# Patient Record
Sex: FEMALE | Race: WHITE | NOT HISPANIC OR LATINO | Employment: OTHER | URBAN - NONMETROPOLITAN AREA
[De-identification: names, ages, dates, MRNs, and addresses within clinical notes are randomized per-mention and may not be internally consistent; named-entity substitution may affect disease eponyms.]

---

## 2020-01-03 ENCOUNTER — TRANSCRIBE ORDERS (OUTPATIENT)
Dept: ADMINISTRATIVE | Facility: HOSPITAL | Age: 75
End: 2020-01-03

## 2020-01-03 DIAGNOSIS — C37 MALIGNANT THYMOMA (HCC): Primary | ICD-10-CM

## 2020-01-07 ENCOUNTER — HOSPITAL ENCOUNTER (OUTPATIENT)
Dept: CT IMAGING | Facility: HOSPITAL | Age: 75
Discharge: HOME | End: 2020-01-07
Payer: MEDICARE

## 2020-01-07 ENCOUNTER — TRANSCRIBE ORDERS (OUTPATIENT)
Dept: ADMINISTRATIVE | Facility: HOSPITAL | Age: 75
End: 2020-01-07

## 2020-01-07 DIAGNOSIS — C37 MALIGNANT THYMOMA (HCC): Primary | ICD-10-CM

## 2020-01-07 DIAGNOSIS — C37 MALIGNANT THYMOMA (HCC): ICD-10-CM

## 2020-01-07 DIAGNOSIS — C37 THYMOMA, MALIGNANT (HCC): ICD-10-CM

## 2020-01-07 PROCEDURE — 71260 CT THORAX DX C+: CPT

## 2020-01-07 RX ADMIN — IOHEXOL 85 ML: 350 INJECTION, SOLUTION INTRAVENOUS at 09:24

## 2020-10-21 DIAGNOSIS — C37 MALIGNANT NEOPLASM OF THYMUS (HCC): ICD-10-CM

## 2021-02-10 ENCOUNTER — IMMUNIZATIONS (OUTPATIENT)
Dept: FAMILY MEDICINE CLINIC | Facility: HOSPITAL | Age: 76
End: 2021-02-10

## 2021-02-10 DIAGNOSIS — Z23 ENCOUNTER FOR IMMUNIZATION: Primary | ICD-10-CM

## 2021-02-10 PROCEDURE — 0011A SARS-COV-2 / COVID-19 MRNA VACCINE (MODERNA) 100 MCG: CPT

## 2021-02-10 PROCEDURE — 91301 SARS-COV-2 / COVID-19 MRNA VACCINE (MODERNA) 100 MCG: CPT

## 2021-04-14 ENCOUNTER — APPOINTMENT (EMERGENCY)
Dept: CT IMAGING | Facility: HOSPITAL | Age: 76
End: 2021-04-14
Payer: MEDICARE

## 2021-04-14 ENCOUNTER — HOSPITAL ENCOUNTER (EMERGENCY)
Facility: HOSPITAL | Age: 76
Discharge: HOME | End: 2021-04-14
Attending: EMERGENCY MEDICINE | Admitting: EMERGENCY MEDICINE
Payer: MEDICARE

## 2021-04-14 ENCOUNTER — APPOINTMENT (EMERGENCY)
Dept: RADIOLOGY | Facility: HOSPITAL | Age: 76
End: 2021-04-14
Payer: MEDICARE

## 2021-04-14 VITALS
TEMPERATURE: 97.6 F | RESPIRATION RATE: 16 BRPM | OXYGEN SATURATION: 97 % | DIASTOLIC BLOOD PRESSURE: 72 MMHG | SYSTOLIC BLOOD PRESSURE: 140 MMHG | HEART RATE: 74 BPM

## 2021-04-14 DIAGNOSIS — R07.9 CHEST PAIN: ICD-10-CM

## 2021-04-14 DIAGNOSIS — K80.20 CHOLELITHIASIS: Primary | ICD-10-CM

## 2021-04-14 LAB
ALBUMIN SERPL BCP-MCNC: 2.5 G/DL (ref 3.5–5)
ALP SERPL-CCNC: 314 U/L (ref 46–116)
ALT SERPL W P-5'-P-CCNC: 44 U/L (ref 12–78)
ANION GAP SERPL CALCULATED.3IONS-SCNC: 8 MMOL/L (ref 4–13)
AST SERPL W P-5'-P-CCNC: 91 U/L (ref 5–45)
BASOPHILS # BLD AUTO: 0.02 THOUSANDS/ΜL (ref 0–0.1)
BASOPHILS NFR BLD AUTO: 0 % (ref 0–1)
BILIRUB SERPL-MCNC: 0.72 MG/DL (ref 0.2–1)
BUN SERPL-MCNC: 12 MG/DL (ref 5–25)
CALCIUM ALBUM COR SERPL-MCNC: 10.1 MG/DL (ref 8.3–10.1)
CALCIUM SERPL-MCNC: 8.9 MG/DL (ref 8.3–10.1)
CHLORIDE SERPL-SCNC: 94 MMOL/L (ref 100–108)
CO2 SERPL-SCNC: 28 MMOL/L (ref 21–32)
CREAT SERPL-MCNC: 0.82 MG/DL (ref 0.6–1.3)
EOSINOPHIL # BLD AUTO: 0.07 THOUSAND/ΜL (ref 0–0.61)
EOSINOPHIL NFR BLD AUTO: 1 % (ref 0–6)
ERYTHROCYTE [DISTWIDTH] IN BLOOD BY AUTOMATED COUNT: 15.1 % (ref 11.6–15.1)
FLUAV RNA RESP QL NAA+PROBE: NEGATIVE
FLUBV RNA RESP QL NAA+PROBE: NEGATIVE
GFR SERPL CREATININE-BSD FRML MDRD: 69 ML/MIN/1.73SQ M
GLUCOSE SERPL-MCNC: 131 MG/DL (ref 65–140)
HCT VFR BLD AUTO: 30.7 % (ref 34.8–46.1)
HGB BLD-MCNC: 9.5 G/DL (ref 11.5–15.4)
IMM GRANULOCYTES # BLD AUTO: 0.06 THOUSAND/UL (ref 0–0.2)
IMM GRANULOCYTES NFR BLD AUTO: 1 % (ref 0–2)
LACTATE SERPL-SCNC: 2 MMOL/L (ref 0.5–2)
LIPASE SERPL-CCNC: 111 U/L (ref 73–393)
LYMPHOCYTES # BLD AUTO: 1.27 THOUSANDS/ΜL (ref 0.6–4.47)
LYMPHOCYTES NFR BLD AUTO: 14 % (ref 14–44)
MCH RBC QN AUTO: 27.6 PG (ref 26.8–34.3)
MCHC RBC AUTO-ENTMCNC: 30.9 G/DL (ref 31.4–37.4)
MCV RBC AUTO: 89 FL (ref 82–98)
MONOCYTES # BLD AUTO: 0.99 THOUSAND/ΜL (ref 0.17–1.22)
MONOCYTES NFR BLD AUTO: 11 % (ref 4–12)
NEUTROPHILS # BLD AUTO: 6.54 THOUSANDS/ΜL (ref 1.85–7.62)
NEUTS SEG NFR BLD AUTO: 73 % (ref 43–75)
NRBC BLD AUTO-RTO: 0 /100 WBCS
PLATELET # BLD AUTO: 367 THOUSANDS/UL (ref 149–390)
PMV BLD AUTO: 9.9 FL (ref 8.9–12.7)
POTASSIUM SERPL-SCNC: 3.8 MMOL/L (ref 3.5–5.3)
PROT SERPL-MCNC: 6.9 G/DL (ref 6.4–8.2)
RBC # BLD AUTO: 3.44 MILLION/UL (ref 3.81–5.12)
RSV RNA RESP QL NAA+PROBE: NEGATIVE
SARS-COV-2 RNA RESP QL NAA+PROBE: NEGATIVE
SODIUM SERPL-SCNC: 130 MMOL/L (ref 136–145)
TROPONIN I SERPL-MCNC: <0.02 NG/ML
TROPONIN I SERPL-MCNC: <0.02 NG/ML
WBC # BLD AUTO: 8.95 THOUSAND/UL (ref 4.31–10.16)

## 2021-04-14 PROCEDURE — 74177 CT ABD & PELVIS W/CONTRAST: CPT

## 2021-04-14 PROCEDURE — 93005 ELECTROCARDIOGRAM TRACING: CPT

## 2021-04-14 PROCEDURE — 85025 COMPLETE CBC W/AUTO DIFF WBC: CPT | Performed by: PHYSICIAN ASSISTANT

## 2021-04-14 PROCEDURE — 71045 X-RAY EXAM CHEST 1 VIEW: CPT

## 2021-04-14 PROCEDURE — 83690 ASSAY OF LIPASE: CPT | Performed by: PHYSICIAN ASSISTANT

## 2021-04-14 PROCEDURE — 71260 CT THORAX DX C+: CPT

## 2021-04-14 PROCEDURE — 0241U HB NFCT DS VIR RESP RNA 4 TRGT: CPT | Performed by: PHYSICIAN ASSISTANT

## 2021-04-14 PROCEDURE — 83605 ASSAY OF LACTIC ACID: CPT | Performed by: PHYSICIAN ASSISTANT

## 2021-04-14 PROCEDURE — 99285 EMERGENCY DEPT VISIT HI MDM: CPT

## 2021-04-14 PROCEDURE — 99284 EMERGENCY DEPT VISIT MOD MDM: CPT | Performed by: EMERGENCY MEDICINE

## 2021-04-14 PROCEDURE — 36415 COLL VENOUS BLD VENIPUNCTURE: CPT | Performed by: PHYSICIAN ASSISTANT

## 2021-04-14 PROCEDURE — 96374 THER/PROPH/DIAG INJ IV PUSH: CPT

## 2021-04-14 PROCEDURE — 80053 COMPREHEN METABOLIC PANEL: CPT | Performed by: PHYSICIAN ASSISTANT

## 2021-04-14 PROCEDURE — 84484 ASSAY OF TROPONIN QUANT: CPT | Performed by: PHYSICIAN ASSISTANT

## 2021-04-14 PROCEDURE — 96361 HYDRATE IV INFUSION ADD-ON: CPT

## 2021-04-14 RX ORDER — KETOROLAC TROMETHAMINE 30 MG/ML
15 INJECTION, SOLUTION INTRAMUSCULAR; INTRAVENOUS ONCE
Status: COMPLETED | OUTPATIENT
Start: 2021-04-14 | End: 2021-04-14

## 2021-04-14 RX ORDER — CEPHALEXIN 250 MG/1
CAPSULE ORAL
COMMUNITY
Start: 2021-01-14

## 2021-04-14 RX ORDER — ATORVASTATIN CALCIUM 20 MG/1
20 TABLET, FILM COATED ORAL
COMMUNITY
Start: 2021-03-30

## 2021-04-14 RX ADMIN — IOHEXOL 100 ML: 350 INJECTION, SOLUTION INTRAVENOUS at 18:02

## 2021-04-14 RX ADMIN — KETOROLAC TROMETHAMINE 15 MG: 30 INJECTION, SOLUTION INTRAMUSCULAR at 18:51

## 2021-04-14 RX ADMIN — SODIUM CHLORIDE 1000 ML: 0.9 INJECTION, SOLUTION INTRAVENOUS at 17:40

## 2021-04-14 NOTE — ED ATTENDING ATTESTATION
4/14/2021  ILebron MD, saw and evaluated the patient  I have discussed the patient with the resident/non-physician practitioner and agree with the resident's/non-physician practitioner's findings, Plan of Care, and MDM as documented in the resident's/non-physician practitioner's note, except where noted  All available labs and Radiology studies were reviewed  I was present for key portions of any procedure(s) performed by the resident/non-physician practitioner and I was immediately available to provide assistance  At this point I agree with the current assessment done in the Emergency Department  I have conducted an independent evaluation of this patient a history and physical is as follows:    ED Course     Patient complains of sharp pain under her breast   No radiation  Felt short of breath  Felt better after nitroglycerin  On exam patient awake alert  Lungs are clear to auscultation  Heart is a regular rate and rhythm  EKG shows no acute  ST changes  Abdomen soft nontender good bowel sounds      Critical Care Time  Procedures

## 2021-04-14 NOTE — ED PROVIDER NOTES
History  Chief Complaint   Patient presents with    Chest Pain     pt left chest pain started at 1430 today that comes and go  denies N/V/D, dizziness, diaphoresis  states SOB due to the pain  pt states fevers for the past 2 months everyday      57-year-old female presents the emergency department for evaluation of pain underneath bilateral breasts  Pain does not radiate  Sharp in nature  Patient states pain started suddenly around 14 30 today  Reports this has been relatively consistent since however waxing and waning  She reports she had had similar previously when she had passed a gallstone however denies cholecystectomy  Patient states she had COVID pneumonia in February  Reports since she has had intermittent fevers  She denies any cough, congestion, chest pain, shortness of breath  Patient denies any nausea, vomiting, diarrhea  She denies any dizziness, diaphoresis  Patient denies any leg swelling  She denies any cardiac history  Patient denies history of hypertension  Patient denies any trauma or injury  History provided by:  Patient  Chest Pain  Chest pain location: Beneath bilateral breasts  Pain quality: sharp    Pain radiates to:  Does not radiate  Pain radiates to the back: no    Pain severity:  Moderate  Onset quality:  Sudden  Timing:  Constant  Progression:  Waxing and waning  Chronicity:  New  Worsened by:  Nothing tried  Associated symptoms: abdominal pain and fever    Associated symptoms: no AICD problem, no altered mental status, no anorexia, no anxiety, no back pain, no claudication, no cough, no diaphoresis, no dizziness, no dysphagia, no fatigue, no headache, no heartburn, no lower extremity edema, no nausea, no near-syncope, no numbness, no orthopnea, no palpitations, no PND, no shortness of breath, no syncope, not vomiting and no weakness        Prior to Admission Medications   Prescriptions Last Dose Informant Patient Reported?  Taking?   atorvastatin (LIPITOR) 20 mg tablet   Yes Yes   Si mg   cephalexin (KEFLEX) 250 mg capsule   Yes Yes   Sig:   Start: 21 7:54:00 EST, See Instructions, Disp# 30 cap, Refills: 5, TAKE 1 CAPSULE BY MOUTH EVERY DAY, Pharmacy: [facility]/pharmacy #[id number]      Facility-Administered Medications: None       History reviewed  No pertinent past medical history  Past Surgical History:   Procedure Laterality Date    HYSTERECTOMY         History reviewed  No pertinent family history  I have reviewed and agree with the history as documented  E-Cigarette/Vaping    E-Cigarette Use Never User      E-Cigarette/Vaping Substances     Social History     Tobacco Use    Smoking status: Never Smoker    Smokeless tobacco: Never Used   Substance Use Topics    Alcohol use: Yes     Frequency: Monthly or less    Drug use: Never       Review of Systems   Constitutional: Positive for fever  Negative for appetite change, chills, diaphoresis and fatigue  HENT: Negative  Negative for trouble swallowing  Eyes: Negative for visual disturbance  Respiratory: Negative for cough, choking, chest tightness, shortness of breath, wheezing and stridor  Cardiovascular: Positive for chest pain  Negative for palpitations, orthopnea, claudication, leg swelling, syncope, PND and near-syncope  Gastrointestinal: Positive for abdominal pain  Negative for anorexia, blood in stool, constipation, diarrhea, heartburn, nausea, rectal pain and vomiting  Genitourinary: Negative  Musculoskeletal: Negative  Negative for back pain  Skin: Negative  Neurological: Negative  Negative for dizziness, weakness, numbness and headaches  All other systems reviewed and are negative  Physical Exam  Physical Exam  Vitals signs and nursing note reviewed  Constitutional:       General: She is not in acute distress  Appearance: She is well-developed and normal weight  She is not ill-appearing, toxic-appearing or diaphoretic     HENT:      Head: Normocephalic and atraumatic  Nose: Nose normal  No congestion or rhinorrhea  Mouth/Throat:      Mouth: Mucous membranes are moist       Pharynx: Oropharynx is clear  No oropharyngeal exudate or posterior oropharyngeal erythema  Eyes:      Extraocular Movements: Extraocular movements intact  Conjunctiva/sclera: Conjunctivae normal       Pupils: Pupils are equal, round, and reactive to light  Neck:      Musculoskeletal: Normal range of motion and neck supple  Cardiovascular:      Rate and Rhythm: Normal rate and regular rhythm  Pulmonary:      Effort: Pulmonary effort is normal  No respiratory distress  Breath sounds: Normal breath sounds  No stridor  No wheezing, rhonchi or rales  Chest:      Chest wall: Tenderness present  No mass  Abdominal:      General: Abdomen is flat  Bowel sounds are normal       Palpations: Abdomen is soft  Musculoskeletal: Normal range of motion  Right lower leg: She exhibits no tenderness  No edema  Left lower leg: She exhibits no tenderness  No edema  Skin:     General: Skin is warm and dry  Capillary Refill: Capillary refill takes less than 2 seconds  Coloration: Skin is not pale  Findings: No bruising, ecchymosis, erythema or rash  Nails: There is no clubbing  Neurological:      General: No focal deficit present  Mental Status: She is alert and oriented to person, place, and time  Sensory: No sensory deficit  Motor: No weakness  Coordination: Coordination normal       Deep Tendon Reflexes: Reflexes normal    Psychiatric:         Mood and Affect: Mood normal          Behavior: Behavior normal          Thought Content:  Thought content normal          Judgment: Judgment normal          Vital Signs  ED Triage Vitals [04/14/21 1624]   Temperature Pulse Respirations Blood Pressure SpO2   (!) 97 1 °F (36 2 °C) 83 20 119/63 92 %      Temp Source Heart Rate Source Patient Position - Orthostatic VS BP Location FiO2 (%) Temporal Monitor Lying Right arm --      Pain Score       4           Vitals:    04/14/21 1624 04/14/21 1700 04/14/21 1820   BP: 119/63 109/58 149/69   Pulse: 83 73 79   Patient Position - Orthostatic VS: Lying Lying Lying         Visual Acuity      ED Medications  Medications   sodium chloride 0 9 % bolus 1,000 mL (0 mL Intravenous Stopped 4/14/21 1927)   ketorolac (TORADOL) injection 15 mg (15 mg Intravenous Given 4/14/21 1851)   iohexol (OMNIPAQUE) 350 MG/ML injection (SINGLE-DOSE) 100 mL (100 mL Intravenous Given 4/14/21 1802)       Diagnostic Studies  Results Reviewed     Procedure Component Value Units Date/Time    Troponin I [[id number]]  (Normal) Collected: 04/14/21 1925    Lab Status: Final result Specimen: Blood from Arm, Right Updated: 04/14/21 1947     Troponin I <0 02 ng/mL     COVID19, Influenza A/B, RSV PCR, [facility] [[id number]]  (Normal) Collected: 04/14/21 1634    Lab Status: Final result Specimen: Nares from Nasopharyngeal Swab Updated: 04/14/21 1724     SARS-CoV-2 Negative     INFLUENZA A PCR Negative     INFLUENZA B PCR Negative     RSV PCR Negative    Narrative: This test has been authorized by FDA under an EUA (Emergency Use Assay) for use by authorized laboratories  Clinical caution and judgement should be used with the interpretation of these results with consideration of the clinical impression and other laboratory testing  Testing reported as "Positive" or "Negative" has been proven to be accurate according to standard laboratory validation requirements  All testing is performed with control materials showing appropriate reactivity at standard intervals      Comprehensive metabolic panel [[id number]]  (Abnormal) Collected: 04/14/21 1634    Lab Status: Final result Specimen: Blood from Arm, Right Updated: 04/14/21 1714     Sodium 130 mmol/L      Potassium 3 8 mmol/L      Chloride 94 mmol/L      CO2 28 mmol/L      ANION GAP 8 mmol/L      BUN 12 mg/dL      Creatinine 0 82 mg/dL Glucose 131 mg/dL      Calcium 8 9 mg/dL      Corrected Calcium 10 1 mg/dL      AST 91 U/L      ALT 44 U/L      Alkaline Phosphatase 314 U/L      Total Protein 6 9 g/dL      Albumin 2 5 g/dL      Total Bilirubin 0 72 mg/dL      eGFR 69 ml/min/1 73sq m     Narrative:      [place] guidelines for Chronic Kidney Disease (CKD):     Stage 1 with normal or high GFR (GFR > 90 mL/min/1 73 square meters)    Stage 2 Mild CKD (GFR = 60-89 mL/min/1 73 square meters)    Stage 3A Moderate CKD (GFR = 45-59 mL/min/1 73 square meters)    Stage 3B Moderate CKD (GFR = 30-44 mL/min/1 73 square meters)    Stage 4 Severe CKD (GFR = 15-29 mL/min/1 73 square meters)    Stage 5 End Stage CKD (GFR <15 mL/min/1 73 square meters)  Note: GFR calculation is accurate only with a steady state creatinine    Lipase [[id number]]  (Normal) Collected: 04/14/21 1634    Lab Status: Final result Specimen: Blood from Arm, Right Updated: 04/14/21 1714     Lipase 111 u/L     Lactic acid [[id number]]  (Normal) Collected: 04/14/21 1634    Lab Status: Final result Specimen: Blood from Arm, Right Updated: 04/14/21 1705     LACTIC ACID 2 0 mmol/L     Narrative:      Result may be elevated if tourniquet was used during collection      Troponin I [[id number]]  (Normal) Collected: 04/14/21 1634    Lab Status: Final result Specimen: Blood from Arm, Right Updated: 04/14/21 1705     Troponin I <0 02 ng/mL     CBC and differential [[id number]]  (Abnormal) Collected: 04/14/21 1634    Lab Status: Final result Specimen: Blood from Arm, Right Updated: 04/14/21 1642     WBC 8 95 Thousand/uL      RBC 3 44 Million/uL      Hemoglobin 9 5 g/dL      Hematocrit 30 7 %      MCV 89 fL      MCH 27 6 pg      MCHC 30 9 g/dL      RDW 15 1 %      MPV 9 9 fL      Platelets 890 Thousands/uL      nRBC 0 /100 WBCs      Neutrophils Relative 73 %      Immat GRANS % 1 %      Lymphocytes Relative 14 %      Monocytes Relative 11 %      Eosinophils Relative 1 % Basophils Relative 0 %      Neutrophils Absolute 6 54 Thousands/µL      Immature Grans Absolute 0 06 Thousand/uL      Lymphocytes Absolute 1 27 Thousands/µL      Monocytes Absolute 0 99 Thousand/µL      Eosinophils Absolute 0 07 Thousand/µL      Basophils Absolute 0 02 Thousands/µL                  CT chest abdomen pelvis w contrast   Final Result by Hoang Soriano MD (04/14 1856)      Pulmonary findings highly suspicious for Covid 19 pneumonia  Serologic correlation is recommended  Cholelithiasis without findings to suggest cholecystitis  Mild nonspecific intrahepatic ductal dilatation  Recommend correlation with LFTs  The study was marked in [facility] for immediate notification  Workstation performed: [id number]         XR chest 1 view portable    (Results Pending)              Procedures  ECG 12 Lead Documentation Only    Date/Time: 4/14/2021 4:46 PM  Performed by: Holly Soto PA-C  Authorized by: Holly Soto PA-C     Indications / Diagnosis:  Chest pain  Patient location:  ED  Interpretation:     Interpretation: non-specific    Rate:     ECG rate:  83    ECG rate assessment: normal    Rhythm:     Rhythm: sinus rhythm    Ectopy:     Ectopy: none    QRS:     QRS axis:  Normal    QRS intervals:  Normal  Conduction:     Conduction: normal    ST segments:     ST segments:  Normal  T waves:     T waves: normal               ED Course  ED Course as of Apr 14 2008 Wed Apr 14, 2021   1707 Troponin I: <0 02   1718 Sodium(!): 130   1858 CT:    Pulmonary findings highly suspicious for Covid 19 pneumonia  Serologic correlation is recommended  Cholelithiasis without findings to suggest cholecystitis  Mild nonspecific intrahepatic ductal dilatation  Recommend correlation with LFTs  1919 Discussed all results and findings with the patient  No significant elevation of LFTs  Patient with history of COVID pneumonia correlating with pulmonary findings    Patient feeling significantly improved  Will recheck troponin  1954 Second troponin normal      2005 I discussed all results and findings with the patient  We discussed symptomatic treatment at home and symptoms that require prompt return to the ED for further evaluation patient verbalized understanding  She will follow-up with PCP she will distally follow-up with General surgery and referral was placed  HEART Risk Score      Most Recent Value   Heart Score Risk Calculator   History  0 Filed at: 04/14/2021 1718   ECG  0 Filed at: 04/14/2021 1718   Age  2 Filed at: 04/14/2021 1718   Risk Factors  0 Filed at: 04/14/2021 1718   Troponin  0 Filed at: 04/14/2021 1718   HEART Score  2 Filed at: 04/14/2021 1718                      SBIRT 20yo+      Most Recent Value   SBIRT (23 yo +)   In order to provide better care to our patients, we are screening all of our patients for alcohol and drug use  Would it be okay to ask you these screening questions? Yes Filed at: 04/14/2021 1744   Initial Alcohol Screen: US AUDIT-C    1  How often do you have a drink containing alcohol?  0 Filed at: 04/14/2021 1744   2  How many drinks containing alcohol do you have on a typical day you are drinking? 0 Filed at: 04/14/2021 1744   3a  Male UNDER 65: How often do you have five or more drinks on one occasion? 0 Filed at: 04/14/2021 1744   3b  FEMALE Any Age, or MALE 65+: How often do you have 4 or more drinks on one occassion? 0 Filed at: 04/14/2021 1744   Audit-C Score  0 Filed at: 04/14/2021 1744   BRIELLE: How many times in the past year have you    Used an illegal drug or used a prescription medication for non-medical reasons?   Never Filed at: 04/14/2021 1744                    MDM  Number of Diagnoses or Management Options  Chest pain: new and requires workup  Cholelithiasis: new and requires workup  Diagnosis management comments: 66year old female presents emergency department for evaluation of pain underneath bilateral breasts onset at 2:30 p m  Today  Vitals and medical record reviewed  Pain resolved with Toradol in the emergency department  Physical exam was relatively unremarkable  She patient did have reproducible tenderness  Heart regular rate rhythm  Lungs clear  Laboratory findings significant for sodium of 130  No leukocytosis  Normal lipase  LFTs relatively unremarkable  Troponin negative  EKG was nonischemic  CT scan significant for pulmonary findings highly suspicious for Covid 19 pneumonia which patient has history of and is currently COVID negative  Serologic correlation is recommended  Cholelithiasis without findings to suggest cholecystitis  Nonspecific intrahepatic ductal dilatation  LFTs relatively unremarkable  Patient had improvement of symptoms  Troponin negative x2  We discussed results and findings, symptomatic treatment and appropriate follow-up  Patient was educated on symptoms that require prompt return to the emergency department for further evaluation and verbalized understanding  Workstation performed: [id number]         XR chest 1 view portable    (Results Pending)         Amount and/or Complexity of Data Reviewed  Clinical lab tests: ordered and reviewed  Tests in the radiology section of CPT®: reviewed and ordered  Review and summarize past medical records: yes  Independent visualization of images, tracings, or specimens: yes        Disposition  Final diagnoses:   Cholelithiasis   Chest pain     Time reflects when diagnosis was documented in both MDM as applicable and the Disposition within this note     Time User Action Codes Description Comment    4/14/2021  7:54 PM Loura Decree [K80 20] Cholelithiasis     4/14/2021  7:56 PM Louie Ojeda [R07 9] Chest pain       ED Disposition     ED Disposition Condition Date/Time Comment    Discharge Stable Wed Apr 14, 2021  7:54 PM [facility] discharge to home/self care              Follow-up Information     Follow up With Specialties Details Why Staceyaryan U  94  In 1 week As needed, If symptoms worsen [address] [facility] 5  [facility] [address] [place] [address]      Shellie Conrad, DO General Surgery In 1 week  [address] [address] [phone]            Patient's Medications   Discharge Prescriptions    No medications on file         PDMP Review     None          ED Provider  Electronically Signed by           Naman Bee PA-C  04/14/21 2008

## 2021-04-14 NOTE — DISCHARGE INSTRUCTIONS
If you have any new or worsening symptoms please return immediately to the emergency department  Please follow-up with your family doctor  I would additionally like you to follow-up with surgery for evaluation of your gallbladder    I placed a referral to a surgeon and Dr Stefany Acuña office number was provided

## 2021-04-18 LAB
ATRIAL RATE: 83 BPM
P AXIS: 28 DEGREES
PR INTERVAL: 178 MS
QRS AXIS: 39 DEGREES
QRSD INTERVAL: 70 MS
QT INTERVAL: 384 MS
QTC INTERVAL: 451 MS
T WAVE AXIS: 55 DEGREES
VENTRICULAR RATE: 83 BPM

## 2021-04-18 PROCEDURE — 93010 ELECTROCARDIOGRAM REPORT: CPT | Performed by: INTERNAL MEDICINE

## 2024-02-04 ENCOUNTER — HOSPITAL ENCOUNTER (INPATIENT)
Facility: HOSPITAL | Age: 79
LOS: 4 days | Discharge: HOME HEALTH | DRG: 871 | End: 2024-02-08
Attending: EMERGENCY MEDICINE | Admitting: HOSPITALIST
Payer: MEDICARE

## 2024-02-04 ENCOUNTER — APPOINTMENT (EMERGENCY)
Dept: RADIOLOGY | Facility: HOSPITAL | Age: 79
DRG: 871 | End: 2024-02-04
Payer: MEDICARE

## 2024-02-04 ENCOUNTER — APPOINTMENT (EMERGENCY)
Dept: CT IMAGING | Facility: HOSPITAL | Age: 79
DRG: 871 | End: 2024-02-04
Payer: MEDICARE

## 2024-02-04 ENCOUNTER — APPOINTMENT (INPATIENT)
Dept: CT IMAGING | Facility: HOSPITAL | Age: 79
DRG: 871 | End: 2024-02-04
Payer: MEDICARE

## 2024-02-04 DIAGNOSIS — J18.9 PNEUMONIA: ICD-10-CM

## 2024-02-04 DIAGNOSIS — N30.00 ACUTE CYSTITIS WITHOUT HEMATURIA: ICD-10-CM

## 2024-02-04 DIAGNOSIS — R53.1 GENERALIZED WEAKNESS: ICD-10-CM

## 2024-02-04 DIAGNOSIS — N39.0 UTI (URINARY TRACT INFECTION): ICD-10-CM

## 2024-02-04 DIAGNOSIS — U07.1 COVID-19: ICD-10-CM

## 2024-02-04 DIAGNOSIS — R78.81 BACTEREMIA: ICD-10-CM

## 2024-02-04 DIAGNOSIS — E87.20 LACTIC ACIDOSIS: ICD-10-CM

## 2024-02-04 DIAGNOSIS — R06.00 DYSPNEA: ICD-10-CM

## 2024-02-04 DIAGNOSIS — A41.51 SEPSIS DUE TO ESCHERICHIA COLI, UNSPECIFIED WHETHER ACUTE ORGAN DYSFUNCTION PRESENT (HCC): ICD-10-CM

## 2024-02-04 DIAGNOSIS — R26.2 AMBULATORY DYSFUNCTION: ICD-10-CM
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
## 2024-02-04 PROBLEM — Z85.238: Status: ACTIVE | Noted: 2024-02-04

## 2024-02-04 PROBLEM — Z85.850 HISTORY OF THYROID CANCER: Status: ACTIVE | Noted: 2024-02-04

## 2024-02-04 LAB
2HR DELTA HS TROPONIN: -2 NG/L
ALBUMIN SERPL BCP-MCNC: 3.5 G/DL (ref 3.5–5)
ALP SERPL-CCNC: 211 U/L (ref 34–104)
ALT SERPL W P-5'-P-CCNC: 41 U/L (ref 7–52)
AMORPH URATE CRY URNS QL MICRO: ABNORMAL /HPF
ANION GAP SERPL CALCULATED.3IONS-SCNC: 12 MMOL/L
ANISOCYTOSIS BLD QL SMEAR: PRESENT
APTT PPP: 25 SECONDS (ref 23–37)
AST SERPL W P-5'-P-CCNC: 27 U/L (ref 13–39)
BACTERIA UR QL AUTO: ABNORMAL /HPF
BASOPHILS # BLD MANUAL: 0 THOUSAND/UL (ref 0–0.1)
BASOPHILS NFR MAR MANUAL: 0 % (ref 0–1)
BILIRUB SERPL-MCNC: 1.06 MG/DL (ref 0.2–1)
BILIRUB UR QL STRIP: NEGATIVE
BNP SERPL-MCNC: 108 PG/ML (ref 0–100)
BUN SERPL-MCNC: 16 MG/DL (ref 5–25)
BURR CELLS BLD QL SMEAR: PRESENT
CALCIUM SERPL-MCNC: 8.2 MG/DL (ref 8.4–10.2)
CARDIAC TROPONIN I PNL SERPL HS: 30 NG/L
CARDIAC TROPONIN I PNL SERPL HS: 32 NG/L
CHLORIDE SERPL-SCNC: 97 MMOL/L (ref 96–108)
CK SERPL-CCNC: 12 U/L (ref 26–192)
CLARITY UR: ABNORMAL
CO2 SERPL-SCNC: 26 MMOL/L (ref 21–32)
COLOR UR: YELLOW
CREAT SERPL-MCNC: 0.94 MG/DL (ref 0.6–1.3)
CRP SERPL QL: 138.5 MG/L
D DIMER PPP FEU-MCNC: 1.61 UG/ML FEU
EOSINOPHIL # BLD MANUAL: 0 THOUSAND/UL (ref 0–0.4)
EOSINOPHIL NFR BLD MANUAL: 0 % (ref 0–6)
ERYTHROCYTE [DISTWIDTH] IN BLOOD BY AUTOMATED COUNT: 15.5 % (ref 11.6–15.1)
FLUAV RNA RESP QL NAA+PROBE: NEGATIVE
FLUBV RNA RESP QL NAA+PROBE: NEGATIVE
GFR SERPL CREATININE-BSD FRML MDRD: 57 ML/MIN/1.73SQ M
GLUCOSE SERPL-MCNC: 111 MG/DL (ref 65–140)
GLUCOSE UR STRIP-MCNC: NEGATIVE MG/DL
HCT VFR BLD AUTO: 40.6 % (ref 34.8–46.1)
HGB BLD-MCNC: 12.6 G/DL (ref 11.5–15.4)
HGB UR QL STRIP.AUTO: ABNORMAL
INR PPP: 0.93 (ref 0.84–1.19)
KETONES UR STRIP-MCNC: NEGATIVE MG/DL
L PNEUMO1 AG UR QL IA.RAPID: NEGATIVE
LACTATE SERPL-SCNC: 1.3 MMOL/L (ref 0.5–2)
LACTATE SERPL-SCNC: 2.6 MMOL/L (ref 0.5–2)
LEUKOCYTE ESTERASE UR QL STRIP: ABNORMAL
LIPASE SERPL-CCNC: 7 U/L (ref 11–82)
LYMPHOCYTES # BLD AUTO: 0.83 THOUSAND/UL (ref 0.6–4.47)
LYMPHOCYTES # BLD AUTO: 8 % (ref 14–44)
MCH RBC QN AUTO: 27 PG (ref 26.8–34.3)
MCHC RBC AUTO-ENTMCNC: 31 G/DL (ref 31.4–37.4)
MCV RBC AUTO: 87 FL (ref 82–98)
MONOCYTES # BLD AUTO: 0.21 THOUSAND/UL (ref 0–1.22)
MONOCYTES NFR BLD: 2 % (ref 4–12)
NEUTROPHILS # BLD MANUAL: 9.29 THOUSAND/UL (ref 1.85–7.62)
NEUTS BAND NFR BLD MANUAL: 8 % (ref 0–8)
NEUTS SEG NFR BLD AUTO: 82 % (ref 43–75)
NITRITE UR QL STRIP: POSITIVE
NON-SQ EPI CELLS URNS QL MICRO: ABNORMAL /HPF
OVALOCYTES BLD QL SMEAR: PRESENT
PH UR STRIP.AUTO: 6.5 [PH]
PLATELET # BLD AUTO: 187 THOUSANDS/UL (ref 149–390)
PLATELET BLD QL SMEAR: ADEQUATE
PMV BLD AUTO: 10.3 FL (ref 8.9–12.7)
POTASSIUM SERPL-SCNC: 3.2 MMOL/L (ref 3.5–5.3)
PROCALCITONIN SERPL-MCNC: 0.23 NG/ML
PROT SERPL-MCNC: 5.7 G/DL (ref 6.4–8.4)
PROT UR STRIP-MCNC: ABNORMAL MG/DL
PROTHROMBIN TIME: 12.8 SECONDS (ref 11.6–14.5)
RBC # BLD AUTO: 4.66 MILLION/UL (ref 3.81–5.12)
RBC #/AREA URNS AUTO: ABNORMAL /HPF
RBC MORPH BLD: PRESENT
RSV RNA RESP QL NAA+PROBE: NEGATIVE
S PNEUM AG UR QL: NEGATIVE
SARS-COV-2 RNA RESP QL NAA+PROBE: POSITIVE
SODIUM SERPL-SCNC: 135 MMOL/L (ref 135–147)
SP GR UR STRIP.AUTO: 1.01 (ref 1–1.03)
TSH SERPL DL<=0.05 MIU/L-ACNC: 3.34 UIU/ML (ref 0.45–4.5)
UROBILINOGEN UR QL STRIP.AUTO: 0.2 E.U./DL
WBC # BLD AUTO: 10.32 THOUSAND/UL (ref 4.31–10.16)
WBC #/AREA URNS AUTO: ABNORMAL /HPF
WBC CLUMPS # UR AUTO: YES /UL

## 2024-02-04 PROCEDURE — 85730 THROMBOPLASTIN TIME PARTIAL: CPT | Performed by: EMERGENCY MEDICINE

## 2024-02-04 PROCEDURE — 85027 COMPLETE CBC AUTOMATED: CPT | Performed by: EMERGENCY MEDICINE

## 2024-02-04 PROCEDURE — 71250 CT THORAX DX C-: CPT

## 2024-02-04 PROCEDURE — 84443 ASSAY THYROID STIM HORMONE: CPT | Performed by: EMERGENCY MEDICINE

## 2024-02-04 PROCEDURE — 84484 ASSAY OF TROPONIN QUANT: CPT

## 2024-02-04 PROCEDURE — 82550 ASSAY OF CK (CPK): CPT

## 2024-02-04 PROCEDURE — 96361 HYDRATE IV INFUSION ADD-ON: CPT

## 2024-02-04 PROCEDURE — 87040 BLOOD CULTURE FOR BACTERIA: CPT | Performed by: EMERGENCY MEDICINE

## 2024-02-04 PROCEDURE — 83605 ASSAY OF LACTIC ACID: CPT | Performed by: EMERGENCY MEDICINE

## 2024-02-04 PROCEDURE — 71275 CT ANGIOGRAPHY CHEST: CPT

## 2024-02-04 PROCEDURE — 87186 SC STD MICRODIL/AGAR DIL: CPT | Performed by: EMERGENCY MEDICINE

## 2024-02-04 PROCEDURE — 87449 NOS EACH ORGANISM AG IA: CPT

## 2024-02-04 PROCEDURE — 70450 CT HEAD/BRAIN W/O DYE: CPT

## 2024-02-04 PROCEDURE — 83690 ASSAY OF LIPASE: CPT | Performed by: EMERGENCY MEDICINE

## 2024-02-04 PROCEDURE — 99285 EMERGENCY DEPT VISIT HI MDM: CPT

## 2024-02-04 PROCEDURE — 85610 PROTHROMBIN TIME: CPT | Performed by: EMERGENCY MEDICINE

## 2024-02-04 PROCEDURE — 94664 DEMO&/EVAL PT USE INHALER: CPT

## 2024-02-04 PROCEDURE — 0241U HB NFCT DS VIR RESP RNA 4 TRGT: CPT | Performed by: EMERGENCY MEDICINE

## 2024-02-04 PROCEDURE — G1004 CDSM NDSC: HCPCS

## 2024-02-04 PROCEDURE — 85007 BL SMEAR W/DIFF WBC COUNT: CPT | Performed by: EMERGENCY MEDICINE

## 2024-02-04 PROCEDURE — 84145 PROCALCITONIN (PCT): CPT

## 2024-02-04 PROCEDURE — 86140 C-REACTIVE PROTEIN: CPT

## 2024-02-04 PROCEDURE — 36415 COLL VENOUS BLD VENIPUNCTURE: CPT | Performed by: EMERGENCY MEDICINE

## 2024-02-04 PROCEDURE — 74176 CT ABD & PELVIS W/O CONTRAST: CPT

## 2024-02-04 PROCEDURE — XW033E5 INTRODUCTION OF REMDESIVIR ANTI-INFECTIVE INTO PERIPHERAL VEIN, PERCUTANEOUS APPROACH, NEW TECHNOLOGY GROUP 5: ICD-10-PCS | Performed by: FAMILY MEDICINE

## 2024-02-04 PROCEDURE — 73060 X-RAY EXAM OF HUMERUS: CPT

## 2024-02-04 PROCEDURE — 81001 URINALYSIS AUTO W/SCOPE: CPT | Performed by: EMERGENCY MEDICINE

## 2024-02-04 PROCEDURE — 99223 1ST HOSP IP/OBS HIGH 75: CPT | Performed by: HOSPITALIST

## 2024-02-04 PROCEDURE — 96374 THER/PROPH/DIAG INJ IV PUSH: CPT

## 2024-02-04 PROCEDURE — 93005 ELECTROCARDIOGRAM TRACING: CPT

## 2024-02-04 PROCEDURE — 83880 ASSAY OF NATRIURETIC PEPTIDE: CPT | Performed by: EMERGENCY MEDICINE

## 2024-02-04 PROCEDURE — 99285 EMERGENCY DEPT VISIT HI MDM: CPT | Performed by: EMERGENCY MEDICINE

## 2024-02-04 PROCEDURE — 72125 CT NECK SPINE W/O DYE: CPT

## 2024-02-04 PROCEDURE — 87154 CUL TYP ID BLD PTHGN 6+ TRGT: CPT | Performed by: EMERGENCY MEDICINE

## 2024-02-04 PROCEDURE — 85379 FIBRIN DEGRADATION QUANT: CPT

## 2024-02-04 PROCEDURE — 87086 URINE CULTURE/COLONY COUNT: CPT | Performed by: EMERGENCY MEDICINE

## 2024-02-04 PROCEDURE — 80053 COMPREHEN METABOLIC PANEL: CPT | Performed by: EMERGENCY MEDICINE

## 2024-02-04 RX ORDER — ACETAMINOPHEN 325 MG/1
650 TABLET ORAL EVERY 6 HOURS PRN
Status: DISCONTINUED | OUTPATIENT
Start: 2024-02-04 | End: 2024-02-08 | Stop reason: HOSPADM

## 2024-02-04 RX ORDER — OMEPRAZOLE 20 MG/1
20 CAPSULE, DELAYED RELEASE ORAL DAILY
Status: ON HOLD | COMMUNITY

## 2024-02-04 RX ORDER — PANTOPRAZOLE SODIUM 40 MG/1
40 TABLET, DELAYED RELEASE ORAL
Status: DISCONTINUED | OUTPATIENT
Start: 2024-02-04 | End: 2024-02-08 | Stop reason: HOSPADM

## 2024-02-04 RX ORDER — CEFTRIAXONE 1 G/50ML
1000 INJECTION, SOLUTION INTRAVENOUS EVERY 24 HOURS
Status: DISCONTINUED | OUTPATIENT
Start: 2024-02-05 | End: 2024-02-05

## 2024-02-04 RX ORDER — CEFTRIAXONE 1 G/50ML
1000 INJECTION, SOLUTION INTRAVENOUS EVERY 24 HOURS
Status: DISCONTINUED | OUTPATIENT
Start: 2024-02-04 | End: 2024-02-04

## 2024-02-04 RX ORDER — MYCOPHENOLATE MOFETIL 250 MG/1
1000 CAPSULE ORAL EVERY 12 HOURS SCHEDULED
Status: DISCONTINUED | OUTPATIENT
Start: 2024-02-04 | End: 2024-02-08 | Stop reason: HOSPADM

## 2024-02-04 RX ORDER — METHENAMINE HIPPURATE 1000 MG/1
1 TABLET ORAL 2 TIMES DAILY WITH MEALS
Status: ON HOLD | COMMUNITY

## 2024-02-04 RX ORDER — ONDANSETRON 2 MG/ML
4 INJECTION INTRAMUSCULAR; INTRAVENOUS EVERY 6 HOURS PRN
Status: DISCONTINUED | OUTPATIENT
Start: 2024-02-04 | End: 2024-02-08 | Stop reason: HOSPADM

## 2024-02-04 RX ORDER — MULTIVIT WITH MINERALS/LUTEIN
500 TABLET ORAL DAILY
Status: ON HOLD | COMMUNITY

## 2024-02-04 RX ORDER — ENOXAPARIN SODIUM 100 MG/ML
40 INJECTION SUBCUTANEOUS DAILY
Status: DISCONTINUED | OUTPATIENT
Start: 2024-02-04 | End: 2024-02-08 | Stop reason: HOSPADM

## 2024-02-04 RX ORDER — ASCORBIC ACID 500 MG
500 TABLET ORAL DAILY
Status: DISCONTINUED | OUTPATIENT
Start: 2024-02-04 | End: 2024-02-08 | Stop reason: HOSPADM

## 2024-02-04 RX ORDER — GUAIFENESIN 600 MG/1
600 TABLET, EXTENDED RELEASE ORAL 2 TIMES DAILY
Status: DISCONTINUED | OUTPATIENT
Start: 2024-02-04 | End: 2024-02-06

## 2024-02-04 RX ORDER — SODIUM CHLORIDE, SODIUM GLUCONATE, SODIUM ACETATE, POTASSIUM CHLORIDE, MAGNESIUM CHLORIDE, SODIUM PHOSPHATE, DIBASIC, AND POTASSIUM PHOSPHATE .53; .5; .37; .037; .03; .012; .00082 G/100ML; G/100ML; G/100ML; G/100ML; G/100ML; G/100ML; G/100ML
75 INJECTION, SOLUTION INTRAVENOUS CONTINUOUS
Status: DISCONTINUED | OUTPATIENT
Start: 2024-02-04 | End: 2024-02-06

## 2024-02-04 RX ORDER — ATOVAQUONE 750 MG/5ML
1500 SUSPENSION ORAL DAILY
Status: ON HOLD | COMMUNITY

## 2024-02-04 RX ORDER — ACETAMINOPHEN 325 MG/1
650 TABLET ORAL ONCE
Status: COMPLETED | OUTPATIENT
Start: 2024-02-04 | End: 2024-02-04

## 2024-02-04 RX ORDER — POTASSIUM CHLORIDE 20 MEQ/1
40 TABLET, EXTENDED RELEASE ORAL ONCE
Status: COMPLETED | OUTPATIENT
Start: 2024-02-04 | End: 2024-02-04

## 2024-02-04 RX ORDER — CEFTRIAXONE 2 G/50ML
2000 INJECTION, SOLUTION INTRAVENOUS ONCE
Status: COMPLETED | OUTPATIENT
Start: 2024-02-04 | End: 2024-02-04

## 2024-02-04 RX ORDER — DEXAMETHASONE SODIUM PHOSPHATE 10 MG/ML
6 INJECTION, SOLUTION INTRAMUSCULAR; INTRAVENOUS EVERY 24 HOURS
Qty: 10 ML | Refills: 0 | Status: DISCONTINUED | OUTPATIENT
Start: 2024-02-04 | End: 2024-02-08 | Stop reason: HOSPADM

## 2024-02-04 RX ORDER — PREDNISONE 20 MG/1
20 TABLET ORAL DAILY
Status: ON HOLD | COMMUNITY

## 2024-02-04 RX ORDER — SODIUM CHLORIDE, SODIUM GLUCONATE, SODIUM ACETATE, POTASSIUM CHLORIDE, MAGNESIUM CHLORIDE, SODIUM PHOSPHATE, DIBASIC, AND POTASSIUM PHOSPHATE .53; .5; .37; .037; .03; .012; .00082 G/100ML; G/100ML; G/100ML; G/100ML; G/100ML; G/100ML; G/100ML
500 INJECTION, SOLUTION INTRAVENOUS ONCE
Status: COMPLETED | OUTPATIENT
Start: 2024-02-04 | End: 2024-02-04

## 2024-02-04 RX ORDER — ATOVAQUONE 750 MG/5ML
1500 SUSPENSION ORAL DAILY
Status: DISCONTINUED | OUTPATIENT
Start: 2024-02-04 | End: 2024-02-08 | Stop reason: HOSPADM

## 2024-02-04 RX ORDER — ATORVASTATIN CALCIUM 20 MG/1
20 TABLET, FILM COATED ORAL
Status: DISCONTINUED | OUTPATIENT
Start: 2024-02-04 | End: 2024-02-08 | Stop reason: HOSPADM

## 2024-02-04 RX ORDER — MYCOPHENOLATE MOFETIL 250 MG/1
1000 CAPSULE ORAL EVERY 12 HOURS SCHEDULED
Status: ON HOLD | COMMUNITY

## 2024-02-04 RX ADMIN — ENOXAPARIN SODIUM 40 MG: 40 INJECTION SUBCUTANEOUS at 10:22

## 2024-02-04 RX ADMIN — OXYCODONE HYDROCHLORIDE AND ACETAMINOPHEN 500 MG: 500 TABLET ORAL at 10:18

## 2024-02-04 RX ADMIN — PANTOPRAZOLE SODIUM 40 MG: 40 TABLET, DELAYED RELEASE ORAL at 10:19

## 2024-02-04 RX ADMIN — SODIUM CHLORIDE, SODIUM GLUCONATE, SODIUM ACETATE, POTASSIUM CHLORIDE, MAGNESIUM CHLORIDE, SODIUM PHOSPHATE, DIBASIC, AND POTASSIUM PHOSPHATE 100 ML/HR: .53; .5; .37; .037; .03; .012; .00082 INJECTION, SOLUTION INTRAVENOUS at 13:35

## 2024-02-04 RX ADMIN — MYCOPHENOLATE MOFETIL 1000 MG: 250 CAPSULE ORAL at 20:59

## 2024-02-04 RX ADMIN — IOHEXOL 65 ML: 350 INJECTION, SOLUTION INTRAVENOUS at 09:50

## 2024-02-04 RX ADMIN — SODIUM CHLORIDE 1000 ML: 0.9 INJECTION, SOLUTION INTRAVENOUS at 05:39

## 2024-02-04 RX ADMIN — GUAIFENESIN 600 MG: 600 TABLET ORAL at 10:19

## 2024-02-04 RX ADMIN — ACETAMINOPHEN 650 MG: 325 TABLET, FILM COATED ORAL at 05:34

## 2024-02-04 RX ADMIN — DEXAMETHASONE SODIUM PHOSPHATE 6 MG: 10 INJECTION, SOLUTION INTRAMUSCULAR; INTRAVENOUS at 10:26

## 2024-02-04 RX ADMIN — SODIUM CHLORIDE, SODIUM GLUCONATE, SODIUM ACETATE, POTASSIUM CHLORIDE, MAGNESIUM CHLORIDE, SODIUM PHOSPHATE, DIBASIC, AND POTASSIUM PHOSPHATE 500 ML: .53; .5; .37; .037; .03; .012; .00082 INJECTION, SOLUTION INTRAVENOUS at 07:12

## 2024-02-04 RX ADMIN — REMDESIVIR 200 MG: 100 INJECTION, POWDER, LYOPHILIZED, FOR SOLUTION INTRAVENOUS at 10:28

## 2024-02-04 RX ADMIN — ATOVAQUONE 1500 MG: 750 SUSPENSION ORAL at 10:20

## 2024-02-04 RX ADMIN — ATORVASTATIN CALCIUM 20 MG: 20 TABLET, FILM COATED ORAL at 16:47

## 2024-02-04 RX ADMIN — MYCOPHENOLATE MOFETIL 1000 MG: 250 CAPSULE ORAL at 10:37

## 2024-02-04 RX ADMIN — POTASSIUM CHLORIDE 40 MEQ: 1500 TABLET, EXTENDED RELEASE ORAL at 06:40

## 2024-02-04 RX ADMIN — SODIUM CHLORIDE, SODIUM GLUCONATE, SODIUM ACETATE, POTASSIUM CHLORIDE, MAGNESIUM CHLORIDE, SODIUM PHOSPHATE, DIBASIC, AND POTASSIUM PHOSPHATE 130 ML/HR: .53; .5; .37; .037; .03; .012; .00082 INJECTION, SOLUTION INTRAVENOUS at 08:18

## 2024-02-04 RX ADMIN — CEFTRIAXONE 2000 MG: 2 INJECTION, SOLUTION INTRAVENOUS at 07:16

## 2024-02-04 RX ADMIN — GUAIFENESIN 600 MG: 600 TABLET ORAL at 18:33

## 2024-02-04 NOTE — ED NOTES
Pt was placed on a bed pan to void. Call bell within reach.     Pt rang when she was done voiding. Specimen collected and sent to the lab.      Kaci Arauz  02/04/24 [id number]

## 2024-02-04 NOTE — ASSESSMENT & PLAN NOTE
Presented to ED with generalized weakness and a fall that occurred prior to admission. No head strike or LOC. Had right sided chest pain s/p fall. Was walking to the restroom when her legs gave out under her causing her to fall forward.   Trauma workup negative  At baseline patient will ambulate well on her own.   Currently lives at home  Suspect secondary to generalized weakness, UTI, and COVID infection.   PT/OT evaluations appreciated   Case management consult to assist with discharge disposition planning

## 2024-02-04 NOTE — ASSESSMENT & PLAN NOTE
Presents to ED with complaints of generalized weakness and UTI symptoms including frequency, burning and urgency  Start ceftriaxone   Follow up cultures

## 2024-02-04 NOTE — ED PROVIDER NOTES
History  Chief Complaint   Patient presents with    Fall     Fall 2 hours ago due to generalized weakness. Denies head strike - LOC. Pt complaining of right sided chest pain post fall      Patient is a 78-year-old female with a history of GERD hyperlipidemia and thymoma and history of cholecystectomy and hysterectomy presents the emergency department due to generalized weakness and fall she was walking to the restroom and her legs gave out on her causing her to fall forward reports striking the right upper abdomen and anterior right chest wall region and has pain there denies strike on head or loss of consciousness no anticoagulants complains of pain also in the bilateral upper arms no other injury.  Patient also reports generalized weakness and symptoms of a UTI and cough worsening over the past few days has not been treated or seen for this either yet.        History provided by:  Patient and EMS personnel  Fall  Mechanism of injury: fall    Fall:     Fall occurred:  Walking  Associated symptoms: abdominal pain and chest pain    Associated symptoms: no headaches, no nausea and no vomiting        Prior to Admission Medications   Prescriptions Last Dose Informant Patient Reported? Taking?   atorvastatin (LIPITOR) 20 mg tablet   Yes No   Si mg   cephalexin (KEFLEX) 250 mg capsule   Yes No   Sig:   Start: 21 7:54:00 EST, See Instructions, Disp# 30 cap, Refills: 5, TAKE 1 CAPSULE BY MOUTH EVERY DAY, Pharmacy: [facility]/pharmacy #[id number]      Facility-Administered Medications: None       Past Medical History:   Diagnosis Date    High cholesterol        Past Surgical History:   Procedure Laterality Date    HYSTERECTOMY         History reviewed. No pertinent family history.  I have reviewed and agree with the history as documented.    E-Cigarette/Vaping    E-Cigarette Use Never User      E-Cigarette/Vaping Substances     Social History     Tobacco Use    Smoking status: Never    Smokeless tobacco: Never   Vaping Use     Vaping status: Never Used   Substance Use Topics    Alcohol use: Yes    Drug use: Never       Review of Systems   Constitutional:  Positive for activity change and fatigue. Negative for appetite change, chills and fever.   HENT:  Negative for congestion, ear pain, rhinorrhea and sore throat.    Eyes:  Negative for discharge, redness and visual disturbance.   Respiratory:  Positive for cough. Negative for chest tightness, shortness of breath and wheezing.    Cardiovascular:  Positive for chest pain. Negative for palpitations.   Gastrointestinal:  Positive for abdominal pain. Negative for constipation, diarrhea, nausea and vomiting.   Endocrine: Negative for polydipsia and polyuria.   Genitourinary:  Positive for dysuria, frequency and urgency. Negative for difficulty urinating and hematuria.   Musculoskeletal:  Negative for arthralgias and myalgias.        Bilateral upper arm pain   Skin:  Negative for color change, pallor and rash.   Neurological:  Positive for weakness. Negative for dizziness, light-headedness, numbness and headaches.   Hematological:  Negative for adenopathy. Does not bruise/bleed easily.   All other systems reviewed and are negative.      Physical Exam  Physical Exam  Vitals and nursing note reviewed.   Constitutional:       Appearance: She is well-developed.   HENT:      Head: Normocephalic and atraumatic.      Right Ear: External ear normal.      Left Ear: External ear normal.      Nose: Congestion present.   Eyes:      Conjunctiva/sclera: Conjunctivae normal.      Pupils: Pupils are equal, round, and reactive to light.   Cardiovascular:      Rate and Rhythm: Regular rhythm. Tachycardia present.      Heart sounds: Normal heart sounds.   Pulmonary:      Effort: Pulmonary effort is normal. No respiratory distress.      Breath sounds: Normal breath sounds. No wheezing or rales.   Chest:      Chest wall: Tenderness present. No deformity or crepitus.      Comments: Tenderness right anterior lower  rib cage  Abdominal:      General: Bowel sounds are normal. There is no distension.      Palpations: Abdomen is soft.      Tenderness: There is abdominal tenderness in the right upper quadrant and epigastric area. There is no guarding.   Musculoskeletal:         General: Normal range of motion.      Right upper arm: Tenderness present. No deformity.      Left upper arm: Tenderness present. No deformity.      Cervical back: Normal range of motion and neck supple.      Comments: Neurovascular intact with full range of motion bilateral upper extremities.     Skin:     General: Skin is warm and dry.   Neurological:      Mental Status: She is alert and oriented to person, place, and time.      Cranial Nerves: No cranial nerve deficit.      Sensory: No sensory deficit.         Vital Signs  ED Triage Vitals [02/04/24 0522]   Temperature Pulse Respirations Blood Pressure SpO2   98.4 °F (36.9 °C) (!) 126 16 139/73 93 %      Temp Source Heart Rate Source Patient Position - Orthostatic VS BP Location FiO2 (%)   Temporal Monitor Sitting Left arm --      Pain Score       4           Vitals:    02/04/24 0522 02/04/24 0545 02/04/24 0605   BP: 139/73  132/63   Pulse: (!) 126 (!) 116 (!) 107   Patient Position - Orthostatic VS: Sitting           Visual Acuity      ED Medications  Medications   sodium chloride 0.9 % bolus 1,000 mL (1,000 mL Intravenous New Bag 2/4/24 0539)   potassium chloride (Klor-Con M20) CR tablet 40 mEq (has no administration in time range)   acetaminophen (TYLENOL) tablet 650 mg (650 mg Oral Given 2/4/24 0534)       Diagnostic Studies  Results Reviewed       Procedure Component Value Units Date/Time    B-Type Natriuretic Peptide(BNP) [[id number]]  (Abnormal) Collected: 02/04/24 0539    Lab Status: Final result Specimen: Blood from Arm, Left Updated: 02/04/24 0629      pg/mL     Protime-INR [[id number]]  (Normal) Collected: 02/04/24 0539    Lab Status: Final result Specimen: Blood from Arm, Left Updated:  02/04/24 0620     Protime 12.8 seconds      INR 0.93    APTT [[id number]]  (Normal) Collected: 02/04/24 0539    Lab Status: Final result Specimen: Blood from Arm, Left Updated: 02/04/24 0620     PTT 25 seconds     Comprehensive metabolic panel [[id number]]  (Abnormal) Collected: 02/04/24 0539    Lab Status: Final result Specimen: Blood from Arm, Left Updated: 02/04/24 0620     Sodium 135 mmol/L      Potassium 3.2 mmol/L      Chloride 97 mmol/L      CO2 26 mmol/L      ANION GAP 12 mmol/L      BUN 16 mg/dL      Creatinine 0.94 mg/dL      Glucose 111 mg/dL      Calcium 8.2 mg/dL      AST 27 U/L      ALT 41 U/L      Alkaline Phosphatase 211 U/L      Total Protein 5.7 g/dL      Albumin 3.5 g/dL      Total Bilirubin 1.06 mg/dL      eGFR 57 ml/min/1.73sq m     Narrative:      National Kid[employer] Disease Foundation guidelines for Chronic Kidney Disease (CKD):     Stage 1 with normal or high GFR (GFR > 90 mL/min/1.73 square meters)    Stage 2 Mild CKD (GFR = 60-89 mL/min/1.73 square meters)    Stage 3A Moderate CKD (GFR = 45-59 mL/min/1.73 square meters)    Stage 3B Moderate CKD (GFR = 30-44 mL/min/1.73 square meters)    Stage 4 Severe CKD (GFR = 15-29 mL/min/1.73 square meters)    Stage 5 End Stage CKD (GFR <15 mL/min/1.73 square meters)  Note: GFR calculation is accurate only with a steady state creatinine    Lipase [[id number]]  (Abnormal) Collected: 02/04/24 0539    Lab Status: Final result Specimen: Blood from Arm, Left Updated: 02/04/24 0620     Lipase 7 u/L     Blood culture #2 [[id number]] Collected: 02/04/24 0539    Lab Status: In process Specimen: Blood from Arm, Right Updated: 02/04/24 0555    Blood culture #1 [[id number]] Collected: 02/04/24 0539    Lab Status: In process Specimen: Blood from Arm, Left Updated: 02/04/24 0555    CBC and differential [[id number]]  (Abnormal) Collected: 02/04/24 0539    Lab Status: Final result Specimen: Blood from Arm, Left Updated: 02/04/24 0553     WBC 10.32 Thousand/uL      RBC 4.66  Million/uL      Hemoglobin 12.6 g/dL      Hematocrit 40.6 %      MCV 87 fL      MCH 27.0 pg      MCHC 31.0 g/dL      RDW 15.5 %      MPV 10.3 fL      Platelets 187 Thousands/uL     Manual Differential(PHLEBS Do Not Order) [[id number]] Collected: 02/04/24 0539    Lab Status: In process Specimen: Blood from Arm, Left Updated: 02/04/24 0553    FLU/RSV/COVID - if FLU/RSV clinically relevant [[id number]] Collected: 02/04/24 0539    Lab Status: In process Specimen: Nares from Nose Updated: 02/04/24 0550    TSH, 3rd generation with Free T4 reflex [[id number]] Collected: 02/04/24 0539    Lab Status: In process Specimen: Blood from Arm, Left Updated: 02/04/24 0548    Lactic acid, plasma (w/reflex if result > 2.0) [[id number]] Collected: 02/04/24 0539    Lab Status: In process Specimen: Blood from Arm, Left Updated: 02/04/24 0548    UA w Reflex to Microscopic w Reflex to Culture [[id number]]     Lab Status: No result Specimen: Urine                    XR humerus RIGHT   ED Interpretation by Farooq Ochoa DO (02/04 0603)   No acute fracture or dislocation      XR humerus LEFT   ED Interpretation by Farooq Ochoa DO (02/04 0603)   No acute fracture or dislocation      CT head without contrast   Final Result by Eladio Campbell MD (02/04 0607)      No acute intracranial abnormality.                  Workstation performed: [id number]         CT cervical spine without contrast   Final Result by Eladio Campbell MD (02/04 0620)      No cervical spine fracture or traumatic malalignment.                  Workstation performed: [id number]         CT chest abdomen pelvis wo contrast    (Results Pending)              Procedures  ECG 12 Lead Documentation Only    Date/Time: 2/4/2024 5:52 AM    Performed by: Farooq Ochoa DO  Authorized by: Farooq Ochoa DO    ECG reviewed by me, the ED Provider: yes    Patient location:  ED  Previous ECG:     Comparison to cardiac monitor: Yes    Quality:     Tracing quality:  Limited by  artifact  Rate:     ECG rate:  116    ECG rate assessment: tachycardic    Rhythm:     Rhythm: sinus tachycardia    QRS:     QRS axis:  Normal    QRS intervals:  Normal  Conduction:     Conduction: normal    ST segments:     ST segments:  Non-specific  T waves:     T waves: non-specific             ED Course  ED Course as of 02/04/24 0632   Sun Feb 04, 2024 0619 Discussed and care transferred to Dr. Torres pending labs and imaging results and further evaluation and treatment and disposition.     0621 Cervical Collar Clearance:    The patient had a CT scan of the cervical spine demonstrating no acute injury. On exam, the patient had no midline point tenderness or paresthesias/numbness/weakness in the extremities. The patient had full range of motion (was then able to flex, extend, and rotate head laterally) without pain. There were no distracting injuries and the patient was not intoxicated.      The patient's cervical spine was cleared radiologically and clinically. Cervical collar removed at this time.     Farooq GARBER AltonmikeyDO  2/4/2024 6:21 AM                                    SBIRT 20yo+      Flowsheet Row Most Recent Value   Initial Alcohol Screen: US AUDIT-C     1. How often do you have a drink containing alcohol? 0 Filed at: 02/04/2024 0521   2. How many drinks containing alcohol do you have on a typical day you are drinking?  0 Filed at: 02/04/2024 0521   3a. Male UNDER 65: How often do you have five or more drinks on one occasion? 0 Filed at: 02/04/2024 0521   3b. FEMALE Any Age, or MALE 65+: How often do you have 4 or more drinks on one occassion? 0 Filed at: 02/04/2024 0521   Audit-C Score 0 Filed at: 02/04/2024 0521   BRIELLE: How many times in the past year have you...    Used an illegal drug or used a prescription medication for non-medical reasons? Never Filed at: 02/04/2024 0521                      Medical Decision Making  Amount and/or Complexity of Data Reviewed  Labs: ordered.  Radiology: ordered  and independent interpretation performed.    Risk  OTC drugs.  Prescription drug management.             Disposition  Final diagnoses:   Fall, initial encounter   Generalized weakness     Time reflects when diagnosis was documented in both MDM as applicable and the Disposition within this note       Time User Action Codes Description Comment    2/4/2024  6:32 AM Farooq Ochoa [W19.XXXA] Fall, initial encounter     2/4/2024  6:32 AM Farooq Ochoa [R53.1] Generalized weakness           ED Disposition       None          Follow-up Information    None         Patient's Medications   Discharge Prescriptions    No medications on file       No discharge procedures on file.    PDMP Review       None            ED Provider  Electronically Signed by             Farooq Ochoa DO  02/04/24 0632

## 2024-02-04 NOTE — ED NOTES
Pt cleaned of incontinence of stool, full linen changed at this time.      Maru Hurd, RN  02/04/24 5777

## 2024-02-04 NOTE — H&P
"[facility]  H&P  Name: Nina Tran 78 y.o. female I MRN: [id number]  Unit/Bed#: ED 11 I Date of Admission: 2/4/2024   Date of Service: 2/4/2024 I Hospital Day: 0      Assessment/Plan   * COVID-19  Assessment & Plan  Background: Presented to ED with generalized weakness and fall when she walked to the bathroom.   Mild COVID pathway as below   Vaccinated previously, states current physicians encouraged patient to not get vaccinated again  COVID19- positive on 2/4   Supplemental oxygen with 2L NC  Wean as tolerated for oxygen saturation greater than 92%  D-dimer elevated 1.61  Will order CTA chest PE study as patient is requiring oxygen  Chest CT scan revealed \" Diffuse nonspecific interlobular septal thickening with areas of groundglass haziness with differential considerations including an infectious or inflammatory process, pulmonary edema, among other etiologies.  \"   CBC and CMP daily  Troponin 0hr, CK, BNP, and CRP ordered and pending  AC per protocol   Treat with remdesivir and steroids  OOB TID; ambulation and mobilization strongly encouraged  PT/OT consult and evaluation   Self proning strongly encouraged  Supportive care     Acute cystitis without hematuria  Assessment & Plan  Presents to ED with complaints of generalized weakness and UTI symptoms including frequency, burning and urgency  Start ceftriaxone   Follow up cultures     Ambulatory dysfunction  Assessment & Plan  Presented to ED with generalized weakness and a fall that occurred prior to admission. No head strike or LOC. Had right sided chest pain s/p fall. Was walking to the restroom when her legs gave out under her causing her to fall forward.   Trauma workup negative  At baseline patient will ambulate well on her own.   Currently lives at home  Suspect secondary to generalized weakness, UTI, and COVID infection.   PT/OT evaluations appreciated   Case management consult to assist with discharge disposition " planning    Generalized weakness  Assessment & Plan  POA with complaints of generalized weakness suspect secondary to COVID infection and UTI  Given 1 dose of rocephin in the ED, will continue and add remdesivir for COVID  PT/OT consult    History of thymus cancer  Assessment & Plan  Patient with history of thymus cancer s/p removal    Continue outpatient follow up        VTE Pharmacologic Prophylaxis: VTE Score: 8 High Risk (Score >/= 5) - Pharmacological DVT Prophylaxis Ordered: enoxaparin (Lovenox). Sequential Compression Devices Ordered.  Code Status: Level 1 - Full Code   Discussion with family: Updated  ( and daughter) at bedside.    Anticipated Length of Stay: Patient will be admitted on an inpatient basis with an anticipated length of stay of greater than 2 midnights secondary to COVID, generalized weakness, ambulatory dysfunction, UTI, IV abx.    Total Time Spent on Date of Encounter in care of patient: 70 mins. This time was spent on one or more of the following: performing physical exam; counseling and coordination of care; obtaining or reviewing history; documenting in the medical record; reviewing/ordering tests, medications or procedures; communicating with other healthcare professionals and discussing with patient's family/caregivers.    Chief Complaint: fall    History of Present Illness:  Nina Tran is a 78 y.o. female with a PMH of f thymus cancer, anemia, recurrent UTI's, GERD, hyperlipidemia who presents secondary to a fall that occurred earlier today. Patient states she was walking to the bathroom when she felt her legs give out from underneath her. No head strike or LOC. Aside from the fall, patient states she has been having generalized weakness that has been worsening over the last few days. Had a cough and increased work of breathing at home. Also with UTI symptoms of urgency, frequency, dysuria. No nausea, vomiting, diarrhea, constipation, abdominal pain, CP, fever,  "chills. Patient does not take any AC. Does not wear oxygen at baseline, but has some oxygen at home that she can use \"as needed\".      Review of Systems:  Review of Systems   Constitutional:  Positive for appetite change and fatigue. Negative for activity change, chills and fever.   HENT: Negative.     Eyes: Negative.    Respiratory:  Positive for cough and shortness of breath. Negative for chest tightness and wheezing.    Cardiovascular:  Negative for chest pain, palpitations and leg swelling.   Gastrointestinal:  Negative for abdominal distention, abdominal pain, constipation, diarrhea, nausea and vomiting.   Endocrine: Negative.    Genitourinary:  Positive for dysuria, frequency and urgency. Negative for difficulty urinating and hematuria.   Musculoskeletal:  Positive for gait problem (secondary to generalized weakness).   Skin: Negative.    Allergic/Immunologic: Negative.    Neurological:  Positive for weakness. Negative for dizziness, light-headedness and headaches.   Hematological: Negative.    Psychiatric/Behavioral: Negative.         Past Medical and Surgical History:   Past Medical History:   Diagnosis Date    High cholesterol        Past Surgical History:   Procedure Laterality Date    HYSTERECTOMY         Meds/Allergies:  Prior to Admission medications    Medication Sig Start Date End Date Taking? Authorizing Provider   atorvastatin (LIPITOR) 20 mg tablet 20 mg 3/30/21   Historical Provider, MD   cephalexin (KEFLEX) 250 mg capsule   Start: 01/14/21 7:54:00 EST, See Instructions, Disp# 30 cap, Refills: 5, TAKE 1 CAPSULE BY MOUTH EVERY DAY, Pharmacy: [facility]/pharmacy #[id number] 1/14/21   Historical Provider, MD     I have reviewed home medications with patient personally.    Allergies:   Allergies   Allergen Reactions    Tetanus Toxoid Hives     hives          Social History:  Marital Status: /Civil Union   Occupation:   Patient Pre-hospital Living Situation: Home  Patient Pre-hospital Level of Mobility: " "walks  Patient Pre-hospital Diet Restrictions:   Substance Use History:   Social History     Substance and Sexual Activity   Alcohol Use Yes     Social History     Tobacco Use   Smoking Status Never   Smokeless Tobacco Never     Social History     Substance and Sexual Activity   Drug Use Never       Family History:  History reviewed. No pertinent family history.    Physical Exam:     Vitals:   Blood Pressure: 120/59 (02/04/24 0800)  Pulse: 96 (02/04/24 0800)  Temperature: 98.4 °F (36.9 °C) (02/04/24 0522)  Temp Source: Temporal (02/04/24 0522)  Respirations: (!) 25 (02/04/24 0800)  Height: 5' 3\" (160 cm) (02/04/24 0522)  Weight - Scale: 59.1 kg (130 lb 4.7 oz) (02/04/24 0522)  SpO2: 97 % (02/04/24 0800)    Physical Exam  Vitals reviewed.   Constitutional:       General: She is not in acute distress.     Appearance: She is ill-appearing. She is not toxic-appearing.   HENT:      Head: Normocephalic and atraumatic.      Nose: Congestion present.      Mouth/Throat:      Mouth: Mucous membranes are dry.   Cardiovascular:      Rate and Rhythm: Normal rate and regular rhythm.      Heart sounds: No murmur heard.  Pulmonary:      Effort: No respiratory distress.      Breath sounds: No stridor. Rhonchi present. No wheezing or rales.   Abdominal:      General: Bowel sounds are normal. There is no distension.      Palpations: Abdomen is soft. There is no mass.      Tenderness: There is no abdominal tenderness.   Musculoskeletal:         General: Tenderness (Tenderness right anterior lower rib cage where patient fell) present.      Right lower leg: No edema.      Left lower leg: No edema.   Skin:     General: Skin is warm and dry.   Neurological:      General: No focal deficit present.      Mental Status: She is alert and oriented to person, place, and time.   Psychiatric:         Mood and Affect: Mood normal.         Behavior: Behavior normal.          Additional Data:     Lab Results:  Results from last 7 days   Lab Units " 02/04/24  0539   WBC Thousand/uL 10.32*   HEMOGLOBIN g/dL 12.6   HEMATOCRIT % 40.6   PLATELETS Thousands/uL 187   BANDS PCT % 8   LYMPHO PCT % 8*   MONO PCT % 2*   EOS PCT % 0     Results from last 7 days   Lab Units 02/04/24  0539   SODIUM mmol/L 135   POTASSIUM mmol/L 3.2*   CHLORIDE mmol/L 97   CO2 mmol/L 26   BUN mg/dL 16   CREATININE mg/dL 0.94   ANION GAP mmol/L 12   CALCIUM mg/dL 8.2*   ALBUMIN g/dL 3.5   TOTAL BILIRUBIN mg/dL 1.06*   ALK PHOS U/L 211*   ALT U/L 41   AST U/L 27   GLUCOSE RANDOM mg/dL 111     Results from last 7 days   Lab Units 02/04/24  0539   INR  0.93             Results from last 7 days   Lab Units 02/04/24  0833 02/04/24  0539   LACTIC ACID mmol/L 1.3 2.6*       Lines/Drains:  Invasive Devices       Peripheral Intravenous Line  Duration             Peripheral IV 02/04/24 Left Antecubital <1 day              Drain  Duration             External Urinary Catheter <1 day                        Imaging: Reviewed radiology reports from this admission including: chest CT scan, abdominal/pelvic CT, and CT head  XR humerus RIGHT   ED Interpretation by Farooq Ochoa DO (02/04 0603)   No acute fracture or dislocation      XR humerus LEFT   ED Interpretation by Farooq Ochoa DO (02/04 0603)   No acute fracture or dislocation      CT head without contrast   Final Result by Eladio Campbell MD (02/04 0607)      No acute intracranial abnormality.                  Workstation performed: [id number]         CT cervical spine without contrast   Final Result by Eladio Campbell MD (02/04 0620)      No cervical spine fracture or traumatic malalignment.                  Workstation performed: [id number]         CT chest abdomen pelvis wo contrast   Final Result by Eladio Campbell MD (02/04 0650)      Although the study was limited by the lack of intravenous contrast, there is no gross evidence of solid organ injury.      No acute intra-abdominal abnormality. No free air or free fluid.       Diffuse nonspecific interlobular septal thickening with areas of groundglass haziness with differential considerations including an infectious or inflammatory process, pulmonary edema, among other etiologies. Correlation with the patient's symptoms and    laboratory studies is recommended. A repeat high-resolution CT chest in 6 to 8 weeks is recommended to assess for improvement/resolution and exclude an underlying lesion.                        Workstation performed: [id number]         CTA chest pe study    (Results Pending)       EKG and Other Studies Reviewed on Admission:   EKG: Sinus Tachycardia. .    ** Please Note: This note has been constructed using a voice recognition system. **

## 2024-02-04 NOTE — ASSESSMENT & PLAN NOTE
POA with complaints of generalized weakness suspect secondary to COVID infection and UTI  Given 1 dose of rocephin in the ED, will continue and add remdesivir for COVID  PT/OT consult

## 2024-02-04 NOTE — ED CARE HANDOFF
"Emergency Department Sign Out Note        Sign out and transfer of care from Dr. Ochoa. See Separate Emergency Department note.     The patient, Nina Tran, was evaluated by the previous provider for generalized weakness and fall.    Workup Completed:  78-year-old female with history of GERD hyperlipidemia presenting to the emergency room after she had a fall secondary to generalized weakness.  She reported that her legs \"gave out.\"  Did not strike her head.  No loss conscious.  Denies any anticoagulation.  Patient is not a smoker.  Patient had increased work of breathing and UTI symptoms with a cough worsening for the last few days.  She was subsequently placed on oxygen.    ED Course / Workup Pending (followup):  History and physical  Review of records and studies as needed  Laboratory studies and imaging as appropriate  Resuscitative measures as required or needed, pain medications and comfort needs as appropriate  Re-evaluation as needed  Signed out to me      Will continue evaluation of the patient emergency room.  Patient is positive for COVID    Patient presented to the emergency department and a MSE was performed. The patient was evaluated for complaint related to acute fatigue.  Patient is potentially at risk for, but not limited to, dehydration, electrolyte abnormality, anxiety, infectious process such as pneumonia or urinary tract infection, cardiac arrhythmia, myocardial infarction, or medication effect/compliance.  Several of these diagnoses have been evaluated and ruled out by history and physical.  As needed, patient will be further evaluated with laboratory and imaging studies.  Higher level diagnostics, such as CT imaging or ultrasound, may also be required.  Please see work-up portion of the note for further evaluation of patient's risk.  Socioeconomic factors were also considered as part of the decision-making process.  Unless otherwise stated in the chart or patient is admitted as elsewhere " "documented, any previously prescribed medications will be maintained.      Please see ED course section of chart for further information regarding this patient's disposition.    Patient presented to the emergency department and a MSE was performed. The patient was evaluated and diagnosed with acute weakness, COVID-19, lactic acidosis, pneumonia, and UTI. This is a new issue that will require additional planned work-up and treatment in a hospitalized setting. As may have been required as part of this evaluation, clinical laboratory test, radiology imaging and medical testing (I.e. EKG) were ordered as necessitated by the patient's presentation. I independently reviewed these studies, imaging and testing. This patient's case is considered to be a considerable risk secondary to the above listed disease process and poses a threat to the patient's well-being and baseline function. Further in-patient diagnostic testing and management, which may include the administration of parenteral medications, is required.      ED Course as of 02/04/24 0713   Sun Feb 04, 2024   0646 SARS-COV-2(!): Positive   0649 Patient reports to me that she has oxygen at home which she can use as needed.  She has the oxygen secondary to \"scar tissue\" from previous episode of COVID.    Patient is not currently vaccinated for COVID-19.  She reports that her physicians advised her against same.     Patient is not eligible for Paxlovid as she is on medications that are contraindicated with that drug.   0654 CT of the cervical spine is negative.   0700 Spoke with the radiologist regarding patient's CAT scan.  He reports that these findings are new and worse than prior studies.    Given the possibility of pneumonia on CAT scan, weakness, increased need for oxygen, COVID-19, leukocytosis, lactic acidosis, will obtain blood cultures and start antibiotics and refer for admission.             Disposition  Final diagnoses:   Fall, initial encounter "   Generalized weakness   COVID-19   Pneumonia   Lactic acidosis   UTI (urinary tract infection)   Dyspnea     Time reflects when diagnosis was documented in both MDM as applicable and the Disposition within this note       Time User Action Codes Description Comment    2/4/2024  6:32 AM Farooq Ochoa Add [W19.XXXA] Fall, initial encounter     2/4/2024  6:32 AM Farooq Ochoa Add [R53.1] Generalized weakness     2/4/2024  6:48 AM Marcus Torres Add [U07.1] COVID-19     2/4/2024  7:12 AM BonrickyteMarcus Add [J18.9] Pneumonia     2/4/2024  7:12 AM BonMarcus burrell Add [E87.20] Lactic acidosis     2/4/2024  7:12 AM Marcus Torres Add [N39.0] UTI (urinary tract infection)     2/4/2024  7:12 AM Marcus Torres Add [R06.00] Dyspnea           ED Disposition       None          Follow-up Information    None       Patient's Medications   Discharge Prescriptions    No medications on file     No discharge procedures on file.       ED Provider  Electronically Signed by     Marcus Torres,   02/04/24 0649       Marcus Torres,   02/04/24 0713

## 2024-02-04 NOTE — ASSESSMENT & PLAN NOTE
"Background: Presented to ED with generalized weakness and fall when she walked to the bathroom.   Mild COVID pathway as below   Vaccinated previously, states current physicians encouraged patient to not get vaccinated again  COVID19- positive on 2/4   Supplemental oxygen with 2L NC  Wean as tolerated for oxygen saturation greater than 92%  D-dimer elevated 1.61  Will order CTA chest PE study as patient is requiring oxygen  Chest CT scan revealed \" Diffuse nonspecific interlobular septal thickening with areas of groundglass haziness with differential considerations including an infectious or inflammatory process, pulmonary edema, among other etiologies.  \"   CBC and CMP daily  Troponin 0hr, CK, BNP, and CRP ordered and pending  AC per protocol   Treat with remdesivir and steroids  OOB TID; ambulation and mobilization strongly encouraged  PT/OT consult and evaluation   Self proning strongly encouraged  Supportive care   "

## 2024-02-04 NOTE — RESPIRATORY THERAPY NOTE
"RT Protocol Note  Nina Tran 78 y.o. female MRN: [id number]  Unit/Bed#: ED 11 Encounter: [id number]    Assessment    Principal Problem:    COVID-19  Active Problems:    Acute cystitis without hematuria    Generalized weakness    Ambulatory dysfunction    History of thymus cancer      Home Pulmonary Medications:    Home Devices/Therapy: Home O2    Past Medical History:   Diagnosis Date    High cholesterol      Social History     Socioeconomic History    Marital status: /Civil Union     Spouse name: None    Number of children: None    Years of education: None    Highest education level: None   Occupational History    None   Tobacco Use    Smoking status: Never    Smokeless tobacco: Never   Vaping Use    Vaping status: Never Used   Substance and Sexual Activity    Alcohol use: Yes    Drug use: Never    Sexual activity: None   Other Topics Concern    None   Social History Narrative    None     Social Determinants of Health     Financial Resource Strain: Not on file   Food Insecurity: Not on file   Transportation Needs: Not on file   Physical Activity: Not on file   Stress: Not on file   Social Connections: Not on file   Intimate Partner Violence: Not on file   Housing Stability: Not on file       Subjective         Objective    Physical Exam:   Assessment Type: Assess only  General Appearance: Alert, Awake  Respiratory Pattern: Normal  Chest Assessment: Chest expansion symmetrical  Bilateral Breath Sounds: Diminished, Crackles  Cough: (P) None  O2 Device: 2 L    Vitals:  Blood pressure 143/61, pulse 83, temperature 98.4 °F (36.9 °C), temperature source Temporal, resp. rate (!) 24, height 5' 3\" (1.6 m), weight 59.1 kg (130 lb 4.7 oz), SpO2 99%.          Imaging and other studies: I have personally reviewed pertinent reports.      O2 Device: 2 L     Plan    Respiratory Plan: (P) No distress/Pulmonary history        Resp Comments: (P) covid + patient, s/p fall, weakness. pt on 2 L which she uses as home prn. " states had 02 ever since lung biopsy in 2023 which showed scarring. pt hx thymus ca. pt currently denies SOB. no home respiratory medications.

## 2024-02-05 ENCOUNTER — APPOINTMENT (INPATIENT)
Dept: NON INVASIVE DIAGNOSTICS | Facility: HOSPITAL | Age: 79
DRG: 871 | End: 2024-02-05
Payer: MEDICARE

## 2024-02-05 PROBLEM — R78.81 BACTEREMIA: Status: ACTIVE | Noted: 2024-02-05

## 2024-02-05 PROBLEM — J96.00 ACUTE RESPIRATORY FAILURE (HCC): Status: ACTIVE | Noted: 2024-02-05

## 2024-02-05 PROBLEM — R06.89 ACUTE RESPIRATORY INSUFFICIENCY: Status: ACTIVE | Noted: 2024-02-05

## 2024-02-05 PROBLEM — A41.9 SEPSIS (HCC): Status: ACTIVE | Noted: 2024-02-05

## 2024-02-05 LAB
ALBUMIN SERPL BCP-MCNC: 3 G/DL (ref 3.5–5)
ALP SERPL-CCNC: 157 U/L (ref 34–104)
ALT SERPL W P-5'-P-CCNC: 31 U/L (ref 7–52)
ANION GAP SERPL CALCULATED.3IONS-SCNC: 9 MMOL/L
AST SERPL W P-5'-P-CCNC: 23 U/L (ref 13–39)
ATRIAL RATE: 116 BPM
BACTERIA UR CULT: NORMAL
BASOPHILS # BLD AUTO: 0.01 THOUSANDS/ÂΜL (ref 0–0.1)
BASOPHILS NFR BLD AUTO: 0 % (ref 0–1)
BILIRUB SERPL-MCNC: 0.65 MG/DL (ref 0.2–1)
BSA FOR ECHO PROCEDURE: 1.61 M2
BUN SERPL-MCNC: 15 MG/DL (ref 5–25)
CALCIUM ALBUM COR SERPL-MCNC: 8.1 MG/DL (ref 8.3–10.1)
CALCIUM SERPL-MCNC: 7.3 MG/DL (ref 8.4–10.2)
CHLORIDE SERPL-SCNC: 102 MMOL/L (ref 96–108)
CO2 SERPL-SCNC: 26 MMOL/L (ref 21–32)
CREAT SERPL-MCNC: 0.72 MG/DL (ref 0.6–1.3)
EOSINOPHIL # BLD AUTO: 0 THOUSAND/ÂΜL (ref 0–0.61)
EOSINOPHIL NFR BLD AUTO: 0 % (ref 0–6)
ERYTHROCYTE [DISTWIDTH] IN BLOOD BY AUTOMATED COUNT: 15.4 % (ref 11.6–15.1)
GFR SERPL CREATININE-BSD FRML MDRD: 79 ML/MIN/1.73SQ M
GLUCOSE SERPL-MCNC: 119 MG/DL (ref 65–140)
GLUCOSE SERPL-MCNC: 158 MG/DL (ref 65–140)
HCT VFR BLD AUTO: 34.5 % (ref 34.8–46.1)
HGB BLD-MCNC: 10.8 G/DL (ref 11.5–15.4)
IMM GRANULOCYTES # BLD AUTO: 0.04 THOUSAND/UL (ref 0–0.2)
IMM GRANULOCYTES NFR BLD AUTO: 1 % (ref 0–2)
IVC: 13 MM
LYMPHOCYTES # BLD AUTO: 0.44 THOUSANDS/ÂΜL (ref 0.6–4.47)
LYMPHOCYTES NFR BLD AUTO: 7 % (ref 14–44)
MAGNESIUM SERPL-MCNC: 1.7 MG/DL (ref 1.9–2.7)
MCH RBC QN AUTO: 27.2 PG (ref 26.8–34.3)
MCHC RBC AUTO-ENTMCNC: 31.3 G/DL (ref 31.4–37.4)
MCV RBC AUTO: 87 FL (ref 82–98)
MONOCYTES # BLD AUTO: 0.23 THOUSAND/ÂΜL (ref 0.17–1.22)
MONOCYTES NFR BLD AUTO: 4 % (ref 4–12)
NEUTROPHILS # BLD AUTO: 5.82 THOUSANDS/ÂΜL (ref 1.85–7.62)
NEUTS SEG NFR BLD AUTO: 88 % (ref 43–75)
NRBC BLD AUTO-RTO: 0 /100 WBCS
P AXIS: 40 DEGREES
PLATELET # BLD AUTO: 156 THOUSANDS/UL (ref 149–390)
PMV BLD AUTO: 10.5 FL (ref 8.9–12.7)
POTASSIUM SERPL-SCNC: 4.1 MMOL/L (ref 3.5–5.3)
PR INTERVAL: 172 MS
PROCALCITONIN SERPL-MCNC: 0.2 NG/ML
PROT SERPL-MCNC: 5 G/DL (ref 6.4–8.4)
QRS AXIS: 39 DEGREES
QRSD INTERVAL: 70 MS
QT INTERVAL: 328 MS
QTC INTERVAL: 455 MS
RA PRESSURE ESTIMATED: 3 MMHG
RBC # BLD AUTO: 3.97 MILLION/UL (ref 3.81–5.12)
SODIUM SERPL-SCNC: 137 MMOL/L (ref 135–147)
T WAVE AXIS: 58 DEGREES
VENTRICULAR RATE: 116 BPM
WBC # BLD AUTO: 6.54 THOUSAND/UL (ref 4.31–10.16)

## 2024-02-05 PROCEDURE — 97163 PT EVAL HIGH COMPLEX 45 MIN: CPT

## 2024-02-05 PROCEDURE — 97167 OT EVAL HIGH COMPLEX 60 MIN: CPT

## 2024-02-05 PROCEDURE — 85025 COMPLETE CBC W/AUTO DIFF WBC: CPT

## 2024-02-05 PROCEDURE — G0427 INPT/ED TELECONSULT70: HCPCS | Performed by: INTERNAL MEDICINE

## 2024-02-05 PROCEDURE — 84145 PROCALCITONIN (PCT): CPT

## 2024-02-05 PROCEDURE — 97116 GAIT TRAINING THERAPY: CPT

## 2024-02-05 PROCEDURE — 83735 ASSAY OF MAGNESIUM: CPT

## 2024-02-05 PROCEDURE — 93306 TTE W/DOPPLER COMPLETE: CPT

## 2024-02-05 PROCEDURE — 80053 COMPREHEN METABOLIC PANEL: CPT

## 2024-02-05 PROCEDURE — 82948 REAGENT STRIP/BLOOD GLUCOSE: CPT

## 2024-02-05 PROCEDURE — 99232 SBSQ HOSP IP/OBS MODERATE 35: CPT

## 2024-02-05 PROCEDURE — 36415 COLL VENOUS BLD VENIPUNCTURE: CPT

## 2024-02-05 RX ORDER — CEFTRIAXONE 2 G/50ML
2000 INJECTION, SOLUTION INTRAVENOUS EVERY 24 HOURS
Status: DISCONTINUED | OUTPATIENT
Start: 2024-02-06 | End: 2024-02-07

## 2024-02-05 RX ORDER — MAGNESIUM SULFATE HEPTAHYDRATE 40 MG/ML
2 INJECTION, SOLUTION INTRAVENOUS ONCE
Status: COMPLETED | OUTPATIENT
Start: 2024-02-05 | End: 2024-02-05

## 2024-02-05 RX ORDER — CEFTRIAXONE 1 G/50ML
1000 INJECTION, SOLUTION INTRAVENOUS ONCE
Status: COMPLETED | OUTPATIENT
Start: 2024-02-05 | End: 2024-02-05

## 2024-02-05 RX ADMIN — MYCOPHENOLATE MOFETIL 1000 MG: 250 CAPSULE ORAL at 08:44

## 2024-02-05 RX ADMIN — ENOXAPARIN SODIUM 40 MG: 40 INJECTION SUBCUTANEOUS at 08:44

## 2024-02-05 RX ADMIN — CEFTRIAXONE 1000 MG: 1 INJECTION, SOLUTION INTRAVENOUS at 06:30

## 2024-02-05 RX ADMIN — GUAIFENESIN 600 MG: 600 TABLET ORAL at 08:44

## 2024-02-05 RX ADMIN — ATORVASTATIN CALCIUM 20 MG: 20 TABLET, FILM COATED ORAL at 17:41

## 2024-02-05 RX ADMIN — OXYCODONE HYDROCHLORIDE AND ACETAMINOPHEN 500 MG: 500 TABLET ORAL at 08:44

## 2024-02-05 RX ADMIN — REMDESIVIR 100 MG: 100 INJECTION, POWDER, LYOPHILIZED, FOR SOLUTION INTRAVENOUS at 11:01

## 2024-02-05 RX ADMIN — GUAIFENESIN 600 MG: 600 TABLET ORAL at 17:41

## 2024-02-05 RX ADMIN — PANTOPRAZOLE SODIUM 40 MG: 40 TABLET, DELAYED RELEASE ORAL at 06:30

## 2024-02-05 RX ADMIN — ATOVAQUONE 1500 MG: 750 SUSPENSION ORAL at 08:44

## 2024-02-05 RX ADMIN — SODIUM CHLORIDE, SODIUM GLUCONATE, SODIUM ACETATE, POTASSIUM CHLORIDE, MAGNESIUM CHLORIDE, SODIUM PHOSPHATE, DIBASIC, AND POTASSIUM PHOSPHATE 75 ML/HR: .53; .5; .37; .037; .03; .012; .00082 INJECTION, SOLUTION INTRAVENOUS at 14:29

## 2024-02-05 RX ADMIN — MYCOPHENOLATE MOFETIL 1000 MG: 250 CAPSULE ORAL at 21:30

## 2024-02-05 RX ADMIN — CEFTRIAXONE 1000 MG: 1 INJECTION, SOLUTION INTRAVENOUS at 13:07

## 2024-02-05 RX ADMIN — DEXAMETHASONE SODIUM PHOSPHATE 6 MG: 10 INJECTION, SOLUTION INTRAMUSCULAR; INTRAVENOUS at 10:46

## 2024-02-05 RX ADMIN — MAGNESIUM SULFATE HEPTAHYDRATE 2 G: 40 INJECTION, SOLUTION INTRAVENOUS at 08:36

## 2024-02-05 NOTE — ASSESSMENT & PLAN NOTE
Patient found to be requiring 2L NC in the ED. Does not wear oxygen at baseline, has home o2 prn. With tachypnea in the ED and desaturations requiring O2. Sats 85% and RR 23-25. Suspect secondary to COVID infection. Start on remdesivir and steroids.   D-dimer elevated  CTA chest PE study without PE; Redemonstrated diffuse interlobular septal thickening and bilateral groundglass opacities most pronounced in the lung bases and mild bronchiectasis in the right upper lobe and bilateral lower lobes. Findings may be due to infectious/inflammatory etiology such as COVID-pneumonia, pulmonary edema, or underlying pulmonary fibrosis. Recommend follow-up high-resolution chest CT in 3 months for reassessment.  Incentive spirometry, supportive measures, wean off oxygen as able.

## 2024-02-05 NOTE — PLAN OF CARE
Problem: Potential for Falls  Goal: Patient will remain free of falls  Description: INTERVENTIONS:  - Educate patient/family on patient safety including physical limitations  - Instruct patient to call for assistance with activity   - Consult OT/PT to assist with strengthening/mobility   - Keep Call bell within reach  - Keep bed low and locked with side rails adjusted as appropriate  - Keep care items and personal belongings within reach  - Initiate and maintain comfort rounds  - Make Fall Risk Sign visible to staff  - Offer Toileting every 2 Hours, in advance of need  - Initiate/Maintain bed alarm  - Obtain necessary fall risk management equipment: non skid socks  - Apply yellow socks and bracelet for high fall risk patients  - Consider moving patient to room near nurses station  Outcome: Progressing

## 2024-02-05 NOTE — ASSESSMENT & PLAN NOTE
POA with complaints of generalized weakness suspect secondary to COVID infection and UTI  Given 1 dose of rocephin in the ED, will continue and add remdesivir for COVID  PT/OT consult: III (Minimum Resource Intensity)

## 2024-02-05 NOTE — ASSESSMENT & PLAN NOTE
Presented to ED with generalized weakness and a fall that occurred prior to admission. No head strike or LOC. Had right sided chest pain s/p fall. Was walking to the restroom when her legs gave out under her causing her to fall forward.   Trauma workup negative  At baseline patient will ambulate well on her own.   Currently lives at home  Suspect secondary to generalized weakness, UTI, and COVID infection.   PT/OT: III (Minimum Resource Intensity)   Case management consult to assist with discharge disposition planning

## 2024-02-05 NOTE — ED NOTES
Patient assisted back into bed from bedside commode. Patient assisted into comfortable position and reconnected to monitor. No other needs at this time.      Paz Franco RN  02/05/24 6752

## 2024-02-05 NOTE — ASSESSMENT & PLAN NOTE
1/2 BC positive for e coli, pending 2nd BC  Patient already on rocephin for UTI  Continue rocephin  Echo:  Left ventricular cavity size is normal. Wall thickness is moderately increased. The left ventricular ejection fraction is 65-69 %. Systolic function is normal. Wall motion is normal. Diastolic function is mildly abnormal, consistent with grade I (abnormal) relaxation.   ID consult: Continue ceftriaxone, adjust dose to 2 every 24, anticipate completing 10 days of therapy with oral cephalosporin, recommend outpatient follow-up with urogynecology, continue Premarin cream and recommend resuming Hip-Richard 500 twice daily with vitamin C  Will need follow-up chest CT in 3 months and outpatient follow-up with pulmonology  Daily CBC, CMP to monitor for any evolving antibiotic toxicity

## 2024-02-05 NOTE — ED NOTES
Pt rang bell to be assisted onto bed side commode. Helped pt onto bedside commode. While pt was using the toilet, I threw away pt's old pure wick and got her a new one. Emptied container into toilet. Pt voided 800ml. Pt wiped herself and inadvertently pulled her own IV out. Stopped fluids. I aided her back into bed and replaced the pure wick. Put on a new diaper. Covered with blankets. Alerted RN to pt's IV being pulled.      Kaci Arauz  02/05/24 0507

## 2024-02-05 NOTE — PHYSICAL THERAPY NOTE
PHYSICAL THERAPY EVALUATION  NAME:  Nina Tran  DATE: 02/05/24    AGE:   78 y.o.  Mrn:   [id number]  ADMIT DX:  Bacteremia [R78.81]  Lactic acidosis [E87.20]  UTI (urinary tract infection) [N39.0]  Pneumonia [J18.9]  Weakness [R53.1]  Dyspnea [R06.00]  Acute cystitis without hematuria [N30.00]  Generalized weakness [R53.1]  Fall, initial encounter [W19.XXXA]  COVID-19 [U07.1]    Past Medical History:   Diagnosis Date    High cholesterol      Length Of Stay: 1  Performed at least 2 patient identifiers during session: Name and Birthday  PHYSICAL THERAPY EVALUATION :    02/05/24 0956   PT Last Visit   PT Visit Date 02/05/24   Note Type   Note type Evaluation   Pain Assessment   Pain Assessment Tool 0-10   Pain Score No Pain   Restrictions/Precautions   Other Precautions Airborne/isolation;Contact/isolation;Fall Risk;O2;Multiple lines  (2 lpm NC)   Home Living   Type of Home House  (2 RYAN no HR)   Home Layout One level;Performs ADLs on one level;Able to live on main level with bedroom/bathroom   Bathroom Shower/Tub Walk-in shower   Bathroom Toilet Raised   Bathroom Equipment Shower chair   Home Equipment Walker  (wasn't using)   Additional Comments Reports living in a 1  with 2 RYAN no HRs and not using an AD PTA.   Prior Function   Level of [place] Independent with ADLs;Independent with functional mobility;Independent with IADLS   Lives With Spouse   Receives Help From Family   IADLs Independent with meal prep;Independent with medication management;Family/Friend/Other provides transportation   Falls in the last 6 months 1 to 4   Comments Reports being independent with mobility, ADls and has assistance for transportation   General   Additional Pertinent History CTA chest PE study: Redemonstrated diffuse interlobular septal thickening and bilateral groundglass opacities most pronounced in the lung bases and mild bronchiectasis in the right upper lobe and bilateral lower lobes. Findings may be due to  "infectious/inflammatory etiology   such as COVID-pneumonia, pulmonary edema, or underlying pulmonary fibrosis.   Cognition   Orientation Level Oriented X4   Following Commands Follows one step commands without difficulty   Subjective   Subjective \"I use my oxygen prn.\"   RLE Assessment   RLE Assessment WFL  (3+/5)   LLE Assessment   LLE Assessment WFL  (3+/5)   Light Touch   RLE Light Touch Impaired   RLE Light Touch Comments plantar aspect of feet-pt reports associated with chemo   LLE Light Touch Impaired   LLE Light Touch Comments plantar aspect of feet-pt reports associated with chemo   Bed Mobility   Supine to Sit 5  Supervision   Additional items Increased time required;Verbal cues   Additional Comments HOB flat without bedrail supervision to complete.   Transfers   Sit to Stand   (SBA)   Additional items Increased time required;Verbal cues   Stand to Sit   (SBA)   Additional items Increased time required;Verbal cues   Stand pivot   (steadying assistance)   Additional items Assist x 1;Increased time required;Verbal cues   Additional Comments no AD. sit<>stand without AD with SBA with inc time with wide ANDRA. spt without AD with steadying asisstance. cues for turning completely.   Ambulation/Elevation   Gait pattern Wide ANDRA;Improper Weight shift;Decreased foot clearance;Short stride;Excessively slow;Step through pattern   Gait Assistance 4  Minimal assist   Additional items Assist x 1;Verbal cues   Assistive Device None   Distance ambulated 20'x1 without AD with wide ANDRA, decreased step length and foot clearance. reaching for external support at times and inc path deviation.   Balance   Static Sitting Good   Dynamic Sitting Fair +   Static Standing Fair   Dynamic Standing Fair -   Ambulatory Poor +   Endurance Deficit   Endurance Deficit Yes   Endurance Deficit Description SpO2 at rest on 2 lpm 96%. after ambulation 85% then increased to 93% with mod verbal and visual cues for pursed lip breathing.   Activity " "Tolerance   Activity Tolerance Patient limited by fatigue   Medical Staff Made Aware Jackelyn KOEHLER   Nurse Made Aware Meredith SILVA   Assessment   Prognosis Good   Problem List Decreased strength;Decreased endurance;Impaired balance;Decreased mobility;Decreased safety awareness   Barriers to Discharge None   Barriers to Discharge Comments pt has support from spouse prn   Goals   Patient Goals \"Go home\"   STG Expiration Date 02/19/24   PT Treatment Day 1   Plan   Treatment/Interventions ADL retraining;Functional transfer training;LE strengthening/ROM;Elevations;Therapeutic exercise;Endurance training;Patient/family training;Equipment eval/education;Bed mobility;Gait training;Compensatory technique education;Spoke to nursing;Spoke to case management;OT   PT Frequency 3-5x/wk   Discharge Recommendation   Rehab Resource Intensity Level, PT III (Minimum Resource Intensity)   Equipment Recommended   (walker-pt has)   Additional Comments recommend assistance with mobility prn. spouse reports beig able to provide assistance.   AM-PAC Basic Mobility Inpatient   Turning in Flat Bed Without Bedrails 3   Lying on Back to Sitting on Edge of Flat Bed Without Bedrails 3   Moving Bed to Chair 3   Standing Up From Chair Using Arms 3   Walk in Room 3   Climb 3-5 Stairs With Railing 3   Basic Mobility Inpatient Raw Score 18   Basic Mobility Standardized Score 41.05   Highest Level Of Mobility   JH-HLM Goal 6: Walk 10 steps or more   JH-HLM Achieved 7: Walk 25 feet or more   Additional Treatment Session   Start Time 1015   End Time 1036   Treatment Assessment Pt tolerated session fairly. She was able to ambulate inc distance with decreased asisstance wiht use of RW compared to no AD. She requires min cue for safety with use of RW. She is limited by decreased strength, balance endurance. She requires verbal and visual cues for improved breathign technique.   Equipment Use initiated use of RW. min cues for hand placement for safety with RW. " sit<>stand with RW wiht supervision. spt wiht RW wiht SBA wiht min cues for turning completely prior ot sitting. ambulated 60'x1 with RW with SBA with slow santiago, decreased step length and foot clearance wiht min cues for in step length and staying wihtin ANDRA of RW. SpO2 at rets 93% on 2 lpm, desaturated to 85% after ambulation and improved to 93% wihtin 4' sitting rest. Discussed use of RW upon return to home.    End of Consult   Patient Position at End of Consult All needs within reach;Supine       Pt requires PT/OT co-eval due to medical complexity, safety concerns, fall risk, significant assistance with mobility and/or cognitive-behavioral impairments.    (Please find full objective findings from PT assessment regarding body systems outlined above).     Assessment: Pt is a 78 y.o. female seen for PT evaluation s/p admission to [facility] on 2/4/2024 with COVID-19.  Order placed for PT services.  Upon evaluation: Pt is presenting with impaired functional mobility due to decreased strength, decreased endurance, impaired balance, gait deviations, decreased safety awareness, impaired judgment, and fall risk requiring  supervision assistance for bed mobility, stand by to steadying assistance for transfers, and minimal assistance for ambulation with out AD . Pt's clinical presentation is currently unpredictable given the functional mobility deficits above, especially weakness, decreased endurance, impaired balance, gait deviations, decreased activity tolerance, decreased functional mobility tolerance, decreased safety awareness, impaired judgement, and SOB upon exertion, coupled with fall risks as indicated by AM-PAC 6-Clicks: 18/24 as well as hx of falls, impaired balance, polypharmacy, impaired judgement, and decreased safety awareness and combined with medical complications of abnormal H&H, abnormal WBCs, abnormal blood sugars, abnormal sodium values, abnormal potassium values, low SpO2 values,  increased O2 needs from baseline, need for input for mobility technique/safety, and Covid-19, opacities left lung, acute cystitis, bacteremia, sepsis, acute respiratory failure .  Pt's PMHx and comorbidities that may affect physical performance and progress include:  thymus cancer, anemia, recurrent UTIs . Personal factors affecting pt at time of IE include: step(s) to enter environment, inability to perform IADLs, inability to perform ADLs, inability to navigate level surfaces without external assistance, inability to ambulate household distances, and recent fall(s)/fall history. Pt will benefit from continued skilled PT services to address deficits as defined above and to maximize level of functional mobility to facilitate return toward PLOF and improved QOL. From PT/mobility standpoint, recommendation at time of d/c would be Level III (Minimum Resource Intensity), with family and/or caregiver support, and with walker in order to reduce fall risk and maximize pt's functional independence and consistency with mobility. Recommend trial with walker next 1-2 sessions and ther ex next 1-2 sessions.       The patient's AM-PAC Basic Mobility Inpatient Short Form Raw Score is 18. A Raw score of greater than 16 suggests the patient may benefit from discharge to home. Please also refer to the recommendation of the Physical Therapist for safe discharge planning.       Goals: Pt will: Perform bed mobility tasks with modified Independent to reposition in bed and prepare for transfers. Pt will perform transfers with modified Independent to decrease burden of care, decrease risk for falls, and improve activity tolerance and prepare for ambulation. Pt will ambulate with LRAD for >/= 150' with  modified Independent  to decrease burden of care, decrease risk for falls, improve activity tolerance, and improve gait quality and to access home environment. Pt will complete 1 step with LRAD and >/= 4 steps without handrail with stand by  assistance to decrease burden of care, return to home with RYAN, decrease risk for falls, and improve activity tolerance. Pt will participate in objective balance assessment to determine baseline fall risk. Pt will participate in SSWS assessment to determine level of mobility. Pt will increase B LE strength >/= 1/2 MMT grade to facilitate functional mobility.      Bonny Perez, PT,DPT

## 2024-02-05 NOTE — PLAN OF CARE
Problem: PHYSICAL THERAPY ADULT  Goal: Performs mobility at highest level of function for planned discharge setting.  See evaluation for individualized goals.  Description: Treatment/Interventions: ADL retraining, Functional transfer training, LE strengthening/ROM, Elevations, Therapeutic exercise, Endurance training, Patient/family training, Equipment eval/education, Bed mobility, Gait training, Compensatory technique education, Spoke to nursing, Spoke to case management, OT  Equipment Recommended:  (walker-pt has)       See flowsheet documentation for full assessment, interventions and recommendations.  2/5/2024 1531 by Bonny Perez PT  Note: Prognosis: Good  Problem List: Decreased strength, Decreased endurance, Impaired balance, Decreased mobility, Decreased safety awareness  Pt tolerated session fairly. She was able to ambulate inc distance with decreased asisstance wiht use of RW compared to no AD. She requires min cue for safety with use of RW. She is limited by decreased strength, balance endurance. She requires verbal and visual cues for improved breathign technique.  Barriers to Discharge: None  Barriers to Discharge Comments: pt has support from spouse prn  Rehab Resource Intensity Level, PT: III (Minimum Resource Intensity)    See flowsheet documentation for full assessment.

## 2024-02-05 NOTE — ASSESSMENT & PLAN NOTE
Presents to ED with complaints of generalized weakness and UTI symptoms including frequency, burning and urgency  Start ceftriaxone   Follow up cultures   1/2 BC positive for e coli, continue rocephin and wait for final BC and urine culture

## 2024-02-05 NOTE — CONSULTS
Consultation - Infectious Disease   Nina Tran 78 y.o. female MRN: [id number]  Unit/Bed#: ED 11 Encounter: [id number]      Inpatient consult to Infectious Diseases  Consult performed by: Masha Altman MD  Consult ordered by: Ann Cardona PA-C          REQUIRED DOCUMENTATION:     1. This service was provided via Telemedicine.  2. Provider located at [place].  3. TeleMed provider: Masha Altman MD.  4. Identify all parties in room with patient during tele consult:RN  5. After connecting through [employer], patient was identified by name and date of birth and assistant checked wristband.  Patient was then informed that this was a Telemedicine visit and that the exam was being conducted confidentially over secure lines. My office door was closed. No one else was in the room.  Patient acknowledged consent and understanding of privacy and security of the Telemedicine visit, and gave us permission to have the assistant stay in the room in order to assist with the history and to conduct the exam.  I informed the patient that I have reviewed their record in Epic and presented the opportunity for them to ask any questions regarding the visit today.  The patient agreed to participate.       Assessment/Recommendations     E.coli bacteremia  Recurrent acute cystitis  Acute hypoxic respiratory insufficiency  Covid infection  Cystocele, vaginal atrophy    -Patient presenting with hypoxia secondary to COVID pneumonia and has E. coli bacteremia from UTI.  She has underlying vaginal atrophy, cystocele, suspect incomplete bladder emptying causing recurrent UTI.  -She is afebrile without leukocytosis, on 2 L/min O2    Continue ceftriaxone, adjust dose to 2 every 24  Follow-up blood and urine cultures  Anticipate completing 10 days of therapy with oral cephalosporin  Recommend outpatient follow-up with urogynecology, continue Premarin cream and recommend resuming Hip-Richard 500 twice daily with vitamin C  Continue  remdesivir and steroids, respiratory support and wean oxygen as tolerated  Will need follow-up chest CT in 3 months and outpatient follow-up with pulmonology  Daily CBC, CMP to monitor for any evolving antibiotic toxicity    CT chest/abdomen images personally reviewed and show bilateral groundglass opacities, diffuse interlobular septal thickening, mild bronchiectasis.  No obstructive uropathy noted.    Discussed above plan in detail with the primary service who is in agreement.    History     Reason for Consult: Bacteremia  HPI: Nina Tran is a 78 y.o. year old female with long covid on intermittent home oxygen, malignant thymoma s/p thymectomy, Hx vaginal atrophy and cystocele and recurrent uti, recent cholecystectomy in November with chronic diarrhea.   She states that she has been generally ill since lita COVID in 2021 and has been on intermittent oxygen.  She had a elective cholecystectomy in November and has had diarrhea postop.  About 1 month ago she began to feel worsening fatigue, generalized weakness and a few weeks ago began to note urinary symptoms such as dysuria, frequency and urgency.  She reports seeing a urogynecologist in the past and was told she needed a cystocele repair but has been putting it off.  She also noted few days of worsening shortness of breath and dry cough.  She presented to the ER on 2/4 when she suddenly felt her legs give out .  No fever or chills.  She tested positive for COVID and blood cultures are growing E. coli.  Infectious disease is being consulted for diagnostic work up and antibiotic management.    Review of Systems  Pertinent positives and negatives as noted in HPI. Rest complete 12 point system-based review of systems is otherwise negative.    PAST MEDICAL HISTORY:  Past Medical History:   Diagnosis Date    High cholesterol      Past Surgical History:   Procedure Laterality Date    HYSTERECTOMY         FAMILY HISTORY:  Non-contributory    SOCIAL  HISTORY:  Social History   /Civil Union  Social History     Substance and Sexual Activity   Alcohol Use Yes     Social History     Substance and Sexual Activity   Drug Use Never     Social History     Tobacco Use   Smoking Status Never   Smokeless Tobacco Never       ALLERGIES:  Allergies   Allergen Reactions    Tetanus Toxoid Hives     hives          MEDICATIONS:  All current active medications have been reviewed.    Physical Exam     Temp:  [97.6 °F (36.4 °C)] 97.6 °F (36.4 °C)  HR:  [56-92] 90  Resp:  [18-25] 20  BP: ()/(56-67) 130/59  SpO2:  [97 %-100 %] 100 %  Temp (24hrs), Av.6 °F (36.4 °C), Min:97.6 °F (36.4 °C), Max:97.6 °F (36.4 °C)  Current: Temperature: 97.6 °F (36.4 °C)    Intake/Output Summary (Last 24 hours) at 2024 1007  Last data filed at 2024 0537  Gross per 24 hour   Intake 290 ml   Output 2800 ml   Net -2510 ml         Physical exam findings reported by bedside and primary medical team staff    General Appearance:  Appearing fatigued, nontoxic, and in no distress, appears stated age   Head:  Normocephalic, without obvious abnormality, atraumatic   Eyes:  PERRL, conjunctiva pink and sclera anicteric, both eyes   Nose: Nares normal, mucosa normal, no drainage   Throat: Oropharynx moist without lesions; lips, mucosa, and tongue normal; teeth and gums normal   Neck: Supple, symmetrical, trachea midline, no adenopathy, no tenderness/mass/nodules   Back:   Symmetric, no curvature, ROM normal, no CVA tenderness   Lungs:   Coarse and diminished and rhonchorosu to auscultation bilaterally, no audible wheezes, respirations unlabored   Chest Wall:  No tenderness or deformity   Heart:  Regular rate and rhythm, S1, S2 normal, no murmur, rub or gallop   Abdomen:   Soft, non-tender, non-distended, positive bowel sounds, no masses, no organomegaly    No CVA tenderness   Extremities: Extremities normal, atraumatic, no cyanosis, clubbing or edema   Skin: Skin color, texture, turgor  normal, no rashes or lesions. No draining wounds noted.   Lymph nodes: Cervical, supraclavicular, and axillary nodes normal   Neurologic: Alert and oriented times 3       Invasive Devices:   Peripheral IV 02/05/24 Distal;Dorsal (posterior);Right Forearm (Active)       External Urinary Catheter (Active)   Collection Container Canister and suction tubing (For Female) 02/04/24 0820       Labs, Imaging, & Other Studies     Lab Results:    I have personally reviewed pertinent labs.    Results from last 7 days   Lab Units 02/05/24  0630 02/04/24  0539   WBC Thousand/uL 6.54 10.32*   HEMOGLOBIN g/dL 10.8* 12.6   PLATELETS Thousands/uL 156 187     Results from last 7 days   Lab Units 02/05/24  0630 02/04/24  0539   POTASSIUM mmol/L 4.1 3.2*   CHLORIDE mmol/L 102 97   CO2 mmol/L 26 26   BUN mg/dL 15 16   CREATININE mg/dL 0.72 0.94   EGFR ml/min/1.73sq m 79 57   CALCIUM mg/dL 7.3* 8.2*   AST U/L 23 27   ALT U/L 31 41   ALK PHOS U/L 157* 211*     Results from last 7 days   Lab Units 02/04/24  0639 02/04/24  0539   BLOOD CULTURE   --  Received in Microbiology Lab. Culture in Progress.   GRAM STAIN RESULT   --  Gram negative rods*   LEGIONELLA URINARY ANTIGEN  Negative  --        Imaging Studies:   I have personally reviewed pertinent imaging study reports and images in PACS.      EKG, Pathology, and Other Studies:   I have personally reviewed pertinent reports and reviewed external records.    Counseling/Coordination of care:       Total 90 minutes communication with the patient via telehealth.  Labs, medical tests and imaging studies were independently and extensively reviewed by me as noted above in HPI and old records were obtained and summarized as noted above in HPI.   My recommendations were discussed with the patient in detail who verbalized understanding.

## 2024-02-05 NOTE — ASSESSMENT & PLAN NOTE
"Background: Presented to ED with generalized weakness and fall when she walked to the bathroom.   Mild COVID pathway as below   Vaccinated previously, states current physicians encouraged patient to not get vaccinated again  COVID19- positive on 2/4   Supplemental oxygen with 2L NC  Wean as tolerated for oxygen saturation greater than 92%  D-dimer elevated 1.61  Will order CTA chest PE study as patient is requiring oxygen  Chest CT scan revealed \" Diffuse nonspecific interlobular septal thickening with areas of groundglass haziness with differential considerations including an infectious or inflammatory process, pulmonary edema, among other etiologies.  \"   CBC and CMP daily  Troponin 0hr normal, CRP elevated, CK low, BNP mildly elevated 108  AC per protocol   Treat with remdesivir and steroids  OOB TID; ambulation and mobilization strongly encouraged  PT/OT consult and evaluation   Self proning strongly encouraged  Supportive care   "

## 2024-02-05 NOTE — PLAN OF CARE
Problem: PHYSICAL THERAPY ADULT  Goal: Performs mobility at highest level of function for planned discharge setting.  See evaluation for individualized goals.  Description: Treatment/Interventions: ADL retraining, Functional transfer training, LE strengthening/ROM, Elevations, Therapeutic exercise, Endurance training, Patient/family training, Equipment eval/education, Bed mobility, Gait training, Compensatory technique education, Spoke to nursing, Spoke to case management, OT  Equipment Recommended:  (walker-pt has)       See flowsheet documentation for full assessment, interventions and recommendations.  Note: Prognosis: Good  Problem List: Decreased strength, Decreased endurance, Impaired balance, Decreased mobility, Decreased safety awareness  Assessment: Pt is a 78 y.o. female seen for PT evaluation s/p admission to [facility] on 2/4/2024 with COVID-19.  Order placed for PT services.  Upon evaluation: Pt is presenting with impaired functional mobility due to decreased strength, decreased endurance, impaired balance, gait deviations, decreased safety awareness, impaired judgment, and fall risk requiring  supervision assistance for bed mobility, stand by to steadying assistance for transfers, and minimal assistance for ambulation with out AD . Pt's clinical presentation is currently unpredictable given the functional mobility deficits above, especially weakness, decreased endurance, impaired balance, gait deviations, decreased activity tolerance, decreased functional mobility tolerance, decreased safety awareness, impaired judgement, and SOB upon exertion, coupled with fall risks as indicated by AM-PAC 6-Clicks: 18/24 as well as hx of falls, impaired balance, polypharmacy, impaired judgement, and decreased safety awareness and combined with medical complications of abnormal H&H, abnormal WBCs, abnormal blood sugars, abnormal sodium values, abnormal potassium values, low SpO2 values, increased  O2 needs from baseline, need for input for mobility technique/safety, and Covid-19, opacities left lung, acute cystitis, bacteremia, sepsis, acute respiratory failure .  Pt's PMHx and comorbidities that may affect physical performance and progress include:  thymus cancer, anemia, recurrent UTIs . Personal factors affecting pt at time of IE include: step(s) to enter environment, inability to perform IADLs, inability to perform ADLs, inability to navigate level surfaces without external assistance, inability to ambulate household distances, and recent fall(s)/fall history. Pt will benefit from continued skilled PT services to address deficits as defined above and to maximize level of functional mobility to facilitate return toward PLOF and improved QOL. From PT/mobility standpoint, recommendation at time of d/c would be Level III (Minimum Resource Intensity), with family and/or caregiver support, and with walker in order to reduce fall risk and maximize pt's functional independence and consistency with mobility. Recommend trial with walker next 1-2 sessions and ther ex next 1-2 sessions.  Barriers to Discharge: None  Barriers to Discharge Comments: pt has support from spouse prn  Rehab Resource Intensity Level, PT: III (Minimum Resource Intensity)    See flowsheet documentation for full assessment.

## 2024-02-05 NOTE — ASSESSMENT & PLAN NOTE
Presents to ED with complaints of generalized weakness and UTI symptoms including frequency, burning and urgency  Start ceftriaxone   Follow up cultures   1/2 BC positive for e coli, continue rocephin  Urine culture mixed contaminants

## 2024-02-05 NOTE — ASSESSMENT & PLAN NOTE
Sepsis, 2/2 Covid, UTI a/e/b Tachypnea, Tachycardia, Lactic acidosis, GNR + BC requiring IVF boluses, IVF, IV Ceftriaxone, IV Remdesivir, Cultures.   ID consulted, continue with current treatment  Repeat BC ordered  Continue IV rocephin 2g q24h per ID recs.    sameer

## 2024-02-05 NOTE — ASSESSMENT & PLAN NOTE
Patient found to be requiring 2L NC in the ED. Does not wear oxygen at baseline. Suspect secondary to COVID infection. Start on remdesivir and steroids.   D-dimer elevated  CTA chest PE study without PE; Redemonstrated diffuse interlobular septal thickening and bilateral groundglass opacities most pronounced in the lung bases and mild bronchiectasis in the right upper lobe and bilateral lower lobes. Findings may be due to infectious/inflammatory etiology such as COVID-pneumonia, pulmonary edema, or underlying pulmonary fibrosis. Recommend follow-up high-resolution chest CT in 3 months for reassessment.  Incentive spirometry, supportive measures, wean off oxygen as able.

## 2024-02-05 NOTE — ASSESSMENT & PLAN NOTE
1/2 BC positive for e coli, pending 2nd BC  Patient already on rocephin for UTI  Continue rocephin  Echo ordered  ID consult

## 2024-02-05 NOTE — PROGRESS NOTES
"[facility]  Progress Note  Name: Nina Tran I  MRN: [id number]  Unit/Bed#: ED 11 I Date of Admission: 2/4/2024   Date of Service: 2/5/2024 I Hospital Day: 1    Assessment/Plan   * COVID-19  Assessment & Plan  Background: Presented to ED with generalized weakness and fall when she walked to the bathroom.   Mild COVID pathway as below   Vaccinated previously, states current physicians encouraged patient to not get vaccinated again  COVID19- positive on 2/4   Supplemental oxygen with 2L NC  Wean as tolerated for oxygen saturation greater than 92%  D-dimer elevated 1.61  Will order CTA chest PE study as patient is requiring oxygen  Chest CT scan revealed \" Diffuse nonspecific interlobular septal thickening with areas of groundglass haziness with differential considerations including an infectious or inflammatory process, pulmonary edema, among other etiologies.  \"   CBC and CMP daily  Troponin 0hr normal, CRP elevated, CK low, BNP mildly elevated 108  AC per protocol   Treat with remdesivir and steroids  OOB TID; ambulation and mobilization strongly encouraged  PT/OT consult and evaluation   Self proning strongly encouraged  Supportive care     Acute cystitis without hematuria  Assessment & Plan  Presents to ED with complaints of generalized weakness and UTI symptoms including frequency, burning and urgency  Start ceftriaxone   Follow up cultures   1/2 BC positive for e coli, continue rocephin and wait for final BC and urine culture    Bacteremia  Assessment & Plan  1/2 BC positive for e coli, pending 2nd BC  Patient already on rocephin for UTI  Continue rocephin  Echo ordered  ID consult    Sepsis (HCC)  Assessment & Plan  Sepsis, 2/2 Covid, UTI a/e/b Tachypnea, Tachycardia, Lactic acidosis, GNR + BC requiring IVF boluses, IVF, IV Ceftriaxone, IV Remdesivir, Cultures.   ID consulted, continue with current treatment  Repeat BC ordered  Continue IV rocephin 2g q24h per ID recs.     Acute " respiratory failure (HCC)  Assessment & Plan  Patient found to be requiring 2L NC in the ED. Does not wear oxygen at baseline, has home o2 prn. With tachypnea in the ED and desaturations requiring O2. Sats 85% and RR 23-25. Suspect secondary to COVID infection. Start on remdesivir and steroids.   D-dimer elevated  CTA chest PE study without PE; Redemonstrated diffuse interlobular septal thickening and bilateral groundglass opacities most pronounced in the lung bases and mild bronchiectasis in the right upper lobe and bilateral lower lobes. Findings may be due to infectious/inflammatory etiology such as COVID-pneumonia, pulmonary edema, or underlying pulmonary fibrosis. Recommend follow-up high-resolution chest CT in 3 months for reassessment.  Incentive spirometry, supportive measures, wean off oxygen as able.     Ambulatory dysfunction  Assessment & Plan  Presented to ED with generalized weakness and a fall that occurred prior to admission. No head strike or LOC. Had right sided chest pain s/p fall. Was walking to the restroom when her legs gave out under her causing her to fall forward.   Trauma workup negative  At baseline patient will ambulate well on her own.   Currently lives at home  Suspect secondary to generalized weakness, UTI, and COVID infection.   PT/OT evaluations appreciated   Case management consult to assist with discharge disposition planning    Generalized weakness  Assessment & Plan  POA with complaints of generalized weakness suspect secondary to COVID infection and UTI  Given 1 dose of rocephin in the ED, will continue and add remdesivir for COVID  PT/OT consult    History of thymus cancer  Assessment & Plan  Patient with history of thymus cancer s/p removal    Continue outpatient follow up          VTE Pharmacologic Prophylaxis: VTE Score: 8 High Risk (Score >/= 5) - Pharmacological DVT Prophylaxis Ordered: enoxaparin (Lovenox). Sequential Compression Devices Ordered.    Mobility:      [facility]  Goal NOT achieved. Continue with multidisciplinary rounding and encourage appropriate mobility to improve upon HLM goals.    Patient Centered Rounds: I performed bedside rounds with nursing staff today.   Discussions with Specialists or Other Care Team Provider: nursing, case management    Education and Discussions with Family / Patient: Attempted to update  () via phone. Left voicemail.     Total Time Spent on Date of Encounter in care of patient: 38 mins. This time was spent on one or more of the following: performing physical exam; counseling and coordination of care; obtaining or reviewing history; documenting in the medical record; reviewing/ordering tests, medications or procedures; communicating with other healthcare professionals and discussing with patient's family/caregivers.    Current Length of Stay: 1 day(s)  Current Patient Status: Inpatient   Certification Statement: The patient will continue to require additional inpatient hospital stay due to COVID, UTI, IV abx, bacteremia, acute respiratory insufficiency  Discharge Plan: Anticipate discharge in >72 hrs to discharge location to be determined pending rehab evaluations.    Code Status: Level 1 - Full Code    Subjective:   Patient seen and examined this morning. She is doing better today. No nausea, vomiting, constipation, abdominal pain.  Still with urinary frequency, but improved. Has chronic diarrhea from recent gallbladder surgery, also feeling improved.Feels her breathing is better as well. Eating and drinking well.     Objective:     Vitals:   Temp (24hrs), Av.6 °F (36.4 °C), Min:97.6 °F (36.4 °C), Max:97.6 °F (36.4 °C)    Temp:  [97.6 °F (36.4 °C)] 97.6 °F (36.4 °C)  HR:  [] 102  Resp:  [18-24] 20  BP: ()/(56-72) 129/60  SpO2:  [85 %-100 %] 94 %  Body mass index is 23.03 kg/m².     Input and Output Summary (last 24 hours):     Intake/Output Summary (Last 24 hours) at 2024 0511  Last data filed at 2024  1212  Gross per 24 hour   Intake 270 ml   Output 1800 ml   Net -1530 ml       Physical Exam:   Physical Exam  Vitals reviewed.   Constitutional:       General: She is not in acute distress.     Appearance: She is ill-appearing. She is not toxic-appearing.   HENT:      Head: Normocephalic and atraumatic.      Nose: Congestion present.      Mouth/Throat:      Mouth: Mucous membranes are moist.   Cardiovascular:      Heart sounds: No murmur heard.  Pulmonary:      Effort: No respiratory distress.      Breath sounds: No stridor. Rhonchi present. No wheezing or rales.      Comments: Saturating around 95% on 2L NC  Abdominal:      General: Bowel sounds are normal. There is no distension.      Palpations: Abdomen is soft. There is no mass.      Tenderness: There is no abdominal tenderness.   Musculoskeletal:         General: Tenderness (Tenderness right anterior lower rib cage where patient fell) present.      Right lower leg: No edema.      Left lower leg: No edema.   Skin:     General: Skin is warm and dry.   Neurological:      General: No focal deficit present.      Mental Status: She is alert and oriented to person, place, and time.   Psychiatric:         Mood and Affect: Mood normal.         Behavior: Behavior normal.          Additional Data:     Labs:  Results from last 7 days   Lab Units 02/05/24  0630 02/04/24  0539   WBC Thousand/uL 6.54 10.32*   HEMOGLOBIN g/dL 10.8* 12.6   HEMATOCRIT % 34.5* 40.6   PLATELETS Thousands/uL 156 187   BANDS PCT %  --  8   NEUTROS PCT % 88*  --    LYMPHS PCT % 7*  --    LYMPHO PCT %  --  8*   MONOS PCT % 4  --    MONO PCT %  --  2*   EOS PCT % 0 0     Results from last 7 days   Lab Units 02/05/24  0630   SODIUM mmol/L 137   POTASSIUM mmol/L 4.1   CHLORIDE mmol/L 102   CO2 mmol/L 26   BUN mg/dL 15   CREATININE mg/dL 0.72   ANION GAP mmol/L 9   CALCIUM mg/dL 7.3*   ALBUMIN g/dL 3.0*   TOTAL BILIRUBIN mg/dL 0.65   ALK PHOS U/L 157*   ALT U/L 31   AST U/L 23   GLUCOSE RANDOM mg/dL 119      Results from last 7 days   Lab Units 02/04/24  0539   INR  0.93     Results from last 7 days   Lab Units 02/05/24  1119   POC GLUCOSE mg/dl 158*         Results from last 7 days   Lab Units 02/05/24  0630 02/04/24  0833 02/04/24  0539   LACTIC ACID mmol/L  --  1.3 2.6*   PROCALCITONIN ng/ml 0.20  --  0.23       Lines/Drains:  Invasive Devices       Peripheral Intravenous Line  Duration             Peripheral IV 02/05/24 Distal;Dorsal (posterior);Right Forearm <1 day              Drain  Duration             External Urinary Catheter 1 day                          Imaging: Reviewed radiology reports from this admission including: CTA chest PE study, CT CAP, CT cervical spine, CT head    Recent Cultures (last 7 days):   Results from last 7 days   Lab Units 02/04/24  0639 02/04/24  0539   BLOOD CULTURE   --  Received in Microbiology Lab. Culture in Progress.   GRAM STAIN RESULT   --  Gram negative rods*   URINE CULTURE  >100,000 cfu/ml  --    LEGIONELLA URINARY ANTIGEN  Negative  --        Last 24 Hours Medication List:   Current Facility-Administered Medications   Medication Dose Route Frequency Provider Last Rate    acetaminophen  650 mg Oral Q6H PRN Ann Cardona PA-C      ascorbic acid  500 mg Oral Daily Ann Cardona PA-C      atorvastatin  20 mg Oral Daily With Dinner Ann Cardona PA-C      atovaquone  1,500 mg Oral Daily Ann Cardona PA-C      cefTRIAXone  1,000 mg Intravenous Once Ann Cardona PA-C      [START ON 2/6/2024] cefTRIAXone  2,000 mg Intravenous Q24H Ann Cardona PA-C      dexamethasone  6 mg Intravenous Q24H KD Garrett-ROMARIO      enoxaparin  40 mg Subcutaneous Daily Ann Cardona PA-C      guaiFENesin  600 mg Oral BID Ann Cardona PA-C      multi-electrolyte  75 mL/hr Intravenous Continuous Ann Cardona PA-C 75 mL/hr (02/05/24 1212)    mycophenolate  1,000 mg Oral Q12H [facility] Ann Cardona PA-C      ondansetron  4 mg  Intravenous Q6H PRN Ann Cardona PA-C      pantoprazole  40 mg Oral Early Morning Ann Cardona PA-C      remdesivir  100 mg Intravenous Q24H Ann Cardona PA-C Stopped (02/05/24 1212)        Today, Patient Was Seen By: Ann Cardona PA-C    **Please Note: This note may have been constructed using a voice recognition system.**

## 2024-02-06 LAB
ALBUMIN SERPL BCP-MCNC: 3.2 G/DL (ref 3.5–5)
ALP SERPL-CCNC: 159 U/L (ref 34–104)
ALT SERPL W P-5'-P-CCNC: 30 U/L (ref 7–52)
ANION GAP SERPL CALCULATED.3IONS-SCNC: 9 MMOL/L
AST SERPL W P-5'-P-CCNC: 24 U/L (ref 13–39)
BILIRUB SERPL-MCNC: 0.56 MG/DL (ref 0.2–1)
BUN SERPL-MCNC: 18 MG/DL (ref 5–25)
CALCIUM ALBUM COR SERPL-MCNC: 8.3 MG/DL (ref 8.3–10.1)
CALCIUM SERPL-MCNC: 7.7 MG/DL (ref 8.4–10.2)
CHLORIDE SERPL-SCNC: 101 MMOL/L (ref 96–108)
CO2 SERPL-SCNC: 25 MMOL/L (ref 21–32)
CREAT SERPL-MCNC: 0.7 MG/DL (ref 0.6–1.3)
ERYTHROCYTE [DISTWIDTH] IN BLOOD BY AUTOMATED COUNT: 15.5 % (ref 11.6–15.1)
GFR SERPL CREATININE-BSD FRML MDRD: 82 ML/MIN/1.73SQ M
GLUCOSE SERPL-MCNC: 136 MG/DL (ref 65–140)
HCT VFR BLD AUTO: 35.2 % (ref 34.8–46.1)
HGB BLD-MCNC: 11.1 G/DL (ref 11.5–15.4)
MAGNESIUM SERPL-MCNC: 2.1 MG/DL (ref 1.9–2.7)
MCH RBC QN AUTO: 27.1 PG (ref 26.8–34.3)
MCHC RBC AUTO-ENTMCNC: 31.5 G/DL (ref 31.4–37.4)
MCV RBC AUTO: 86 FL (ref 82–98)
PLATELET # BLD AUTO: 181 THOUSANDS/UL (ref 149–390)
PMV BLD AUTO: 10.8 FL (ref 8.9–12.7)
POTASSIUM SERPL-SCNC: 3.9 MMOL/L (ref 3.5–5.3)
PROCALCITONIN SERPL-MCNC: 0.16 NG/ML
PROT SERPL-MCNC: 5.3 G/DL (ref 6.4–8.4)
RBC # BLD AUTO: 4.09 MILLION/UL (ref 3.81–5.12)
SODIUM SERPL-SCNC: 135 MMOL/L (ref 135–147)
WBC # BLD AUTO: 7.88 THOUSAND/UL (ref 4.31–10.16)

## 2024-02-06 PROCEDURE — 85027 COMPLETE CBC AUTOMATED: CPT

## 2024-02-06 PROCEDURE — 83735 ASSAY OF MAGNESIUM: CPT

## 2024-02-06 PROCEDURE — 84145 PROCALCITONIN (PCT): CPT

## 2024-02-06 PROCEDURE — 87040 BLOOD CULTURE FOR BACTERIA: CPT

## 2024-02-06 PROCEDURE — 87205 SMEAR GRAM STAIN: CPT

## 2024-02-06 PROCEDURE — 80053 COMPREHEN METABOLIC PANEL: CPT

## 2024-02-06 PROCEDURE — 99232 SBSQ HOSP IP/OBS MODERATE 35: CPT

## 2024-02-06 PROCEDURE — 87070 CULTURE OTHR SPECIMN AEROBIC: CPT

## 2024-02-06 RX ORDER — METHENAMINE HIPPURATE 1000 MG/1
1 TABLET ORAL 2 TIMES DAILY
Status: DISCONTINUED | OUTPATIENT
Start: 2024-02-06 | End: 2024-02-08 | Stop reason: HOSPADM

## 2024-02-06 RX ORDER — GUAIFENESIN 600 MG/1
1200 TABLET, EXTENDED RELEASE ORAL 2 TIMES DAILY
Status: DISCONTINUED | OUTPATIENT
Start: 2024-02-06 | End: 2024-02-08 | Stop reason: HOSPADM

## 2024-02-06 RX ADMIN — CEFTRIAXONE 2000 MG: 2 INJECTION, SOLUTION INTRAVENOUS at 05:45

## 2024-02-06 RX ADMIN — PANTOPRAZOLE SODIUM 40 MG: 40 TABLET, DELAYED RELEASE ORAL at 05:33

## 2024-02-06 RX ADMIN — REMDESIVIR 100 MG: 100 INJECTION, POWDER, LYOPHILIZED, FOR SOLUTION INTRAVENOUS at 10:12

## 2024-02-06 RX ADMIN — ATORVASTATIN CALCIUM 20 MG: 20 TABLET, FILM COATED ORAL at 15:28

## 2024-02-06 RX ADMIN — DEXAMETHASONE SODIUM PHOSPHATE 6 MG: 10 INJECTION, SOLUTION INTRAMUSCULAR; INTRAVENOUS at 08:37

## 2024-02-06 RX ADMIN — SODIUM CHLORIDE, SODIUM GLUCONATE, SODIUM ACETATE, POTASSIUM CHLORIDE, MAGNESIUM CHLORIDE, SODIUM PHOSPHATE, DIBASIC, AND POTASSIUM PHOSPHATE 75 ML/HR: .53; .5; .37; .037; .03; .012; .00082 INJECTION, SOLUTION INTRAVENOUS at 03:32

## 2024-02-06 RX ADMIN — GUAIFENESIN 1200 MG: 600 TABLET ORAL at 17:17

## 2024-02-06 RX ADMIN — ATOVAQUONE 1500 MG: 750 SUSPENSION ORAL at 08:38

## 2024-02-06 RX ADMIN — GUAIFENESIN 600 MG: 600 TABLET ORAL at 08:37

## 2024-02-06 RX ADMIN — MYCOPHENOLATE MOFETIL 1000 MG: 250 CAPSULE ORAL at 20:11

## 2024-02-06 RX ADMIN — ENOXAPARIN SODIUM 40 MG: 40 INJECTION SUBCUTANEOUS at 08:37

## 2024-02-06 RX ADMIN — MYCOPHENOLATE MOFETIL 1000 MG: 250 CAPSULE ORAL at 08:37

## 2024-02-06 RX ADMIN — METHENAMINE HIPPURATE 1 G: 1 TABLET ORAL at 17:18

## 2024-02-06 RX ADMIN — OXYCODONE HYDROCHLORIDE AND ACETAMINOPHEN 500 MG: 500 TABLET ORAL at 08:37

## 2024-02-06 NOTE — CASE MANAGEMENT
Case Management Assessment & Discharge Planning Note    Patient name Nina Tran  Location /-01 MRN [id number]  : 1945 Date 2024       Current Admission Date: 2024  Current Admission Diagnosis:COVID-19   Patient Active Problem List    Diagnosis Date Noted    Bacteremia 2024    Acute respiratory failure (HCC) 2024    Sepsis (HCC) 2024    Acute cystitis without hematuria 2024    COVID-19 2024    Generalized weakness 2024    Ambulatory dysfunction 2024    History of thymus cancer 2024      LOS (days): 2  Geometric Mean LOS (GMLOS) (days): 5.1  Days to GMLOS:3     OBJECTIVE:    Risk of Unplanned Readmission Score: 12.66         Current admission status: Inpatient       Preferred Pharmacy:   [facility]/pharmacy #[id number] [place] [address]  Phone: [phone] Fax: [phone]    Primary Care Provider: No primary care provider on file.    Primary Insurance: MEDICARE  Secondary Insurance: AARP    ASSESSMENT:  Active Health Care Proxies    There are no active Health Care Proxies on file.       Advance Directives  Does patient have a Health Care POA?: No  Was patient offered paperwork?: Yes (pt declined)  Does patient currently have a Health Care decision maker?: Yes, please see Health Care Proxy section  Does patient have Advance Directives?: No  Was patient offered paperwork?: Yes (pt declined)  Primary Contact: Abelardo Tran, spouse         Readmission Root Cause  30 Day Readmission: No    Patient Information  Admitted from:: Home  Mental Status: Alert  During Assessment patient was accompanied by: Not accompanied during assessment  Assessment information provided by:: Patient, Spouse  Primary Caregiver: Self  Support Systems: Spouse/significant other, Daughter  County of Residence: [facility]  What city do you live in?: [place]  Home entry access options. Select all that  apply.: Stairs  Number of steps to enter home.: 2  Do the steps have railings?: No  Type of Current Residence: [facility]  Living Arrangements: Lives w/ Spouse/significant other  Is patient a ?: No    Activities of Daily Living Prior to Admission  Functional Status: Independent  Completes ADLs independently?: Yes  Ambulates independently?: Yes  Does patient use assisted devices?: Yes  Assisted Devices (DME) used: Home Oxygen concentrator, Portable Oxygen tanks, Walker  DME Company Name (respiratory supplies): [employer]  O2 Rate(s): 2L PRN  Does patient currently own DME?: Yes  What DME does the patient currently own?: Home Oxygen concentrator, Portable Oxygen tanks, Walker  Does patient have a history of Outpatient Therapy (PT/OT)?: No  Does the patient have a history of Short-Term Rehab?: No  Does patient have a history of HHC?: No  Does patient currently have HHC?: No         Patient Information Continued  Income Source: SSI/SSD  Does patient have prescription coverage?: Yes  Does patient receive dialysis treatments?: No  Does patient have a history of substance abuse?: No  Does patient have a history of Mental Health Diagnosis?: No         Means of Transportation  Means of Transport to Appts:: Family transport      Housing Stability: Low Risk  (2/6/2024)    Housing Stability Vital Sign     Unable to Pay for Housing in the Last Year: No     Number of Places Lived in the Last Year: 1     Unstable Housing in the Last Year: No   Food Insecurity: No Food Insecurity (2/6/2024)    Hunger Vital Sign     Worried About Running Out of Food in the Last Year: Never true     Ran Out of Food in the Last Year: Never true   Transportation Needs: No Transportation Needs (2/6/2024)    PRAPARE - Transportation     Lack of Transportation (Medical): No     Lack of Transportation (Non-Medical): No   Utilities: Not At Risk (2/6/2024)    [facility] Utilities     Threatened with loss of utilities: No       DISCHARGE DETAILS:    Discharge  planning discussed with:: patient and spouse, Abelardo  Freedom of Choice: Yes  Comments - Freedom of Choice: blanket [facility] referral for HHC  CM contacted family/caregiver?: Yes             Contacts  Patient Contacts: Abelardo Tran, spouse  Relationship to Patient:: Family  Contact Method: Phone  Phone Number: [phone] (H)  Reason/Outcome: Continuity of Care, Discharge Planning    Requested Home Health Care         Is the patient interested in HHC at discharge?: Yes  Home Health Discipline requested:: Nursing, Occupational Therapy, Physical Therapy  HHA External Referral Reason (only applicable if external HHA name selected): Services not provided in network or near patient location  Home Health Follow-Up Provider:: PCP  Home Health Services Needed:: Evaluate Functional Status and Safety, Gait/ADL Training, Strengthening/Theraputic Exercises to Improve Function, Other (comment) (Medication Management)  Homebound Criteria Met:: Uses an Assist Device (i.e. cane, walker, etc)  Supporting Clincal Findings:: Limited Endurance, Fatigues Easliy in Short Distances    DME Referral Provided  Referral made for DME?: No    Other Referral/Resources/Interventions Provided:  Interventions: HHC         Treatment Team Recommendation: Home with Home Health Care  Discharge Destination Plan:: Home with Home Health Care  Transport at Discharge : Family              CM contacted patient in her room and contacted spouse Abelardo for home O2 supplier information, baseline information was obtained. CM discussed the role of CM in helping the patient develop a discharge plan and assist the patient in carry out their plan.      Patient lives in ranch home, independent with ambulation. Patient has had O2 at home for approximately one year, 2L PRN through [facility].  CM obtained PCP and placed in patients chart, Emely Will.    CM discussed with patient therapy recommendation for HHC. A post acute care recommendation was  made by your care team for [facility].  Discussed Freedom of Choice with patient. Offered patient blanket referral for agencies via phone. patient aware the list is custom by insurance and patient's medical needs. CM submitted blanket [facility] referral in [facility].    CM to follow for [facility] acceptance and review with patient.  CM to follow for any additional CM discharge referral needs.

## 2024-02-06 NOTE — PLAN OF CARE
Problem: Potential for Falls  Goal: Patient will remain free of falls  Description: INTERVENTIONS:  - Educate patient/family on patient safety including physical limitations  - Instruct patient to call for assistance with activity   - Consult OT/PT to assist with strengthening/mobility   - Keep Call bell within reach  - Keep bed low and locked with side rails adjusted as appropriate  - Keep care items and personal belongings within reach  - Initiate and maintain comfort rounds  - Make Fall Risk Sign visible to staff  - Offer Toileting every 2 Hours, in advance of need  - Initiate/Maintain bed alarm  - Obtain necessary fall risk management equipment: bed check  - Apply yellow socks and bracelet for high fall risk patients  - Consider moving patient to room near nurses station  Outcome: Progressing     Problem: PAIN - ADULT  Goal: Verbalizes/displays adequate comfort level or baseline comfort level  Description: Interventions:  - Encourage patient to monitor pain and request assistance  - Assess pain using appropriate pain scale  - Administer analgesics based on type and severity of pain and evaluate response  - Implement non-pharmacological measures as appropriate and evaluate response  - Consider cultural and social influences on pain and pain management  - Notify physician/advanced practitioner if interventions unsuccessful or patient reports new pain  Outcome: Progressing     Problem: INFECTION - ADULT  Goal: Absence or prevention of progression during hospitalization  Description: INTERVENTIONS:  - Assess and monitor for signs and symptoms of infection  - Monitor lab/diagnostic results  - Monitor all insertion sites, i.e. indwelling lines, tubes, and drains  - Monitor endotracheal if appropriate and nasal secretions for changes in amount and color  - Rico appropriate cooling/warming therapies per order  - Administer medications as ordered  - Instruct and encourage patient and family to use good hand  hygiene technique  - Identify and instruct in appropriate isolation precautions for identified infection/condition  Outcome: Progressing     Problem: SAFETY ADULT  Goal: Patient will remain free of falls  Description: INTERVENTIONS:  - Educate patient/family on patient safety including physical limitations  - Instruct patient to call for assistance with activity   - Consult OT/PT to assist with strengthening/mobility   - Keep Call bell within reach  - Keep bed low and locked with side rails adjusted as appropriate  - Keep care items and personal belongings within reach  - Initiate and maintain comfort rounds  - Make Fall Risk Sign visible to staff  - Offer Toileting every 2 Hours, in advance of need  - Initiate/Maintain bed alarm  - Obtain necessary fall risk management equipment: bed check  - Apply yellow socks and bracelet for high fall risk patients  - Consider moving patient to room near nurses station  Outcome: Progressing  Goal: Maintain or return to baseline ADL function  Description: INTERVENTIONS:  -  Assess patient's ability to carry out ADLs; assess patient's baseline for ADL function and identify physical deficits which impact ability to perform ADLs (bathing, care of mouth/teeth, toileting, grooming, dressing, etc.)  - Assess/evaluate cause of self-care deficits   - Assess range of motion  - Assess patient's mobility; develop plan if impaired  - Assess patient's need for assistive devices and provide as appropriate  - Encourage maximum independence but intervene and supervise when necessary  - Involve family in performance of ADLs  - Assess for home care needs following discharge   - Consider OT consult to assist with ADL evaluation and planning for discharge  - Provide patient education as appropriate  Outcome: Progressing  Goal: Maintains/Returns to pre admission functional level  Description: INTERVENTIONS:  - Perform AM-PAC 6 Click Basic Mobility/ Daily Activity assessment daily.  - Set and  communicate daily mobility goal to care team and patient/family/caregiver.   - Collaborate with rehabilitation services on mobility goals if consulted  -  - Reposition patient every 2 hours.    - Out of bed for toileting  - Record patient progress and toleration of activity level   Outcome: Progressing     Problem: DISCHARGE PLANNING  Goal: Discharge to home or other facility with appropriate resources  Description: INTERVENTIONS:  - Identify barriers to discharge w/patient and caregiver  - Arrange for needed discharge resources and transportation as appropriate  - Identify discharge learning needs (meds, wound care, etc.)  - Arrange for interpretive services to assist at discharge as needed  - Refer to Case Management Department for coordinating discharge planning if the patient needs post-hospital services based on physician/advanced practitioner order or complex needs related to functional status, cognitive ability, or social support system  Outcome: Progressing     Problem: Knowledge Deficit  Goal: Patient/family/caregiver demonstrates understanding of disease process, treatment plan, medications, and discharge instructions  Description: Complete learning assessment and assess knowledge base.  Interventions:  - Provide teaching at level of understanding  - Provide teaching via preferred learning methods  Outcome: Progressing     Problem: RESPIRATORY - ADULT  Goal: Achieves optimal ventilation and oxygenation  Description: INTERVENTIONS:  - Assess for changes in respiratory status  - Assess for changes in mentation and behavior  - Position to facilitate oxygenation and minimize respiratory effort  - Oxygen administered by appropriate delivery if ordered  - Initiate smoking cessation education as indicated  - Encourage broncho-pulmonary hygiene including cough, deep breathe, Incentive Spirometry  - Assess the need for suctioning and aspirate as needed  - Assess and instruct to report SOB or any respiratory  difficulty  - Respiratory Therapy support as indicated  Outcome: Progressing

## 2024-02-06 NOTE — PROGRESS NOTES
"[facility]  Progress Note  Name: Nina Tran I  MRN: [id number]  Unit/Bed#: -Kar[facility] I Date of Admission: 2/4/2024   Date of Service: 2/6/2024 I Hospital Day: 2    Assessment/Plan   * COVID-19  Assessment & Plan  Background: Presented to ED with generalized weakness and fall when she walked to the bathroom.   Mild COVID pathway as below   Vaccinated previously, states current physicians encouraged patient to not get vaccinated again  COVID19- positive on 2/4   Supplemental oxygen with 2L NC  Wean as tolerated for oxygen saturation greater than 92%  D-dimer elevated 1.61  Will order CTA chest PE study as patient is requiring oxygen  Chest CT scan revealed \" Diffuse nonspecific interlobular septal thickening with areas of groundglass haziness with differential considerations including an infectious or inflammatory process, pulmonary edema, among other etiologies.  \"   CBC and CMP daily  Troponin 0hr normal, CRP elevated, CK low, BNP mildly elevated 108  AC per protocol   Treat with remdesivir and steroids  OOB TID; ambulation and mobilization strongly encouraged  PT/OT consult and evaluation   Self proning strongly encouraged  Supportive care     Acute cystitis without hematuria  Assessment & Plan  Presents to ED with complaints of generalized weakness and UTI symptoms including frequency, burning and urgency  Start ceftriaxone   Follow up cultures   1/2 BC positive for e coli, continue rocephin  Urine culture mixed contaminants    Bacteremia  Assessment & Plan  1/2 BC positive for e coli, pending 2nd BC  Patient already on rocephin for UTI  Continue rocephin  Echo:  Left ventricular cavity size is normal. Wall thickness is moderately increased. The left ventricular ejection fraction is 65-69 %. Systolic function is normal. Wall motion is normal. Diastolic function is mildly abnormal, consistent with grade I (abnormal) relaxation.   ID consult: Continue ceftriaxone, adjust dose to 2 " every 24, anticipate completing 10 days of therapy with oral cephalosporin, recommend outpatient follow-up with urogynecology, continue Premarin cream and recommend resuming Hip-Richard 500 twice daily with vitamin C  Will need follow-up chest CT in 3 months and outpatient follow-up with pulmonology  Daily CBC, CMP to monitor for any evolving antibiotic toxicity    Sepsis (HCC)  Assessment & Plan  Sepsis, 2/2 Covid, UTI a/e/b Tachypnea, Tachycardia, Lactic acidosis, GNR + BC requiring IVF boluses, IVF, IV Ceftriaxone, IV Remdesivir, Cultures.   ID consulted, continue with current treatment  Repeat BC ordered  Continue IV rocephin 2g q24h per ID recs.     Acute respiratory failure (HCC)  Assessment & Plan  Patient found to be requiring 2L NC in the ED. Does not wear oxygen at baseline, has home o2 prn. With tachypnea in the ED and desaturations requiring O2. Sats 85% and RR 23-25. Suspect secondary to COVID infection. Start on remdesivir and steroids.   D-dimer elevated  CTA chest PE study without PE; Redemonstrated diffuse interlobular septal thickening and bilateral groundglass opacities most pronounced in the lung bases and mild bronchiectasis in the right upper lobe and bilateral lower lobes. Findings may be due to infectious/inflammatory etiology such as COVID-pneumonia, pulmonary edema, or underlying pulmonary fibrosis. Recommend follow-up high-resolution chest CT in 3 months for reassessment.  Incentive spirometry, supportive measures, wean off oxygen as able.     Ambulatory dysfunction  Assessment & Plan  Presented to ED with generalized weakness and a fall that occurred prior to admission. No head strike or LOC. Had right sided chest pain s/p fall. Was walking to the restroom when her legs gave out under her causing her to fall forward.   Trauma workup negative  At baseline patient will ambulate well on her own.   Currently lives at home  Suspect secondary to generalized weakness, UTI, and COVID infection.    PT/OT: III (Minimum Resource Intensity)   Case management consult to assist with discharge disposition planning    Generalized weakness  Assessment & Plan  POA with complaints of generalized weakness suspect secondary to COVID infection and UTI  Given 1 dose of rocephin in the ED, will continue and add remdesivir for COVID  PT/OT consult: III (Minimum Resource Intensity)     History of thymus cancer  Assessment & Plan  Patient with history of thymus cancer s/p removal    Continue outpatient follow up          VTE Pharmacologic Prophylaxis: VTE Score: 8 High Risk (Score >/= 5) - Pharmacological DVT Prophylaxis Ordered: enoxaparin (Lovenox). Sequential Compression Devices Ordered.    Mobility:   Basic Mobility Inpatient Raw Score: 22  JH-HLM Goal: 7: Walk 25 feet or more  JH-HLM Achieved: 6: Walk 10 steps or more  HLM Goal NOT achieved. Continue with multidisciplinary rounding and encourage appropriate mobility to improve upon HLM goals.    Patient Centered Rounds: I performed bedside rounds with nursing staff today.   Discussions with Specialists or Other Care Team Provider: nursing, case management    Education and Discussions with Family / Patient: Attempted to update  () via phone. Left voicemail.     Total Time Spent on Date of Encounter in care of patient: 40 mins. This time was spent on one or more of the following: performing physical exam; counseling and coordination of care; obtaining or reviewing history; documenting in the medical record; reviewing/ordering tests, medications or procedures; communicating with other healthcare professionals and discussing with patient's family/caregivers.    Current Length of Stay: 2 day(s)  Current Patient Status: Inpatient   Certification Statement: The patient will continue to require additional inpatient hospital stay due to bacteremia, sepsis, UTI, IV abx  Discharge Plan: Anticipate discharge in >72 hrs to home with home services.    Code Status:  Level 1 - Full Code    Subjective:   Patient seen and examined today. She is feeling a little better today. States she is coughing frequently and not able to bring much up. No nausea, vomiting. Still has some loose bowel movements, but same as her baseline. No CP. SOB improving.     Objective:     Vitals:   Temp (24hrs), Av.5 °F (36.4 °C), Min:97.2 °F (36.2 °C), Max:97.7 °F (36.5 °C)    Temp:  [97.2 °F (36.2 °C)-97.7 °F (36.5 °C)] 97.5 °F (36.4 °C)  HR:  [] 118  Resp:  [17-19] 19  BP: (122-129)/(66-74) 127/74  SpO2:  [79 %-97 %] 94 %  Body mass index is 23.03 kg/m².     Input and Output Summary (last 24 hours):     Intake/Output Summary (Last 24 hours) at 2024 1252  Last data filed at 2024 1012  Gross per 24 hour   Intake 1160 ml   Output 350 ml   Net 810 ml       Physical Exam:   Physical Exam  Vitals reviewed.   Constitutional:       General: She is not in acute distress.     Appearance: She is ill-appearing. She is not toxic-appearing.   HENT:      Head: Normocephalic and atraumatic.      Mouth/Throat:      Mouth: Mucous membranes are moist.   Cardiovascular:      Rate and Rhythm: Normal rate and regular rhythm.      Heart sounds: No murmur heard.  Pulmonary:      Effort: No respiratory distress.      Breath sounds: No stridor. Rhonchi present. No wheezing or rales.      Comments: Saturating around 92% on 2L NC  Abdominal:      General: Bowel sounds are normal. There is no distension.      Palpations: Abdomen is soft. There is no mass.      Tenderness: There is no abdominal tenderness.   Musculoskeletal:      Right lower leg: No edema.      Left lower leg: No edema.   Skin:     General: Skin is warm and dry.   Neurological:      General: No focal deficit present.      Mental Status: She is alert and oriented to person, place, and time.   Psychiatric:         Mood and Affect: Mood normal.         Behavior: Behavior normal.          Additional Data:     Labs:  Results from last 7 days   Lab Units  02/06/24  0505 02/05/24  0630 02/04/24  0539   WBC Thousand/uL 7.88 6.54 10.32*   HEMOGLOBIN g/dL 11.1* 10.8* 12.6   HEMATOCRIT % 35.2 34.5* 40.6   PLATELETS Thousands/uL 181 156 187   BANDS PCT %  --   --  8   NEUTROS PCT %  --  88*  --    LYMPHS PCT %  --  7*  --    LYMPHO PCT %  --   --  8*   MONOS PCT %  --  4  --    MONO PCT %  --   --  2*   EOS PCT %  --  0 0     Results from last 7 days   Lab Units 02/06/24  0505   SODIUM mmol/L 135   POTASSIUM mmol/L 3.9   CHLORIDE mmol/L 101   CO2 mmol/L 25   BUN mg/dL 18   CREATININE mg/dL 0.70   ANION GAP mmol/L 9   CALCIUM mg/dL 7.7*   ALBUMIN g/dL 3.2*   TOTAL BILIRUBIN mg/dL 0.56   ALK PHOS U/L 159*   ALT U/L 30   AST U/L 24   GLUCOSE RANDOM mg/dL 136     Results from last 7 days   Lab Units 02/04/24  0539   INR  0.93     Results from last 7 days   Lab Units 02/05/24  1119   POC GLUCOSE mg/dl 158*         Results from last 7 days   Lab Units 02/06/24  0505 02/05/24  0630 02/04/24  0833 02/04/24  0539   LACTIC ACID mmol/L  --   --  1.3 2.6*   PROCALCITONIN ng/ml 0.16 0.20  --  0.23       Lines/Drains:  Invasive Devices       Peripheral Intravenous Line  Duration             Peripheral IV 02/05/24 Distal;Dorsal (posterior);Right Forearm 1 day                          Imaging: Reviewed radiology reports from this admission including: ECHO    Recent Cultures (last 7 days):   Results from last 7 days   Lab Units 02/04/24  0639 02/04/24  0539   BLOOD CULTURE   --  Escherichia coli*  No Growth at 24 hrs.   GRAM STAIN RESULT   --  Gram negative rods*   URINE CULTURE  >100,000 cfu/ml  --    LEGIONELLA URINARY ANTIGEN  Negative  --        Last 24 Hours Medication List:   Current Facility-Administered Medications   Medication Dose Route Frequency Provider Last Rate    acetaminophen  650 mg Oral Q6H PRN Ann Cardona PA-C      ascorbic acid  500 mg Oral Daily Ann Cardona PA-C      atorvastatin  20 mg Oral Daily With Dinner Ann Cardona PA-C      atovaquone   1,500 mg Oral Daily Ann Cardona PA-C      cefTRIAXone  2,000 mg Intravenous Q24H Ann Cardona PA-C 2,000 mg (02/06/24 0545)    dexamethasone  6 mg Intravenous Q24H Ann Cardona PA-C      enoxaparin  40 mg Subcutaneous Daily Ann Cardona PA-C      guaiFENesin  1,200 mg Oral BID Ann Cardona PA-C      mycophenolate  1,000 mg Oral Q12H FALLON Ann Cardona PA-C      ondansetron  4 mg Intravenous Q6H PRN Ann Cardona PA-C      pantoprazole  40 mg Oral Early Morning Ann Cardona PA-C      remdesivir  100 mg Intravenous Q24H Ann Cardona PA-C 0 mg (02/05/24 1212)        Today, Patient Was Seen By: Ann Cardona PA-C    **Please Note: This note may have been constructed using a voice recognition system.**

## 2024-02-06 NOTE — INCIDENTAL FINDINGS
The following findings require follow up:  Radiographic finding   Finding: Redemonstrated diffuse interlobular septal thickening and bilateral groundglass opacities most pronounced in the lung bases and mild bronchiectasis in the right upper lobe and bilateral lower lobes. Findings may be due to infectious/inflammatory etiology such as COVID-pneumonia, pulmonary edema, or underlying pulmonary fibrosis   Follow up required: high-resolution chest CT    Follow up should be done within 3 month(s)    Please notify the following clinician to assist with the follow up:   Your outpatient PCP

## 2024-02-07 LAB
ALBUMIN SERPL BCP-MCNC: 3.3 G/DL (ref 3.5–5)
ALP SERPL-CCNC: 146 U/L (ref 34–104)
ALT SERPL W P-5'-P-CCNC: 30 U/L (ref 7–52)
ANION GAP SERPL CALCULATED.3IONS-SCNC: 6 MMOL/L
AST SERPL W P-5'-P-CCNC: 24 U/L (ref 13–39)
BACTERIA BLD CULT: ABNORMAL
BILIRUB SERPL-MCNC: 0.49 MG/DL (ref 0.2–1)
BUN SERPL-MCNC: 21 MG/DL (ref 5–25)
CALCIUM ALBUM COR SERPL-MCNC: 8.8 MG/DL (ref 8.3–10.1)
CALCIUM SERPL-MCNC: 8.2 MG/DL (ref 8.4–10.2)
CHLORIDE SERPL-SCNC: 102 MMOL/L (ref 96–108)
CO2 SERPL-SCNC: 28 MMOL/L (ref 21–32)
CREAT SERPL-MCNC: 0.73 MG/DL (ref 0.6–1.3)
E COLI DNA BLD POS QL NAA+NON-PROBE: DETECTED
ERYTHROCYTE [DISTWIDTH] IN BLOOD BY AUTOMATED COUNT: 15.6 % (ref 11.6–15.1)
GFR SERPL CREATININE-BSD FRML MDRD: 78 ML/MIN/1.73SQ M
GLUCOSE SERPL-MCNC: 122 MG/DL (ref 65–140)
GRAM STN SPEC: ABNORMAL
HCT VFR BLD AUTO: 35 % (ref 34.8–46.1)
HGB BLD-MCNC: 10.9 G/DL (ref 11.5–15.4)
MAGNESIUM SERPL-MCNC: 1.9 MG/DL (ref 1.9–2.7)
MCH RBC QN AUTO: 27 PG (ref 26.8–34.3)
MCHC RBC AUTO-ENTMCNC: 31.1 G/DL (ref 31.4–37.4)
MCV RBC AUTO: 87 FL (ref 82–98)
PLATELET # BLD AUTO: 181 THOUSANDS/UL (ref 149–390)
PMV BLD AUTO: 10.6 FL (ref 8.9–12.7)
POTASSIUM SERPL-SCNC: 3.9 MMOL/L (ref 3.5–5.3)
PROT SERPL-MCNC: 5.4 G/DL (ref 6.4–8.4)
RBC # BLD AUTO: 4.03 MILLION/UL (ref 3.81–5.12)
SODIUM SERPL-SCNC: 136 MMOL/L (ref 135–147)
WBC # BLD AUTO: 6.32 THOUSAND/UL (ref 4.31–10.16)

## 2024-02-07 PROCEDURE — 80053 COMPREHEN METABOLIC PANEL: CPT

## 2024-02-07 PROCEDURE — 97116 GAIT TRAINING THERAPY: CPT

## 2024-02-07 PROCEDURE — 83735 ASSAY OF MAGNESIUM: CPT

## 2024-02-07 PROCEDURE — 99233 SBSQ HOSP IP/OBS HIGH 50: CPT | Performed by: INTERNAL MEDICINE

## 2024-02-07 PROCEDURE — 85027 COMPLETE CBC AUTOMATED: CPT

## 2024-02-07 PROCEDURE — 97110 THERAPEUTIC EXERCISES: CPT

## 2024-02-07 PROCEDURE — 99232 SBSQ HOSP IP/OBS MODERATE 35: CPT

## 2024-02-07 RX ORDER — CEFAZOLIN SODIUM 2 G/50ML
2000 SOLUTION INTRAVENOUS EVERY 8 HOURS
Status: DISCONTINUED | OUTPATIENT
Start: 2024-02-07 | End: 2024-02-08 | Stop reason: HOSPADM

## 2024-02-07 RX ADMIN — PANTOPRAZOLE SODIUM 40 MG: 40 TABLET, DELAYED RELEASE ORAL at 05:35

## 2024-02-07 RX ADMIN — METHENAMINE HIPPURATE 1 G: 1 TABLET ORAL at 08:52

## 2024-02-07 RX ADMIN — ENOXAPARIN SODIUM 40 MG: 40 INJECTION SUBCUTANEOUS at 08:52

## 2024-02-07 RX ADMIN — CEFAZOLIN SODIUM 2000 MG: 2 SOLUTION INTRAVENOUS at 20:19

## 2024-02-07 RX ADMIN — DEXAMETHASONE SODIUM PHOSPHATE 6 MG: 10 INJECTION, SOLUTION INTRAMUSCULAR; INTRAVENOUS at 08:53

## 2024-02-07 RX ADMIN — METHENAMINE HIPPURATE 1 G: 1 TABLET ORAL at 16:51

## 2024-02-07 RX ADMIN — GUAIFENESIN 1200 MG: 600 TABLET ORAL at 16:51

## 2024-02-07 RX ADMIN — ATORVASTATIN CALCIUM 20 MG: 20 TABLET, FILM COATED ORAL at 16:51

## 2024-02-07 RX ADMIN — REMDESIVIR 100 MG: 100 INJECTION, POWDER, LYOPHILIZED, FOR SOLUTION INTRAVENOUS at 10:22

## 2024-02-07 RX ADMIN — MYCOPHENOLATE MOFETIL 1000 MG: 250 CAPSULE ORAL at 20:18

## 2024-02-07 RX ADMIN — GUAIFENESIN 1200 MG: 600 TABLET ORAL at 08:52

## 2024-02-07 RX ADMIN — OXYCODONE HYDROCHLORIDE AND ACETAMINOPHEN 500 MG: 500 TABLET ORAL at 08:53

## 2024-02-07 RX ADMIN — CEFAZOLIN SODIUM 2000 MG: 2 SOLUTION INTRAVENOUS at 13:29

## 2024-02-07 RX ADMIN — ATOVAQUONE 1500 MG: 750 SUSPENSION ORAL at 08:52

## 2024-02-07 RX ADMIN — CEFTRIAXONE 2000 MG: 2 INJECTION, SOLUTION INTRAVENOUS at 05:36

## 2024-02-07 RX ADMIN — MYCOPHENOLATE MOFETIL 1000 MG: 250 CAPSULE ORAL at 08:52

## 2024-02-07 NOTE — PLAN OF CARE
Problem: SAFETY,RESTRAINT: NV/NON-SELF DESTRUCTIVE BEHAVIOR  Goal: Remains free of harm/injury (restraint for non violent/non self-detsructive behavior)  Description: INTERVENTIONS:  - Instruct patient/family regarding restraint use   - Assess and monitor physiologic and psychological status   - Provide interventions and comfort measures to meet assessed patient needs   - Identify and implement measures to help patient regain control  - Assess readiness for release of restraint   2/7/2024 0014 by Niru Aranda RN  Outcome: Completed  Note: Charted on wrong person  2/7/2024 0013 by Niru Aranda RN  Outcome: Not Progressing  Goal: Returns to optimal restraint-free functioning  Description: INTERVENTIONS:  - Assess the patient's behavior and symptoms that indicate continued need for restraint  - Identify and implement measures to help patient regain control  - Assess readiness for release of restraint   2/7/2024 0014 by Niru Aranda RN  Outcome: Completed  Note: Charted on wrong person  2/7/2024 0013 by Niru Aranda RN  Outcome: Not Progressing

## 2024-02-07 NOTE — PLAN OF CARE
Problem: Nutrition/Hydration-ADULT  Goal: Nutrient/Hydration intake appropriate for improving, restoring or maintaining nutritional needs  Description: Monitor and assess patient's nutrition/hydration status for malnutrition. Collaborate with interdisciplinary team and initiate plan and interventions as ordered.  Monitor patient's weight and dietary intake as ordered or per policy. Utilize nutrition screening tool and intervene as necessary. Determine patient's food preferences and provide high-protein, high-caloric foods as appropriate.     INTERVENTIONS:  - Monitor oral intake, urinary output, labs, and treatment plans  - Assess nutrition and hydration status and recommend course of action  - Evaluate amount of meals eaten  - Assist patient with eating if necessary   - Allow adequate time for meals  - Recommend/ encourage appropriate diets, oral nutritional supplements, and vitamin/mineral supplements  - Order, calculate, and assess calorie counts as needed  - Recommend, monitor, and adjust tube feedings and TPN/PPN based on assessed needs  - Assess need for intravenous fluids  - Provide specific nutrition/hydration education as appropriate  - Include patient/family/caregiver in decisions related to nutrition  Outcome: Progressing     Problem: SAFETY,RESTRAINT: NV/NON-SELF DESTRUCTIVE BEHAVIOR  Goal: Remains free of harm/injury (restraint for non violent/non self-detsructive behavior)  Description: INTERVENTIONS:  - Instruct patient/family regarding restraint use   - Assess and monitor physiologic and psychological status   - Provide interventions and comfort measures to meet assessed patient needs   - Identify and implement measures to help patient regain control  - Assess readiness for release of restraint   Outcome: Not Progressing  Goal: Returns to optimal restraint-free functioning  Description: INTERVENTIONS:  - Assess the patient's behavior and symptoms that indicate continued need for restraint  -  Identify and implement measures to help patient regain control  - Assess readiness for release of restraint   Outcome: Not Progressing

## 2024-02-07 NOTE — ASSESSMENT & PLAN NOTE
Sepsis, 2/2 Covid, UTI a/e/b Tachypnea, Tachycardia, Lactic acidosis, GNR + BC requiring IVF boluses, IVF, IV Ceftriaxone, IV Remdesivir, Cultures.   ID consulted, continue with current treatment  Repeat BC no growth 24 hours  ID recs: start cefazolin 2 q8h, can discharge on po keflex 500 mg qid on d/c through 2/13

## 2024-02-07 NOTE — PROGRESS NOTES
Patient:    MRN:  [id number]    Nerissa Request ID:  [id number]    Level of care reserved:  Home Health Agency    Partner Reserved:  [facility] [place] [address] [phone]    Clinical needs requested:    Geography searched:  [phone]    Start of Service:    Request sent:  10:15am EST on 2/6/2024 by Carri Lowry    Partner reserved:  4:00pm EST on 2/7/2024 by Carri Lowry    Choice list shared:  9:01am EST on 2/7/2024 by Carri Lowry

## 2024-02-07 NOTE — ASSESSMENT & PLAN NOTE
1/2 BC positive for e coli, pending 2nd BC  Patient already on rocephin for UTI  Continue rocephin  Echo:  Left ventricular cavity size is normal. Wall thickness is moderately increased. The left ventricular ejection fraction is 65-69 %. Systolic function is normal. Wall motion is normal. Diastolic function is mildly abnormal, consistent with grade I (abnormal) relaxation.   ID consult: Switch rocephin to cefazolin 2 q8h, anticipate completing 10 days of abx therapy with keflex 500 mg qid on d/c through 2/13, recommend outpatient follow-up with urogynecology, continue Premarin cream and recommend resuming Hip-Richard 500 twice daily with vitamin C  Will need follow-up chest CT in 3 months and outpatient follow-up with pulmonology  Daily CBC, CMP to monitor for any evolving antibiotic toxicity  Repeat BC no growth 24 hours.

## 2024-02-07 NOTE — PLAN OF CARE
Problem: PHYSICAL THERAPY ADULT  Goal: Performs mobility at highest level of function for planned discharge setting.  See evaluation for individualized goals.  Description: Treatment/Interventions: ADL retraining, Functional transfer training, LE strengthening/ROM, Elevations, Therapeutic exercise, Endurance training, Patient/family training, Equipment eval/education, Bed mobility, Gait training, Compensatory technique education, Spoke to nursing, Spoke to case management, OT  Equipment Recommended:  (walker-pt has)       See flowsheet documentation for full assessment, interventions and recommendations.  Outcome: Progressing  Note: Prognosis: Good  Problem List: Decreased strength, Decreased endurance, Impaired balance, Decreased mobility, Decreased safety awareness  Assessment: Pt seen for PT treatment session this date with interventions consisting of gait training w/ emphasis on improving pt's ability to ambulate level surfaces x 20' + 80' with SBA provided by therapist with no AD and Therapeutic exercise consisting of: AROM 15 reps B LE in standing with B/L UE support position. Pt agreeable to PT treatment session upon arrival, pt found seated OOB in recliner, in no apparent distress. In comparison to previous session, pt with improvements in pt able to ambulate increased distance this session and complete assigned therex, pt limited by fatigue . Post session: pt returned back to recliner, all needs in reach, and RN notified of session findings/recommendations Continue to recommend  Level III Resource Intensity  at time of d/c in order to maximize pt's functional independence and safety w/ mobility. Pt continues to be functioning below baseline level, and remains limited 2* factors listed above and including decreased strength, balance, mobility, and activity tolerance. PT will continue to see pt while here in order to address the deficits listed above and provide interventions consistent w/ POC in effort to  achieve STGs.  Barriers to Discharge: None  Barriers to Discharge Comments: pt has support from spouse prn  Rehab Resource Intensity Level, PT: III (Minimum Resource Intensity)    See flowsheet documentation for full assessment.

## 2024-02-07 NOTE — PROGRESS NOTES
Progress Note - Infectious Disease   Nina Tran 78 y.o. female MRN: [id number]  Unit/Bed#: -01 Encounter: [id number]        REQUIRED DOCUMENTATION:     1. This service was provided via Telemedicine.  2. Provider located at [place].  3. TeleMed provider: Masha Altman MD.  4. Identify all parties in room with patient during tele consult:RN  5. After connecting through televideo, patient was identified by name and date of birth and assistant checked wristband.  Patient was then informed that this was a Telemedicine visit and that the exam was being conducted confidentially over secure lines. My office door was closed. No one else was in the room.  Patient acknowledged consent and understanding of privacy and security of the Telemedicine visit, and gave us permission to have the assistant stay in the room in order to assist with the history and to conduct the exam.  I informed the patient that I have reviewed their record in Epic and presented the opportunity for them to ask any questions regarding the visit today.  The patient agreed to participate.       Assessment/Recommendations     E.coli bacteremia  Recurrent acute cystitis  Acute hypoxic respiratory insufficiency  Covid infection  Cystocele, vaginal atrophy     -Patient presenting with hypoxia secondary to COVID pneumonia and has E. coli bacteremia from UTI.  She has underlying vaginal atrophy, cystocele, suspect incomplete bladder emptying causing recurrent UTI.  -She is afebrile without leukocytosis, on 2 L/min O2     Discontinue ceftriaxone and start cefazolin 2 q8h  Follow-up cultures  Anticipate completing 10 days of therapy with po keflex 500 qid on dc through 2/13  Recommend outpatient follow-up with urogynecology, continue Premarin cream and recommend resuming Hip-Richard 500 twice daily with vitamin C  Continue remdesivir and steroids, respiratory support and wean oxygen as tolerated  Will need follow-up chest CT in 3 months and outpatient  follow-up with pulmonology  Daily CBC, CMP to monitor for any evolving antibiotic toxicity    Discussed above plan in detail with the primary service who is in agreement.    History       Subjective:  The patient has no complaints and reports improvement in breathing.  Denies fevers, chills, or sweats.  Denies nausea, vomiting, or diarrhea.    Antibiotics:  ceftriaxone      Physical Exam     Temp:  [97.2 °F (36.2 °C)-97.7 °F (36.5 °C)] 97.2 °F (36.2 °C)  HR:  [72-91] 91  Resp:  [18] 18  BP: (110-123)/(63-72) 123/72  SpO2:  [86 %-98 %] 86 %  Temp (24hrs), Av.5 °F (36.4 °C), Min:97.2 °F (36.2 °C), Max:97.7 °F (36.5 °C)  Current: Temperature: (!) 97.2 °F (36.2 °C)    Intake/Output Summary (Last 24 hours) at 2024 1221  Last data filed at 2024 0905  Gross per 24 hour   Intake 840 ml   Output --   Net 840 ml       Physical exam findings reported by bedside and primary medical team staff      General Appearance:  Appearing well, nontoxic, and in no distress, appears stated age   Lungs:   Coarse and diminished to auscultation bilaterally, no audible wheezes, rhonchi and rales, respirations unlabored   Heart:  Regular rate and rhythm, S1, S2 normal, no murmur, rub or gallop   Abdomen:   Soft, non-tender, non-distended, positive bowel sounds, no masses, no organomegaly    No CVA tenderness   Extremities: Extremities normal, atraumatic, no cyanosis, clubbing or edema   Skin: Skin color, texture, turgor normal, no rashes or lesions. No draining wounds noted.   Neurologic: Alert and oriented times 3,         Invasive Devices:   Peripheral IV 24 Distal;Dorsal (posterior);Right Forearm (Active)   Site Assessment WDL 24   Dressing Type Transparent 24   Line Status Flushed 24   Dressing Status Clean;Dry;Intact 24   Dressing Change Due 24 1425   Reason Not Rotated Not due 24 0900       Labs, Imaging, & Other Studies     Lab Results:    I have  personally reviewed pertinent labs.    Results from last 7 days   Lab Units 02/07/24  0451 02/06/24  0505 02/05/24  0630   WBC Thousand/uL 6.32 7.88 6.54   HEMOGLOBIN g/dL 10.9* 11.1* 10.8*   PLATELETS Thousands/uL 181 181 156     Results from last 7 days   Lab Units 02/07/24  0451 02/06/24  0505 02/05/24  0630   POTASSIUM mmol/L 3.9 3.9 4.1   CHLORIDE mmol/L 102 101 102   CO2 mmol/L 28 25 26   BUN mg/dL 21 18 15   CREATININE mg/dL 0.73 0.70 0.72   EGFR ml/min/1.73sq m 78 82 79   CALCIUM mg/dL 8.2* 7.7* 7.3*   AST U/L 24 24 23   ALT U/L 30 30 31   ALK PHOS U/L 146* 159* 157*     Results from last 7 days   Lab Units 02/06/24  1006 02/06/24  0643 02/06/24  0506 02/04/24  0639 02/04/24  0539   BLOOD CULTURE   --  No Growth at 24 hrs. No Growth at 24 hrs.  --  Escherichia coli*  No Growth at 48 hrs.   GRAM STAIN RESULT  2+ Epithelial cells per low power field  No polys seen  No organisms seen  --   --   --  Gram negative rods*   URINE CULTURE   --   --   --  >100,000 cfu/ml  --    LEGIONELLA URINARY ANTIGEN   --   --   --  Negative  --        Imaging Studies:   I have personally reviewed pertinent imaging study reports and images in PACS.      EKG, Pathology, and Other Studies:   I have personally reviewed pertinent reports.        Counseling/Coordination of care:       Total 50 minutes communication with the patient via telehealth.  Labs, medical tests and imaging studies were independently reviewed by me as noted above.

## 2024-02-07 NOTE — PHYSICAL THERAPY NOTE
PHYSICAL THERAPY TREATMENT NOTE  NAME:  Nina Tran  DATE: 02/07/24    Length Of Stay: 3  Performed at least 2 patient identifiers during session: Name and Birthday  Treatment time: 4777-0289  Treatment length: 30 min       02/07/24 1334   Note Type   Note Type Treatment   Pain Assessment   Pain Assessment Tool 0-10   Pain Score No Pain   Restrictions/Precautions   Other Precautions Airborne/isolation;Contact/isolation;Multiple lines;O2  (1 lpm)   General   Chart Reviewed Yes   Response to Previous Treatment Patient with no complaints from previous session.   Cognition   Overall Cognitive Status WFL   Orientation Level Oriented X4   Following Commands Follows one step commands without difficulty   Bed Mobility   Additional Comments Pt seated OOB in recliner at start and end of session; bed mobility not assessed this session   Transfers   Sit to Stand 5  Supervision   Additional items Increased time required;Verbal cues   Stand to Sit 5  Supervision   Additional items Increased time required;Verbal cues   Stand pivot   (SBA)   Additional items Increased time required;Verbal cues   Additional Comments STS with not AD and supervision with VC for safety, SPT with SBA   Ambulation/Elevation   Gait pattern Wide ANDRA;Decreased foot clearance;Short stride   Gait Assistance   (SBA)   Additional items Verbal cues   Assistive Device None   Distance 20' + 80' without AD, VC for increased step length, pt limited by fatigue, O2 87-95% on 1 lpm throughout session   Balance   Static Sitting Good   Dynamic Sitting Fair +   Static Standing Fair   Dynamic Standing Fair -   Ambulatory Fair -   Endurance Deficit   Endurance Deficit Yes   Endurance Deficit Description fatigue   Activity Tolerance   Activity Tolerance Patient limited by fatigue   Medical Staff Made Aware OTBaudilio   Nurse Made Aware Blanca SILVA   Exercises   Hamstring Sets Standing;15 reps;AROM;Bilateral  (HS Curls)   Hip Flexion Standing;15 reps;AROM;Bilateral   Hip  Abduction Standing;15 reps;AROM;Bilateral   Ankle Pumps Standing;15 reps;AROM;Bilateral  (HR/TR)   Assessment   Prognosis Good   Problem List Decreased strength;Decreased endurance;Impaired balance;Decreased mobility;Decreased safety awareness   Barriers to Discharge None   Goals   PT Treatment Day 2   Plan   Treatment/Interventions Functional transfer training;LE strengthening/ROM;Elevations;Therapeutic exercise;Endurance training;Patient/family training;Equipment eval/education;Bed mobility;Gait training;Compensatory technique education;Spoke to nursing   Progress Progressing toward goals   PT Frequency 3-5x/wk   Discharge Recommendation   Rehab Resource Intensity Level, PT III (Minimum Resource Intensity)   AM-PAC Basic Mobility Inpatient   Turning in Flat Bed Without Bedrails 4   Lying on Back to Sitting on Edge of Flat Bed Without Bedrails 4   Moving Bed to Chair 3   Standing Up From Chair Using Arms 3   Walk in Room 3   Climb 3-5 Stairs With Railing 3   Basic Mobility Inpatient Raw Score 20   Basic Mobility Standardized Score 43.99   Highest Level Of Mobility   JH-HLM Goal 6: Walk 10 steps or more   JH-HLM Achieved 7: Walk 25 feet or more   Education   Education Provided Mobility training   Patient Demonstrates acceptance/verbal understanding   End of Consult   Patient Position at End of Consult Bedside chair;All needs within reach     The patient's AM-PAC Basic Mobility Inpatient Short Form Raw Score is 20. A Raw score of greater than 16 suggests the patient may benefit from discharge to home. Please also refer to the recommendation of the Physical Therapist for safe discharge planning.      Assessment: Pt seen for PT treatment session this date with interventions consisting of gait training w/ emphasis on improving pt's ability to ambulate level surfaces x 20' + 80' with SBA provided by therapist with no AD and Therapeutic exercise consisting of: AROM 15 reps B LE in standing with B/L UE support position. Pt  agreeable to PT treatment session upon arrival, pt found seated OOB in recliner, in no apparent distress. In comparison to previous session, pt with improvements in pt able to ambulate increased distance this session and complete assigned therex, pt limited by fatigue . Post session: pt returned back to recliner, all needs in reach, and RN notified of session findings/recommendations Continue to recommend  Level III Resource Intensity  at time of d/c in order to maximize pt's functional independence and safety w/ mobility. Pt continues to be functioning below baseline level, and remains limited 2* factors listed above and including decreased strength, balance, mobility, and activity tolerance. PT will continue to see pt while here in order to address the deficits listed above and provide interventions consistent w/ POC in effort to achieve STGs.     Adriana Marte, PT,DPT

## 2024-02-07 NOTE — PLAN OF CARE
Problem: Potential for Falls  Goal: Patient will remain free of falls  Description: INTERVENTIONS:  - Educate patient/family on patient safety including physical limitations  - Instruct patient to call for assistance with activity   - Consult OT/PT to assist with strengthening/mobility   - Keep Call bell within reach  - Keep bed low and locked with side rails adjusted as appropriate  - Keep care items and personal belongings within reach  - Initiate and maintain comfort rounds  - Make Fall Risk Sign visible to staff  - Offer Toileting every 2 Hours, in advance of need  - Initiate/Maintain bedalarm  - Obtain necessary fall risk management equipment:   - Apply yellow socks and bracelet for high fall risk patients  - Consider moving patient to room near nurses station  Outcome: Progressing     Problem: PAIN - ADULT  Goal: Verbalizes/displays adequate comfort level or baseline comfort level  Description: Interventions:  - Encourage patient to monitor pain and request assistance  - Assess pain using appropriate pain scale  - Administer analgesics based on type and severity of pain and evaluate response  - Implement non-pharmacological measures as appropriate and evaluate response  - Consider cultural and social influences on pain and pain management  - Notify physician/advanced practitioner if interventions unsuccessful or patient reports new pain  Outcome: Progressing     Problem: INFECTION - ADULT  Goal: Absence or prevention of progression during hospitalization  Description: INTERVENTIONS:  - Assess and monitor for signs and symptoms of infection  - Monitor lab/diagnostic results  - Monitor all insertion sites, i.e. indwelling lines, tubes, and drains  - Monitor endotracheal if appropriate and nasal secretions for changes in amount and color  - [place] appropriate cooling/warming therapies per order  - Administer medications as ordered  - Instruct and encourage patient and family to use good hand hygiene  technique  - Identify and instruct in appropriate isolation precautions for identified infection/condition  Outcome: Progressing     Problem: SAFETY ADULT  Goal: Patient will remain free of falls  Description: INTERVENTIONS:  - Educate patient/family on patient safety including physical limitations  - Instruct patient to call for assistance with activity   - Consult OT/PT to assist with strengthening/mobility   - Keep Call bell within reach  - Keep bed low and locked with side rails adjusted as appropriate  - Keep care items and personal belongings within reach  - Initiate and maintain comfort rounds  - Make Fall Risk Sign visible to staff  - Offer Toileting every 2 Hours, in advance of need  - Initiate/Maintain bedalarm  - Obtain necessary fall risk management equipment:   - Apply yellow socks and bracelet for high fall risk patients  - Consider moving patient to room near nurses station  Outcome: Progressing  Goal: Maintain or return to baseline ADL function  Description: INTERVENTIONS:  -  Assess patient's ability to carry out ADLs; assess patient's baseline for ADL function and identify physical deficits which impact ability to perform ADLs (bathing, care of mouth/teeth, toileting, grooming, dressing, etc.)  - Assess/evaluate cause of self-care deficits   - Assess range of motion  - Assess patient's mobility; develop plan if impaired  - Assess patient's need for assistive devices and provide as appropriate  - Encourage maximum independence but intervene and supervise when necessary  - Involve family in performance of ADLs  - Assess for home care needs following discharge   - Consider OT consult to assist with ADL evaluation and planning for discharge  - Provide patient education as appropriate  Outcome: Progressing  Goal: Maintains/Returns to pre admission functional level  Description: INTERVENTIONS:  - Perform AM-PAC 6 Click Basic Mobility/ Daily Activity assessment daily.  - Set and communicate daily mobility  goal to care team and patient/family/caregiver.   - Collaborate with rehabilitation services on mobility goals if consulted  - Perform Range of Motion 2 times a day.  - Reposition patient every 2 hours.  - Dangle patient 2 times a day  - Stand patient 2 times a day  - Ambulate patient 2 times a day  - Out of bed to chair 2 times a day   - Out of bed for meals 2 times a day  - Out of bed for toileting  - Record patient progress and toleration of activity level   Outcome: Progressing

## 2024-02-07 NOTE — PROGRESS NOTES
"[facility]  Progress Note  Name: Nina Tran I  MRN: [id number]  Unit/Bed#: -[place] I Date of Admission: 2/4/2024   Date of Service: 2/7/2024 I Hospital Day: 3    Assessment/Plan   * COVID-19  Assessment & Plan  Background: Presented to ED with generalized weakness and fall when she walked to the bathroom.   Mild COVID pathway as below   Vaccinated previously, states current physicians encouraged patient to not get vaccinated again  COVID19- positive on 2/4   Supplemental oxygen with 2L NC  Wean as tolerated for oxygen saturation greater than 92%  D-dimer elevated 1.61  Will order CTA chest PE study as patient is requiring oxygen  Chest CT scan revealed \" Diffuse nonspecific interlobular septal thickening with areas of groundglass haziness with differential considerations including an infectious or inflammatory process, pulmonary edema, among other etiologies.  \"   CBC and CMP daily  Troponin 0hr normal, CRP elevated, CK low, BNP mildly elevated 108  AC per protocol   Treat with remdesivir and steroids  OOB TID; ambulation and mobilization strongly encouraged  PT/OT consult and evaluation   Self proning strongly encouraged  Supportive care     Acute cystitis without hematuria  Assessment & Plan  Presents to ED with complaints of generalized weakness and UTI symptoms including frequency, burning and urgency  Start ceftriaxone   Follow up cultures   1/2 BC positive for e coli, continue rocephin  Urine culture mixed contaminants    Bacteremia  Assessment & Plan  1/2 BC positive for e coli, pending 2nd BC  Patient already on rocephin for UTI  Continue rocephin  Echo:  Left ventricular cavity size is normal. Wall thickness is moderately increased. The left ventricular ejection fraction is 65-69 %. Systolic function is normal. Wall motion is normal. Diastolic function is mildly abnormal, consistent with grade I (abnormal) relaxation.   ID consult: Switch rocephin to cefazolin 2 q8h, " anticipate completing 10 days of abx therapy with keflex 500 mg qid on d/c through 2/13, recommend outpatient follow-up with urogynecology, continue Premarin cream and recommend resuming Hip-Richard 500 twice daily with vitamin C  Will need follow-up chest CT in 3 months and outpatient follow-up with pulmonology  Daily CBC, CMP to monitor for any evolving antibiotic toxicity  Repeat BC no growth 24 hours.     Sepsis (HCC)  Assessment & Plan  Sepsis, 2/2 Covid, UTI a/e/b Tachypnea, Tachycardia, Lactic acidosis, GNR + BC requiring IVF boluses, IVF, IV Ceftriaxone, IV Remdesivir, Cultures.   ID consulted, continue with current treatment  Repeat BC no growth 24 hours  ID recs: start cefazolin 2 q8h, can discharge on po keflex 500 mg qid on d/c through 2/13    Acute respiratory failure (HCC)  Assessment & Plan  Patient found to be requiring 2L NC in the ED. Does not wear oxygen at baseline, has home o2 prn. With tachypnea in the ED and desaturations requiring O2. Sats 85% and RR 23-25. Suspect secondary to COVID infection. Start on remdesivir and steroids.   D-dimer elevated  CTA chest PE study without PE; Redemonstrated diffuse interlobular septal thickening and bilateral groundglass opacities most pronounced in the lung bases and mild bronchiectasis in the right upper lobe and bilateral lower lobes. Findings may be due to infectious/inflammatory etiology such as COVID-pneumonia, pulmonary edema, or underlying pulmonary fibrosis. Recommend follow-up high-resolution chest CT in 3 months for reassessment.  Incentive spirometry, supportive measures, wean off oxygen as able.   Weaned to 1L NC    Ambulatory dysfunction  Assessment & Plan  Presented to ED with generalized weakness and a fall that occurred prior to admission. No head strike or LOC. Had right sided chest pain s/p fall. Was walking to the restroom when her legs gave out under her causing her to fall forward.   Trauma workup negative  At baseline patient will  ambulate well on her own.   Currently lives at home  Suspect secondary to generalized weakness, UTI, and COVID infection.   PT/OT: III (Minimum Resource Intensity)   Case management consult to assist with discharge disposition planning    Generalized weakness  Assessment & Plan  POA with complaints of generalized weakness suspect secondary to COVID infection and UTI  Given 1 dose of rocephin in the ED, will continue and add remdesivir for COVID  PT/OT consult: III (Minimum Resource Intensity)     History of thymus cancer  Assessment & Plan  Patient with history of thymus cancer s/p removal    Continue outpatient follow up          VTE Pharmacologic Prophylaxis: VTE Score: 8 High Risk (Score >/= 5) - Pharmacological DVT Prophylaxis Ordered: enoxaparin (Lovenox). Sequential Compression Devices Ordered.    Mobility:   Basic Mobility Inpatient Raw Score: 22  JH-HLM Goal: 7: Walk 25 feet or more  JH-HLM Achieved: 7: Walk 25 feet or more  HLM Goal achieved. Continue to encourage appropriate mobility.    Patient Centered Rounds: I performed bedside rounds with nursing staff today.   Discussions with Specialists or Other Care Team Provider: nursing, case management, ID    Education and Discussions with Family / Patient: Updated  () at bedside.    Total Time Spent on Date of Encounter in care of patient: 39 mins. This time was spent on one or more of the following: performing physical exam; counseling and coordination of care; obtaining or reviewing history; documenting in the medical record; reviewing/ordering tests, medications or procedures; communicating with other healthcare professionals and discussing with patient's family/caregivers.    Current Length of Stay: 3 day(s)  Current Patient Status: Inpatient   Certification Statement: The patient will continue to require additional inpatient hospital stay due to IV abx, monitoring blood cultures  Discharge Plan: Anticipate discharge in 24-48 hrs to  home with home services.    Code Status: Level 1 - Full Code    Subjective:   Patient seen and examined today. She is doing better today. Feeling that her energy is coming back more. No nausea, vomiting, diarrhea, constipation, abdominal pain, CP. Feels her breathing is improving today.     Objective:     Vitals:   Temp (24hrs), Av.5 °F (36.4 °C), Min:97.2 °F (36.2 °C), Max:97.7 °F (36.5 °C)    Temp:  [97.2 °F (36.2 °C)-97.7 °F (36.5 °C)] 97.2 °F (36.2 °C)  HR:  [72-91] 91  Resp:  [18] 18  BP: (110-123)/(63-72) 123/72  SpO2:  [86 %-98 %] 86 %  Body mass index is 23.03 kg/m².     Input and Output Summary (last 24 hours):     Intake/Output Summary (Last 24 hours) at 2024 1328  Last data filed at 2024 0905  Gross per 24 hour   Intake 600 ml   Output --   Net 600 ml       Physical Exam:   Physical Exam  Vitals reviewed.   Constitutional:       General: She is not in acute distress.     Appearance: She is ill-appearing. She is not toxic-appearing.   HENT:      Head: Normocephalic and atraumatic.      Mouth/Throat:      Mouth: Mucous membranes are moist.   Cardiovascular:      Rate and Rhythm: Normal rate and regular rhythm.      Heart sounds: No murmur heard.  Pulmonary:      Effort: No respiratory distress.      Breath sounds: No stridor. Rhonchi present. No wheezing or rales.      Comments: Saturating around 98% on 2L NC, decreased to 1L NC and saturating around 95%.   Abdominal:      General: Bowel sounds are normal. There is no distension.      Palpations: Abdomen is soft. There is no mass.      Tenderness: There is no abdominal tenderness.   Musculoskeletal:      Right lower leg: No edema.      Left lower leg: No edema.   Skin:     General: Skin is warm.   Neurological:      General: No focal deficit present.      Mental Status: She is alert and oriented to person, place, and time.   Psychiatric:         Mood and Affect: Mood normal.         Behavior: Behavior normal.          Additional Data:      Labs:  Results from last 7 days   Lab Units 02/07/24  0451 02/06/24  0505 02/05/24  0630 02/04/24  0539   WBC Thousand/uL 6.32   < > 6.54 10.32*   HEMOGLOBIN g/dL 10.9*   < > 10.8* 12.6   HEMATOCRIT % 35.0   < > 34.5* 40.6   PLATELETS Thousands/uL 181   < > 156 187   BANDS PCT %  --   --   --  8   NEUTROS PCT %  --   --  88*  --    LYMPHS PCT %  --   --  7*  --    LYMPHO PCT %  --   --   --  8*   MONOS PCT %  --   --  4  --    MONO PCT %  --   --   --  2*   EOS PCT %  --   --  0 0    < > = values in this interval not displayed.     Results from last 7 days   Lab Units 02/07/24  0451   SODIUM mmol/L 136   POTASSIUM mmol/L 3.9   CHLORIDE mmol/L 102   CO2 mmol/L 28   BUN mg/dL 21   CREATININE mg/dL 0.73   ANION GAP mmol/L 6   CALCIUM mg/dL 8.2*   ALBUMIN g/dL 3.3*   TOTAL BILIRUBIN mg/dL 0.49   ALK PHOS U/L 146*   ALT U/L 30   AST U/L 24   GLUCOSE RANDOM mg/dL 122     Results from last 7 days   Lab Units 02/04/24  0539   INR  0.93     Results from last 7 days   Lab Units 02/05/24  1119   POC GLUCOSE mg/dl 158*         Results from last 7 days   Lab Units 02/06/24  0505 02/05/24  0630 02/04/24  0833 02/04/24  0539   LACTIC ACID mmol/L  --   --  1.3 2.6*   PROCALCITONIN ng/ml 0.16 0.20  --  0.23       Lines/Drains:  Invasive Devices       Peripheral Intravenous Line  Duration             Peripheral IV 02/05/24 Distal;Dorsal (posterior);Right Forearm 2 days                          Imaging: Reviewed radiology reports from this admission including: CTA chest, XR humerus left and right, CT CAP, CT cervical spine, CT head    Recent Cultures (last 7 days):   Results from last 7 days   Lab Units 02/06/24  1006 02/06/24  0643 02/06/24  0506 02/04/24  0639 02/04/24  0539   BLOOD CULTURE   --  No Growth at 24 hrs. No Growth at 24 hrs.  --  Escherichia coli*  No Growth at 48 hrs.   SPUTUM CULTURE  Culture too young- will reincubate  --   --   --   --    GRAM STAIN RESULT  2+ Epithelial cells per low power field  No polys  seen  No organisms seen  --   --   --  Gram negative rods*   URINE CULTURE   --   --   --  >100,000 cfu/ml  --    LEGIONELLA URINARY ANTIGEN   --   --   --  Negative  --        Last 24 Hours Medication List:   Current Facility-Administered Medications   Medication Dose Route Frequency Provider Last Rate    acetaminophen  650 mg Oral Q6H PRN Ann Cardona PA-C      ascorbic acid  500 mg Oral Daily Ann Cardona PA-C      atorvastatin  20 mg Oral Daily With Dinner Ann Cardona PA-C      atovaquone  1,500 mg Oral Daily KD Garrett-ROMARIO      cefazolin  2,000 mg Intravenous Q8H KD Garrett-ROMARIO      dexamethasone  6 mg Intravenous Q24H Ann Cardona PA-C      enoxaparin  40 mg Subcutaneous Daily Ann Cardona PA-C      guaiFENesin  1,200 mg Oral BID Ann Cardona PA-C      methenamine hippurate  1 g Oral BID Ann Cardona PA-C      mycophenolate  1,000 mg Oral Q12H [place] Ann Cardona PA-C      ondansetron  4 mg Intravenous Q6H PRN Ann Cardona PA-C      pantoprazole  40 mg Oral Early Morning Ann Cardona PA-C      remdesivir  100 mg Intravenous Q24H VALERIA GarrettC 100 mg (02/07/24 1022)        Today, Patient Was Seen By: Ann Cardona PA-C    **Please Note: This note may have been constructed using a voice recognition system.**

## 2024-02-07 NOTE — CASE MANAGEMENT
Case Management Discharge Planning Note    Patient name Nina Tran  Location /-01 MRN [id number]  : 1945 Date 2024       Current Admission Date: 2024  Current Admission Diagnosis:COVID-19   Patient Active Problem List    Diagnosis Date Noted    Bacteremia 2024    Acute respiratory failure (HCC) 2024    Sepsis (HCC) 2024    Acute cystitis without hematuria 2024    COVID-19 2024    Generalized weakness 2024    Ambulatory dysfunction 2024    History of thymus cancer 2024      LOS (days): 3  Geometric Mean LOS (GMLOS) (days): 5.1  Days to GMLOS:1.7     OBJECTIVE:  Risk of Unplanned Readmission Score: 12.61         Current admission status: Inpatient   Preferred Pharmacy:   [facility]/pharmacy #[id number] [place] [address]  Phone: [phone] Fax: [phone]    Primary Care Provider: Emely Will DO    Primary Insurance: MEDICARE  Secondary Insurance: [facility]    DISCHARGE DETAILS:    Discharge planning discussed with:: patient           Were Treatment Team discharge recommendations reviewed with patient/caregiver?: Yes  Did patient/caregiver verbalize understanding of patient care needs?: Yes  Were patient/caregiver advised of the risks associated with not following Treatment Team discharge recommendations?: Yes         Requested Home Health Care         Home Health Agency Name:: [facility]         Other Referral/Resources/Interventions Provided:  Referral Comments: [facility]             CM contacted patient in her room to review [facility] Provider Choice List. Patient chose [facility]. CM made [facility] providers aware of patient choice.    CM to follow for medical stability.

## 2024-02-07 NOTE — ASSESSMENT & PLAN NOTE
Patient found to be requiring 2L NC in the ED. Does not wear oxygen at baseline, has home o2 prn. With tachypnea in the ED and desaturations requiring O2. Sats 85% and RR 23-25. Suspect secondary to COVID infection. Start on remdesivir and steroids.   D-dimer elevated  CTA chest PE study without PE; Redemonstrated diffuse interlobular septal thickening and bilateral groundglass opacities most pronounced in the lung bases and mild bronchiectasis in the right upper lobe and bilateral lower lobes. Findings may be due to infectious/inflammatory etiology such as COVID-pneumonia, pulmonary edema, or underlying pulmonary fibrosis. Recommend follow-up high-resolution chest CT in 3 months for reassessment.  Incentive spirometry, supportive measures, wean off oxygen as able.   Weaned to 1L NC

## 2024-02-08 VITALS
TEMPERATURE: 97.3 F | RESPIRATION RATE: 20 BRPM | HEART RATE: 89 BPM | HEIGHT: 63 IN | WEIGHT: 130 LBS | SYSTOLIC BLOOD PRESSURE: 128 MMHG | DIASTOLIC BLOOD PRESSURE: 70 MMHG | OXYGEN SATURATION: 88 % | BODY MASS INDEX: 23.04 KG/M2

## 2024-02-08 LAB
ANION GAP SERPL CALCULATED.3IONS-SCNC: 8 MMOL/L
BACTERIA SPT RESP CULT: NORMAL
BUN SERPL-MCNC: 22 MG/DL (ref 5–25)
CALCIUM SERPL-MCNC: 7.9 MG/DL (ref 8.4–10.2)
CHLORIDE SERPL-SCNC: 102 MMOL/L (ref 96–108)
CO2 SERPL-SCNC: 27 MMOL/L (ref 21–32)
CREAT SERPL-MCNC: 0.73 MG/DL (ref 0.6–1.3)
ERYTHROCYTE [DISTWIDTH] IN BLOOD BY AUTOMATED COUNT: 15.7 % (ref 11.6–15.1)
GFR SERPL CREATININE-BSD FRML MDRD: 78 ML/MIN/1.73SQ M
GLUCOSE SERPL-MCNC: 134 MG/DL (ref 65–140)
GRAM STN SPEC: NORMAL
HCT VFR BLD AUTO: 36 % (ref 34.8–46.1)
HGB BLD-MCNC: 11.2 G/DL (ref 11.5–15.4)
MAGNESIUM SERPL-MCNC: 1.7 MG/DL (ref 1.9–2.7)
MCH RBC QN AUTO: 27.1 PG (ref 26.8–34.3)
MCHC RBC AUTO-ENTMCNC: 31.1 G/DL (ref 31.4–37.4)
MCV RBC AUTO: 87 FL (ref 82–98)
PLATELET # BLD AUTO: 181 THOUSANDS/UL (ref 149–390)
PMV BLD AUTO: 10.9 FL (ref 8.9–12.7)
POTASSIUM SERPL-SCNC: 3.9 MMOL/L (ref 3.5–5.3)
RBC # BLD AUTO: 4.14 MILLION/UL (ref 3.81–5.12)
SODIUM SERPL-SCNC: 137 MMOL/L (ref 135–147)
WBC # BLD AUTO: 7.19 THOUSAND/UL (ref 4.31–10.16)

## 2024-02-08 PROCEDURE — 99233 SBSQ HOSP IP/OBS HIGH 50: CPT | Performed by: INTERNAL MEDICINE

## 2024-02-08 PROCEDURE — 83735 ASSAY OF MAGNESIUM: CPT

## 2024-02-08 PROCEDURE — 97535 SELF CARE MNGMENT TRAINING: CPT

## 2024-02-08 PROCEDURE — 99239 HOSP IP/OBS DSCHRG MGMT >30: CPT

## 2024-02-08 PROCEDURE — 97110 THERAPEUTIC EXERCISES: CPT

## 2024-02-08 PROCEDURE — 94761 N-INVAS EAR/PLS OXIMETRY MLT: CPT

## 2024-02-08 PROCEDURE — 80048 BASIC METABOLIC PNL TOTAL CA: CPT

## 2024-02-08 PROCEDURE — 85027 COMPLETE CBC AUTOMATED: CPT

## 2024-02-08 RX ORDER — DEXAMETHASONE 6 MG/1
6 TABLET ORAL
Qty: 5 TABLET | Refills: 0 | Status: SHIPPED | OUTPATIENT
Start: 2024-02-09 | End: 2024-02-14

## 2024-02-08 RX ORDER — MAGNESIUM SULFATE HEPTAHYDRATE 40 MG/ML
2 INJECTION, SOLUTION INTRAVENOUS ONCE
Qty: 50 ML | Refills: 0 | Status: COMPLETED | OUTPATIENT
Start: 2024-02-08 | End: 2024-02-08

## 2024-02-08 RX ORDER — CEPHALEXIN 500 MG/1
500 CAPSULE ORAL EVERY 6 HOURS SCHEDULED
Qty: 24 CAPSULE | Refills: 0 | Status: SHIPPED | OUTPATIENT
Start: 2024-02-08 | End: 2024-02-14

## 2024-02-08 RX ADMIN — GUAIFENESIN 1200 MG: 600 TABLET ORAL at 09:23

## 2024-02-08 RX ADMIN — CEFAZOLIN SODIUM 2000 MG: 2 SOLUTION INTRAVENOUS at 13:09

## 2024-02-08 RX ADMIN — ATOVAQUONE 1500 MG: 750 SUSPENSION ORAL at 09:24

## 2024-02-08 RX ADMIN — ENOXAPARIN SODIUM 40 MG: 40 INJECTION SUBCUTANEOUS at 09:23

## 2024-02-08 RX ADMIN — METHENAMINE HIPPURATE 1 G: 1 TABLET ORAL at 11:00

## 2024-02-08 RX ADMIN — CEFAZOLIN SODIUM 2000 MG: 2 SOLUTION INTRAVENOUS at 04:54

## 2024-02-08 RX ADMIN — REMDESIVIR 100 MG: 100 INJECTION, POWDER, LYOPHILIZED, FOR SOLUTION INTRAVENOUS at 11:00

## 2024-02-08 RX ADMIN — PANTOPRAZOLE SODIUM 40 MG: 40 TABLET, DELAYED RELEASE ORAL at 04:55

## 2024-02-08 RX ADMIN — MYCOPHENOLATE MOFETIL 1000 MG: 250 CAPSULE ORAL at 09:23

## 2024-02-08 RX ADMIN — MAGNESIUM SULFATE HEPTAHYDRATE 2 G: 40 INJECTION, SOLUTION INTRAVENOUS at 09:25

## 2024-02-08 RX ADMIN — OXYCODONE HYDROCHLORIDE AND ACETAMINOPHEN 500 MG: 500 TABLET ORAL at 09:23

## 2024-02-08 RX ADMIN — DEXAMETHASONE SODIUM PHOSPHATE 6 MG: 10 INJECTION, SOLUTION INTRAMUSCULAR; INTRAVENOUS at 09:25

## 2024-02-08 NOTE — ASSESSMENT & PLAN NOTE
1/2 BC positive for e coli, pending 2nd BC  Patient already on rocephin for UTI  Continue rocephin  Echo:  Left ventricular cavity size is normal. Wall thickness is moderately increased. The left ventricular ejection fraction is 65-69 %. Systolic function is normal. Wall motion is normal. Diastolic function is mildly abnormal, consistent with grade I (abnormal) relaxation.   ID consult: Switch rocephin to cefazolin 2 q8h, anticipate completing 10 days of abx therapy with keflex 500 mg qid on d/c through 2/13, recommend outpatient follow-up with urogynecology, continue Premarin cream and recommend resuming Hip-Richard 500 twice daily with vitamin C; favor completing 10 days of po dexamethasone 6 mg daily then resuming po prednisone 20 mg daily.   Will need follow-up chest CT in 3 months and outpatient follow-up with pulmonology  Daily CBC, CMP to monitor for any evolving antibiotic toxicity  Repeat BC no growth 48 hours.

## 2024-02-08 NOTE — DISCHARGE INSTR - AVS FIRST PAGE
Dear Nina Tran,     It was our pleasure to care for you here at [facility]. For follow up as well as any medication refills, we recommend that you follow up with your primary care physician. Here are the most important instructions/ recommendations at discharge:     Notable Medication Adjustments -   Start taking keflex 500 mg four times daily until 2/13  Continue taking dexamethasone 6 mg daily, when completed with this, can resume home prednisone  Continue taking hipprex and atovaquone as previously prescribed  Testing Required after Discharge -   Repeat CT chest in 2-3 months  Important follow up information -   Follow up with PCP in 1 week  Follow up with pulmonology outpatient  Follow up with ID outpatient  Follow up with urogynecology outpatient  Other Instructions -   Please continue taking medications as prescribed. You were found to need 2L of oxygen when you are up and moving around. You do not need any oxygen when you are resting and sitting down, but any time you are up walking around you should use your oxygen  If you have any new or worsening symptoms, please return to the ED for evaluation  Please review this entire after visit summary as additional general instructions including medication list, appointments, activity, diet, any pertinent wound care, and other additional recommendations from your care team that may be provided for you.      Sincerely,     Ann Cardona PA-C

## 2024-02-08 NOTE — NURSING NOTE
Reviewed discharge instructions, med rec, follow up appointments, worsening s/s when to call PCP or return to ER verbally understood

## 2024-02-08 NOTE — ASSESSMENT & PLAN NOTE
Sepsis, 2/2 Covid, UTI a/e/b Tachypnea, Tachycardia, Lactic acidosis, GNR + BC requiring IVF boluses, IVF, IV Ceftriaxone, IV Remdesivir, Cultures.   ID consulted, continue with current treatment  Repeat BC no growth 48 hours  ID recs: start cefazolin 2 q8h, can discharge on po keflex 500 mg qid on d/c through 2/13

## 2024-02-08 NOTE — PROGRESS NOTES
Progress Note - Infectious Disease   Nina Tran 78 y.o. female MRN: [id number]  Unit/Bed#: -01 Encounter: [id number]        REQUIRED DOCUMENTATION:     1. This service was provided via Telemedicine.  2. Provider located at [place].  3. TeleMed provider: Masha Altman MD.  4. Identify all parties in room with patient during tele consult:RN  5. After connecting through televideo, patient was identified by name and date of birth and assistant checked wristband.  Patient was then informed that this was a Telemedicine visit and that the exam was being conducted confidentially over secure lines. My office door was closed. No one else was in the room.  Patient acknowledged consent and understanding of privacy and security of the Telemedicine visit, and gave us permission to have the assistant stay in the room in order to assist with the history and to conduct the exam.  I informed the patient that I have reviewed their record in Epic and presented the opportunity for them to ask any questions regarding the visit today.  The patient agreed to participate.       Assessment/Recommendations     E.coli bacteremia  Recurrent acute cystitis  Acute hypoxic respiratory insufficiency  Covid infection  Cystocele, vaginal atrophy  Hx organizing pneumonia, chronic hypoxic respiratory failure on cellcept, prednisone     -Patient presenting with hypoxia secondary to COVID pneumonia and has E. coli bacteremia from UTI.  She has underlying vaginal atrophy, cystocele, suspect incomplete bladder emptying causing recurrent UTI.  -She has a past complicated hx of organizing pneumonia (thought to be related to covid vs nitrofurantoin) and is followed by [facility] and ID  -She is afebrile without leukocytosis, on 1-2 L/min O2     Continue cefazolin 2 q8h  On discharge, patient can be transitioned to po keflex 500 qid through 2/13  Recommend outpatient follow-up with urogynecology, continue Premarin cream and continue  Hip-Richard 1g twice daily with vitamin C  Complete 5 days of iv remdesivir through today   Favor completing 10 days of po dexamethasone 6 mg daily then resuming po prednisone 20 mg daily.   Continue cellcept and pcp prophylaxis with atovaquone as recommended by pulm/ID  Will need follow-up chest CT in 2-3 months and outpatient follow-up with pulmonology    Reviewed recommendations with primary team who is in agreement. Will sign off.  Please call with concerns or any changes in clinical status or new test results.      History       Subjective:  The patient has no complaints and reports ongoing improvement in breathing.  Denies fevers, chills, or sweats.  Denies nausea, vomiting, or diarrhea.    Antibiotics:  cefazolin      Physical Exam     Temp:  [97.3 °F (36.3 °C)] 97.3 °F (36.3 °C)  HR:  [75-89] 89  Resp:  [16-20] 20  BP: (111-128)/(67-70) 128/70  SpO2:  [88 %-98 %] 88 %  Temp (24hrs), Av.3 °F (36.3 °C), Min:97.3 °F (36.3 °C), Max:97.3 °F (36.3 °C)  Current: Temperature: (!) 97.3 °F (36.3 °C)    Intake/Output Summary (Last 24 hours) at 2024 0933  Last data filed at 2024 2019  Gross per 24 hour   Intake 360 ml   Output --   Net 360 ml       Physical exam findings reported by bedside and primary medical team staff      General Appearance:  Appearing well, nontoxic, and in no distress, appears stated age   Lungs:   Coarse and diminished to auscultation bilaterally, no audible wheezes, rhonchi and rales, respirations unlabored   Heart:  Regular rate and rhythm, S1, S2 normal, no murmur, rub or gallop   Abdomen:   Soft, non-tender, non-distended, positive bowel sounds, no masses, no organomegaly    No CVA tenderness   Extremities: Extremities normal, atraumatic, no cyanosis, clubbing or edema   Skin: Skin color, texture, turgor normal, no rashes or lesions. No draining wounds noted.   Neurologic: Alert and oriented times 3         Invasive Devices:   Peripheral IV 24 Distal;Dorsal (posterior);Right  Forearm (Active)   Site Assessment WDL 02/07/24 0900   Dressing Type Transparent 02/07/24 0900   Line Status Flushed 02/07/24 0900   Dressing Status Clean;Dry;Intact 02/07/24 0900   Dressing Change Due 02/09/24 02/05/24 1425   Reason Not Rotated Not due 02/07/24 0900       Labs, Imaging, & Other Studies     Lab Results:    I have personally reviewed pertinent labs.    Results from last 7 days   Lab Units 02/08/24  0423 02/07/24  0451 02/06/24  0505   WBC Thousand/uL 7.19 6.32 7.88   HEMOGLOBIN g/dL 11.2* 10.9* 11.1*   PLATELETS Thousands/uL 181 181 181     Results from last 7 days   Lab Units 02/08/24  0423 02/07/24  0451 02/06/24  0505 02/05/24  0630   POTASSIUM mmol/L 3.9 3.9 3.9 4.1   CHLORIDE mmol/L 102 102 101 102   CO2 mmol/L 27 28 25 26   BUN mg/dL 22 21 18 15   CREATININE mg/dL 0.73 0.73 0.70 0.72   EGFR ml/min/1.73sq m 78 78 82 79   CALCIUM mg/dL 7.9* 8.2* 7.7* 7.3*   AST U/L  --  24 24 23   ALT U/L  --  30 30 31   ALK PHOS U/L  --  146* 159* 157*     Results from last 7 days   Lab Units 02/06/24  1006 02/06/24  0643 02/06/24  0506 02/04/24  0639 02/04/24  0539   BLOOD CULTURE   --  No Growth at 24 hrs. No Growth at 24 hrs.  --  No Growth at 72 hrs.  Escherichia coli*   SPUTUM CULTURE  Culture too young- will reincubate  --   --   --   --    GRAM STAIN RESULT  2+ Epithelial cells per low power field  No polys seen  No organisms seen  --   --   --  Gram negative rods*   URINE CULTURE   --   --   --  >100,000 cfu/ml  --    LEGIONELLA URINARY ANTIGEN   --   --   --  Negative  --        Imaging Studies:   I have personally reviewed pertinent imaging study reports and images in PACS.      EKG, Pathology, and Other Studies:   I have personally reviewed pertinent reports.        Counseling/Coordination of care:       Total 50 minutes communication with the patient via telehealth.  Labs, medical tests and imaging studies were independently reviewed by me as noted above.

## 2024-02-08 NOTE — PLAN OF CARE
Problem: INFECTION - ADULT  Goal: Absence or prevention of progression during hospitalization  Description: INTERVENTIONS:  - Assess and monitor for signs and symptoms of infection fever cough  - Monitor lab/diagnostic results  - Monitor all insertion sites, i.e. indwelling lines, tubes, and drains  - Monitor endotracheal if appropriate and nasal secretions for changes in amount and color  - Bock appropriate cooling/warming therapies per order  - Administer medications as ordered  - Instruct and encourage patient and family to use good hand hygiene technique  - Identify and instruct in appropriate isolation precautions for identified infection/condition  Outcome: Progressing

## 2024-02-08 NOTE — DISCHARGE SUMMARY
"[facility]  Discharge- Nina Tran 1945, 78 y.o. female MRN: [id number]  Unit/Bed#: -[facility] Encounter: [id number]  Primary Care Provider: Emely Will DO   Date and time admitted to hospital: 2/4/2024  5:18 AM    * COVID-19  Assessment & Plan  Background: Presented to ED with generalized weakness and fall when she walked to the bathroom.   Mild COVID pathway as below   Vaccinated previously, states current physicians encouraged patient to not get vaccinated again  COVID19- positive on 2/4   Supplemental oxygen with 2L NC  Wean as tolerated for oxygen saturation greater than 92%  D-dimer elevated 1.61  Will order CTA chest PE study as patient is requiring oxygen  Chest CT scan revealed \" Diffuse nonspecific interlobular septal thickening with areas of groundglass haziness with differential considerations including an infectious or inflammatory process, pulmonary edema, among other etiologies.  \"   CBC and CMP daily  Troponin 0hr normal, CRP elevated, CK low, BNP mildly elevated 108  AC per protocol   Treat with remdesivir and steroids  OOB TID; ambulation and mobilization strongly encouraged  PT/OT consult and evaluation   Self proning strongly encouraged  Supportive care     Acute cystitis without hematuria  Assessment & Plan  Presents to ED with complaints of generalized weakness and UTI symptoms including frequency, burning and urgency  Start ceftriaxone   Follow up cultures   1/2 BC positive for e coli; repeat BC no growth 48 hours  Urine culture mixed contaminants    Bacteremia  Assessment & Plan  1/2 BC positive for e coli, pending 2nd BC  Patient already on rocephin for UTI  Continue rocephin  Echo:  Left ventricular cavity size is normal. Wall thickness is moderately increased. The left ventricular ejection fraction is 65-69 %. Systolic function is normal. Wall motion is normal. Diastolic function is mildly abnormal, consistent with grade I (abnormal) relaxation. "   ID consult: Switch rocephin to cefazolin 2 q8h, anticipate completing 10 days of abx therapy with keflex 500 mg qid on d/c through 2/13, recommend outpatient follow-up with urogynecology, continue Premarin cream and recommend resuming Hip-Richard 500 twice daily with vitamin C; favor completing 10 days of po dexamethasone 6 mg daily then resuming po prednisone 20 mg daily.   Will need follow-up chest CT in 3 months and outpatient follow-up with pulmonology  Daily CBC, CMP to monitor for any evolving antibiotic toxicity  Repeat BC no growth 48 hours.     Sepsis (HCC)  Assessment & Plan  Sepsis, 2/2 Covid, UTI a/e/b Tachypnea, Tachycardia, Lactic acidosis, GNR + BC requiring IVF boluses, IVF, IV Ceftriaxone, IV Remdesivir, Cultures.   ID consulted, continue with current treatment  Repeat BC no growth 48 hours  ID recs: start cefazolin 2 q8h, can discharge on po keflex 500 mg qid on d/c through 2/13    Acute respiratory failure (HCC)  Assessment & Plan  Patient found to be requiring 2L NC in the ED. Does not wear oxygen at baseline, has home o2 prn. With tachypnea in the ED and desaturations requiring O2. Sats 85% and RR 23-25. Suspect secondary to COVID infection. Start on remdesivir and steroids.   D-dimer elevated  CTA chest PE study without PE; Redemonstrated diffuse interlobular septal thickening and bilateral groundglass opacities most pronounced in the lung bases and mild bronchiectasis in the right upper lobe and bilateral lower lobes. Findings may be due to infectious/inflammatory etiology such as COVID-pneumonia, pulmonary edema, or underlying pulmonary fibrosis. Recommend follow-up high-resolution chest CT in 3 months for reassessment.  Incentive spirometry, supportive measures, wean off oxygen as able.   Home o2 eval showing no o2 needed at rest, 2L NC needed with ambulation    Ambulatory dysfunction  Assessment & Plan  Presented to ED with generalized weakness and a fall that occurred prior to admission. No  head strike or LOC. Had right sided chest pain s/p fall. Was walking to the restroom when her legs gave out under her causing her to fall forward.   Trauma workup negative  At baseline patient will ambulate well on her own.   Currently lives at home  Suspect secondary to generalized weakness, UTI, and COVID infection.   PT/OT: III (Minimum Resource Intensity)   Case management consult to assist with discharge disposition planning    Generalized weakness  Assessment & Plan  POA with complaints of generalized weakness suspect secondary to COVID infection and UTI  Given 1 dose of rocephin in the ED, will continue and add remdesivir for COVID  PT/OT consult: III (Minimum Resource Intensity)     History of thymus cancer  Assessment & Plan  Patient with history of thymus cancer s/p removal    Continue outpatient follow up       Medical Problems       Resolved Problems  Date Reviewed: 2/8/2024   None       Discharging Physician / Practitioner: Ann Cardona PA-C  PCP: Emely Will,   Admission Date:   Admission Orders (From admission, onward)       Ordered        02/04/24 0714  INPATIENT ADMISSION  Once                          Discharge Date: 02/08/24    Consultations During Hospital Stay:  ID    Procedures Performed:   Home o2 eval: no o2 needed at rest, 2L NC with ambulation    Significant Findings / Test Results:   CTA chest pe study   Final Result by Roxanna Powell MD (02/04 1016)      No pulmonary embolism.      Redemonstrated diffuse interlobular septal thickening and bilateral groundglass opacities most pronounced in the lung bases and mild bronchiectasis in the right upper lobe and bilateral lower lobes. Findings may be due to infectious/inflammatory etiology    such as COVID-pneumonia, pulmonary edema, or underlying pulmonary fibrosis. Recommend follow-up high-resolution chest CT in 3 months for reassessment.      The study was marked in EPIC for immediate notification.      Workstation  performed: [id number]         XR humerus RIGHT   ED Interpretation by Farooq Ochoa DO (02/04 0603)   No acute fracture or dislocation      Final Result by Dagoberto Bundy MD (02/05 0934)      No acute osseous abnormality.      Resident: ETHAN Flowers I, the attending radiologist, have reviewed the images and agree with the final report above.      Workstation performed: [id number]         XR humerus LEFT   ED Interpretation by Farooq Ochoa DO (02/04 0603)   No acute fracture or dislocation      Final Result by Dagoberto Bundy MD (02/05 0938)      No acute osseous abnormality.      Opacities in the left lung could represent pneumonia or sequelae of interstitial lung disease. Correlate for acute respiratory symptoms.            Resident: ETHAN Flowers I, the attending radiologist, have reviewed the images and agree with the final report above.      Workstation performed: [id number]         CT head without contrast   Final Result by Eladio Campbell MD (02/04 0607)      No acute intracranial abnormality.                  Workstation performed: [id number]         CT cervical spine without contrast   Final Result by Eladio Campbell MD (02/04 0620)      No cervical spine fracture or traumatic malalignment.                  Workstation performed: [id number]         CT chest abdomen pelvis wo contrast   Final Result by Eladio Campbell MD (02/04 0650)      Although the study was limited by the lack of intravenous contrast, there is no gross evidence of solid organ injury.      No acute intra-abdominal abnormality. No free air or free fluid.      Diffuse nonspecific interlobular septal thickening with areas of groundglass haziness with differential considerations including an infectious or inflammatory process, pulmonary edema, among other etiologies. Correlation with the patient's symptoms and    laboratory studies is recommended. A repeat high-resolution CT chest in 6 to 8 weeks is recommended to  assess for improvement/resolution and exclude an underlying lesion.                        Workstation performed: [id number]           Echo: Left Ventricle: Left ventricular cavity size is normal. Wall thickness is moderately increased. The left ventricular ejection fraction is 65-69 %. Systolic function is normal. Wall motion is normal. Diastolic function is mildly abnormal, consistent with grade I (abnormal) relaxation. Right Ventricle: Right ventricular cavity size is normal. Systolic function is normal. Mitral Valve: There is mild regurgitation.  Tricuspid Valve: There is mild regurgitation. IVC/SVC: The inferior vena cava size is 13.0 mm. The right atrial pressure is estimated at 3.0 mmHg. The inferior vena cava is normal in size. Respirophasic changes were normal.  Sputum culture mixed respiratory emelia  BC 1/2 positive for e coli  Repeat BC no growth after 48 hours  UA: moderate leukocytes, positive nitrite, protein: 30, small occult blood; microscopic: WBC: 20-30, bacteria innumerable, occasional epithelial cells  Urine culture: mixed contaminants  Procal negative  D-dimer: 1.61  COVID/Flu/RSV: COVID positive  TSH normal  BNP: 108    Incidental Findings:   none    Test Results Pending at Discharge (will require follow up):   Repeat BC     Outpatient Tests Requested:  Follow up with urogynecology outpatient  Follow up with pulmonology outpatient  Follow up with ID outpatient  Repeat CT chest in 3 months     Complications:  none    Reason for Admission: COVID, UTI, ambulatory dysfunction, generalized weakness    Hospital Course:   Nina Tran is a 78 y.o. female patient who originally presented to the hospital on 2/4/2024 due to a fall that occurred at home. Was walking to the bathroom and felt her legs give out from under her. In ED, trauma workup negative. CT with results above. D-dimer elevated and CTA ordered. No PE. COVID positive. Admitted for COVID infection and UTI. Started on IV rocephin and  "remdesivir. Decadron per protocol. Found to have acute respiratory failure as well on 2L NC. BC ordered and 1/2 positive for e coli. ID consulted, recommended increasing rocephin initially, then switched to cefazolin. Echo ordered with results above. Repeat BC ordered and no growth after 48 hours. ID recommended on discharge, patient can be transitioned to po keflex 500 qid through 2/13, favor completing 10 days of po dexamethasone 6 mg daily then resuming po prednisone 20 mg daily. Patient was weaned to room air and home o2 eval done prior to discharge. Showed patient did not need oxygen at rest, but needed 2L NC with exertion. Patient already has O2 at home. PT/OT consulted for generalized weakness, recommended III minimum resource intensity. CM assisted with C for discharge. In addition to antibiotics and steroids, patient should continue hipprex ant atovaquone as recommended by pulm/ID outpatient. Should follow up with urogynecology outpatient. Follow up with pulm and ID outpatient. Follow up with PCP in 1 week. Needs repeat CT chest in 2-3 months as well. Instructed patient to return if she has any new or worsening symptoms. Verbalized understanding and agreement to the plan.     Please see above list of diagnoses and related plan for additional information.     Condition at Discharge: fair    Discharge Day Visit / Exam:   Subjective:  Patient seen and examined. She is doing well today. Feels much better than when she came in. No nausea, vomiting, diarrhea, constipation, abdominal pain, CP, SOB, fever, chills. She is looking forward to going home. Feels she has much more energy than before.   Vitals: Blood Pressure: 128/70 (02/08/24 0727)  Pulse: 89 (02/08/24 0727)  Temperature: (!) 97.3 °F (36.3 °C) (02/08/24 0727)  Temp Source: Temporal (02/05/24 2320)  Respirations: 20 (02/08/24 0727)  Height: 5' 3\" (160 cm) (02/05/24 1130)  Weight - Scale: 59 kg (130 lb) (02/05/24 1130)  SpO2: (!) 88 % (02/08/24 " 0193)  Exam:   Physical Exam  Vitals reviewed.   Constitutional:       General: She is not in acute distress.     Appearance: She is ill-appearing. She is not toxic-appearing.   HENT:      Head: Normocephalic and atraumatic.      Mouth/Throat:      Mouth: Mucous membranes are moist.   Cardiovascular:      Rate and Rhythm: Normal rate and regular rhythm.      Heart sounds: No murmur heard.  Pulmonary:      Effort: No respiratory distress.      Breath sounds: No stridor. No wheezing.      Comments: Saturating well on room air  Abdominal:      General: Bowel sounds are normal. There is no distension.      Palpations: Abdomen is soft. There is no mass.      Tenderness: There is no abdominal tenderness.   Musculoskeletal:      Right lower leg: No edema.      Left lower leg: No edema.   Skin:     General: Skin is warm and dry.   Neurological:      General: No focal deficit present.      Mental Status: She is alert and oriented to person, place, and time.   Psychiatric:         Mood and Affect: Mood normal.         Behavior: Behavior normal.          Discussion with Family: Updated  () at bedside.    Discharge instructions/Information to patient and family:   See after visit summary for information provided to patient and family.      Provisions for Follow-Up Care:  See after visit summary for information related to follow-up care and any pertinent home health orders.      Mobility at time of Discharge:   Basic Mobility Inpatient Raw Score: 22  JH-HLM Goal: 7: Walk 25 feet or more  JH-HLM Achieved: 7: Walk 25 feet or more  HLM Goal achieved. Continue to encourage appropriate mobility.     Disposition:   Home with [facility] Services (Reminder: Complete face to face encounter)    Planned Readmission: no     Discharge Statement:  I spent 53 minutes discharging the patient. This time was spent on the day of discharge. I had direct contact with the patient on the day of discharge. Greater than 50% of the total  time was spent examining patient, answering all patient questions, arranging and discussing plan of care with patient as well as directly providing post-discharge instructions.  Additional time then spent on discharge activities.    Discharge Medications:  See after visit summary for reconciled discharge medications provided to patient and/or family.      **Please Note: This note may have been constructed using a voice recognition system**

## 2024-02-08 NOTE — DISCHARGE INSTR - OTHER ORDERS
Your goal pulse oximetry is 88 - 92%.  If your pulse ox is <88 - rest for 5 minutes and take deep breaths through your nose with the oxygen in place.  Make sure your finger is warm (cold fingers will give falsely low readings).  After 5 minutes, recheck your pulse ox.  If your pulse ox continues to be below < 88% and you feel short of breath, rest, breathe through your nose and call your primary care physician or pulmonologist 24/7 (if after office hours the answering service will contact the on call physician who will call you back).  If you continue to feel excessively short of breath (much more than your normal breathing pattern), consider further evaluation in the emergency department - remember that a mask must be worn to enter any [facility] Emergency Department.  If your pulse ox continues to be below < 88% and you do not feel short of breath increase your oxygen flow by 1 liter at a time to achieve a reading between 88 and 92%.  If you are needing more than 2 liters higher than your normal flow for more than a few hours call your primary care physician or pulmonologist, even if you are not feeling short of breath.  If your pulse ox is >92% it is safe to turn down oxygen by 1 liter at a time to achieve a reading of 88-92%.     YOU ARE RECOMMENDED 2L O2 UPON EXERTION AND 0L AT REST.

## 2024-02-08 NOTE — RESPIRATORY THERAPY NOTE
Home Oxygen Qualifying Test     Patient name: Nina Tran        : 1945   Date of Test:  2024  Diagnosis:    Home Oxygen Test:    **Medicare Guidelines require item(s) 1-5 on all ambulatory patients or 1 and 2 on non-ambulatory patients.    1. Baseline SPO2 on Room Air at rest 93 %   If <= 88% on Room Air add O2 via NC to obtain SpO2 >=88%. If LPM needed, document LPM NA needed to reach =>88%    SPO2 during exertion on Room Air 84  %  During exertion monitor SPO2. If SPO2 increases >=89%, do not add supplemental oxygen    SPO2 on Oxygen at Rest NA % at NA LPM    SPO2 during exertion on Oxygen 90 % at 2 LPM    Test performed during exertion activity.      [x]  Supplemental Home Oxygen is indicated.    []  Client does not qualify for home oxygen.    Respiratory Additional Notes- Pt will require 2 L NC on exertion and None at rest.    Kaci Zabala, RT

## 2024-02-08 NOTE — OCCUPATIONAL THERAPY NOTE
Occupational Therapy Progress Note     Patient Name: Nina Tran  Today's Date: 2/8/2024  Problem List  Principal Problem:    COVID-19  Active Problems:    Acute cystitis without hematuria    Generalized weakness    Ambulatory dysfunction    History of thymus cancer    Bacteremia    Acute respiratory failure (HCC)    Sepsis (HCC)       02/08/24 0825   OT Last Visit   OT Visit Date 02/08/24   Note Type   Note Type Treatment   Pain Assessment   Pain Assessment Tool 0-10   Pain Score No Pain   Restrictions/Precautions   Other Precautions Airborne/isolation;Contact/isolation;Multiple lines;O2  (1L O2)   ADL   Where Assessed Standing at sink   Grooming Assistance 5  Supervision/Setup   Grooming Deficit Setup;Supervision/safety;Wash/dry hands;Wash/dry face;Teeth care   Toileting Assistance  5  Supervision/Setup   Toileting Deficit Clothing management up;Clothing management down   Transfers   Sit to Stand 5  Supervision   Additional items Increased time required;Verbal cues   Stand to Sit 5  Supervision   Additional items Increased time required;Verbal cues   Additional Comments No AD; cued to increase BLE ANDRA for improved balance   Exercise Tools   Exercise Tools Yes   Theraband Red theraband exercises completed while seated unsupported: bicep curls, chest press, diagonals, 2 x 20.   Cognition   Overall Cognitive Status WFL   Arousal/Participation Alert;Cooperative   Attention Within functional limits   Activity Tolerance   Activity Tolerance Other (Comment)  (O2 desaturation on 1L)   Assessment   Assessment Ms. Tran was seen for a follow up treatment session focused on improving cardiovascular and muscular endurance required for safe ADL/IADL management. Pt received on 1L O2 and titrated to RA. Pt maintaining >90% at rest, however, required supplemental 1L O2 with exertion due to desaturations noted to 80% and moderate ECHEVERRIA reported. Pt educated about pursed lip breathing techniques to improve reoxygenation. Standing  tolerance at sink (no AD): 3-4 minutes without LOB. Pt reports her  will be able to assist with tasks as needed upon DC. Continue recommendation for Level III Resource Intensity and continued acute OT to maximize functional performance.   Plan   Treatment Interventions ADL retraining;Functional transfer training;UE strengthening/ROM;Endurance training;Cognitive reorientation;Patient/family training;Equipment evaluation/education;Compensatory technique education;Continued evaluation;Energy conservation;Activityengagement   Goal Expiration Date 02/19/24   OT Treatment Day 1   OT Frequency 2-3x/wk   Discharge Recommendation   Rehab Resource Intensity Level, OT III (Minimum Resource Intensity)   AM-PAC Daily Activity Inpatient   Lower Body Dressing 3   Bathing 4   Toileting 4   Upper Body Dressing 4   Grooming 4   Eating 4   Daily Activity Raw Score 23   Daily Activity Standardized Score (Calc for Raw Score >=11) 51.12   -[facility] Applied Cognition Inpatient   Following a Speech/Presentation 4   Understanding Ordinary Conversation 4   Taking Medications 4   Remembering Where Things Are Placed or Put Away 4   Remembering List of 4-5 Errands 4   Taking Care of Complicated Tasks 3   Applied Cognition Raw Score 23   Applied Cognition Standardized Score 53.08     The patient's raw score on the AM-PAC Daily Activity Inpatient Short Form is 19. A raw score of greater than or equal to 19 suggests the patient may benefit from discharge to home. Please refer to the recommendation of the Occupational Therapist for safe discharge planning. Level III: Minimum Resource Intensity     Nhung Cummins MS, OTR/L

## 2024-02-08 NOTE — ASSESSMENT & PLAN NOTE
Patient found to be requiring 2L NC in the ED. Does not wear oxygen at baseline, has home o2 prn. With tachypnea in the ED and desaturations requiring O2. Sats 85% and RR 23-25. Suspect secondary to COVID infection. Start on remdesivir and steroids.   D-dimer elevated  CTA chest PE study without PE; Redemonstrated diffuse interlobular septal thickening and bilateral groundglass opacities most pronounced in the lung bases and mild bronchiectasis in the right upper lobe and bilateral lower lobes. Findings may be due to infectious/inflammatory etiology such as COVID-pneumonia, pulmonary edema, or underlying pulmonary fibrosis. Recommend follow-up high-resolution chest CT in 3 months for reassessment.  Incentive spirometry, supportive measures, wean off oxygen as able.   Home o2 eval showing no o2 needed at rest, 2L NC needed with ambulation

## 2024-02-08 NOTE — ASSESSMENT & PLAN NOTE
Presents to ED with complaints of generalized weakness and UTI symptoms including frequency, burning and urgency  Start ceftriaxone   Follow up cultures   1/2 BC positive for e coli; repeat BC no growth 48 hours  Urine culture mixed contaminants

## 2024-02-08 NOTE — CASE MANAGEMENT
Case Management Discharge Planning Note    Patient name Nina Tran  Location /-01 MRN [id number]  : 1945 Date 2024       Current Admission Date: 2024  Current Admission Diagnosis:COVID-19   Patient Active Problem List    Diagnosis Date Noted    Bacteremia 2024    Acute respiratory failure (HCC) 2024    Sepsis (HCC) 2024    Acute cystitis without hematuria 2024    COVID-19 2024    Generalized weakness 2024    Ambulatory dysfunction 2024    History of thymus cancer 2024      LOS (days): 4  Geometric Mean LOS (GMLOS) (days): 5.1  Days to GMLOS:0.8     OBJECTIVE:  Risk of Unplanned Readmission Score: 12.95         Current admission status: Inpatient   Preferred Pharmacy:   [facility]/pharmacy #[id number] [place] [address]  Phone: [phone] Fax: [phone]    Primary Care Provider: Emely Will DO    Primary Insurance: MEDICARE  Secondary Insurance: [facility]    DISCHARGE DETAILS:        CM contacted patient in her room to discuss discharge home today with [facility] and new O2 recommendations.    CM spoke to patient, reviewed DC IMM with patient and informed that patient can stay an additional 4 hours for reconsidering appealing the discharge as the medicare rights were review on the day of discharge. Pt verbalized understanding and feels ready to go home and does not intend to stay 4 hours to reconsider. IMM placed in bin for filing.                                                                                   IMM Given (Date):: 24  IMM Given to:: Patient  Family notified:: reviewed verbally with patient

## 2024-02-08 NOTE — CASE MANAGEMENT
Case Management Discharge Planning Note    Patient name Nina Tran  Location /-01 MRN [id number]  : 1945 Date 2024       Current Admission Date: 2024  Current Admission Diagnosis:COVID-19   Patient Active Problem List    Diagnosis Date Noted    Bacteremia 2024    Acute respiratory failure (HCC) 2024    Sepsis (HCC) 2024    Acute cystitis without hematuria 2024    COVID-19 2024    Generalized weakness 2024    Ambulatory dysfunction 2024    History of thymus cancer 2024      LOS (days): 4  Geometric Mean LOS (GMLOS) (days): 5.1  Days to GMLOS:0.7     OBJECTIVE:  Risk of Unplanned Readmission Score: 13.27         Current admission status: Inpatient   Preferred Pharmacy:   [facility]/pharmacy #[id number] [place] [address]  Phone: [phone] Fax: [phone]    Primary Care Provider: Emely Will DO    Primary Insurance: MEDICARE  Secondary Insurance: [employer]    DISCHARGE DETAILS:        AVS to [facility] order uploaded in [facility]

## 2024-02-09 LAB — BACTERIA BLD CULT: NORMAL

## 2024-02-11 LAB
BACTERIA BLD CULT: NORMAL
BACTERIA BLD CULT: NORMAL

## 2024-02-17 ENCOUNTER — HOSPITAL ENCOUNTER (INPATIENT)
Facility: HOSPITAL | Age: 79
LOS: 17 days | Discharge: HOSPICE | DRG: 196 | End: 2024-03-05
Attending: EMERGENCY MEDICINE | Admitting: FAMILY MEDICINE
Payer: MEDICARE

## 2024-02-17 ENCOUNTER — APPOINTMENT (EMERGENCY)
Dept: CT IMAGING | Facility: HOSPITAL | Age: 79
DRG: 196 | End: 2024-02-17
Payer: MEDICARE

## 2024-02-17 DIAGNOSIS — K21.9 GASTROESOPHAGEAL REFLUX DISEASE WITHOUT ESOPHAGITIS: ICD-10-CM

## 2024-02-17 DIAGNOSIS — J96.01 ACUTE RESPIRATORY FAILURE WITH HYPOXIA (HCC): ICD-10-CM

## 2024-02-17 DIAGNOSIS — R09.02 HYPOXIA: Primary | ICD-10-CM

## 2024-02-17 DIAGNOSIS — R10.13 EPIGASTRIC PAIN: ICD-10-CM

## 2024-02-17 DIAGNOSIS — Z71.89 GOALS OF CARE, COUNSELING/DISCUSSION: ICD-10-CM

## 2024-02-17 DIAGNOSIS — K21.9 GASTROESOPHAGEAL REFLUX DISEASE, UNSPECIFIED WHETHER ESOPHAGITIS PRESENT: ICD-10-CM

## 2024-02-17 DIAGNOSIS — J18.9 PNEUMONIA DUE TO INFECTIOUS ORGANISM, UNSPECIFIED LATERALITY, UNSPECIFIED PART OF LUNG: ICD-10-CM

## 2024-02-17 PROBLEM — R07.9 CHEST PAIN: Status: ACTIVE | Noted: 2024-02-17

## 2024-02-17 LAB
2HR DELTA HS TROPONIN: -9 NG/L
ALBUMIN SERPL BCP-MCNC: 3.3 G/DL (ref 3.5–5)
ALP SERPL-CCNC: 131 U/L (ref 34–104)
ALT SERPL W P-5'-P-CCNC: 50 U/L (ref 7–52)
ANION GAP SERPL CALCULATED.3IONS-SCNC: 9 MMOL/L
ANISOCYTOSIS BLD QL SMEAR: PRESENT
APTT PPP: 25 SECONDS (ref 23–37)
AST SERPL W P-5'-P-CCNC: 38 U/L (ref 13–39)
BACTERIA UR QL AUTO: ABNORMAL /HPF
BASOPHILS # BLD MANUAL: 0 THOUSAND/UL (ref 0–0.1)
BASOPHILS NFR MAR MANUAL: 0 % (ref 0–1)
BILIRUB SERPL-MCNC: 1.3 MG/DL (ref 0.2–1)
BILIRUB UR QL STRIP: NEGATIVE
BNP SERPL-MCNC: 52 PG/ML (ref 0–100)
BUN SERPL-MCNC: 20 MG/DL (ref 5–25)
BURR CELLS BLD QL SMEAR: PRESENT
CALCIUM ALBUM COR SERPL-MCNC: 8.7 MG/DL (ref 8.3–10.1)
CALCIUM SERPL-MCNC: 8.1 MG/DL (ref 8.4–10.2)
CARDIAC TROPONIN I PNL SERPL HS: 30 NG/L
CARDIAC TROPONIN I PNL SERPL HS: 39 NG/L
CHLORIDE SERPL-SCNC: 95 MMOL/L (ref 96–108)
CHOLEST SERPL-MCNC: 150 MG/DL
CLARITY UR: CLEAR
CO2 SERPL-SCNC: 28 MMOL/L (ref 21–32)
COLOR UR: YELLOW
CREAT SERPL-MCNC: 0.81 MG/DL (ref 0.6–1.3)
EOSINOPHIL # BLD MANUAL: 0 THOUSAND/UL (ref 0–0.4)
EOSINOPHIL NFR BLD MANUAL: 0 % (ref 0–6)
ERYTHROCYTE [DISTWIDTH] IN BLOOD BY AUTOMATED COUNT: 15.6 % (ref 11.6–15.1)
FLUAV RNA RESP QL NAA+PROBE: NEGATIVE
FLUBV RNA RESP QL NAA+PROBE: NEGATIVE
GFR SERPL CREATININE-BSD FRML MDRD: 68 ML/MIN/1.73SQ M
GLUCOSE SERPL-MCNC: 113 MG/DL (ref 65–140)
GLUCOSE UR STRIP-MCNC: NEGATIVE MG/DL
HCT VFR BLD AUTO: 41.3 % (ref 34.8–46.1)
HDLC SERPL-MCNC: 49 MG/DL
HGB BLD-MCNC: 13.1 G/DL (ref 11.5–15.4)
HGB UR QL STRIP.AUTO: ABNORMAL
HYALINE CASTS #/AREA URNS LPF: ABNORMAL /LPF
INR PPP: 0.92 (ref 0.84–1.19)
KETONES UR STRIP-MCNC: NEGATIVE MG/DL
L PNEUMO1 AG UR QL IA.RAPID: NEGATIVE
LACTATE SERPL-SCNC: 1.1 MMOL/L (ref 0.5–2)
LACTATE SERPL-SCNC: 2.3 MMOL/L (ref 0.5–2)
LDLC SERPL CALC-MCNC: 54 MG/DL (ref 0–100)
LEUKOCYTE ESTERASE UR QL STRIP: NEGATIVE
LIPASE SERPL-CCNC: 29 U/L (ref 11–82)
LYMPHOCYTES # BLD AUTO: 0.09 THOUSAND/UL (ref 0.6–4.47)
LYMPHOCYTES # BLD AUTO: 1 % (ref 14–44)
MCH RBC QN AUTO: 26.8 PG (ref 26.8–34.3)
MCHC RBC AUTO-ENTMCNC: 31.7 G/DL (ref 31.4–37.4)
MCV RBC AUTO: 85 FL (ref 82–98)
MICROCYTES BLD QL AUTO: PRESENT
MONOCYTES # BLD AUTO: 0.19 THOUSAND/UL (ref 0–1.22)
MONOCYTES NFR BLD: 2 % (ref 4–12)
MYELOCYTES NFR BLD MANUAL: 1 % (ref 0–1)
NEUTROPHILS # BLD MANUAL: 9.03 THOUSAND/UL (ref 1.85–7.62)
NEUTS BAND NFR BLD MANUAL: 8 % (ref 0–8)
NEUTS SEG NFR BLD AUTO: 88 % (ref 43–75)
NITRITE UR QL STRIP: NEGATIVE
NON-SQ EPI CELLS URNS QL MICRO: ABNORMAL /HPF
NONHDLC SERPL-MCNC: 101 MG/DL
OVALOCYTES BLD QL SMEAR: PRESENT
PH UR STRIP.AUTO: 7 [PH]
PLATELET # BLD AUTO: 112 THOUSANDS/UL (ref 149–390)
PLATELET BLD QL SMEAR: ABNORMAL
PMV BLD AUTO: 10.5 FL (ref 8.9–12.7)
POTASSIUM SERPL-SCNC: 3.6 MMOL/L (ref 3.5–5.3)
PROCALCITONIN SERPL-MCNC: 0.32 NG/ML
PROT SERPL-MCNC: 5.3 G/DL (ref 6.4–8.4)
PROT UR STRIP-MCNC: ABNORMAL MG/DL
PROTHROMBIN TIME: 12.6 SECONDS (ref 11.6–14.5)
RBC # BLD AUTO: 4.89 MILLION/UL (ref 3.81–5.12)
RBC #/AREA URNS AUTO: ABNORMAL /HPF
RBC MORPH BLD: PRESENT
RSV RNA RESP QL NAA+PROBE: NEGATIVE
S PNEUM AG UR QL: NEGATIVE
SARS-COV-2 RNA RESP QL NAA+PROBE: POSITIVE
SODIUM SERPL-SCNC: 132 MMOL/L (ref 135–147)
SP GR UR STRIP.AUTO: <=1.005 (ref 1–1.03)
STOMATOCYTES BLD QL SMEAR: PRESENT
T4 FREE SERPL-MCNC: 1.06 NG/DL (ref 0.61–1.12)
TRIGL SERPL-MCNC: 235 MG/DL
TSH SERPL DL<=0.05 MIU/L-ACNC: 5.93 UIU/ML (ref 0.45–4.5)
UROBILINOGEN UR QL STRIP.AUTO: 0.2 E.U./DL
WBC # BLD AUTO: 9.41 THOUSAND/UL (ref 4.31–10.16)
WBC #/AREA URNS AUTO: ABNORMAL /HPF

## 2024-02-17 PROCEDURE — 71275 CT ANGIOGRAPHY CHEST: CPT

## 2024-02-17 PROCEDURE — 84484 ASSAY OF TROPONIN QUANT: CPT | Performed by: EMERGENCY MEDICINE

## 2024-02-17 PROCEDURE — 83690 ASSAY OF LIPASE: CPT | Performed by: EMERGENCY MEDICINE

## 2024-02-17 PROCEDURE — 85730 THROMBOPLASTIN TIME PARTIAL: CPT | Performed by: EMERGENCY MEDICINE

## 2024-02-17 PROCEDURE — C9113 INJ PANTOPRAZOLE SODIUM, VIA: HCPCS

## 2024-02-17 PROCEDURE — 84145 PROCALCITONIN (PCT): CPT

## 2024-02-17 PROCEDURE — 99223 1ST HOSP IP/OBS HIGH 75: CPT | Performed by: FAMILY MEDICINE

## 2024-02-17 PROCEDURE — 36415 COLL VENOUS BLD VENIPUNCTURE: CPT | Performed by: EMERGENCY MEDICINE

## 2024-02-17 PROCEDURE — 84443 ASSAY THYROID STIM HORMONE: CPT

## 2024-02-17 PROCEDURE — 84439 ASSAY OF FREE THYROXINE: CPT

## 2024-02-17 PROCEDURE — 87070 CULTURE OTHR SPECIMN AEROBIC: CPT

## 2024-02-17 PROCEDURE — 87449 NOS EACH ORGANISM AG IA: CPT

## 2024-02-17 PROCEDURE — 87040 BLOOD CULTURE FOR BACTERIA: CPT | Performed by: EMERGENCY MEDICINE

## 2024-02-17 PROCEDURE — G1004 CDSM NDSC: HCPCS

## 2024-02-17 PROCEDURE — 99285 EMERGENCY DEPT VISIT HI MDM: CPT | Performed by: EMERGENCY MEDICINE

## 2024-02-17 PROCEDURE — 85027 COMPLETE CBC AUTOMATED: CPT | Performed by: EMERGENCY MEDICINE

## 2024-02-17 PROCEDURE — 81001 URINALYSIS AUTO W/SCOPE: CPT | Performed by: EMERGENCY MEDICINE

## 2024-02-17 PROCEDURE — 97116 GAIT TRAINING THERAPY: CPT

## 2024-02-17 PROCEDURE — 83880 ASSAY OF NATRIURETIC PEPTIDE: CPT | Performed by: EMERGENCY MEDICINE

## 2024-02-17 PROCEDURE — 74177 CT ABD & PELVIS W/CONTRAST: CPT

## 2024-02-17 PROCEDURE — 99285 EMERGENCY DEPT VISIT HI MDM: CPT

## 2024-02-17 PROCEDURE — 83605 ASSAY OF LACTIC ACID: CPT | Performed by: EMERGENCY MEDICINE

## 2024-02-17 PROCEDURE — 93005 ELECTROCARDIOGRAM TRACING: CPT

## 2024-02-17 PROCEDURE — 85610 PROTHROMBIN TIME: CPT | Performed by: EMERGENCY MEDICINE

## 2024-02-17 PROCEDURE — 0241U HB NFCT DS VIR RESP RNA 4 TRGT: CPT | Performed by: EMERGENCY MEDICINE

## 2024-02-17 PROCEDURE — 85007 BL SMEAR W/DIFF WBC COUNT: CPT | Performed by: EMERGENCY MEDICINE

## 2024-02-17 PROCEDURE — 80061 LIPID PANEL: CPT

## 2024-02-17 PROCEDURE — 87205 SMEAR GRAM STAIN: CPT

## 2024-02-17 PROCEDURE — 97163 PT EVAL HIGH COMPLEX 45 MIN: CPT

## 2024-02-17 PROCEDURE — 80053 COMPREHEN METABOLIC PANEL: CPT | Performed by: EMERGENCY MEDICINE

## 2024-02-17 RX ORDER — METHENAMINE HIPPURATE 1000 MG/1
1 TABLET ORAL 2 TIMES DAILY WITH MEALS
Status: DISCONTINUED | OUTPATIENT
Start: 2024-02-17 | End: 2024-03-05 | Stop reason: HOSPADM

## 2024-02-17 RX ORDER — SODIUM CHLORIDE, SODIUM GLUCONATE, SODIUM ACETATE, POTASSIUM CHLORIDE, MAGNESIUM CHLORIDE, SODIUM PHOSPHATE, DIBASIC, AND POTASSIUM PHOSPHATE .53; .5; .37; .037; .03; .012; .00082 G/100ML; G/100ML; G/100ML; G/100ML; G/100ML; G/100ML; G/100ML
100 INJECTION, SOLUTION INTRAVENOUS CONTINUOUS
Status: DISCONTINUED | OUTPATIENT
Start: 2024-02-17 | End: 2024-02-18

## 2024-02-17 RX ORDER — ATORVASTATIN CALCIUM 20 MG/1
20 TABLET, FILM COATED ORAL
Status: DISCONTINUED | OUTPATIENT
Start: 2024-02-17 | End: 2024-03-05 | Stop reason: HOSPADM

## 2024-02-17 RX ORDER — ACETAMINOPHEN 325 MG/1
650 TABLET ORAL EVERY 6 HOURS PRN
Status: DISCONTINUED | OUTPATIENT
Start: 2024-02-17 | End: 2024-03-05 | Stop reason: HOSPADM

## 2024-02-17 RX ORDER — ASCORBIC ACID 500 MG
500 TABLET ORAL DAILY
Status: DISCONTINUED | OUTPATIENT
Start: 2024-02-17 | End: 2024-03-05 | Stop reason: HOSPADM

## 2024-02-17 RX ORDER — ONDANSETRON 2 MG/ML
4 INJECTION INTRAMUSCULAR; INTRAVENOUS EVERY 6 HOURS PRN
Status: DISCONTINUED | OUTPATIENT
Start: 2024-02-17 | End: 2024-03-05 | Stop reason: HOSPADM

## 2024-02-17 RX ORDER — CEFTRIAXONE 1 G/50ML
1000 INJECTION, SOLUTION INTRAVENOUS ONCE
Status: COMPLETED | OUTPATIENT
Start: 2024-02-17 | End: 2024-02-17

## 2024-02-17 RX ORDER — CEFTRIAXONE 1 G/50ML
1000 INJECTION, SOLUTION INTRAVENOUS EVERY 24 HOURS
Status: DISCONTINUED | OUTPATIENT
Start: 2024-02-18 | End: 2024-02-22

## 2024-02-17 RX ORDER — PANTOPRAZOLE SODIUM 40 MG/1
40 TABLET, DELAYED RELEASE ORAL
Status: DISCONTINUED | OUTPATIENT
Start: 2024-02-17 | End: 2024-02-17

## 2024-02-17 RX ORDER — MYCOPHENOLATE MOFETIL 250 MG/1
1000 CAPSULE ORAL EVERY 12 HOURS SCHEDULED
Status: DISCONTINUED | OUTPATIENT
Start: 2024-02-17 | End: 2024-03-05 | Stop reason: HOSPADM

## 2024-02-17 RX ORDER — GUAIFENESIN 600 MG/1
1200 TABLET, EXTENDED RELEASE ORAL EVERY 12 HOURS SCHEDULED
Status: DISCONTINUED | OUTPATIENT
Start: 2024-02-17 | End: 2024-03-05 | Stop reason: HOSPADM

## 2024-02-17 RX ORDER — PREDNISONE 20 MG/1
20 TABLET ORAL DAILY
Status: DISCONTINUED | OUTPATIENT
Start: 2024-02-17 | End: 2024-02-20

## 2024-02-17 RX ORDER — ENOXAPARIN SODIUM 100 MG/ML
40 INJECTION SUBCUTANEOUS DAILY
Status: DISCONTINUED | OUTPATIENT
Start: 2024-02-17 | End: 2024-03-05 | Stop reason: HOSPADM

## 2024-02-17 RX ORDER — PANTOPRAZOLE SODIUM 40 MG/10ML
40 INJECTION, POWDER, LYOPHILIZED, FOR SOLUTION INTRAVENOUS EVERY 12 HOURS SCHEDULED
Status: DISCONTINUED | OUTPATIENT
Start: 2024-02-17 | End: 2024-02-23

## 2024-02-17 RX ORDER — ATOVAQUONE 750 MG/5ML
1500 SUSPENSION ORAL DAILY
Status: DISCONTINUED | OUTPATIENT
Start: 2024-02-17 | End: 2024-03-05 | Stop reason: HOSPADM

## 2024-02-17 RX ADMIN — ATOVAQUONE 1500 MG: 750 SUSPENSION ORAL at 10:58

## 2024-02-17 RX ADMIN — SODIUM CHLORIDE, SODIUM GLUCONATE, SODIUM ACETATE, POTASSIUM CHLORIDE, MAGNESIUM CHLORIDE, SODIUM PHOSPHATE, DIBASIC, AND POTASSIUM PHOSPHATE 100 ML/HR: .53; .5; .37; .037; .03; .012; .00082 INJECTION, SOLUTION INTRAVENOUS at 16:39

## 2024-02-17 RX ADMIN — ATORVASTATIN CALCIUM 20 MG: 20 TABLET, FILM COATED ORAL at 16:38

## 2024-02-17 RX ADMIN — MYCOPHENOLATE MOFETIL 1000 MG: 250 CAPSULE ORAL at 20:38

## 2024-02-17 RX ADMIN — DOXYCYCLINE 100 MG: 100 INJECTION, POWDER, LYOPHILIZED, FOR SOLUTION INTRAVENOUS at 09:59

## 2024-02-17 RX ADMIN — SODIUM CHLORIDE, SODIUM GLUCONATE, SODIUM ACETATE, POTASSIUM CHLORIDE, MAGNESIUM CHLORIDE, SODIUM PHOSPHATE, DIBASIC, AND POTASSIUM PHOSPHATE 100 ML/HR: .53; .5; .37; .037; .03; .012; .00082 INJECTION, SOLUTION INTRAVENOUS at 12:02

## 2024-02-17 RX ADMIN — ACETAMINOPHEN 325MG 650 MG: 325 TABLET ORAL at 17:44

## 2024-02-17 RX ADMIN — METHENAMINE HIPPURATE 1 G: 1 TABLET ORAL at 17:44

## 2024-02-17 RX ADMIN — DOXYCYCLINE 100 MG: 100 INJECTION, POWDER, LYOPHILIZED, FOR SOLUTION INTRAVENOUS at 20:51

## 2024-02-17 RX ADMIN — PANTOPRAZOLE SODIUM 40 MG: 40 TABLET, DELAYED RELEASE ORAL at 10:57

## 2024-02-17 RX ADMIN — OXYCODONE HYDROCHLORIDE AND ACETAMINOPHEN 500 MG: 500 TABLET ORAL at 10:57

## 2024-02-17 RX ADMIN — PREDNISONE 20 MG: 20 TABLET ORAL at 10:57

## 2024-02-17 RX ADMIN — GUAIFENESIN 1200 MG: 600 TABLET ORAL at 10:57

## 2024-02-17 RX ADMIN — PANTOPRAZOLE SODIUM 40 MG: 40 INJECTION, POWDER, FOR SOLUTION INTRAVENOUS at 20:39

## 2024-02-17 RX ADMIN — ENOXAPARIN SODIUM 40 MG: 40 INJECTION SUBCUTANEOUS at 10:59

## 2024-02-17 RX ADMIN — GUAIFENESIN 1200 MG: 600 TABLET ORAL at 20:38

## 2024-02-17 RX ADMIN — CEFTRIAXONE 1000 MG: 1 INJECTION, SOLUTION INTRAVENOUS at 09:26

## 2024-02-17 RX ADMIN — SODIUM CHLORIDE 2000 ML: 0.9 INJECTION, SOLUTION INTRAVENOUS at 08:43

## 2024-02-17 RX ADMIN — IOHEXOL 100 ML: 350 INJECTION, SOLUTION INTRAVENOUS at 08:09

## 2024-02-17 NOTE — ASSESSMENT & PLAN NOTE
Presented to ED with left sided chest pain and worsening SOB for 3 days.   CTA chest: Unchanged groundglass opacities and interstitial marking thickening with bibasal predominance. Appearance favors fibrosis but acute process cannot be completely excluded. No PE.   COVID/Flu/RSV: COVID positive, but positive since 2/4 during previous admission.   Urine strep and legionella ordered, Sputum culture ordered  Blood Cultures pending  Trend fever curve  Initially given rocephin and doxy in the emergency department, will continue and monitor cultures and titrate therapy as indicated  Supplemental oxygen for saturations > 90%; wean as tolerated  Aspiration Precautions  Respiratory Protocol   Incentive Spirometry   Supportive Care

## 2024-02-17 NOTE — ED NOTES
Olu txt sent to 3M charge RN. Pt to be transported to unit, No s/s of distress, VS stable, A&Ox4, ID band in place.      Almita Dawson RN  02/17/24 1334

## 2024-02-17 NOTE — PLAN OF CARE
Problem: PHYSICAL THERAPY ADULT  Goal: Performs mobility at highest level of function for planned discharge setting.  See evaluation for individualized goals.  Description: Treatment/Interventions: ADL retraining, Functional transfer training, LE strengthening/ROM, Elevations, Therapeutic exercise, Endurance training, Patient/family training, Equipment eval/education, Bed mobility, Gait training, Compensatory technique education, Spoke to nursing, Spoke to case management, OT  Equipment Recommended:  (walker-pt has)       See flowsheet documentation for full assessment, interventions and recommendations.  2/17/2024 1422 by Bonny Perez, PT  Note: Prognosis: Good  Problem List: Decreased strength, Decreased endurance, Impaired balance, Decreased mobility, Decreased safety awareness, Pain  Pt tolerated session fairly. She prefers to return to home upon discharge with spouse assisting patient prn. verbalized understanding of use of RW. She was able to ambulate and transfer with decreased asisstance wiht use of RW comapred to no AD. She fatigues easily. She is limited by decreased strength, balance, endurance. She will continue to beenfit from PT services to maximize LOF.  Barriers to Discharge: Inaccessible home environment (limited activity tolerance, fatigues easily)  Barriers to Discharge Comments: recommend increased support from spouse uponr eturn to home and use of RW  Rehab Resource Intensity Level, PT: II (Moderate Resource Intensity)    See flowsheet documentation for full assessment.

## 2024-02-17 NOTE — H&P
[facility]  H&P  Name: Nina Tran 78 y.o. female I MRN: [id number]  Unit/Bed#: TR 13B I Date of Admission: 2/17/2024   Date of Service: 2/17/2024 I Hospital Day: 0      Assessment/Plan   * Acute respiratory failure (HCC)  Assessment & Plan  POA with complaints of SOB and left sided chest pain. Recently admitted for COVID infection and sepsis. During previous admission, was requiring 2L NC. Home o2 eval done prior to discharge showed patient requiring no o2 at rest and 2L NC with ambulation. The last 3 days, patient has been using o2 more. Increased pain with movement.   CTA chest PE study: Unchanged groundglass opacities and interstitial marking thickening with bibasal predominance. Appearance favors fibrosis but acute process cannot be completely excluded. No pulmonary embolism  Will treat as active infection, start on IV rocephin  Incentive spirometry, supportive measures  Keep o2 saturations >89% and wean off o2 as able.   Will consult pulmonology    Pneumonia  Assessment & Plan  Presented to ED with left sided chest pain and worsening SOB for 3 days.   CTA chest: Unchanged groundglass opacities and interstitial marking thickening with bibasal predominance. Appearance favors fibrosis but acute process cannot be completely excluded. No PE.   COVID/Flu/RSV: COVID positive, but positive since 2/4 during previous admission.   Urine strep and legionella ordered, Sputum culture ordered  Blood Cultures pending  Trend fever curve  Initially given rocephin and doxy in the emergency department, will continue and monitor cultures and titrate therapy as indicated  Supplemental oxygen for saturations > 90%; wean as tolerated  Aspiration Precautions  Respiratory Protocol   Incentive Spirometry   Supportive Care    Chest pain  Assessment & Plan  Presents to ED with left sided chest pain and increasing shortness of breath worsening over the last 3 days. Worse with movement.   EKG: sinus  tachycardia, no ischemic changes.   CTA chest PE study: Unchanged groundglass opacities and interstitial marking thickening with bibasal predominance. Appearance favors fibrosis but acute process cannot be completely excluded.   MITESH score = 2  Troponin 0Hr 39; continue to trend with serial EKGs  Lipid panel,TSH, & Hgb A1c ordered  Hold NSAIDS  Pain management  Telemetry  Cardiac diet; low sodium     Generalized weakness  Assessment & Plan  POA with complaints of generalized weakness suspect secondary to suspected bacterial pneumonia   Given 1 dose of rocephin and doxy in the ED, will continue  PT/OT consult    History of thymus cancer  Assessment & Plan  Patient with history of thymus cancer s/p removal    Continue outpatient follow up     COVID-19  Assessment & Plan  Recently admitted with COVID-19 infection tested positive on 2/4, still testing positive from previous infection. Completed 10 days of isolation per COVID isolation precautions  Will hold further treatment for COVID and treat for suspected bacterial pneumonia         VTE Pharmacologic Prophylaxis:   High Risk (Score >/= 5) - Pharmacological DVT Prophylaxis Ordered: enoxaparin (Lovenox). Sequential Compression Devices Ordered.  Code Status: Level 1 - Full Code   Discussion with family: Updated  () at bedside.    Anticipated Length of Stay: Patient will be admitted on an inpatient basis with an anticipated length of stay of greater than 2 midnights secondary to acute respiratory failure, IV abx, pneumonia.    Total Time Spent on Date of Encounter in care of patient: 68 mins. This time was spent on one or more of the following: performing physical exam; counseling and coordination of care; obtaining or reviewing history; documenting in the medical record; reviewing/ordering tests, medications or procedures; communicating with other healthcare professionals and discussing with patient's family/caregivers.    Chief Complaint: shortness  of breath and left sided chest pain worsening x 3 days    History of Present Illness:  Nina Tran is a 78 y.o. female with a PMH of  thymus cancer, anemia, recurrent UTI's, GERD, hyperlipidemia, recent admission with COVID infection who presents with shortness of breath and left-sided chest pain x 3 days.  Patient states that she has been having worsening shortness of breath and left-sided chest pain.  No nausea or vomiting.  No difficulty eating.  Does still have cough, no fevers or chills.  Feels that her pain is worse with movement but does not have any change with deep breaths.  Has been needing her oxygen more frequently than prior since discharge.  No dysuria, hematuria, frequency, urgency.    Review of Systems:  Review of Systems   Constitutional:  Positive for activity change. Negative for appetite change, fatigue and fever.   HENT: Negative.     Eyes: Negative.    Respiratory:  Positive for cough and shortness of breath. Negative for apnea, chest tightness and wheezing.    Cardiovascular:  Positive for chest pain. Negative for palpitations and leg swelling.   Gastrointestinal:  Positive for abdominal pain (epigastric) and diarrhea (chronic loose stools). Negative for constipation, nausea and vomiting.   Endocrine: Negative.    Genitourinary:  Negative for difficulty urinating, dysuria, frequency, hematuria and urgency.   Musculoskeletal: Negative.    Skin: Negative.    Allergic/Immunologic: Negative.    Neurological:  Positive for weakness (generalized).   Psychiatric/Behavioral: Negative.         Past Medical and Surgical History:   Past Medical History:   Diagnosis Date    High cholesterol        Past Surgical History:   Procedure Laterality Date    CHOLECYSTECTOMY  11/2023    HYSTERECTOMY      LUNG BIOPSY Right 01/2023       Meds/Allergies:  Prior to Admission medications    Medication Sig Start Date End Date Taking? Authorizing Provider   ascorbic acid (VITAMIN C) 250 mg tablet Take 500 mg by mouth  "daily    Historical Provider, MD   atorvastatin (LIPITOR) 20 mg tablet 20 mg 3/30/21   Historical Provider, MD   atovaquone (MEPRON) 750 mg/5 mL suspension Take 1,500 mg by mouth daily Take 10 mL by mouth daily    Historical Provider, MD   methenamine hippurate (HIPREX) 1 g tablet Take 1 g by mouth 2 (two) times a day with meals    Historical Provider, MD   mycophenolate (CELLCEPT) 250 mg capsule Take 1,000 mg by mouth every 12 (twelve) hours    Historical Provider, MD   omeprazole (PriLOSEC) 20 mg delayed release capsule Take 20 mg by mouth daily    Historical Provider, MD   predniSONE 20 mg tablet Take 20 mg by mouth daily    Historical Provider, MD     I have reviewed home medications with patient personally.    Allergies:   Allergies   Allergen Reactions    Tetanus Toxoid Hives     hives          Social History:  Marital Status: /Civil Union   Occupation:   Patient Pre-hospital Living Situation: Home  Patient Pre-hospital Level of Mobility: walks  Patient Pre-hospital Diet Restrictions:   Substance Use History:   Social History     Substance and Sexual Activity   Alcohol Use Yes     Social History     Tobacco Use   Smoking Status Never   Smokeless Tobacco Never     Social History     Substance and Sexual Activity   Drug Use Never       Family History:  History reviewed. No pertinent family history.    Physical Exam:     Vitals:   Blood Pressure: 104/57 (02/17/24 1115)  Pulse: 96 (02/17/24 1115)  Temperature: 97.8 °F (36.6 °C) (02/17/24 0740)  Respirations: 18 (02/17/24 1115)  Height: 5' 3\" (160 cm) (02/17/24 0740)  SpO2: 99 % (02/17/24 1115)    Physical Exam  Vitals reviewed.   Constitutional:       General: She is not in acute distress.     Appearance: She is ill-appearing. She is not toxic-appearing.   HENT:      Head: Normocephalic and atraumatic.      Mouth/Throat:      Mouth: Mucous membranes are dry.   Cardiovascular:      Rate and Rhythm: Regular rhythm. Tachycardia present.      Heart sounds: No " murmur heard.  Pulmonary:      Effort: No respiratory distress.      Breath sounds: No stridor. Rhonchi present. No wheezing or rales.      Comments: Saturating on 4L NC at 96%.   Abdominal:      General: Bowel sounds are normal. There is no distension.      Palpations: Abdomen is soft. There is no mass.      Tenderness: There is abdominal tenderness (epigastric tenderness).   Musculoskeletal:      Right lower leg: No edema.      Left lower leg: No edema.   Skin:     General: Skin is warm and dry.   Neurological:      Mental Status: She is alert.   Psychiatric:         Mood and Affect: Mood normal.         Behavior: Behavior normal.          Additional Data:     Lab Results:  Results from last 7 days   Lab Units 02/17/24  0753   WBC Thousand/uL 9.41   HEMOGLOBIN g/dL 13.1   HEMATOCRIT % 41.3   PLATELETS Thousands/uL 112*   BANDS PCT % 8   LYMPHO PCT % 1*   MONO PCT % 2*   EOS PCT % 0     Results from last 7 days   Lab Units 02/17/24  0753   SODIUM mmol/L 132*   POTASSIUM mmol/L 3.6   CHLORIDE mmol/L 95*   CO2 mmol/L 28   BUN mg/dL 20   CREATININE mg/dL 0.81   ANION GAP mmol/L 9   CALCIUM mg/dL 8.1*   ALBUMIN g/dL 3.3*   TOTAL BILIRUBIN mg/dL 1.30*   ALK PHOS U/L 131*   ALT U/L 50   AST U/L 38   GLUCOSE RANDOM mg/dL 113     Results from last 7 days   Lab Units 02/17/24  0753   INR  0.92             Results from last 7 days   Lab Units 02/17/24  1052 02/17/24  0753   LACTIC ACID mmol/L 1.1 2.3*   PROCALCITONIN ng/ml  --  0.32*       Lines/Drains:  Invasive Devices       Peripheral Intravenous Line  Duration             Peripheral IV 02/17/24 Left Antecubital <1 day                        Imaging: Reviewed radiology reports from this admission including: CTA chest PE study  PE Study with CT Abdomen and Pelvis with contrast   Final Result by Laura Poon MD (02/17 0854)   No pulmonary embolism identified.   Unchanged groundglass opacities and interstitial marking thickening with bibasal predominance. Appearance  favors fibrosis but acute process cannot be completely excluded.   Featureless appearance, wall thickening and under distention of the transverse and left colon including rectum. Correlate to exclude history or symptoms of colitis. Ulcerative colitis is considered but usually presents at earlier age, correlate with    history.   Chronic findings, as per the body of the report.                           Workstation performed: [id number]             EKG and Other Studies Reviewed on Admission:   EKG: Sinus Tachycardia. .    ** Please Note: This note has been constructed using a voice recognition system. **

## 2024-02-17 NOTE — ASSESSMENT & PLAN NOTE
POA with complaints of generalized weakness suspect secondary to suspected bacterial pneumonia   Given 1 dose of rocephin and doxy in the ED, will continue  PT/OT consult

## 2024-02-17 NOTE — ASSESSMENT & PLAN NOTE
POA with complaints of SOB and left sided chest pain. Recently admitted for COVID infection and sepsis. During previous admission, was requiring 2L NC. Home o2 eval done prior to discharge showed patient requiring no o2 at rest and 2L NC with ambulation. The last 3 days, patient has been using o2 more. Increased pain with movement.   CTA chest PE study: Unchanged groundglass opacities and interstitial marking thickening with bibasal predominance. Appearance favors fibrosis but acute process cannot be completely excluded. No pulmonary embolism  Will treat as active infection, start on IV rocephin  Incentive spirometry, supportive measures  Keep o2 saturations >89% and wean off o2 as able.   Will consult pulmonology

## 2024-02-17 NOTE — PLAN OF CARE
Problem: PHYSICAL THERAPY ADULT  Goal: Performs mobility at highest level of function for planned discharge setting.  See evaluation for individualized goals.  Description: Treatment/Interventions: ADL retraining, Functional transfer training, LE strengthening/ROM, Elevations, Therapeutic exercise, Endurance training, Patient/family training, Equipment eval/education, Bed mobility, Gait training, Compensatory technique education, Spoke to nursing, Spoke to case management, OT  Equipment Recommended:  (walker-pt has)       See flowsheet documentation for full assessment, interventions and recommendations.  Note: Prognosis: Good  Problem List: Decreased strength, Decreased endurance, Impaired balance, Decreased mobility, Decreased safety awareness, Pain  Assessment: Pt is a 78 y.o. female seen for PT evaluation s/p admission to [facility] on 2/17/2024 with Acute respiratory failure (HCC).  Order placed for PT services.  Upon evaluation: Pt is presenting with impaired functional mobility due to pain, decreased strength, decreased endurance, impaired balance, gait deviations, decreased safety awareness, impaired judgment, and fall risk requiring  minimal assistance for bed mobility, steadying to minimal assistance for transfers, and minimal assistance for ambulation with out AD . Pt's clinical presentation is currently unpredictable given the functional mobility deficits above, especially weakness, decreased endurance, impaired balance, gait deviations, pain, decreased activity tolerance, decreased functional mobility tolerance, decreased safety awareness, and SOB upon exertion, coupled with fall risks as indicated by AM-PAC 6-Clicks: 17/24 as well as hx of falls, impaired balance, polypharmacy, and decreased safety awareness and combined with medical complications of pain impacting overall mobility status, abnormal sodium values, increased O2 needs from baseline, readmission to hospital, need for  input for mobility technique/safety, and acute respiratory failure, PNA, c/o chest pain (troponin normal range), Covid-19 .  Pt's PMHx and comorbidities that may affect physical performance and progress include:  thymus cancer, anemia, recurrent UTIs, recent Covid-19 . Personal factors affecting pt at time of IE include: inaccessible home environment, step(s) to enter environment, advanced age, inability to perform IADLs, inability to perform ADLs, inability to navigate level surfaces without external assistance, inability to ambulate household distances, and recent fall(s)/fall history. Pt will benefit from continued skilled PT services to address deficits as defined above and to maximize level of functional mobility to facilitate return toward PLOF and improved QOL. From PT/mobility standpoint, recommendation at time of d/c would be Level II (Moderate Resource Intensity, with family and/or caregiver support, and with walker in order to reduce fall risk and maximize pt's functional independence and consistency with mobility. Recommend trial with walker next 1-2 sessions and ther ex next 1-2 sessions.  Barriers to Discharge: Inaccessible home environment (limited activity tolerance, fatigues easily)  Barriers to Discharge Comments: recommend increased support from spouse uponr eturn to home and use of RW  Rehab Resource Intensity Level, PT: II (Moderate Resource Intensity)    See flowsheet documentation for full assessment.

## 2024-02-17 NOTE — ED NOTES
Pt on with [facility] at this time for admission assessment     Almita Dawson RN  02/17/24 1200

## 2024-02-17 NOTE — ASSESSMENT & PLAN NOTE
Recently admitted with COVID-19 infection tested positive on 2/4, still testing positive from previous infection. Completed 10 days of isolation per COVID isolation precautions  Will hold further treatment for COVID and treat for suspected bacterial pneumonia

## 2024-02-17 NOTE — ASSESSMENT & PLAN NOTE
Presents to ED with left sided chest pain and increasing shortness of breath worsening over the last 3 days. Worse with movement.   EKG: sinus tachycardia, no ischemic changes.   CTA chest PE study: Unchanged groundglass opacities and interstitial marking thickening with bibasal predominance. Appearance favors fibrosis but acute process cannot be completely excluded.   MITESH score = 2  Troponin 0Hr 39; continue to trend with serial EKGs  Lipid panel,TSH, & Hgb A1c ordered  Hold NSAIDS  Pain management  Telemetry  Cardiac diet; low sodium

## 2024-02-17 NOTE — ED PROVIDER NOTES
History  Chief Complaint   Patient presents with    Chest Pain     Pt arrives via ems from home.  Pt wears home O2 PRN at 2 liters but reports over past 2 weeks since covid + she has needed it more.  Pt reports left sided chest pain increasing over past 3 days with SOB. Pt received 324mg of ASA via ems.      Patient recently diagnosed with COVID 2 weeks ago.  Is on 2 L nasal cannula.  Now complaining of increasing shortness of breath.  Complaining of left-sided chest pain/left upper quadrant pain over the last 3 days.  No nausea or vomiting.  No change with eating.  Has a cough.  No fevers or chills.  No dysuria or frequency.  Patient states pain seems to get worse with movement.  No change with deep breath.  Has had her gallbladder removed already.      History provided by:  Patient   used: No    Chest Pain  Pain location:  L chest  Pain quality: aching    Pain radiates to:  Epigastrium  Pain radiates to the back: no    Pain severity:  Mild  Onset quality:  Gradual  Duration:  3 days  Timing:  Constant  Progression:  Worsening  Chronicity:  New  Context: at rest    Context: no drug use and no movement    Relieved by:  Nothing  Worsened by:  Movement  Ineffective treatments:  None tried  Associated symptoms: abdominal pain, cough and shortness of breath    Associated symptoms: no altered mental status, no anxiety, no dizziness, no dysphagia, no fever, no headache, no nausea, no orthopnea, no palpitations and not vomiting        Prior to Admission Medications   Prescriptions Last Dose Informant Patient Reported? Taking?   ascorbic acid (VITAMIN C) 250 mg tablet   Yes No   Sig: Take 500 mg by mouth daily   atorvastatin (LIPITOR) 20 mg tablet   Yes No   Si mg   atovaquone (MEPRON) 750 mg/5 mL suspension   Yes No   Sig: Take 1,500 mg by mouth daily Take 10 mL by mouth daily   methenamine hippurate (HIPREX) 1 g tablet   Yes No   Sig: Take 1 g by mouth 2 (two) times a day with meals    mycophenolate (CELLCEPT) 250 mg capsule   Yes No   Sig: Take 1,000 mg by mouth every 12 (twelve) hours   omeprazole (PriLOSEC) 20 mg delayed release capsule   Yes No   Sig: Take 20 mg by mouth daily   predniSONE 20 mg tablet   Yes No   Sig: Take 20 mg by mouth daily      Facility-Administered Medications: None       Past Medical History:   Diagnosis Date    High cholesterol        Past Surgical History:   Procedure Laterality Date    CHOLECYSTECTOMY  11/2023    HYSTERECTOMY      LUNG BIOPSY Right 01/2023       History reviewed. No pertinent family history.  I have reviewed and agree with the history as documented.    E-Cigarette/Vaping    E-Cigarette Use Never User      E-Cigarette/Vaping Substances     Social History     Tobacco Use    Smoking status: Never    Smokeless tobacco: Never   Vaping Use    Vaping status: Never Used   Substance Use Topics    Alcohol use: Yes    Drug use: Never       Review of Systems   Constitutional:  Negative for chills and fever.   HENT:  Negative for ear pain, hearing loss, sore throat, trouble swallowing and voice change.    Eyes:  Negative for pain and discharge.   Respiratory:  Positive for cough and shortness of breath. Negative for wheezing.    Cardiovascular:  Positive for chest pain. Negative for palpitations and orthopnea.   Gastrointestinal:  Positive for abdominal pain. Negative for blood in stool, constipation, diarrhea, nausea and vomiting.   Genitourinary:  Negative for dysuria, flank pain, frequency and hematuria.   Musculoskeletal:  Negative for joint swelling, neck pain and neck stiffness.   Skin:  Negative for rash and wound.   Neurological:  Negative for dizziness, seizures, syncope, facial asymmetry and headaches.   Psychiatric/Behavioral:  Negative for hallucinations, self-injury and suicidal ideas.    All other systems reviewed and are negative.      Physical Exam  Physical Exam  Vitals and nursing note reviewed.   Constitutional:       General: She is not in  acute distress.     Appearance: She is well-developed.   HENT:      Head: Normocephalic and atraumatic.      Right Ear: External ear normal.      Left Ear: External ear normal.   Eyes:      General: No scleral icterus.        Right eye: No discharge.         Left eye: No discharge.      Extraocular Movements: Extraocular movements intact.      Conjunctiva/sclera: Conjunctivae normal.   Cardiovascular:      Rate and Rhythm: Normal rate and regular rhythm.      Heart sounds: Normal heart sounds. No murmur heard.  Pulmonary:      Effort: Pulmonary effort is normal.      Breath sounds: Normal breath sounds. No wheezing or rales.   Abdominal:      General: Bowel sounds are normal. There is no distension.      Palpations: Abdomen is soft.      Tenderness: There is no abdominal tenderness. There is no guarding or rebound.   Musculoskeletal:         General: No deformity. Normal range of motion.      Cervical back: Normal range of motion and neck supple.      Right lower leg: Edema present.      Left lower leg: Edema present.   Skin:     General: Skin is warm and dry.      Findings: No rash.   Neurological:      General: No focal deficit present.      Mental Status: She is alert and oriented to person, place, and time.      Cranial Nerves: No cranial nerve deficit.   Psychiatric:         Mood and Affect: Mood normal.         Behavior: Behavior normal.         Thought Content: Thought content normal.         Judgment: Judgment normal.         Vital Signs  ED Triage Vitals   Temperature Pulse Respirations Blood Pressure SpO2   02/17/24 0740 02/17/24 0740 02/17/24 0740 02/17/24 0742 02/17/24 0742   97.8 °F (36.6 °C) (!) 118 20 103/68 (!) 86 %      Temp src Heart Rate Source Patient Position - Orthostatic VS BP Location FiO2 (%)   -- 02/17/24 0740 -- -- --    Monitor         Pain Score       02/17/24 0740       8           Vitals:    02/17/24 0740 02/17/24 0742   BP:  103/68   Pulse: (!) 118          Visual Acuity      ED  Medications  Medications   sodium chloride 0.9 % bolus 2,000 mL (2,000 mL Intravenous New Bag 2/17/24 0843)   cefTRIAXone (ROCEPHIN) IVPB (premix in dextrose) 1,000 mg 50 mL (has no administration in time range)   doxycycline (VIBRAMYCIN) 100 mg in sodium chloride 0.9 % 100 mL IVPB (has no administration in time range)   iohexol (OMNIPAQUE) 350 MG/ML injection (MULTI-DOSE) 100 mL (100 mL Intravenous Given 2/17/24 0809)       Diagnostic Studies  Results Reviewed       Procedure Component Value Units Date/Time    Manual Differential(PHLEBS Do Not Order) [[id number]]  (Abnormal) Collected: 02/17/24 0753    Lab Status: Final result Specimen: Blood from Foot, Left Updated: 02/17/24 0904     Segmented % 88 %      Bands % 8 %      Lymphocytes % 1 %      Monocytes % 2 %      Eosinophils, % 0 %      Basophils % 0 %      Myelocytes % 1 %      Absolute Neutrophils 9.03 Thousand/uL      Lymphocytes Absolute 0.09 Thousand/uL      Monocytes Absolute 0.19 Thousand/uL      Eosinophils Absolute 0.00 Thousand/uL      Basophils Absolute 0.00 Thousand/uL      Total Counted --     RBC Morphology Present     Platelet Estimate Decreased     Anisocytosis Present     Morris Cells Present     Microcytes Present     Ovalocytes Present     Stomatocytes Present    FLU/RSV/COVID - if FLU/RSV clinically relevant [[id number]]  (Abnormal) Collected: 02/17/24 0753    Lab Status: Final result Specimen: Nares from Nasopharyngeal Swab Updated: 02/17/24 0841     SARS-CoV-2 Positive     INFLUENZA A PCR Negative     INFLUENZA B PCR Negative     RSV PCR Negative    Narrative:      FOR PEDIATRIC PATIENTS - copy/paste COVID Guidelines URL to browser: [email or web address]    SARS-CoV-2 assay is a Nucleic Acid Amplification assay intended for the  qualitative detection of nucleic acid from SARS-CoV-2 in nasopharyngeal  swabs. Results are for the presumptive identification of SARS-CoV-2 RNA.    Positive results  are indicative of infection with SARS-CoV-2, the virus  causing COVID-19, but do not rule out bacterial infection or co-infection  with other viruses. Laboratories within the [place] and its  territories are required to report all positive results to the appropriate  public health authorities. Negative results do not preclude SARS-CoV-2  infection and should not be used as the sole basis for treatment or other  patient management decisions. Negative results must be combined with  clinical observations, patient history, and epidemiological information.  This test has not been FDA cleared or approved.    This test has been authorized by FDA under an Emergency Use Authorization  (EUA). This test is only authorized for the duration of time the  declaration that circumstances exist justifying the authorization of the  emergency use of an in vitro diagnostic tests for detection of SARS-CoV-2  virus and/or diagnosis of COVID-19 infection under section 564(b)(1) of  the Act, 21 U.S.C. 360bbb-3(b)(1), unless the authorization is terminated  or revoked sooner. The test has been validated but independent review by FDA  and CLIA is pending.    Test performed using [employer] GeneXpert: This RT-PCR assay targets N2,  a region unique to SARS-CoV-2. A conserved region in the E-gene was chosen  for pan-Sarbecovirus detection which includes SARS-CoV-2.    According to CMS-2020-01-R, this platform meets the definition of high-throughput technology.    Lactic acid, plasma (w/reflex if result > 2.0) [[id number]]  (Abnormal) Collected: 02/17/24 0753    Lab Status: Final result Specimen: Blood from Arm, Left Updated: 02/17/24 0838     LACTIC ACID 2.3 mmol/L     Narrative:      Result may be elevated if tourniquet was used during collection.    Lactic acid 2 Hours [[id number]]     Lab Status: No result Specimen: Blood     B-Type Natriuretic Peptide(BNP) [[id number]]  (Normal) Collected: 02/17/24 0753    Lab Status: Final result Specimen:  Blood from Foot, Left Updated: 02/17/24 0833     BNP 52 pg/mL     HS Troponin 0hr (reflex protocol) [[id number]]  (Normal) Collected: 02/17/24 0753    Lab Status: Final result Specimen: Blood from Arm, Left Updated: 02/17/24 0829     hs TnI 0hr 39 ng/L     HS Troponin I 4hr [[id number]]     Lab Status: No result Specimen: Blood     HS Troponin I 2hr [[id number]]     Lab Status: No result Specimen: Blood     Comprehensive metabolic panel [[id number]]  (Abnormal) Collected: 02/17/24 0753    Lab Status: Final result Specimen: Blood from Arm, Left Updated: 02/17/24 0828     Sodium 132 mmol/L      Potassium 3.6 mmol/L      Chloride 95 mmol/L      CO2 28 mmol/L      ANION GAP 9 mmol/L      BUN 20 mg/dL      Creatinine 0.81 mg/dL      Glucose 113 mg/dL      Calcium 8.1 mg/dL      Corrected Calcium 8.7 mg/dL      AST 38 U/L      ALT 50 U/L      Alkaline Phosphatase 131 U/L      Total Protein 5.3 g/dL      Albumin 3.3 g/dL      Total Bilirubin 1.30 mg/dL      eGFR 68 ml/min/1.73sq m     Narrative:      National Kidney Disease Foundation guidelines for Chronic Kidney Disease (CKD):     Stage 1 with normal or high GFR (GFR > 90 mL/min/1.73 square meters)    Stage 2 Mild CKD (GFR = 60-89 mL/min/1.73 square meters)    Stage 3A Moderate CKD (GFR = 45-59 mL/min/1.73 square meters)    Stage 3B Moderate CKD (GFR = 30-44 mL/min/1.73 square meters)    Stage 4 Severe CKD (GFR = 15-29 mL/min/1.73 square meters)    Stage 5 End Stage CKD (GFR <15 mL/min/1.73 square meters)  Note: GFR calculation is accurate only with a steady state creatinine    Lipase [[id number]]  (Normal) Collected: 02/17/24 0753    Lab Status: Final result Specimen: Blood from Arm, Left Updated: 02/17/24 0828     Lipase 29 u/L     Protime-INR [[id number]]  (Normal) Collected: 02/17/24 0753    Lab Status: Final result Specimen: Blood from Arm, Left Updated: 02/17/24 0823     Protime 12.6 seconds      INR 0.92    APTT [[id number]]  (Normal) Collected: 02/17/24 0753    Lab  Status: Final result Specimen: Blood from Arm, Left Updated: 02/17/24 0823     PTT 25 seconds     CBC and differential [[id number]]  (Abnormal) Collected: 02/17/24 0753    Lab Status: Final result Specimen: Blood from Foot, Left Updated: 02/17/24 0812     WBC 9.41 Thousand/uL      RBC 4.89 Million/uL      Hemoglobin 13.1 g/dL      Hematocrit 41.3 %      MCV 85 fL      MCH 26.8 pg      MCHC 31.7 g/dL      RDW 15.6 %      MPV 10.5 fL      Platelets 112 Thousands/uL     Blood culture #1 [[id number]] Collected: 02/17/24 0753    Lab Status: In process Specimen: Blood from Hand, Right Updated: 02/17/24 0758    Blood culture #2 [[id number]] Collected: 02/17/24 0753    Lab Status: In process Specimen: Blood from Arm, Left Updated: 02/17/24 0758    UA w Reflex to Microscopic w Reflex to Culture [[id number]]     Lab Status: No result Specimen: Urine                    PE Study with CT Abdomen and Pelvis with contrast   Final Result by Laura Poon MD (02/17 0854)   No pulmonary embolism identified.   Unchanged groundglass opacities and interstitial marking thickening with bibasal predominance. Appearance favors fibrosis but acute process cannot be completely excluded.   Featureless appearance, wall thickening and under distention of the transverse and left colon including rectum. Correlate to exclude history or symptoms of colitis. Ulcerative colitis is considered but usually presents at earlier age, correlate with    history.   Chronic findings, as per the body of the report.                           Workstation performed: [id number]                    Procedures  ECG 12 Lead Documentation Only    Date/Time: 2/17/2024 7:45 AM    Performed by: Melvin Dominique MD  Authorized by: Melvin Dominique MD    Patient location:  ED  Previous ECG:     Previous ECG:  Compared to current    Similarity:  No change  Rate:     ECG rate:  115  Rhythm:     Rhythm: sinus rhythm    Ectopy:     Ectopy: PAC    QRS:     QRS axis:  Normal            ED Course  ED Course as of 02/17/24 0913   Sat Feb 17, 2024   0830 hs TnI 0hr: 39  Troponins were in the 30s 2 weeks ago.             HEART Risk Score      Flowsheet Row Most Recent Value   Heart Score Risk Calculator    History 0 Filed at: 02/17/2024 0815   ECG 0 Filed at: 02/17/2024 0815   Age 2 Filed at: 02/17/2024 0815   Risk Factors 1 Filed at: 02/17/2024 0815   Troponin 2 Filed at: 02/17/2024 0815   HEART Score 5 Filed at: 02/17/2024 0815                          SBIRT 20yo+      Flowsheet Row Most Recent Value   Initial Alcohol Screen:  AUDIT-C     1. How often do you have a drink containing alcohol? 0 Filed at: 02/17/2024 0832   2. How many drinks containing alcohol do you have on a typical day you are drinking?  0 Filed at: 02/17/2024 0832   3b. FEMALE Any Age, or MALE 65+: How often do you have 4 or more drinks on one occassion? 0 Filed at: 02/17/2024 0832   Audit-C Score 0 Filed at: 02/17/2024 0832   BRIELLE: How many times in the past year have you...    Used an illegal drug or used a prescription medication for non-medical reasons? Never Filed at: 02/17/2024 0832                      Medical Decision Making  Amount and/or Complexity of Data Reviewed  Labs: ordered. Decision-making details documented in ED Course.  Radiology: ordered. Decision-making details documented in ED Course.  ECG/medicine tests: ordered and independent interpretation performed. Decision-making details documented in ED Course.  Discussion of management or test interpretation with external provider(s): Differential diagnosis includes but not limited to STEMI, NSTEMI, PE, pneumonia, pneumothorax, musculoskeletal chest pain, costochondritis, gastritis, cholelithiasis, contusion, strain    Risk  Prescription drug management.  Decision regarding hospitalization.             Disposition  Final diagnoses:   Hypoxia     Time reflects when diagnosis was documented in both MDM as applicable and the Disposition within this note        Time User Action Codes Description Comment    2/17/2024  9:03 AM Melvin Dominique Add [R09.02] Hypoxia           ED Disposition       ED Disposition   Admit    Condition   Stable    Date/Time   Sat Feb 17, 2024 0913    Comment   Case was discussed with Dr. Archer and the patient's admission status was agreed to be Admission Status: inpatient status to the service of Dr. Archer.               Follow-up Information    None         Patient's Medications   Discharge Prescriptions    No medications on file       No discharge procedures on file.    PDMP Review       None            ED Provider  Electronically Signed by             Melvin Dominique MD  02/17/24 0937

## 2024-02-17 NOTE — PHYSICAL THERAPY NOTE
"   PHYSICAL THERAPY EVALUATION  NAME:  Nina Tran  DATE: 02/17/24    AGE:   78 y.o.  Mrn:   [id number]  ADMIT DX:  Chest pain [R07.9]    Past Medical History:   Diagnosis Date    High cholesterol      Length Of Stay: 0  Performed at least 2 patient identifiers during session: Name and Birthday  PHYSICAL THERAPY EVALUATION :    02/17/24 1317   PT Last Visit   PT Visit Date 02/17/24   Note Type   Note type Evaluation   Pain Assessment   Pain Assessment Tool 0-10   Pain Score 6   Pain Location/Orientation Orientation: Upper;Location: Abdomen   Restrictions/Precautions   Other Precautions Fall Risk;Multiple lines;Telemetry;O2  (4 lpm at rest upon arrival to room)   Home Living   Type of Home House  (1 small RYAN)   Home Layout One level;Performs ADLs on one level;Able to live on main level with bedroom/bathroom   Bathroom Shower/Tub Walk-in shower   Bathroom Toilet Raised   Bathroom Equipment Shower chair;Grab bars in shower;Grab bars around toilet   Home Equipment Walker;Cane  (wasn't using an AD PTA.)   Additional Comments Reports livign in a 1SH with 1 small RYAN and not using an AD PTA.   Prior Function   Level of [place] Independent with ADLs;Independent with functional mobility;Independent with IADLS   Lives With Spouse   Receives Help From Family   IADLs Independent with driving;Independent with meal prep;Independent with medication management  (hasn't drivin for a while)   Falls in the last 6 months 1 to 4   Comments Reports being independent with mobility, ADLs and IADLs.   General   Additional Pertinent History Recently admitted to this campus 2/4/24 to 2/8/24, discharged to home with [facility], room air at rest and 2 lpm with exertion.   Cognition   Orientation Level Oriented X4   Following Commands Follows one step commands without difficulty   Subjective   Subjective \"My  can help me.\"   RLE Assessment   RLE Assessment WFL  (3+/5)   LLE Assessment   LLE Assessment WFL  (3+/5)   Bed Mobility   Supine " to Sit 4  Minimal assistance   Additional items Assist x 1;Increased time required;Verbal cues   Additional Comments HOB elevated > 30 degrees. minAx1 to ahcieve sitting at EOB, verbal cues for technique. pt reporting fatigue   Transfers   Sit to Stand   (steadying assistance)   Additional items Assist x 1;Increased time required;Verbal cues   Stand to Sit   (steadying assistance)   Additional items Assist x 1;Increased time required;Verbal cues   Stand pivot 4  Minimal assistance   Additional items Assist x 1;Increased time required;Verbal cues   Additional Comments no AD. sit<>stand without AD wiht steadying assistance, pt guarded, reaching for external support. spt without AD with minAx1 with pt reaching for support, manual cues for wt shifting.   Ambulation/Elevation   Gait pattern Wide ANDRA;Improper Weight shift;Decreased foot clearance;Short stride;Excessively slow   Gait Assistance 4  Minimal assist   Additional items Assist x 1;Verbal cues   Assistive Device None   Distance ambulated 5'x1 forward and backward with miNAx1 wiht manual cues for wt shifting and verbal cues for inc steplength. pt gurded with short shuffled steps   Balance   Static Sitting Fair   Dynamic Sitting Fair -   Static Standing Fair -   Dynamic Standing Poor +   Ambulatory Poor +   Endurance Deficit   Endurance Deficit Yes   Endurance Deficit Description Spo2 at rest on 4 lpm 100%. HR low 100s. trialed on 2 lpm. SpO2 on 2 lpm 96% at rest. with activity SpO2 93%. HR to 110s.   Activity Tolerance   Activity Tolerance Patient limited by fatigue;Patient limited by pain   Nurse Made Aware Almita SILVA   Assessment   Prognosis Good   Problem List Decreased strength;Decreased endurance;Impaired balance;Decreased mobility;Decreased safety awareness;Pain   Barriers to Discharge Inaccessible home environment  (limited activity tolerance, fatigues easily)   Barriers to Discharge Comments recommend increased support from spouse uponr eturn to home and  "use of RW   Goals   Patient Goals \"Go home\"   [facility] Expiration Date 03/02/24   PT Treatment Day 1   Plan   Treatment/Interventions ADL retraining;Functional transfer training;LE strengthening/ROM;Elevations;Therapeutic exercise;Endurance training;Patient/family training;Equipment eval/education;Bed mobility;Gait training;Compensatory technique education;Spoke to nursing;Spoke to case management;OT   PT Frequency 3-5x/wk   Discharge Recommendation   Rehab Resource Intensity Level, PT II (Moderate Resource Intensity)   Equipment Recommended   (walker-pt has)   AM-PAC Basic Mobility Inpatient   Turning in Flat Bed Without Bedrails 3   Lying on Back to Sitting on Edge of Flat Bed Without Bedrails 3   Moving Bed to Chair 3   Standing Up From Chair Using Arms 3   Walk in Room 3   Climb 3-5 Stairs With Railing 2   Basic Mobility Inpatient Raw Score 17   Basic Mobility Standardized Score 39.67   Highest Level Of Mobility   -HLM Goal 5: Stand one or more mins   JH-HLM Achieved 7: Walk 25 feet or more   Additional Treatment Session   Start Time 1339   End Time 1349   Treatment Assessment Pt tolerated session fairly. She prefers to return to home upon discharge with spouse assisting patient prn. verbalized understanding of use of RW. She was able to ambulate and transfer with decreased asisstance wiht use of RW comapred to no AD. She fatigues easily. She is limited by decreased strength, balance, endurance. She will continue to beenfit from PT services to maximize LOF.   Equipment Use initiated use of RW. min cues for hand placement for safety. sit<>stand with steadying assistance wiht inc time. spt with RW with steadying assistance. min cues for use of RW throughout turning for safety and balance. ambulated 40'x1 with RW with steadying assistance with slow santiago, decreased step length and foot clearance with min cues for increased foot clearance and step length. min cues for use of RW throughout. discussed use of RW upon " discharge and assistance from spouse. pt verbalized understanding. SpO2 on 2 lpm after ambulation 87%, min ECHEVERRIA. min cues for pursed lip breathing. Spo2 increased to 88% after 1', O2 increased to 3 then 4 lpm to increase to 93%. SpO2 on 4 lpm then 98%. decreased to 2 lpm and Spo2 at 96-97% on 2 lpm. RN aware of O2.   End of Consult   Patient Position at End of Consult Supine;All needs within reach         (Please find full objective findings from PT assessment regarding body systems outlined above).     Assessment: Pt is a 78 y.o. female seen for PT evaluation s/p admission to [facility] on 2/17/2024 with Acute respiratory failure (HCC).  Order placed for PT services.  Upon evaluation: Pt is presenting with impaired functional mobility due to pain, decreased strength, decreased endurance, impaired balance, gait deviations, decreased safety awareness, impaired judgment, and fall risk requiring  minimal assistance for bed mobility, steadying to minimal assistance for transfers, and minimal assistance for ambulation with out AD . Pt's clinical presentation is currently unpredictable given the functional mobility deficits above, especially weakness, decreased endurance, impaired balance, gait deviations, pain, decreased activity tolerance, decreased functional mobility tolerance, decreased safety awareness, and SOB upon exertion, coupled with fall risks as indicated by AM-PAC 6-Clicks: 17/24 as well as hx of falls, impaired balance, polypharmacy, and decreased safety awareness and combined with medical complications of pain impacting overall mobility status, abnormal sodium values, increased O2 needs from baseline, readmission to hospital, need for input for mobility technique/safety, and acute respiratory failure, PNA, c/o chest pain (troponin normal range), Covid-19 .  Pt's PMHx and comorbidities that may affect physical performance and progress include:  thymus cancer, anemia, recurrent UTIs, recent  Covid-19 . Personal factors affecting pt at time of IE include: inaccessible home environment, step(s) to enter environment, advanced age, inability to perform IADLs, inability to perform ADLs, inability to navigate level surfaces without external assistance, inability to ambulate household distances, and recent fall(s)/fall history. Pt will benefit from continued skilled PT services to address deficits as defined above and to maximize level of functional mobility to facilitate return toward PLOF and improved QOL. From PT/mobility standpoint, recommendation at time of d/c would be Level II (Moderate Resource Intensity, with family and/or caregiver support, and with walker in order to reduce fall risk and maximize pt's functional independence and consistency with mobility. Recommend trial with walker next 1-2 sessions and ther ex next 1-2 sessions.       The patient's AM-PAC Basic Mobility Inpatient Short Form Raw Score is 17. A Raw score of greater than 16 suggests the patient may benefit from discharge to home. Please also refer to the recommendation of the Physical Therapist for safe discharge planning.       Goals: Pt will: Perform bed mobility tasks with modified Independent to reposition in bed and prepare for transfers. Pt will perform transfers with modified Independent to decrease burden of care, decrease risk for falls, and improve activity tolerance and prepare for ambulation. Pt will ambulate with LRAD for >/= 150' with  modified Independent  to decrease burden of care, decrease risk for falls, and improve activity tolerance and to access home environment. Pt will complete 1 step with LRAD and >/= 4 steps with unilateral handrail with stand by assistance to decrease burden of care, return to home with RYAN, decrease risk for falls, and improve activity tolerance. Pt will participate in objective balance assessment to determine baseline fall risk. Pt will participate in SSWS assessment to determine level of  mobility. Pt will increase B LE strength >/= 1/2 MMT grade to facilitate functional mobility.      Bonny Perez, PT,DPT

## 2024-02-18 PROBLEM — K21.9 GERD (GASTROESOPHAGEAL REFLUX DISEASE): Status: ACTIVE | Noted: 2024-02-18

## 2024-02-18 LAB
ALBUMIN SERPL BCP-MCNC: 2.6 G/DL (ref 3.5–5)
ALP SERPL-CCNC: 94 U/L (ref 34–104)
ALT SERPL W P-5'-P-CCNC: 40 U/L (ref 7–52)
ANION GAP SERPL CALCULATED.3IONS-SCNC: 5 MMOL/L
ANISOCYTOSIS BLD QL SMEAR: PRESENT
AST SERPL W P-5'-P-CCNC: 31 U/L (ref 13–39)
BASOPHILS # BLD MANUAL: 0 THOUSAND/UL (ref 0–0.1)
BASOPHILS NFR MAR MANUAL: 0 % (ref 0–1)
BILIRUB SERPL-MCNC: 0.83 MG/DL (ref 0.2–1)
BUN SERPL-MCNC: 16 MG/DL (ref 5–25)
BURR CELLS BLD QL SMEAR: PRESENT
CALCIUM ALBUM COR SERPL-MCNC: 8 MG/DL (ref 8.3–10.1)
CALCIUM SERPL-MCNC: 6.9 MG/DL (ref 8.4–10.2)
CHLORIDE SERPL-SCNC: 102 MMOL/L (ref 96–108)
CO2 SERPL-SCNC: 26 MMOL/L (ref 21–32)
CREAT SERPL-MCNC: 0.65 MG/DL (ref 0.6–1.3)
EOSINOPHIL # BLD MANUAL: 0 THOUSAND/UL (ref 0–0.4)
EOSINOPHIL NFR BLD MANUAL: 0 % (ref 0–6)
ERYTHROCYTE [DISTWIDTH] IN BLOOD BY AUTOMATED COUNT: 15.8 % (ref 11.6–15.1)
GFR SERPL CREATININE-BSD FRML MDRD: 84 ML/MIN/1.73SQ M
GLUCOSE SERPL-MCNC: 88 MG/DL (ref 65–140)
HCT VFR BLD AUTO: 33.4 % (ref 34.8–46.1)
HGB BLD-MCNC: 10.4 G/DL (ref 11.5–15.4)
LYMPHOCYTES # BLD AUTO: 0.13 THOUSAND/UL (ref 0.6–4.47)
LYMPHOCYTES # BLD AUTO: 2 % (ref 14–44)
MAGNESIUM SERPL-MCNC: 1.7 MG/DL (ref 1.9–2.7)
MCH RBC QN AUTO: 27.2 PG (ref 26.8–34.3)
MCHC RBC AUTO-ENTMCNC: 31.1 G/DL (ref 31.4–37.4)
MCV RBC AUTO: 87 FL (ref 82–98)
MONOCYTES # BLD AUTO: 0.13 THOUSAND/UL (ref 0–1.22)
MONOCYTES NFR BLD: 2 % (ref 4–12)
NEUTROPHILS # BLD MANUAL: 6.17 THOUSAND/UL (ref 1.85–7.62)
NEUTS BAND NFR BLD MANUAL: 18 % (ref 0–8)
NEUTS SEG NFR BLD AUTO: 78 % (ref 43–75)
OVALOCYTES BLD QL SMEAR: PRESENT
PLATELET # BLD AUTO: 97 THOUSANDS/UL (ref 149–390)
PLATELET BLD QL SMEAR: ABNORMAL
PMV BLD AUTO: 11.2 FL (ref 8.9–12.7)
POTASSIUM SERPL-SCNC: 3.9 MMOL/L (ref 3.5–5.3)
PROCALCITONIN SERPL-MCNC: 0.26 NG/ML
PROT SERPL-MCNC: 4.2 G/DL (ref 6.4–8.4)
RBC # BLD AUTO: 3.83 MILLION/UL (ref 3.81–5.12)
RBC MORPH BLD: PRESENT
SODIUM SERPL-SCNC: 133 MMOL/L (ref 135–147)
WBC # BLD AUTO: 6.43 THOUSAND/UL (ref 4.31–10.16)

## 2024-02-18 PROCEDURE — C9113 INJ PANTOPRAZOLE SODIUM, VIA: HCPCS

## 2024-02-18 PROCEDURE — 85027 COMPLETE CBC AUTOMATED: CPT

## 2024-02-18 PROCEDURE — 84145 PROCALCITONIN (PCT): CPT

## 2024-02-18 PROCEDURE — 80053 COMPREHEN METABOLIC PANEL: CPT

## 2024-02-18 PROCEDURE — 83036 HEMOGLOBIN GLYCOSYLATED A1C: CPT

## 2024-02-18 PROCEDURE — 99232 SBSQ HOSP IP/OBS MODERATE 35: CPT

## 2024-02-18 PROCEDURE — 97535 SELF CARE MNGMENT TRAINING: CPT

## 2024-02-18 PROCEDURE — 97167 OT EVAL HIGH COMPLEX 60 MIN: CPT

## 2024-02-18 PROCEDURE — 85007 BL SMEAR W/DIFF WBC COUNT: CPT

## 2024-02-18 PROCEDURE — 83735 ASSAY OF MAGNESIUM: CPT

## 2024-02-18 RX ORDER — SUCRALFATE 1 G/1
1 TABLET ORAL
Status: DISCONTINUED | OUTPATIENT
Start: 2024-02-18 | End: 2024-03-05 | Stop reason: HOSPADM

## 2024-02-18 RX ORDER — MAGNESIUM SULFATE HEPTAHYDRATE 40 MG/ML
2 INJECTION, SOLUTION INTRAVENOUS ONCE
Status: COMPLETED | OUTPATIENT
Start: 2024-02-18 | End: 2024-02-18

## 2024-02-18 RX ADMIN — ACETAMINOPHEN 325MG 650 MG: 325 TABLET ORAL at 08:53

## 2024-02-18 RX ADMIN — ATOVAQUONE 1500 MG: 750 SUSPENSION ORAL at 08:54

## 2024-02-18 RX ADMIN — METHENAMINE HIPPURATE 1 G: 1 TABLET ORAL at 17:49

## 2024-02-18 RX ADMIN — METHENAMINE HIPPURATE 1 G: 1 TABLET ORAL at 08:53

## 2024-02-18 RX ADMIN — PANTOPRAZOLE SODIUM 40 MG: 40 INJECTION, POWDER, FOR SOLUTION INTRAVENOUS at 21:52

## 2024-02-18 RX ADMIN — MAGNESIUM SULFATE HEPTAHYDRATE 2 G: 40 INJECTION, SOLUTION INTRAVENOUS at 08:53

## 2024-02-18 RX ADMIN — PREDNISONE 20 MG: 20 TABLET ORAL at 08:53

## 2024-02-18 RX ADMIN — SUCRALFATE 1 G: 1 TABLET ORAL at 15:13

## 2024-02-18 RX ADMIN — ACETAMINOPHEN 325MG 650 MG: 325 TABLET ORAL at 19:48

## 2024-02-18 RX ADMIN — SUCRALFATE 1 G: 1 TABLET ORAL at 21:52

## 2024-02-18 RX ADMIN — DOXYCYCLINE 100 MG: 100 INJECTION, POWDER, LYOPHILIZED, FOR SOLUTION INTRAVENOUS at 21:56

## 2024-02-18 RX ADMIN — MYCOPHENOLATE MOFETIL 1000 MG: 250 CAPSULE ORAL at 08:52

## 2024-02-18 RX ADMIN — GUAIFENESIN 1200 MG: 600 TABLET ORAL at 21:52

## 2024-02-18 RX ADMIN — CEFTRIAXONE 1000 MG: 1 INJECTION, SOLUTION INTRAVENOUS at 05:47

## 2024-02-18 RX ADMIN — ENOXAPARIN SODIUM 40 MG: 40 INJECTION SUBCUTANEOUS at 08:53

## 2024-02-18 RX ADMIN — MYCOPHENOLATE MOFETIL 1000 MG: 250 CAPSULE ORAL at 21:52

## 2024-02-18 RX ADMIN — OXYCODONE HYDROCHLORIDE AND ACETAMINOPHEN 500 MG: 500 TABLET ORAL at 08:53

## 2024-02-18 RX ADMIN — ATORVASTATIN CALCIUM 20 MG: 20 TABLET, FILM COATED ORAL at 17:49

## 2024-02-18 RX ADMIN — DOXYCYCLINE 100 MG: 100 INJECTION, POWDER, LYOPHILIZED, FOR SOLUTION INTRAVENOUS at 09:01

## 2024-02-18 RX ADMIN — PANTOPRAZOLE SODIUM 40 MG: 40 INJECTION, POWDER, FOR SOLUTION INTRAVENOUS at 08:53

## 2024-02-18 RX ADMIN — SODIUM CHLORIDE, SODIUM GLUCONATE, SODIUM ACETATE, POTASSIUM CHLORIDE, MAGNESIUM CHLORIDE, SODIUM PHOSPHATE, DIBASIC, AND POTASSIUM PHOSPHATE 100 ML/HR: .53; .5; .37; .037; .03; .012; .00082 INJECTION, SOLUTION INTRAVENOUS at 04:26

## 2024-02-18 RX ADMIN — GUAIFENESIN 1200 MG: 600 TABLET ORAL at 08:52

## 2024-02-18 NOTE — ASSESSMENT & PLAN NOTE
POA with complaints of SOB and left sided chest pain. Recently admitted for COVID infection and sepsis. During previous admission, was requiring 2L NC. Home o2 eval done prior to discharge showed patient requiring no o2 at rest and 2L NC with ambulation. The last 3 days, patient has been using o2 more. Increased pain with movement.   During previous admission, patient stated she had o2 at home she used prn, but did not chronically use o2.   In ED, patient found to be 86% when placed on 2L NC and needed o2 increased to 4L NC.   CTA chest PE study: Unchanged groundglass opacities and interstitial marking thickening with bibasal predominance. Appearance favors fibrosis but acute process cannot be completely excluded. No pulmonary embolism  Will treat as active infection, start on IV rocephin  Incentive spirometry, supportive measures  Keep o2 saturations >89% and wean off o2 as able.   Will consult pulmonology

## 2024-02-18 NOTE — ASSESSMENT & PLAN NOTE
Presented to ED with left sided chest pain and worsening SOB for 3 days.   CTA chest: Unchanged groundglass opacities and interstitial marking thickening with bibasal predominance. Appearance favors fibrosis but acute process cannot be completely excluded. No PE.   COVID/Flu/RSV: COVID positive, but positive since 2/4 during previous admission.   Urine strep and legionella negative, Sputum culture ordered  Blood Cultures pending  Trend fever curve  Initially given rocephin and doxy in the emergency department, will continue and monitor cultures and titrate therapy as indicated  Supplemental oxygen for saturations > 90%; wean as tolerated  Aspiration Precautions  Respiratory Protocol   Incentive Spirometry   Supportive Care

## 2024-02-18 NOTE — PLAN OF CARE
Problem: OCCUPATIONAL THERAPY ADULT  Goal: Performs self-care activities at highest level of function for planned discharge setting.  See evaluation for individualized goals.  Description: Treatment Interventions: ADL retraining, Functional transfer training, UE strengthening/ROM, Endurance training, Patient/family training, Equipment evaluation/education, Compensatory technique education, Continued evaluation, Cardiac education, Energy conservation, Activityengagement          See flowsheet documentation for full assessment, interventions and recommendations.   Note: Limitation: Decreased ADL status, Decreased Safe judgement during ADL, Decreased endurance, Decreased self-care trans, Decreased high-level ADLs  Prognosis: Good  Assessment: Pt is a 78 y.o. female, admitted to [facility] 2/17/2024 d/t experiencing chest pain. Dx: Acute respiratory failure. Pt with PMHx impacting their performance during ADL tasks, including: pneumonia, acute respiratory failure, chest pain, hx of thymus cancer s/p removal, COVID-19, anemia, recurrent UTIs, GERD, HLD. Prior to admission to the hospital Pt was performing ADLs without physical assistance. Most IADLs without physical assistance. Functional transfers/ambulation without physical assistance. Cognitive status PTA was WFL. OT order placed to assess Pt's ADLs, cognitive status, and performance during functional tasks in order to maximize safety and independence while making most appropriate d/c recommendations. Pt's clinical presentation is currently unstable/unpredictable given new onset deficits that affect Pt's occupational performance and ability to safely return to PLOF including decrease activity tolerance, decrease standing balance, decrease performance during ADL tasks, decrease activity engagement, decrease performance during functional transfers, steps to enter home, limited family support, high fall risk, and limited insight to deficits combined with medical complications  of abnormal H&H, abnormal CBC, abnormal sodium values, low SpO2 values, new onset O2 use, fear/retreat, and need for input for mobility technique/safety. Personal factors affecting Pt at time of initial evaluation include: anxiety, step(s) to enter environment, limited home support, advanced age, inability to perform IADLs, new need for AD, inability to navigate community distances, limited insight into impairments, decreased initiation and engagement, and recent fall(s)/fall history. Pt will benefit from continued skilled OT services to address deficits as defined above and to maximize level independence/participation during ADLs and functional tasks to facilitate return toward PLOF and improved quality of life. From an occupational therapy standpoint, Level II (Moderate Resource Intensity is recommended upon d/c.     Rehab Resource Intensity Level, OT: II (Moderate Resource Intensity)

## 2024-02-18 NOTE — PROGRESS NOTES
[facility]  Progress Note  Name: Nina Tran I  MRN: [id number]  Unit/Bed#: -01 I Date of Admission: 2/17/2024   Date of Service: 2/18/2024 I Hospital Day: 1    Assessment/Plan   * Acute respiratory failure (HCC)  Assessment & Plan  POA with complaints of SOB and left sided chest pain. Recently admitted for COVID infection and sepsis. During previous admission, was requiring 2L NC. Home o2 eval done prior to discharge showed patient requiring no o2 at rest and 2L NC with ambulation. The last 3 days, patient has been using o2 more. Increased pain with movement.   During previous admission, patient stated she had o2 at home she used prn, but did not chronically use o2.   In ED, patient found to be 86% when placed on 2L NC and needed o2 increased to 4L NC.   CTA chest PE study: Unchanged groundglass opacities and interstitial marking thickening with bibasal predominance. Appearance favors fibrosis but acute process cannot be completely excluded. No pulmonary embolism  Will treat as active infection, start on IV rocephin  Incentive spirometry, supportive measures  Keep o2 saturations >89% and wean off o2 as able.   Will consult pulmonology    Pneumonia  Assessment & Plan  Presented to ED with left sided chest pain and worsening SOB for 3 days.   CTA chest: Unchanged groundglass opacities and interstitial marking thickening with bibasal predominance. Appearance favors fibrosis but acute process cannot be completely excluded. No PE.   COVID/Flu/RSV: COVID positive, but positive since 2/4 during previous admission.   Urine strep and legionella negative, Sputum culture ordered  Blood Cultures pending  Trend fever curve  Initially given rocephin and doxy in the emergency department, will continue and monitor cultures and titrate therapy as indicated  Supplemental oxygen for saturations > 90%; wean as tolerated  Aspiration Precautions  Respiratory Protocol   Incentive Spirometry    Supportive Care    Chest pain  Assessment & Plan  Presents to ED with left sided chest pain and increasing shortness of breath worsening over the last 3 days. Worse with movement.   EKG: sinus tachycardia, no ischemic changes.   CTA chest PE study: Unchanged groundglass opacities and interstitial marking thickening with bibasal predominance. Appearance favors fibrosis but acute process cannot be completely excluded.   MITESH score = 2  Troponin 0Hr 39; continue to trend with serial EKGs  TSH elevated, normal t4, repeat thyroid studies in 4-6 weeks  A1c pending  Lipid panel with elevated triglycerides  Hold NSAIDS  Pain management  Telemetry  Cardiac diet; low sodium   Patient describing epigastric pain, more consistent with GERD, serial troponins normal.     Generalized weakness  Assessment & Plan  POA with complaints of generalized weakness suspect secondary to suspected bacterial pneumonia   Given 1 dose of rocephin and doxy in the ED, will continue  PT/OT consult    GERD (gastroesophageal reflux disease)  Assessment & Plan  Patient with history of acid reflux taking protonix, patient explains epigastric pain, started after eating bbq outpatient. Still having epigastric pain  Started on iv protonix bid  Carafate qid  If still with pain, will consult GI to discuss potential EGD    History of thymus cancer  Assessment & Plan  Patient with history of thymus cancer s/p removal    Continue outpatient follow up     COVID-19  Assessment & Plan  Recently admitted with COVID-19 infection tested positive on 2/4, still testing positive from previous infection. Completed 10 days of isolation per COVID isolation precautions  Will hold further treatment for COVID and treat for suspected bacterial pneumonia           VTE Pharmacologic Prophylaxis: VTE Score: 6 High Risk (Score >/= 5) - Pharmacological DVT Prophylaxis Ordered: enoxaparin (Lovenox). Sequential Compression Devices Ordered.    Mobility:   Basic Mobility Inpatient  Raw Score: 18  JH-HLM Goal: 6: Walk 10 steps or more  JH-HLM Achieved: 7: Walk 25 feet or more  HLM Goal achieved. Continue to encourage appropriate mobility.    Patient Centered Rounds: I performed bedside rounds with nursing staff today.   Discussions with Specialists or Other Care Team Provider: nursing, case management    Education and Discussions with Family / Patient: Updated  () at bedside.    Total Time Spent on Date of Encounter in care of patient: 40 mins. This time was spent on one or more of the following: performing physical exam; counseling and coordination of care; obtaining or reviewing history; documenting in the medical record; reviewing/ordering tests, medications or procedures; communicating with other healthcare professionals and discussing with patient's family/caregivers.    Current Length of Stay: 1 day(s)  Current Patient Status: Inpatient   Certification Statement: The patient will continue to require additional inpatient hospital stay due to iv abx, acute respiratory failure  Discharge Plan: Anticipate discharge in >72 hrs to home with home services.    Code Status: Level 1 - Full Code    Subjective:   Patient seen and examined today. She said she is still having some chest pain, pointing in the epigastric region. Said she feels worse when she was up moving around and when she was laying down. She does not have any nausea, vomiting, diarrhea, constipation, fever, chills. Still SOB.     Objective:     Vitals:   Temp (24hrs), Av.3 °F (36.3 °C), Min:97.2 °F (36.2 °C), Max:97.5 °F (36.4 °C)    Temp:  [97.2 °F (36.2 °C)-97.5 °F (36.4 °C)] 97.3 °F (36.3 °C)  HR:  [] 110  Resp:  [18-19] 19  BP: ()/(53-67) 106/67  SpO2:  [73 %-100 %] 92 %  Body mass index is 23.03 kg/m².     Input and Output Summary (last 24 hours):     Intake/Output Summary (Last 24 hours) at 2024 1238  Last data filed at 2024 1113  Gross per 24 hour   Intake 1001.67 ml   Output 1500  ml   Net -498.33 ml       Physical Exam:   Physical Exam  Vitals reviewed.   Constitutional:       General: She is not in acute distress.     Appearance: She is ill-appearing. She is not toxic-appearing.   HENT:      Head: Normocephalic and atraumatic.      Mouth/Throat:      Mouth: Mucous membranes are moist.   Cardiovascular:      Rate and Rhythm: Normal rate and regular rhythm.      Heart sounds: No murmur heard.  Pulmonary:      Effort: No respiratory distress.      Breath sounds: No stridor. No wheezing.      Comments: Decreased breath sounds bilaterally, crackles bilaterally, saturating around 94% on 3L NC  Abdominal:      General: Bowel sounds are normal. There is no distension.      Palpations: Abdomen is soft. There is no mass.      Tenderness: There is abdominal tenderness (epigastric).   Musculoskeletal:      Right lower leg: No edema.      Left lower leg: No edema.   Skin:     General: Skin is warm and dry.      Findings: Bruising (bilateral upper and lower extremities) present.   Neurological:      General: No focal deficit present.      Mental Status: She is alert and oriented to person, place, and time.   Psychiatric:         Mood and Affect: Mood normal.         Behavior: Behavior normal.          Additional Data:     Labs:  Results from last 7 days   Lab Units 02/18/24  0428   WBC Thousand/uL 6.43   HEMOGLOBIN g/dL 10.4*   HEMATOCRIT % 33.4*   PLATELETS Thousands/uL 97*   BANDS PCT % 18*   LYMPHO PCT % 2*   MONO PCT % 2*   EOS PCT % 0     Results from last 7 days   Lab Units 02/18/24  0428   SODIUM mmol/L 133*   POTASSIUM mmol/L 3.9   CHLORIDE mmol/L 102   CO2 mmol/L 26   BUN mg/dL 16   CREATININE mg/dL 0.65   ANION GAP mmol/L 5   CALCIUM mg/dL 6.9*   ALBUMIN g/dL 2.6*   TOTAL BILIRUBIN mg/dL 0.83   ALK PHOS U/L 94   ALT U/L 40   AST U/L 31   GLUCOSE RANDOM mg/dL 88     Results from last 7 days   Lab Units 02/17/24  0753   INR  0.92             Results from last 7 days   Lab Units 02/18/24  0428  02/17/24  1052 02/17/24  0753   LACTIC ACID mmol/L  --  1.1 2.3*   PROCALCITONIN ng/ml 0.26*  --  0.32*       Lines/Drains:  Invasive Devices       Peripheral Intravenous Line  Duration             Peripheral IV 02/17/24 Left Antecubital 1 day              Drain  Duration             External Urinary Catheter <1 day                          Imaging: Reviewed radiology reports from this admission including: chest CT scan and abdominal/pelvic CT    Recent Cultures (last 7 days):   Results from last 7 days   Lab Units 02/17/24  1530 02/17/24  0925 02/17/24  0753   BLOOD CULTURE   --   --  Received in Microbiology Lab. Culture in Progress.  Received in Microbiology Lab. Culture in Progress.   GRAM STAIN RESULT  No Epithelial cells per low power field*  No polys seen*  Rare Gram positive cocci in pairs*  --   --    LEGIONELLA URINARY ANTIGEN   --  Negative  --        Last 24 Hours Medication List:   Current Facility-Administered Medications   Medication Dose Route Frequency Provider Last Rate    acetaminophen  650 mg Oral Q6H PRN Ann Cardona, PA-C      ascorbic acid  500 mg Oral Daily Ann Cardona, PA-C      atorvastatin  20 mg Oral Daily With Dinner Ann Cardona PA-C      atovaquone  1,500 mg Oral Daily Ann Cardona, PA-C      cefTRIAXone  1,000 mg Intravenous Q24H Ann Cardona, PA-C 1,000 mg (02/18/24 0547)    doxycycline  100 mg Intravenous Q12H Ann Cardona, PA-C 100 mg (02/18/24 0901)    enoxaparin  40 mg Subcutaneous Daily Ann Cardona, PA-C      guaiFENesin  1,200 mg Oral Q12H [facility] Ann Cardona, PA-C      methenamine hippurate  1 g Oral BID With Meals Ann Cardona, PA-C      mycophenolate  1,000 mg Oral Q12H [facility] Ann Cardona, PA-C      ondansetron  4 mg Intravenous Q6H PRN Ann Cardona, PA-C      pantoprazole  40 mg Intravenous Q12H [facility] Ann Cardona, PA-C      predniSONE  20 mg Oral Daily Ann Cardona, PA-C      sucralfate  1 g Oral  4x Daily (AC & HS) Ann Cardona PA-C          Today, Patient Was Seen By: Ann Cardona PA-C    **Please Note: This note may have been constructed using a voice recognition system.**

## 2024-02-18 NOTE — OCCUPATIONAL THERAPY NOTE
Occupational Therapy Evaluation     Patient Name: Nina Tran  Today's Date: 2/18/2024  Problem List  Principal Problem:    Acute respiratory failure (HCC)  Active Problems:    COVID-19    Generalized weakness    History of thymus cancer    Chest pain    Pneumonia    GERD (gastroesophageal reflux disease)    Past Medical History  Past Medical History:   Diagnosis Date    High cholesterol      Past Surgical History  Past Surgical History:   Procedure Laterality Date    CHOLECYSTECTOMY  11/2023    HYSTERECTOMY      LUNG BIOPSY Right 01/2023 02/18/24 1230   Note Type   Note type Evaluation   Pain Assessment   Pain Assessment Tool FLACC   Pain Location/Orientation Location: Chest   Pain Rating: FLACC (Rest) - Face 0   Pain Rating: FLACC (Rest) - Legs 0   Pain Rating: FLACC (Rest) - Activity 0   Pain Rating: FLACC (Rest) - Cry 0   Pain Rating: FLACC (Rest) - Consolability 0   Score: FLACC (Rest) 0   Pain Rating: FLACC (Activity) - Face 1   Pain Rating: FLACC (Activity) - Legs 0   Pain Rating: FLACC (Activity) - Activity 0   Pain Rating: FLACC (Activity) - Cry 1   Pain Rating: FLACC (Activity) - Consolability 0   Score: FLACC (Activity) 2   Restrictions/Precautions   Other Precautions O2;Chair Alarm;Bed Alarm;Fall Risk;Pain  (3 L O2 NC)   Home Living   Type of Home House  (1 RYAN)   Home Layout One level;Performs ADLs on one level;Able to live on main level with bedroom/bathroom  (1 step to bathroom)   Bathroom Shower/Tub Walk-in shower   Bathroom Toilet Raised   Bathroom Equipment Shower chair;Grab bars in shower;Grab bars around toilet;Commode   Home Equipment Walker;Cane   Additional Comments Pt reports living in a 1SH with 1 RYAN and wasn't using AD for functional mobility PTA.   Prior Function   Level of [place] Independent with ADLs;Independent with functional mobility;Independent with IADLS   Lives With Spouse   Receives Help From Family   IADLs Independent with meal prep;Independent with medication  management;Family/Friend/Other provides transportation   Falls in the last 6 months 1 to 4   Comments PTA, pt reports independence with ADLs, functional mobility, and most IADLs- has assistance for transportation.   General   Additional Pertinent History Per RN and PAKrishnaC, pt currently testing positive from last infection on 2/4. Not active infection currently. Does not require contact nor airborne precautions.   ADL   UB Dressing Assistance 5  Supervision/Setup   UB Dressing Deficit Increased time to complete;Supervision/safety;Verbal cueing   LB Dressing Assistance 5  Supervision/Setup   LB Dressing Deficit Don/doff R sock;Don/doff L sock   Additional Comments Pt able to doff/don R sock while seated in chair. Overall, LB ADLs with supervision. Would require more assistance for ADLs in standing due to balance deficits. UB ADLs with setup and increased time.   Bed Mobility   Additional Comments Not assessed, pt received sitting OOB in recliner chair upon start of session.   Transfers   Sit to Stand   (Steadying assist)   Additional items Assist x 1;Increased time required;Verbal cues   Stand to Sit   (Steadying assist)   Additional items Assist x 1;Increased time required;Verbal cues   Stand pivot   (Steadying assist)   Additional items Assist x 1;Increased time required;Verbal cues   Additional Comments All functional transfers with RW. Pt sit > stand from recliner chair with steadying assist. Pt spt in room with steadying assist.   Functional Mobility   Functional Mobility   (Steadying assist)   Additional Comments Pt participated in short functional distance in room, walking to bathroom with steadying assist and RW.   Balance   Static Sitting Good   Dynamic Sitting Fair +   Static Standing Fair -   Dynamic Standing Poor +   Activity Tolerance   Activity Tolerance Patient limited by fatigue;Patient limited by pain   Nurse Made Aware RNJeanette Assessment   RUE Assessment WFL   LUE Assessment   LUE Assessment  WFL   Hand Function   Gross Motor Coordination Functional   Fine Motor Coordination Functional   Hand Function Comments Pt is L hand dominant.   Sensation   Light Touch No apparent deficits   Cognition   Overall Cognitive Status WFL   Arousal/Participation Alert;Cooperative   Attention Within functional limits   Orientation Level Oriented X4   Memory Within functional limits   Following Commands Follows one step commands without difficulty   Comments Pt is anxious at times   Assessment   Limitation Decreased ADL status;Decreased Safe judgement during ADL;Decreased endurance;Decreased self-care trans;Decreased high-level ADLs   Prognosis Good   Assessment Pt is a 78 y.o. female, admitted to [facility] 2/17/2024 d/t experiencing chest pain. Dx: Acute respiratory failure. Pt with PMHx impacting their performance during ADL tasks, including: pneumonia, acute respiratory failure, chest pain, hx of thymus cancer s/p removal, COVID-19, anemia, recurrent UTIs, GERD, HLD. Prior to admission to the hospital Pt was performing ADLs without physical assistance. Most IADLs without physical assistance. Functional transfers/ambulation without physical assistance. Cognitive status PTA was WFL. OT order placed to assess Pt's ADLs, cognitive status, and performance during functional tasks in order to maximize safety and independence while making most appropriate d/c recommendations. Pt's clinical presentation is currently unstable/unpredictable given new onset deficits that affect Pt's occupational performance and ability to safely return to PLOF including decrease activity tolerance, decrease standing balance, decrease performance during ADL tasks, decrease activity engagement, decrease performance during functional transfers, steps to enter home, limited family support, high fall risk, and limited insight to deficits combined with medical complications of abnormal H&H, abnormal CBC, abnormal sodium values, low SpO2 values, new onset O2  use, fear/retreat, and need for input for mobility technique/safety. Personal factors affecting Pt at time of initial evaluation include: anxiety, step(s) to enter environment, limited home support, advanced age, inability to perform IADLs, new need for AD, inability to navigate community distances, limited insight into impairments, decreased initiation and engagement, and recent fall(s)/fall history. Pt will benefit from continued skilled OT services to address deficits as defined above and to maximize level independence/participation during ADLs and functional tasks to facilitate return toward PLOF and improved quality of life. From an occupational therapy standpoint, Level II (Moderate Resource Intensity is recommended upon d/c.   Plan   Treatment Interventions ADL retraining;Functional transfer training;UE strengthening/ROM;Endurance training;Patient/family training;Equipment evaluation/education;Compensatory technique education;Continued evaluation;Cardiac education;Energy conservation;Activityengagement   Goal Expiration Date 03/03/24   OT Treatment Day 1   OT Frequency 3-5x/wk   Discharge Recommendation   Rehab Resource Intensity Level, OT II (Moderate Resource Intensity)   AM-PAC Daily Activity Inpatient   Lower Body Dressing 3   Bathing 3   Toileting 3   Upper Body Dressing 3   Grooming 3   Eating 4   Daily Activity Raw Score 19   Daily Activity Standardized Score (Calc for Raw Score >=11) 40.22   AM-PAC Applied Cognition Inpatient   Following a Speech/Presentation 3   Understanding Ordinary Conversation 4   Taking Medications 3   Remembering Where Things Are Placed or Put Away 3   Remembering List of 4-5 Errands 3   Taking Care of Complicated Tasks 2   Applied Cognition Raw Score 18   Applied Cognition Standardized Score 38.07   Additional Treatment Session   Start Time 1247   End Time 1257   Treatment Assessment Pt participated in tx session #1 focused on ADLs and functional mobility. Pt alert and  agreeable to participate. All functional transfers and mobility with RW.  Pt participated in functional transfer to standard toilet with minimal assistance for controlled descent. Pt urinated while on toilet, participated in pericare in seated with SBA. Mild LOB noted in bathroom however pt able to self-correct with continued steadying assistance from therapist. Wash/dry hands with bath wipe and setup. Pt participated in short functional distance in room, walking to recliner chair with steadying assistance. Pt stand > sit with steadying assistance. Pt donning underwear in seated with supervision, sit > stand from recliner chair with SBA, pulling up over hips with SBA. Pt tolerates treatment fairly but is limited by decreased strength, balance and endurance. Pt on 3 L O2 NC throughout treatment, spO2 dropping to as low as 87% with activity, but returning to mid 90s after activity while resting in recliner chair. She will benefit from continued skilled OT services to regain strength and maximize independence and safety during ADLs and functional mobility. At end of session, pt seated in chair with chair alarm on, call bell in reach, and all needs met.     The patient's raw score on the -PAC Daily Activity Inpatient Short Form is 19. A raw score of greater than or equal to 19 suggests the patient may benefit from discharge to home. Please refer to the recommendation of the Occupational Therapist for safe discharge planning.    Pt goals to be met by 3/3/2024:    Pt will demonstrate ability to complete grooming/hygiene tasks @ mod I after set-up.  Pt will demonstrate ability to complete supine<>sit @ mod I in order to increase safety and independence during ADL tasks.  Pt will demonstrate ability to complete UB ADLs including washing/dressing @ mod I in order to increase performance and participation during meaningful tasks  Pt will demonstrate ability to complete LB dressing @ mod I in order to increase safety and  independence during meaningful tasks.   Pt will demonstrate ability to sara/doff socks/shoes while sitting EOB/chair @ mod I in order to increase safety and independence during meaningful tasks.   Pt will demonstrate ability to complete toileting tasks including CM and pericare @ mod I in order to increase safety and independence during meaningful tasks.  Pt will demonstrate ability to complete EOB, chair, toilet/commode transfers @ mod I in order to increase performance and participation during functional tasks.  Pt will demonstrate ability to stand for 10 minutes while maintaining F+ balance with use of RW for UB support PRN.  Pt will demonstrate ability to tolerate 20 minute OT session with no vc'ing for deep breathing or use of energy conservation techniques in order to increase activity tolerance during functional tasks.   Pt will demonstrate Good carryover of use of energy conservation/compensatory strategies during ADLs and functional tasks in order to increase safety and reduce risk for falls.   Pt will demonstrate Good attention and participation in continued evaluation of functional ambulation house hold distances in order to assist with safe d/c planning.  Pt will attend to continued cognitive assessments 100% of the time in order to provide most appropriate d/c recommendations.   Pt will follow 100% simple 2-step commands and be A&O x4 consistently with environmental cues to increase participation in functional activities.   Pt will identify 3 areas of interest/hobbies and 1 intervention on how to incorporate into daily life in order to increase interaction with environment and peers as well as increase participation in meaningful tasks.   Pt will demonstrate 100% carryover of BUE HEP in order to increase BUE MS and increase performance during functional tasks upon d/c home.    Shadi Vincent MS, OTR/L

## 2024-02-18 NOTE — PLAN OF CARE
Problem: SAFETY ADULT  Goal: Patient will remain free of falls  Description: INTERVENTIONS:  - Educate patient/family on patient safety including physical limitations  - Instruct patient to call for assistance with activity   - Consult OT/PT to assist with strengthening/mobility   - Keep Call bell within reach  - Keep bed low and locked with side rails adjusted as appropriate  - Keep care items and personal belongings within reach  - Initiate and maintain comfort rounds  - Make Fall Risk Sign visible to staff  - Offer Toileting every 2 Hours, in advance of need  - Initiate/Maintain bed/chair alarm  - Obtain necessary fall risk management equipment: bed/chair alarm  - Apply yellow socks and bracelet for high fall risk patients  - Consider moving patient to room near nurses station  Outcome: Progressing  Goal: Maintain or return to baseline ADL function  Description: INTERVENTIONS:  -  Assess patient's ability to carry out ADLs; assess patient's baseline for ADL function and identify physical deficits which impact ability to perform ADLs (bathing, care of mouth/teeth, toileting, grooming, dressing, etc.)  - Assess/evaluate cause of self-care deficits   - Assess range of motion  - Assess patient's mobility; develop plan if impaired  - Assess patient's need for assistive devices and provide as appropriate  - Encourage maximum independence but intervene and supervise when necessary  - Involve family in performance of ADLs  - Assess for home care needs following discharge   - Consider OT consult to assist with ADL evaluation and planning for discharge  - Provide patient education as appropriate  Outcome: Progressing  Goal: Maintains/Returns to pre admission functional level  Description: INTERVENTIONS:  - Perform AM-PAC 6 Click Basic Mobility/ Daily Activity assessment daily.  - Set and communicate daily mobility goal to care team and patient/family/caregiver.   - Collaborate with rehabilitation services on mobility  goals if consulted  - Reposition patient every 2 hours.  - Ambulate patient 3 times a day  - Out of bed to chair 3 times a day   - Out of bed for meals 3 times a day  - Out of bed for toileting  - Record patient progress and toleration of activity level   Outcome: Progressing     Problem: DISCHARGE PLANNING  Goal: Discharge to home or other facility with appropriate resources  Description: INTERVENTIONS:  - Identify barriers to discharge w/patient and caregiver  - Arrange for needed discharge resources and transportation as appropriate  - Identify discharge learning needs (meds, wound care, etc.)  - Arrange for interpretive services to assist at discharge as needed  - Refer to Case Management Department for coordinating discharge planning if the patient needs post-hospital services based on physician/advanced practitioner order or complex needs related to functional status, cognitive ability, or social support system  Outcome: Progressing

## 2024-02-18 NOTE — ASSESSMENT & PLAN NOTE
Presents to ED with left sided chest pain and increasing shortness of breath worsening over the last 3 days. Worse with movement.   EKG: sinus tachycardia, no ischemic changes.   CTA chest PE study: Unchanged groundglass opacities and interstitial marking thickening with bibasal predominance. Appearance favors fibrosis but acute process cannot be completely excluded.   MITESH score = 2  Troponin 0Hr 39; continue to trend with serial EKGs  TSH elevated, normal t4, repeat thyroid studies in 4-6 weeks  A1c pending  Lipid panel with elevated triglycerides  Hold NSAIDS  Pain management  Telemetry  Cardiac diet; low sodium   Patient describing epigastric pain, more consistent with GERD, serial troponins normal.

## 2024-02-18 NOTE — ASSESSMENT & PLAN NOTE
Patient with history of acid reflux taking protonix, patient explains epigastric pain, started after eating bbq outpatient. Still having epigastric pain  Started on iv protonix bid  Carafate qid  If still with pain, will consult GI to discuss potential EGD

## 2024-02-18 NOTE — PLAN OF CARE
Problem: PAIN - ADULT  Goal: Verbalizes/displays adequate comfort level or baseline comfort level  Description: Interventions:  - Encourage patient to monitor pain and request assistance  - Assess pain using appropriate pain scale  - Administer analgesics based on type and severity of pain and evaluate response  - Implement non-pharmacological measures as appropriate and evaluate response  - Consider cultural and social influences on pain and pain management  - Notify physician/advanced practitioner if interventions unsuccessful or patient reports new pain  Outcome: Progressing     Problem: INFECTION - ADULT  Goal: Absence or prevention of progression during hospitalization  Description: INTERVENTIONS:  - Assess and monitor for signs and symptoms of infection  - Monitor lab/diagnostic results  - Monitor all insertion sites, i.e. indwelling lines, tubes, and drains  - Monitor endotracheal if appropriate and nasal secretions for changes in amount and color  - [place] appropriate cooling/warming therapies per order  - Administer medications as ordered  - Instruct and encourage patient and family to use good hand hygiene technique  - Identify and instruct in appropriate isolation precautions for identified infection/condition  Outcome: Progressing     Problem: SAFETY ADULT  Goal: Patient will remain free of falls  Description: INTERVENTIONS:  - Educate patient/family on patient safety including physical limitations  - Instruct patient to call for assistance with activity   - Consult OT/PT to assist with strengthening/mobility   - Keep Call bell within reach  - Keep bed low and locked with side rails adjusted as appropriate  - Keep care items and personal belongings within reach  - Initiate and maintain comfort rounds  - Make Fall Risk Sign visible to staff  - Offer Toileting every 2 Hours, in advance of need  - Initiate/Maintain bed/chair alarm  - Apply yellow socks and bracelet for high fall risk patients  -  Consider moving patient to room near nurses station  Outcome: Progressing  Goal: Maintain or return to baseline ADL function  Description: INTERVENTIONS:  -  Assess patient's ability to carry out ADLs; assess patient's baseline for ADL function and identify physical deficits which impact ability to perform ADLs (bathing, care of mouth/teeth, toileting, grooming, dressing, etc.)  - Assess/evaluate cause of self-care deficits   - Assess range of motion  - Assess patient's mobility; develop plan if impaired  - Assess patient's need for assistive devices and provide as appropriate  - Encourage maximum independence but intervene and supervise when necessary  - Involve family in performance of ADLs  - Assess for home care needs following discharge   - Consider OT consult to assist with ADL evaluation and planning for discharge  - Provide patient education as appropriate  Outcome: Progressing  Goal: Maintains/Returns to pre admission functional level  Description: INTERVENTIONS:  - Perform AM-PAC 6 Click Basic Mobility/ Daily Activity assessment daily.  - Set and communicate daily mobility goal to care team and patient/family/caregiver.   - Collaborate with rehabilitation services on mobility goals if consulted  - Perform Range of Motion 4 times a day.  - Reposition patient every 2 hours.  - Ambulate patient 4 times a day  - Out of bed to chair 3 times a day   - Out of bed for meals 3 times a day  - Out of bed for toileting  - Record patient progress and toleration of activity level   Outcome: Progressing     Problem: DISCHARGE PLANNING  Goal: Discharge to home or other facility with appropriate resources  Description: INTERVENTIONS:  - Identify barriers to discharge w/patient and caregiver  - Arrange for needed discharge resources and transportation as appropriate  - Identify discharge learning needs (meds, wound care, etc.)  - Arrange for interpretive services to assist at discharge as needed  - Refer to Case Management  Department for coordinating discharge planning if the patient needs post-hospital services based on physician/advanced practitioner order or complex needs related to functional status, cognitive ability, or social support system  Outcome: Progressing     Problem: Knowledge Deficit  Goal: Patient/family/caregiver demonstrates understanding of disease process, treatment plan, medications, and discharge instructions  Description: Complete learning assessment and assess knowledge base.  Interventions:  - Provide teaching at level of understanding  - Provide teaching via preferred learning methods  Outcome: Progressing     Problem: RESPIRATORY - ADULT  Goal: Achieves optimal ventilation and oxygenation  Description: INTERVENTIONS:  - Assess for changes in respiratory status  - Assess for changes in mentation and behavior  - Position to facilitate oxygenation and minimize respiratory effort  - Oxygen administered by appropriate delivery if ordered  - Initiate smoking cessation education as indicated  - Encourage broncho-pulmonary hygiene including cough, deep breathe, Incentive Spirometry  - Assess the need for suctioning and aspirate as needed  - Assess and instruct to report SOB or any respiratory difficulty  - Respiratory Therapy support as indicated  Outcome: Progressing

## 2024-02-19 LAB
ALBUMIN SERPL BCP-MCNC: 2.8 G/DL (ref 3.5–5)
ALP SERPL-CCNC: 114 U/L (ref 34–104)
ALT SERPL W P-5'-P-CCNC: 44 U/L (ref 7–52)
ANION GAP SERPL CALCULATED.3IONS-SCNC: 6 MMOL/L
AST SERPL W P-5'-P-CCNC: 35 U/L (ref 13–39)
ATRIAL RATE: 115 BPM
ATRIAL RATE: 94 BPM
BACTERIA SPT RESP CULT: ABNORMAL
BILIRUB SERPL-MCNC: 0.89 MG/DL (ref 0.2–1)
BUN SERPL-MCNC: 16 MG/DL (ref 5–25)
CALCIUM ALBUM COR SERPL-MCNC: 8.6 MG/DL (ref 8.3–10.1)
CALCIUM SERPL-MCNC: 7.6 MG/DL (ref 8.4–10.2)
CHLORIDE SERPL-SCNC: 102 MMOL/L (ref 96–108)
CO2 SERPL-SCNC: 26 MMOL/L (ref 21–32)
CREAT SERPL-MCNC: 0.73 MG/DL (ref 0.6–1.3)
ERYTHROCYTE [DISTWIDTH] IN BLOOD BY AUTOMATED COUNT: 15.7 % (ref 11.6–15.1)
EST. AVERAGE GLUCOSE BLD GHB EST-MCNC: 157 MG/DL
GFR SERPL CREATININE-BSD FRML MDRD: 78 ML/MIN/1.73SQ M
GLUCOSE SERPL-MCNC: 93 MG/DL (ref 65–140)
GRAM STN SPEC: ABNORMAL
HBA1C MFR BLD: 7.1 %
HCT VFR BLD AUTO: 34.2 % (ref 34.8–46.1)
HGB BLD-MCNC: 11 G/DL (ref 11.5–15.4)
MAGNESIUM SERPL-MCNC: 2 MG/DL (ref 1.9–2.7)
MCH RBC QN AUTO: 27.1 PG (ref 26.8–34.3)
MCHC RBC AUTO-ENTMCNC: 32.2 G/DL (ref 31.4–37.4)
MCV RBC AUTO: 84 FL (ref 82–98)
P AXIS: 38 DEGREES
P AXIS: 39 DEGREES
PLATELET # BLD AUTO: 103 THOUSANDS/UL (ref 149–390)
PMV BLD AUTO: 10.6 FL (ref 8.9–12.7)
POTASSIUM SERPL-SCNC: 3.7 MMOL/L (ref 3.5–5.3)
PR INTERVAL: 172 MS
PR INTERVAL: 204 MS
PROCALCITONIN SERPL-MCNC: 0.25 NG/ML
PROT SERPL-MCNC: 4.6 G/DL (ref 6.4–8.4)
QRS AXIS: 12 DEGREES
QRS AXIS: 14 DEGREES
QRSD INTERVAL: 70 MS
QRSD INTERVAL: 82 MS
QT INTERVAL: 316 MS
QT INTERVAL: 360 MS
QTC INTERVAL: 437 MS
QTC INTERVAL: 450 MS
RBC # BLD AUTO: 4.06 MILLION/UL (ref 3.81–5.12)
SODIUM SERPL-SCNC: 134 MMOL/L (ref 135–147)
T WAVE AXIS: 27 DEGREES
T WAVE AXIS: 50 DEGREES
VENTRICULAR RATE: 115 BPM
VENTRICULAR RATE: 94 BPM
WBC # BLD AUTO: 6.43 THOUSAND/UL (ref 4.31–10.16)

## 2024-02-19 PROCEDURE — 94664 DEMO&/EVAL PT USE INHALER: CPT

## 2024-02-19 PROCEDURE — 85027 COMPLETE CBC AUTOMATED: CPT

## 2024-02-19 PROCEDURE — 97535 SELF CARE MNGMENT TRAINING: CPT

## 2024-02-19 PROCEDURE — 83735 ASSAY OF MAGNESIUM: CPT

## 2024-02-19 PROCEDURE — 97110 THERAPEUTIC EXERCISES: CPT

## 2024-02-19 PROCEDURE — 97116 GAIT TRAINING THERAPY: CPT

## 2024-02-19 PROCEDURE — 99232 SBSQ HOSP IP/OBS MODERATE 35: CPT

## 2024-02-19 PROCEDURE — 80053 COMPREHEN METABOLIC PANEL: CPT

## 2024-02-19 PROCEDURE — 84145 PROCALCITONIN (PCT): CPT

## 2024-02-19 PROCEDURE — C9113 INJ PANTOPRAZOLE SODIUM, VIA: HCPCS

## 2024-02-19 RX ORDER — HYDROMORPHONE HCL IN WATER/PF 6 MG/30 ML
0.2 PATIENT CONTROLLED ANALGESIA SYRINGE INTRAVENOUS EVERY 2 HOUR PRN
Status: DISCONTINUED | OUTPATIENT
Start: 2024-02-19 | End: 2024-02-24

## 2024-02-19 RX ORDER — OXYCODONE HYDROCHLORIDE 5 MG/1
2.5 TABLET ORAL EVERY 4 HOURS PRN
Status: DISCONTINUED | OUTPATIENT
Start: 2024-02-19 | End: 2024-03-05 | Stop reason: HOSPADM

## 2024-02-19 RX ORDER — OXYCODONE HYDROCHLORIDE 5 MG/1
5 TABLET ORAL EVERY 4 HOURS PRN
Status: DISCONTINUED | OUTPATIENT
Start: 2024-02-19 | End: 2024-03-05 | Stop reason: HOSPADM

## 2024-02-19 RX ORDER — MAGNESIUM HYDROXIDE/ALUMINUM HYDROXICE/SIMETHICONE 120; 1200; 1200 MG/30ML; MG/30ML; MG/30ML
30 SUSPENSION ORAL EVERY 4 HOURS PRN
Status: DISCONTINUED | OUTPATIENT
Start: 2024-02-19 | End: 2024-03-05 | Stop reason: HOSPADM

## 2024-02-19 RX ADMIN — SUCRALFATE 1 G: 1 TABLET ORAL at 05:28

## 2024-02-19 RX ADMIN — OXYCODONE HYDROCHLORIDE 2.5 MG: 5 TABLET ORAL at 21:01

## 2024-02-19 RX ADMIN — GUAIFENESIN 1200 MG: 600 TABLET ORAL at 08:07

## 2024-02-19 RX ADMIN — SUCRALFATE 1 G: 1 TABLET ORAL at 11:07

## 2024-02-19 RX ADMIN — ENOXAPARIN SODIUM 40 MG: 40 INJECTION SUBCUTANEOUS at 08:08

## 2024-02-19 RX ADMIN — PANTOPRAZOLE SODIUM 40 MG: 40 INJECTION, POWDER, FOR SOLUTION INTRAVENOUS at 07:55

## 2024-02-19 RX ADMIN — PANTOPRAZOLE SODIUM 40 MG: 40 INJECTION, POWDER, FOR SOLUTION INTRAVENOUS at 20:38

## 2024-02-19 RX ADMIN — SUCRALFATE 1 G: 1 TABLET ORAL at 20:54

## 2024-02-19 RX ADMIN — ATORVASTATIN CALCIUM 20 MG: 20 TABLET, FILM COATED ORAL at 15:58

## 2024-02-19 RX ADMIN — PREDNISONE 20 MG: 20 TABLET ORAL at 08:07

## 2024-02-19 RX ADMIN — DOXYCYCLINE 100 MG: 100 INJECTION, POWDER, LYOPHILIZED, FOR SOLUTION INTRAVENOUS at 20:45

## 2024-02-19 RX ADMIN — SUCRALFATE 1 G: 1 TABLET ORAL at 15:58

## 2024-02-19 RX ADMIN — METHENAMINE HIPPURATE 1 G: 1 TABLET ORAL at 07:32

## 2024-02-19 RX ADMIN — ACETAMINOPHEN 325MG 650 MG: 325 TABLET ORAL at 03:47

## 2024-02-19 RX ADMIN — OXYCODONE HYDROCHLORIDE 2.5 MG: 5 TABLET ORAL at 11:39

## 2024-02-19 RX ADMIN — DOXYCYCLINE 100 MG: 100 INJECTION, POWDER, LYOPHILIZED, FOR SOLUTION INTRAVENOUS at 08:16

## 2024-02-19 RX ADMIN — ATOVAQUONE 1500 MG: 750 SUSPENSION ORAL at 08:08

## 2024-02-19 RX ADMIN — OXYCODONE HYDROCHLORIDE AND ACETAMINOPHEN 500 MG: 500 TABLET ORAL at 08:07

## 2024-02-19 RX ADMIN — ACETAMINOPHEN 325MG 650 MG: 325 TABLET ORAL at 09:34

## 2024-02-19 RX ADMIN — MYCOPHENOLATE MOFETIL 1000 MG: 250 CAPSULE ORAL at 20:39

## 2024-02-19 RX ADMIN — METHENAMINE HIPPURATE 1 G: 1 TABLET ORAL at 15:58

## 2024-02-19 RX ADMIN — MYCOPHENOLATE MOFETIL 1000 MG: 250 CAPSULE ORAL at 08:07

## 2024-02-19 RX ADMIN — GUAIFENESIN 1200 MG: 600 TABLET ORAL at 20:39

## 2024-02-19 NOTE — PLAN OF CARE
Problem: PAIN - ADULT  Goal: Verbalizes/displays adequate comfort level or baseline comfort level  Description: Interventions:  - Encourage patient to monitor pain and request assistance  - Assess pain using appropriate pain scale0-10  - Administer analgesics based on type and severity of pain and evaluate response  - Implement non-pharmacological measures as appropriate and evaluate response  - Consider cultural and social influences on pain and pain management  - Notify physician/advanced practitioner if interventions unsuccessful or patient reports new pain  Outcome: Progressing     Problem: INFECTION - ADULT  Goal: Absence or prevention of progression during hospitalization  Description: INTERVENTIONS:  - Assess and monitor for signs and symptoms of infection  - Monitor lab/diagnostic results  - Monitor all insertion sites, i.e. indwelling lines, tubes, and drains  - Monitor endotracheal if appropriate and nasal secretions for changes in amount and color  - [place] appropriate cooling/warming therapies per order  - Administer medications as ordered  - Instruct and encourage patient and family to use good hand hygiene technique  - Identify and instruct in appropriate isolation precautions for identified infection/condition  Outcome: Progressing     Problem: SAFETY ADULT  Goal: Patient will remain free of falls  Description: INTERVENTIONS:  - Educate patient/family on patient safety including physical limitations  - Instruct patient to call for assistance with activity   - Consult OT/PT to assist with strengthening/mobility   - Keep Call bell within reach  - Keep bed low and locked with side rails adjusted as appropriate  - Keep care items and personal belongings within reach  - Initiate and maintain comfort rounds  - Make Fall Risk Sign visible to staff  - Offer Toileting every 2 Hours, in advance of need  - Initiate/Maintain bed/chair alarm  - Apply yellow socks and bracelet for high fall risk patients  -  Consider moving patient to room near nurses station  Outcome: Progressing  Goal: Maintain or return to baseline ADL function  Description: INTERVENTIONS:  -  Assess patient's ability to carry out ADLs; assess patient's baseline for ADL function and identify physical deficits which impact ability to perform ADLs (bathing, care of mouth/teeth, toileting, grooming, dressing, etc.)  - Assess/evaluate cause of self-care deficits   - Assess range of motion  - Assess patient's mobility; develop plan if impaired  - Assess patient's need for assistive devices and provide as appropriate  - Encourage maximum independence but intervene and supervise when necessary  - Involve family in performance of ADLs  - Assess for home care needs following discharge   - Consider OT consult to assist with ADL evaluation and planning for discharge  - Provide patient education as appropriate  Outcome: Progressing  Goal: Maintains/Returns to pre admission functional level  Description: INTERVENTIONS:  - Perform AM-PAC 6 Click Basic Mobility/ Daily Activity assessment daily.  - Set and communicate daily mobility goal to care team and patient/family/caregiver.   - Collaborate with rehabilitation services on mobility goals if consulted  - Perform Range of Motion 4 times a day.  - Reposition patient every 2 hours.  - Ambulate patient 4 times a day  - Out of bed to chair 3 times a day   - Out of bed for meals 3 times a day  - Out of bed for toileting  - Record patient progress and toleration of activity level   Outcome: Progressing     Problem: DISCHARGE PLANNING  Goal: Discharge to home or other facility with appropriate resources  Description: INTERVENTIONS:  - Identify barriers to discharge w/patient and caregiver  - Arrange for needed discharge resources and transportation as appropriate  - Identify discharge learning needs (meds, wound care, etc.)  - Arrange for interpretive services to assist at discharge as needed  - Refer to Case Management  Department for coordinating discharge planning if the patient needs post-hospital services based on physician/advanced practitioner order or complex needs related to functional status, cognitive ability, or social support system  Outcome: Progressing     Problem: Knowledge Deficit  Goal: Patient/family/caregiver demonstrates understanding of disease process, treatment plan, medications, and discharge instructions  Description: Complete learning assessment and assess knowledge base.  Interventions:  - Provide teaching at level of understanding  - Provide teaching via preferred learning methods  Outcome: Progressing     Problem: RESPIRATORY - ADULT  Goal: Achieves optimal ventilation and oxygenation  Description: INTERVENTIONS:  - Assess for changes in respiratory status  - Assess for changes in mentation and behavior  - Position to facilitate oxygenation and minimize respiratory effort  - Oxygen administered by appropriate delivery if ordered  - Initiate smoking cessation education as indicated  - Encourage broncho-pulmonary hygiene including cough, deep breathe, Incentive Spirometry  - Assess the need for suctioning and aspirate as needed  - Assess and instruct to report SOB or any respiratory difficulty  - Respiratory Therapy support as indicated  Outcome: Progressing

## 2024-02-19 NOTE — ASSESSMENT & PLAN NOTE
POA with complaints of generalized weakness suspect secondary to suspected bacterial pneumonia   Given 1 dose of rocephin and doxy in the ED, will continue  PT/OT consult: II (Moderate Resource Intensity)

## 2024-02-19 NOTE — OCCUPATIONAL THERAPY NOTE
Occupational Therapy Progress Note     Patient Name: Nina Tran  Today's Date: 2/19/2024  Problem List  Principal Problem:    Acute respiratory failure (HCC)  Active Problems:    COVID-19    Generalized weakness    History of thymus cancer    Chest pain    Pneumonia    GERD (gastroesophageal reflux disease)       02/19/24 0906   Note Type   Note Type Treatment   Pain Assessment   Pain Assessment Tool 0-10   Pain Score 8   Pain Location/Orientation Location: Chest   Restrictions/Precautions   Other Precautions Chair Alarm;Bed Alarm;Fall Risk;O2;Pain  (4lpm)   ADL   Where Assessed Chair   Grooming Assistance 5  Supervision/Setup   Grooming Deficit Setup;Wash/dry face   UB Bathing Assistance 5  Supervision/Setup   UB Bathing Deficit Setup   LB Bathing Assistance 5  Supervision/Setup   LB Bathing Deficit Setup;Increased time to complete   UB Dressing Assistance 5  Supervision/Setup   UB Dressing Deficit Setup   LB Dressing Assistance 5  Supervision/Setup   LB Dressing Deficit Setup;Requires assistive device for steadying;Increased time to complete   Toileting Assistance  5  Supervision/Setup   Toileting Deficit Setup   Bed Mobility   Supine to Sit 5  Supervision   Additional items Increased time required;Verbal cues   Additional Comments Pt supine in bed at beginning of session. Supine to sit @ S.   Transfers   Sit to Stand 5  Supervision   Additional items Increased time required;Verbal cues   Stand to Sit 5  Supervision   Additional items Increased time required;Verbal cues   Stand pivot 5  Supervision   Additional items Increased time required;Verbal cues   Toilet transfer 5  Supervision   Additional items Increased time required;Verbal cues;Standard toilet   Additional Comments RW utilized for all transfers, cues for hand placement required throughout. Pt seated OOB in chair at end of session with chair alarm intact, call bell within reach and all needs met,   Functional Mobility   Additional Comments Pt able to  complete functional mobility to/from bathroom and around room with RW @ S and increased time d/t fatigue.   Cognition   Overall Cognitive Status WFL   Arousal/Participation Alert;Responsive;Cooperative   Attention Within functional limits   Orientation Level Oriented X4   Memory Within functional limits   Following Commands Follows one step commands without difficulty   Activity Tolerance   Activity Tolerance Patient limited by fatigue   Medical Staff Made Aware Spoke with PT Adriana and SHIRA Summers   Assessment   Assessment Pt completed OT tx session ## focused on ADL performance and functional mobility. Pt alert and agreeable to participate. Pt demonstrated improvements in transfer status, functional mobility, and ADL performance but continues to be limited throughout by fatigue. Pt currently completing UB ADLs @ S/set-up, LB ADLs @ S/set-up, and functional mobility with RW @ S. Pt maintained Good O2 sats on 4lpm throughout. O2 decreased with activity but recovered with rest. Pt demonstrating Good participation and Giid potential to achieve goals but is currently demonstrating deficits in decrease activity tolerance, decrease standing balance, decrease performance during ADL tasks, decrease UB MS, decrease generalized strength, decrease activity engagement, and decrease performance during functional transfers. At this time, recommend Level III: Minimum Resource Intensity Therapy upon discharge to increase safety and independence in ADL tasks and functional mobility.   Plan   Treatment Interventions ADL retraining;Functional transfer training   Goal Expiration Date 03/03/24   OT Treatment Day 2   OT Frequency 2-3x/wk   Discharge Recommendation   Rehab Resource Intensity Level, OT III (Minimum Resource Intensity)   AM-PAC Daily Activity Inpatient   Lower Body Dressing 3   Bathing 3   Toileting 3   Upper Body Dressing 3   Grooming 3   Eating 4   Daily Activity Raw Score 19   Daily Activity Standardized Score (Calc for  Raw Score >=11) 40.22   -PAC Applied Cognition Inpatient   Following a Speech/Presentation 3   Understanding Ordinary Conversation 4   Taking Medications 3   Remembering Where Things Are Placed or Put Away 3   Remembering List of 4-5 Errands 3   Taking Care of Complicated Tasks 2   Applied Cognition Raw Score 18   Applied Cognition Standardized Score 38.07     The patient's raw score on the AM-PAC Daily Activity Inpatient Short Form is 19. A raw score of greater than or equal to 19 suggests the patient may benefit from discharge to home. Please refer to the recommendation of the Occupational Therapist for safe discharge planning.    Pt goals to be met by 3/3/2024:     Pt will demonstrate ability to complete grooming/hygiene tasks @ mod I after set-up.  Pt will demonstrate ability to complete supine<>sit @ mod I in order to increase safety and independence during ADL tasks.  Pt will demonstrate ability to complete UB ADLs including washing/dressing @ mod I in order to increase performance and participation during meaningful tasks  Pt will demonstrate ability to complete LB dressing @ mod I in order to increase safety and independence during meaningful tasks.   Pt will demonstrate ability to sara/doff socks/shoes while sitting EOB/chair @ mod I in order to increase safety and independence during meaningful tasks.   Pt will demonstrate ability to complete toileting tasks including CM and pericare @ mod I in order to increase safety and independence during meaningful tasks.  Pt will demonstrate ability to complete EOB, chair, toilet/commode transfers @ mod I in order to increase performance and participation during functional tasks.  Pt will demonstrate ability to stand for 10 minutes while maintaining F+ balance with use of RW for UB support PRN.  Pt will demonstrate ability to tolerate 20 minute OT session with no vc'ing for deep breathing or use of energy conservation techniques in order to increase activity  tolerance during functional tasks.   Pt will demonstrate Good carryover of use of energy conservation/compensatory strategies during ADLs and functional tasks in order to increase safety and reduce risk for falls.   Pt will demonstrate Good attention and participation in continued evaluation of functional ambulation house hold distances in order to assist with safe d/c planning.  Pt will attend to continued cognitive assessments 100% of the time in order to provide most appropriate d/c recommendations.   Pt will follow 100% simple 2-step commands and be A&O x4 consistently with environmental cues to increase participation in functional activities.   Pt will identify 3 areas of interest/hobbies and 1 intervention on how to incorporate into daily life in order to increase interaction with environment and peers as well as increase participation in meaningful tasks.   Pt will demonstrate 100% carryover of BUE HEP in order to increase BUE MS and increase performance during functional tasks upon d/c home.    Angélica Wilkerson, OTR/L

## 2024-02-19 NOTE — PROGRESS NOTES
[facility]  Progress Note  Name: Nina Tran I  MRN: [id number]  Unit/Bed#: -01 I Date of Admission: 2/17/2024   Date of Service: 2/19/2024 I Hospital Day: 2    Assessment/Plan   * Acute respiratory failure (HCC)  Assessment & Plan  POA with complaints of SOB and left sided chest pain. Recently admitted for COVID infection and sepsis. During previous admission, was requiring 2L NC. Home o2 eval done prior to discharge showed patient requiring no o2 at rest and 2L NC with ambulation. The last 3 days, patient has been using o2 more. Increased pain with movement.   During previous admission, patient stated she had o2 at home she used prn, but did not chronically use o2.   In ED, patient found to be 86% when placed on 2L NC and needed o2 increased to 4L NC.   CTA chest PE study: Unchanged groundglass opacities and interstitial marking thickening with bibasal predominance. Appearance favors fibrosis but acute process cannot be completely excluded. No pulmonary embolism  Will treat as active infection, start on IV rocephin  Incentive spirometry, supportive measures  Keep o2 saturations >90% and wean off o2 as able.   Will consult pulmonology    Pneumonia  Assessment & Plan  Presented to ED with left sided chest pain and worsening SOB for 3 days.   CTA chest: Unchanged groundglass opacities and interstitial marking thickening with bibasal predominance. Appearance favors fibrosis but acute process cannot be completely excluded. No PE.   COVID/Flu/RSV: COVID positive, but positive since 2/4 during previous admission.   Urine strep and legionella negative, Sputum culture ordered  Blood Cultures no growth 24 hours  Trend fever curve  Initially given rocephin and doxy in the emergency department, will continue and monitor cultures and titrate therapy as indicated  Supplemental oxygen for saturations > 90%; wean as tolerated  Aspiration Precautions  Respiratory Protocol   Incentive Spirometry    Supportive Care    Chest pain  Assessment & Plan  Presents to ED with left sided chest pain and increasing shortness of breath worsening over the last 3 days. Worse with movement.   EKG: sinus tachycardia, no ischemic changes.   CTA chest PE study: Unchanged groundglass opacities and interstitial marking thickening with bibasal predominance. Appearance favors fibrosis but acute process cannot be completely excluded.   MITESH score = 2  Troponin 0Hr 39; serial troponins normal  TSH elevated, normal t4, repeat thyroid studies in 4-6 weeks  A1c 7.1  Lipid panel with elevated triglycerides  Hold NSAIDS  Pain management  Telemetry  Cardiac diet; low sodium   Patient describing epigastric pain, more consistent with GERD, serial troponins normal.     Generalized weakness  Assessment & Plan  POA with complaints of generalized weakness suspect secondary to suspected bacterial pneumonia   Given 1 dose of rocephin and doxy in the ED, will continue  PT/OT consult: II (Moderate Resource Intensity)     GERD (gastroesophageal reflux disease)  Assessment & Plan  Patient with history of acid reflux taking protonix, patient explains epigastric pain, started after eating bbq outpatient. Still having epigastric pain  Started on iv protonix bid  Carafate qid  If still with pain, will consult GI to discuss potential EGD  Will consult GI    History of thymus cancer  Assessment & Plan  Patient with history of thymus cancer s/p removal    Continue outpatient follow up     COVID-19  Assessment & Plan  Recently admitted with COVID-19 infection tested positive on 2/4, still testing positive from previous infection. Completed 10 days of isolation per COVID isolation precautions  Will hold further treatment for COVID and treat for suspected bacterial pneumonia         VTE Pharmacologic Prophylaxis: VTE Score: 6 High Risk (Score >/= 5) - Pharmacological DVT Prophylaxis Ordered: enoxaparin (Lovenox). Sequential Compression Devices  Ordered.    Mobility:   Basic Mobility Inpatient Raw Score: 18  JH-HLM Goal: 6: Walk 10 steps or more  JH-HLM Achieved: 7: Walk 25 feet or more  HLM Goal achieved. Continue to encourage appropriate mobility.    Patient Centered Rounds: I performed bedside rounds with nursing staff today.   Discussions with Specialists or Other Care Team Provider: nursing, case management    Education and Discussions with Family / Patient: Updated  () at bedside.    Total Time Spent on Date of Encounter in care of patient: 37 mins. This time was spent on one or more of the following: performing physical exam; counseling and coordination of care; obtaining or reviewing history; documenting in the medical record; reviewing/ordering tests, medications or procedures; communicating with other healthcare professionals and discussing with patient's family/caregivers.    Current Length of Stay: 2 day(s)  Current Patient Status: Inpatient   Certification Statement: The patient will continue to require additional inpatient hospital stay due to acute respiratory failure, pneumonia, pulm consult  Discharge Plan: Anticipate discharge in >72 hrs to home with home services.    Code Status: Level 1 - Full Code    Subjective:   Patient seen and examined. Still having epigastric pain. States that it is better with the pain medication. Didn't sleep well because of the epigastric pain. No nausea, vomiting, diarrhea, constipation, fever, chills.     Objective:     Vitals:   Temp (24hrs), Av.3 °F (36.3 °C), Min:97.2 °F (36.2 °C), Max:97.5 °F (36.4 °C)    Temp:  [97.2 °F (36.2 °C)-97.5 °F (36.4 °C)] 97.3 °F (36.3 °C)  HR:  [] 103  Resp:  [16-18] 18  BP: ()/(59-67) 100/60  SpO2:  [79 %-97 %] 79 %  Body mass index is 23.03 kg/m².     Input and Output Summary (last 24 hours):     Intake/Output Summary (Last 24 hours) at 2024 1327  Last data filed at 2024 0900  Gross per 24 hour   Intake 540 ml   Output 1275 ml    Net -735 ml       Physical Exam:   Physical Exam  Vitals reviewed.   Constitutional:       General: She is not in acute distress.     Appearance: She is ill-appearing. She is not toxic-appearing.   HENT:      Head: Normocephalic and atraumatic.      Mouth/Throat:      Mouth: Mucous membranes are moist.   Cardiovascular:      Rate and Rhythm: Normal rate and regular rhythm.   Pulmonary:      Effort: No respiratory distress.      Breath sounds: No stridor. No wheezing, rhonchi or rales.      Comments: Decreased breath sounds bilaterally, crackles bilaterally  Abdominal:      General: Bowel sounds are normal. There is no distension.      Palpations: Abdomen is soft. There is no mass.      Tenderness: There is abdominal tenderness (epigastric).   Musculoskeletal:      Right lower leg: No edema.      Left lower leg: No edema.   Skin:     General: Skin is warm and dry.      Findings: Bruising (bilateral upper and lower extremity bruising) present.   Neurological:      General: No focal deficit present.      Mental Status: She is alert and oriented to person, place, and time.   Psychiatric:         Mood and Affect: Mood normal.         Behavior: Behavior normal.          Additional Data:     Labs:  Results from last 7 days   Lab Units 02/19/24  0540 02/18/24  0428   WBC Thousand/uL 6.43 6.43   HEMOGLOBIN g/dL 11.0* 10.4*   HEMATOCRIT % 34.2* 33.4*   PLATELETS Thousands/uL 103* 97*   BANDS PCT %  --  18*   LYMPHO PCT %  --  2*   MONO PCT %  --  2*   EOS PCT %  --  0     Results from last 7 days   Lab Units 02/19/24  0540   SODIUM mmol/L 134*   POTASSIUM mmol/L 3.7   CHLORIDE mmol/L 102   CO2 mmol/L 26   BUN mg/dL 16   CREATININE mg/dL 0.73   ANION GAP mmol/L 6   CALCIUM mg/dL 7.6*   ALBUMIN g/dL 2.8*   TOTAL BILIRUBIN mg/dL 0.89   ALK PHOS U/L 114*   ALT U/L 44   AST U/L 35   GLUCOSE RANDOM mg/dL 93     Results from last 7 days   Lab Units 02/17/24  0753   INR  0.92         Results from last 7 days   Lab Units  02/18/24  0428   HEMOGLOBIN A1C % 7.1*     Results from last 7 days   Lab Units 02/19/24  0540 02/18/24  0428 02/17/24  1052 02/17/24  0753   LACTIC ACID mmol/L  --   --  1.1 2.3*   PROCALCITONIN ng/ml 0.25 0.26*  --  0.32*       Lines/Drains:  Invasive Devices       Peripheral Intravenous Line  Duration             Peripheral IV 02/17/24 Left Antecubital 2 days              Drain  Duration             External Urinary Catheter 1 day                          Imaging: Reviewed radiology reports from this admission including: chest CT scan and abdominal/pelvic CT    Recent Cultures (last 7 days):   Results from last 7 days   Lab Units 02/17/24  1530 02/17/24  0925 02/17/24  0753   BLOOD CULTURE   --   --  No Growth at 24 hrs.  No Growth at 24 hrs.   SPUTUM CULTURE  2+ Growth of  --   --    GRAM STAIN RESULT  No Epithelial cells per low power field*  No polys seen*  Rare Gram positive cocci in pairs*  --   --    LEGIONELLA URINARY ANTIGEN   --  Negative  --        Last 24 Hours Medication List:   Current Facility-Administered Medications   Medication Dose Route Frequency Provider Last Rate    acetaminophen  650 mg Oral Q6H PRN Ann Cardona, PA-C      aluminum-magnesium hydroxide-simethicone  30 mL Oral Q4H PRN Ann Cardona, PA-C      ascorbic acid  500 mg Oral Daily Ann Cardona, PA-C      atorvastatin  20 mg Oral Daily With Dinner Ann Cardona PA-C      atovaquone  1,500 mg Oral Daily Ann Cardona, PA-C      cefTRIAXone  1,000 mg Intravenous Q24H Ann Cardona, PA-C 100 mL/hr at 02/19/24 0528    doxycycline  100 mg Intravenous Q12H Ann Cardona, PA-C 100 mg (02/19/24 0816)    enoxaparin  40 mg Subcutaneous Daily Ann Cardona, PA-C      guaiFENesin  1,200 mg Oral Q12H [facility] Ann Cardona, PA-C      HYDROmorphone  0.2 mg Intravenous Q2H PRN Ann Cardona, PA-C      methenamine hippurate  1 g Oral BID With Meals Ann Cardona PA-C      mycophenolate  1,000  mg Oral Q12H [facility] Ann Cardona PA-C      ondansetron  4 mg Intravenous Q6H PRN Ann Cardona PA-C      oxyCODONE  2.5 mg Oral Q4H PRN Ann Cardona PA-C      Or    oxyCODONE  5 mg Oral Q4H PRN Ann Cardona PA-C      pantoprazole  40 mg Intravenous Q12H [place] Ann Cardona PA-C      predniSONE  20 mg Oral Daily Ann Cardona PA-C      sucralfate  1 g Oral 4x Daily (AC & HS) Ann Cardona PA-C          Today, Patient Was Seen By: Ann Cardona PA-C    **Please Note: This note may have been constructed using a voice recognition system.**

## 2024-02-19 NOTE — CASE MANAGEMENT
Case Management Discharge Planning Note    Patient name Nina Tran  Location /-01 MRN [id number]  : 1945 Date 2024       Current Admission Date: 2024  Current Admission Diagnosis:Acute respiratory failure (HCC)   Patient Active Problem List    Diagnosis Date Noted    GERD (gastroesophageal reflux disease) 2024    Chest pain 2024    Pneumonia 2024    Bacteremia 2024    Acute respiratory failure (HCC) 2024    Sepsis (HCC) 2024    Acute cystitis without hematuria 2024    COVID-19 2024    Generalized weakness 2024    Ambulatory dysfunction 2024    History of thymus cancer 2024      LOS (days): 2  Geometric Mean LOS (GMLOS) (days): 4.1  Days to GMLOS:2.1     OBJECTIVE:  Risk of Unplanned Readmission Score: 14.73         Current admission status: Inpatient   Preferred Pharmacy:   [facility]/pharmacy #[id number] [address] [address]  Phone: [phone] Fax: [phone]    Primary Care Provider: Emely Will DO    Primary Insurance: MEDICARE  Secondary Insurance: [facility]    DISCHARGE DETAILS:              Received call from Kyung at [facility], pt is active with their services for PT/OT and nursing     [facility] has accepted pt at time of discharge

## 2024-02-19 NOTE — PHYSICAL THERAPY NOTE
PHYSICAL THERAPY TREATMENT NOTE  NAME:  Nina Tran  DATE: 02/19/24    Length Of Stay: 2  Performed at least 2 patient identifiers during session: Name and Birthday  Treatment time: 905-929  Treatment length: 24 min       02/19/24 0905   Note Type   Note Type Treatment   Pain Assessment   Pain Assessment Tool 0-10   Pain Score 8   Pain Location/Orientation Location: Chest   Restrictions/Precautions   Other Precautions Chair Alarm;Bed Alarm;Multiple lines;Fall Risk;O2  (4 lpm)   General   Chart Reviewed Yes   Response to Previous Treatment Patient with no complaints from previous session.   Cognition   Overall Cognitive Status WFL   Orientation Level Oriented X4   Following Commands Follows one step commands without difficulty   Bed Mobility   Supine to Sit 5  Supervision   Additional items Increased time required;Verbal cues   Additional Comments HOB elevated as pt reports sleeping in recliner   Transfers   Sit to Stand 5  Supervision   Additional items Increased time required;Verbal cues   Stand to Sit 5  Supervision   Additional items Increased time required;Verbal cues   Stand pivot 5  Supervision   Additional items Increased time required;Verbal cues   Additional Comments RW for transfers with VC for hand placement and supervision for safety   Ambulation/Elevation   Gait pattern Improper Weight shift;Decreased foot clearance;Foward flexed;Short stride;Excessively slow   Gait Assistance 5  Supervision   Additional items Verbal cues   Assistive Device Rolling walker   Distance 12' + 50' with RW and supervision   Balance   Static Sitting Good   Dynamic Sitting Fair +   Static Standing Fair   Dynamic Standing Fair -   Ambulatory Fair -   Endurance Deficit   Endurance Deficit Yes   Endurance Deficit Description fatigue, O2 desat with mobility into low-mid 80s, increased O2 to 4 lpm with O2 increasing to 88-90%. educated pt on pursed lip breathing however pt reports difficulty breathing through nose   Activity  Tolerance   Activity Tolerance Patient limited by fatigue   Nurse Made Aware Melina SILVA   Exercises   Hip Flexion Standing;10 reps;AROM;Bilateral   Knee AROM Long Arc Quad Sitting;20 reps;AROM;Bilateral   Ankle Pumps Standing;10 reps;AROM;Bilateral   Assessment   Prognosis Good   Problem List Decreased strength;Decreased endurance;Impaired balance;Decreased mobility;Decreased safety awareness;Pain   Barriers to Discharge None   Goals   PT Treatment Day 2   Plan   Treatment/Interventions Functional transfer training;LE strengthening/ROM;Elevations;Therapeutic exercise;Endurance training;Patient/family training;Equipment eval/education;Bed mobility;Compensatory technique education;Gait training;Spoke to nursing;OT   Progress Progressing toward goals   PT Frequency 3-5x/wk   Discharge Recommendation   Rehab Resource Intensity Level, PT III (Minimum Resource Intensity)   Equipment Recommended   (pt owns RW)   AM-PAC Basic Mobility Inpatient   Turning in Flat Bed Without Bedrails 3   Lying on Back to Sitting on Edge of Flat Bed Without Bedrails 3   Moving Bed to Chair 3   Standing Up From Chair Using Arms 3   Walk in Room 3   Climb 3-5 Stairs With Railing 3   Basic Mobility Inpatient Raw Score 18   Basic Mobility Standardized Score 41.05   Highest Level Of Mobility   JH-HLM Goal 6: Walk 10 steps or more   JH-HLM Achieved 7: Walk 25 feet or more   Education   Education Provided Mobility training;Assistive device   Patient Demonstrates acceptance/verbal understanding   End of Consult   Patient Position at End of Consult Bedside chair;Bed/Chair alarm activated;All needs within reach     The patient's AM-PAC Basic Mobility Inpatient Short Form Raw Score is 18. A Raw score of greater than 16 suggests the patient may benefit from discharge to home. Please also refer to the recommendation of the Physical Therapist for safe discharge planning.      Assessment: Pt seen for PT treatment session this date with interventions  consisting of gait training w/ emphasis on improving pt's ability to ambulate level surfaces x 12' +50' with close S provided by therapist with RW and Therapeutic exercise consisting of: AROM 10 reps and 20 reps B LE in sitting and standing with B/L UE support position. Pt agreeable to PT treatment session upon arrival, pt found supine in bed w/ HOB elevated, in no apparent distress. In comparison to previous session, pt with improvements in pt ambulating increased distance with decreased support and completing assigned therex . Post session: pt returned back to recliner, chair alarm engaged, all needs in reach, and RN notified of session findings/recommendations Continue to recommend  Level III Resource Intensity  at time of d/c in order to maximize pt's functional independence and safety w/ mobility. Pt continues to be functioning below baseline level, and remains limited 2* factors listed above and including decreased strength, balance, mobility, and activity tolerance. PT will continue to see pt while here in order to address the deficits listed above and provide interventions consistent w/ POC in effort to achieve STGs.     Adriana Marte, PT,DPT

## 2024-02-19 NOTE — PLAN OF CARE
Problem: OCCUPATIONAL THERAPY ADULT  Goal: Performs self-care activities at highest level of function for planned discharge setting.  See evaluation for individualized goals.  Description: Treatment Interventions: ADL retraining, Functional transfer training, UE strengthening/ROM, Endurance training, Patient/family training, Equipment evaluation/education, Compensatory technique education, Continued evaluation, Cardiac education, Energy conservation, Activityengagement          See flowsheet documentation for full assessment, interventions and recommendations.   Outcome: Progressing  Note: Limitation: Decreased ADL status, Decreased Safe judgement during ADL, Decreased endurance, Decreased self-care trans, Decreased high-level ADLs  Prognosis: Good  Assessment: Pt completed OT tx session ## focused on ADL performance and functional mobility. Pt alert and agreeable to participate. Pt demonstrated improvements in transfer status, functional mobility, and ADL performance but continues to be limited throughout by fatigue. Pt currently completing UB ADLs @ S/set-up, LB ADLs @ S/set-up, and functional mobility with RW @ S. Pt maintained Good O2 sats on 4lpm throughout. O2 decreased with activity but recovered with rest. Pt demonstrating Good participation and Giid potential to achieve goals but is currently demonstrating deficits in decrease activity tolerance, decrease standing balance, decrease performance during ADL tasks, decrease UB MS, decrease generalized strength, decrease activity engagement, and decrease performance during functional transfers. At this time, recommend Level III: Minimum Resource Intensity Therapy upon discharge to increase safety and independence in ADL tasks and functional mobility.     Rehab Resource Intensity Level, OT: III (Minimum Resource Intensity)

## 2024-02-19 NOTE — ASSESSMENT & PLAN NOTE
Patient with history of acid reflux taking protonix, patient explains epigastric pain, started after eating bbq outpatient. Still having epigastric pain  Started on iv protonix bid  Carafate qid  If still with pain, will consult GI to discuss potential EGD  Will consult GI

## 2024-02-19 NOTE — PLAN OF CARE
Problem: PHYSICAL THERAPY ADULT  Goal: Performs mobility at highest level of function for planned discharge setting.  See evaluation for individualized goals.  Description: Treatment/Interventions: ADL retraining, Functional transfer training, LE strengthening/ROM, Elevations, Therapeutic exercise, Endurance training, Patient/family training, Equipment eval/education, Bed mobility, Gait training, Compensatory technique education, Spoke to nursing, Spoke to case management, OT  Equipment Recommended:  (walker-pt has)       See flowsheet documentation for full assessment, interventions and recommendations.  Outcome: Progressing  Note: Prognosis: Good  Problem List: Decreased strength, Decreased endurance, Impaired balance, Decreased mobility, Decreased safety awareness, Pain  Assessment: Pt seen for PT treatment session this date with interventions consisting of gait training w/ emphasis on improving pt's ability to ambulate level surfaces x 12' +50' with close S provided by therapist with RW and Therapeutic exercise consisting of: AROM 10 reps and 20 reps B LE in sitting and standing with B/L UE support position. Pt agreeable to PT treatment session upon arrival, pt found supine in bed w/ HOB elevated, in no apparent distress. In comparison to previous session, pt with improvements in pt ambulating increased distance with decreased support and completing assigned therex . Post session: pt returned back to recliner, chair alarm engaged, all needs in reach, and RN notified of session findings/recommendations Continue to recommend  Level III Resource Intensity  at time of d/c in order to maximize pt's functional independence and safety w/ mobility. Pt continues to be functioning below baseline level, and remains limited 2* factors listed above and including decreased strength, balance, mobility, and activity tolerance. PT will continue to see pt while here in order to address the deficits listed above and provide  interventions consistent w/ POC in effort to achieve STGs.  Barriers to Discharge: None  Barriers to Discharge Comments: recommend increased support from spouse uponr eturn to home and use of RW  Rehab Resource Intensity Level, PT: III (Minimum Resource Intensity)    See flowsheet documentation for full assessment.

## 2024-02-19 NOTE — ASSESSMENT & PLAN NOTE
POA with complaints of SOB and left sided chest pain. Recently admitted for COVID infection and sepsis. During previous admission, was requiring 2L NC. Home o2 eval done prior to discharge showed patient requiring no o2 at rest and 2L NC with ambulation. The last 3 days, patient has been using o2 more. Increased pain with movement.   During previous admission, patient stated she had o2 at home she used prn, but did not chronically use o2.   In ED, patient found to be 86% when placed on 2L NC and needed o2 increased to 4L NC.   CTA chest PE study: Unchanged groundglass opacities and interstitial marking thickening with bibasal predominance. Appearance favors fibrosis but acute process cannot be completely excluded. No pulmonary embolism  Will treat as active infection, start on IV rocephin  Incentive spirometry, supportive measures  Keep o2 saturations >90% and wean off o2 as able.   Will consult pulmonology

## 2024-02-19 NOTE — ASSESSMENT & PLAN NOTE
Presents to ED with left sided chest pain and increasing shortness of breath worsening over the last 3 days. Worse with movement.   EKG: sinus tachycardia, no ischemic changes.   CTA chest PE study: Unchanged groundglass opacities and interstitial marking thickening with bibasal predominance. Appearance favors fibrosis but acute process cannot be completely excluded.   MITESH score = 2  Troponin 0Hr 39; serial troponins normal  TSH elevated, normal t4, repeat thyroid studies in 4-6 weeks  A1c 7.1  Lipid panel with elevated triglycerides  Hold NSAIDS  Pain management  Telemetry  Cardiac diet; low sodium   Patient describing epigastric pain, more consistent with GERD, serial troponins normal.

## 2024-02-19 NOTE — ASSESSMENT & PLAN NOTE
Presented to ED with left sided chest pain and worsening SOB for 3 days.   CTA chest: Unchanged groundglass opacities and interstitial marking thickening with bibasal predominance. Appearance favors fibrosis but acute process cannot be completely excluded. No PE.   COVID/Flu/RSV: COVID positive, but positive since 2/4 during previous admission.   Urine strep and legionella negative, Sputum culture ordered  Blood Cultures no growth 24 hours  Trend fever curve  Initially given rocephin and doxy in the emergency department, will continue and monitor cultures and titrate therapy as indicated  Supplemental oxygen for saturations > 90%; wean as tolerated  Aspiration Precautions  Respiratory Protocol   Incentive Spirometry   Supportive Care

## 2024-02-19 NOTE — CASE MANAGEMENT
Case Management Assessment & Discharge Planning Note    Patient name Nina Tran  Location /-01 MRN [id number]  : 1945 Date 2024       Current Admission Date: 2024  Current Admission Diagnosis:Acute respiratory failure (HCC)   Patient Active Problem List    Diagnosis Date Noted    GERD (gastroesophageal reflux disease) 2024    Chest pain 2024    Pneumonia 2024    Bacteremia 2024    Acute respiratory failure (HCC) 2024    Sepsis (HCC) 2024    Acute cystitis without hematuria 2024    COVID-19 2024    Generalized weakness 2024    Ambulatory dysfunction 2024    History of thymus cancer 2024      LOS (days): 2  Geometric Mean LOS (GMLOS) (days): 4.1  Days to GMLOS:2     OBJECTIVE:  PATIENT READMITTED TO HOSPITAL  Risk of Unplanned Readmission Score: 14.73         Current admission status: Inpatient       Preferred Pharmacy:   [facility]/pharmacy #[id number] - [place] [address]  Phone: [phone] Fax: [phone]    Primary Care Provider: Emely Will DO    Primary Insurance: MEDICARE  Secondary Insurance: AARP    ASSESSMENT:  Active Health Care Proxies    There are no active Health Care Proxies on file.       Advance Directives  Does patient have a Health Care POA?: No  Was patient offered paperwork?: Yes (declined)  Does patient currently have a Health Care decision maker?: No  Does patient have Advance Directives?: No  Was patient offered paperwork?: Yes (declined)  Primary Contact: , Abelardo              Patient Information  Admitted from:: Home  Mental Status: Alert  During Assessment patient was accompanied by: Not accompanied during assessment  Assessment information provided by:: Patient  Primary Caregiver: Self (chart)  Support Systems: Spouse/significant other, Daughter  County [facility] Residence: [facility]  Home entry access options. Select all  that apply.: Stairs  Number of steps to enter home.: 2  Type of Current Residence: [facility]  Living Arrangements: Lives w/ Spouse/significant other  Is patient a ?: No    Activities of Daily Living Prior to Admission  Functional Status: Independent  Completes ADLs independently?: Yes  Ambulates independently?: Yes  Does patient use assisted devices?: Yes  Assisted Devices (DME) used: Walker  Does patient currently own DME?: Yes  What DME does the patient currently own?: Walker  Does patient have a history of Outpatient Therapy (PT/OT)?: No  Does the patient have a history of Short-Term Rehab?: No  Does patient have a history of HHC?: Yes ([facility])  Does patient currently have HHC?: Yes ([facility])    Current Home Health Care  Type of Current Home Care Services: Home OT, Home PT, Nurse visit  Current Home Health Agency:: [facility]  Current Home Health Follow-Up Provider:: PCP    Patient Information Continued  Income Source: SSI/SSD  Does patient have prescription coverage?: Yes  Does patient receive dialysis treatments?: No  Does patient have a history of substance abuse?: No  Does patient have a history of Mental Health Diagnosis?: No         Means of Transportation  Means of Transport to Appts:: Family transport      Social Determinants of Health (SDOH)      Flowsheet Row Most Recent Value   Housing Stability    In the last 12 months, was there a time when you were not able to pay the mortgage or rent on time? N   In the last 12 months, how many places have you lived? 1   In the last 12 months, was there a time when you did not have a steady place to sleep or slept in a shelter (including now)? N   Food Insecurity    Within the past 12 months, you worried that your food would run out before you got the money to buy more. Never true   Within the past 12 months, the food you bought just didn't last and you didn't have money to get more. Never true   Utilities    In the past 12 months has the  electric, gas, oil, or water company threatened to shut off services in your home? No            DISCHARGE DETAILS:       Freedom of Choice: Yes     CM contacted family/caregiver?: No- see comments (declined)  Were Treatment Team discharge recommendations reviewed with patient/caregiver?: Yes  Did patient/caregiver verbalize understanding of patient care needs?: Yes       Contacts  Patient Contacts: Abelardo Tran, spouse  Relationship to Patient:: Family    Requested Home Health Care         Is the patient interested in HHC at discharge?: Yes  Home Health Discipline requested:: Nursing, Physical Therapy, Occupational Therapy  [facility] Health Agency [facility]:: [facility]  A External Referral Reason (only applicable if external HHA name selected): Patient has established relationship with provider  Home Health Follow-Up Provider:: PCP  Home Health Services Needed:: Evaluate Functional Status and Safety, Gait/ADL Training, Strengthening/Theraputic Exercises to Improve Function (medical management)  Oxygen LPM Ordered (if applicable based on home health services needed):: 2 LPM  Homebound Criteria Met:: Requires the Assistance of Another Person for Safe Ambulation or to Leave the Home  Supporting Clincal Findings:: Limited Endurance    DME Referral Provided  Referral made for DME?: No         Would you like to participate in our [facility] service program?  : No - Declined

## 2024-02-20 ENCOUNTER — APPOINTMENT (INPATIENT)
Dept: RADIOLOGY | Facility: HOSPITAL | Age: 79
DRG: 196 | End: 2024-02-20
Payer: MEDICARE

## 2024-02-20 LAB
ALBUMIN SERPL BCP-MCNC: 3.2 G/DL (ref 3.5–5)
ALP SERPL-CCNC: 145 U/L (ref 34–104)
ALT SERPL W P-5'-P-CCNC: 47 U/L (ref 7–52)
ANION GAP SERPL CALCULATED.3IONS-SCNC: 10 MMOL/L
AST SERPL W P-5'-P-CCNC: 40 U/L (ref 13–39)
BILIRUB SERPL-MCNC: 0.99 MG/DL (ref 0.2–1)
BUN SERPL-MCNC: 16 MG/DL (ref 5–25)
CALCIUM ALBUM COR SERPL-MCNC: 8.3 MG/DL (ref 8.3–10.1)
CALCIUM SERPL-MCNC: 7.7 MG/DL (ref 8.4–10.2)
CHLORIDE SERPL-SCNC: 99 MMOL/L (ref 96–108)
CO2 SERPL-SCNC: 23 MMOL/L (ref 21–32)
CREAT SERPL-MCNC: 0.79 MG/DL (ref 0.6–1.3)
ERYTHROCYTE [DISTWIDTH] IN BLOOD BY AUTOMATED COUNT: 15.7 % (ref 11.6–15.1)
GFR SERPL CREATININE-BSD FRML MDRD: 70 ML/MIN/1.73SQ M
GLUCOSE SERPL-MCNC: 94 MG/DL (ref 65–140)
HCT VFR BLD AUTO: 36.5 % (ref 34.8–46.1)
HGB BLD-MCNC: 11.5 G/DL (ref 11.5–15.4)
MAGNESIUM SERPL-MCNC: 1.8 MG/DL (ref 1.9–2.7)
MCH RBC QN AUTO: 26.9 PG (ref 26.8–34.3)
MCHC RBC AUTO-ENTMCNC: 31.5 G/DL (ref 31.4–37.4)
MCV RBC AUTO: 86 FL (ref 82–98)
PLATELET # BLD AUTO: 127 THOUSANDS/UL (ref 149–390)
PMV BLD AUTO: 10.2 FL (ref 8.9–12.7)
POTASSIUM SERPL-SCNC: 4.3 MMOL/L (ref 3.5–5.3)
PROT SERPL-MCNC: 5.3 G/DL (ref 6.4–8.4)
RBC # BLD AUTO: 4.27 MILLION/UL (ref 3.81–5.12)
SODIUM SERPL-SCNC: 132 MMOL/L (ref 135–147)
WBC # BLD AUTO: 8.12 THOUSAND/UL (ref 4.31–10.16)

## 2024-02-20 PROCEDURE — 85027 COMPLETE CBC AUTOMATED: CPT

## 2024-02-20 PROCEDURE — 99223 1ST HOSP IP/OBS HIGH 75: CPT | Performed by: PHYSICIAN ASSISTANT

## 2024-02-20 PROCEDURE — 99232 SBSQ HOSP IP/OBS MODERATE 35: CPT

## 2024-02-20 PROCEDURE — 83735 ASSAY OF MAGNESIUM: CPT

## 2024-02-20 PROCEDURE — 80053 COMPREHEN METABOLIC PANEL: CPT

## 2024-02-20 PROCEDURE — C9113 INJ PANTOPRAZOLE SODIUM, VIA: HCPCS

## 2024-02-20 PROCEDURE — 71045 X-RAY EXAM CHEST 1 VIEW: CPT

## 2024-02-20 RX ORDER — MAGNESIUM SULFATE HEPTAHYDRATE 40 MG/ML
2 INJECTION, SOLUTION INTRAVENOUS ONCE
Status: COMPLETED | OUTPATIENT
Start: 2024-02-20 | End: 2024-02-20

## 2024-02-20 RX ORDER — METHYLPREDNISOLONE SODIUM SUCCINATE 40 MG/ML
40 INJECTION, POWDER, LYOPHILIZED, FOR SOLUTION INTRAMUSCULAR; INTRAVENOUS EVERY 12 HOURS SCHEDULED
Status: DISCONTINUED | OUTPATIENT
Start: 2024-02-20 | End: 2024-02-20

## 2024-02-20 RX ORDER — METHYLPREDNISOLONE SODIUM SUCCINATE 40 MG/ML
40 INJECTION, POWDER, LYOPHILIZED, FOR SOLUTION INTRAMUSCULAR; INTRAVENOUS EVERY 12 HOURS SCHEDULED
Status: DISCONTINUED | OUTPATIENT
Start: 2024-02-20 | End: 2024-02-23

## 2024-02-20 RX ADMIN — GUAIFENESIN 1200 MG: 600 TABLET ORAL at 10:17

## 2024-02-20 RX ADMIN — GUAIFENESIN 1200 MG: 600 TABLET ORAL at 21:51

## 2024-02-20 RX ADMIN — MAGNESIUM SULFATE HEPTAHYDRATE 2 G: 40 INJECTION, SOLUTION INTRAVENOUS at 10:11

## 2024-02-20 RX ADMIN — SUCRALFATE 1 G: 1 TABLET ORAL at 10:12

## 2024-02-20 RX ADMIN — PANTOPRAZOLE SODIUM 40 MG: 40 INJECTION, POWDER, FOR SOLUTION INTRAVENOUS at 21:51

## 2024-02-20 RX ADMIN — ACETAMINOPHEN 325MG 650 MG: 325 TABLET ORAL at 10:19

## 2024-02-20 RX ADMIN — METHENAMINE HIPPURATE 1 G: 1 TABLET ORAL at 07:32

## 2024-02-20 RX ADMIN — PREDNISONE 20 MG: 20 TABLET ORAL at 10:17

## 2024-02-20 RX ADMIN — MYCOPHENOLATE MOFETIL 1000 MG: 250 CAPSULE ORAL at 10:12

## 2024-02-20 RX ADMIN — ATORVASTATIN CALCIUM 20 MG: 20 TABLET, FILM COATED ORAL at 16:09

## 2024-02-20 RX ADMIN — DOXYCYCLINE 100 MG: 100 INJECTION, POWDER, LYOPHILIZED, FOR SOLUTION INTRAVENOUS at 21:57

## 2024-02-20 RX ADMIN — OXYCODONE HYDROCHLORIDE 2.5 MG: 5 TABLET ORAL at 11:39

## 2024-02-20 RX ADMIN — CEFTRIAXONE 1000 MG: 1 INJECTION, SOLUTION INTRAVENOUS at 06:09

## 2024-02-20 RX ADMIN — METHYLPREDNISOLONE SODIUM SUCCINATE 40 MG: 40 INJECTION, POWDER, FOR SOLUTION INTRAMUSCULAR; INTRAVENOUS at 17:32

## 2024-02-20 RX ADMIN — OXYCODONE HYDROCHLORIDE AND ACETAMINOPHEN 500 MG: 500 TABLET ORAL at 10:12

## 2024-02-20 RX ADMIN — ATOVAQUONE 1500 MG: 750 SUSPENSION ORAL at 10:17

## 2024-02-20 RX ADMIN — METHENAMINE HIPPURATE 1 G: 1 TABLET ORAL at 16:09

## 2024-02-20 RX ADMIN — MYCOPHENOLATE MOFETIL 1000 MG: 250 CAPSULE ORAL at 21:51

## 2024-02-20 RX ADMIN — ENOXAPARIN SODIUM 40 MG: 40 INJECTION SUBCUTANEOUS at 10:11

## 2024-02-20 RX ADMIN — PANTOPRAZOLE SODIUM 40 MG: 40 INJECTION, POWDER, FOR SOLUTION INTRAVENOUS at 10:13

## 2024-02-20 RX ADMIN — SUCRALFATE 1 G: 1 TABLET ORAL at 06:09

## 2024-02-20 RX ADMIN — SUCRALFATE 1 G: 1 TABLET ORAL at 21:51

## 2024-02-20 RX ADMIN — DOXYCYCLINE 100 MG: 100 INJECTION, POWDER, LYOPHILIZED, FOR SOLUTION INTRAVENOUS at 10:17

## 2024-02-20 RX ADMIN — SUCRALFATE 1 G: 1 TABLET ORAL at 16:09

## 2024-02-20 NOTE — PROGRESS NOTES
[facility]  Progress Note  Name: Nina Tran I  MRN: [id number]  Unit/Bed#: -01 I Date of Admission: 2/17/2024   Date of Service: 2/20/2024 I Hospital Day: 3    Assessment/Plan   * Acute respiratory failure (HCC)  Assessment & Plan  POA with complaints of SOB and left sided chest pain. Recently admitted for COVID infection and sepsis. During previous admission, was requiring 2L NC. Home o2 eval done prior to discharge showed patient requiring no o2 at rest and 2L NC with ambulation. The last 3 days, patient has been using o2 more. Increased pain with movement.   During previous admission, patient stated she had o2 at home she used prn, but did not chronically use o2.   In ED, patient found to be 86% when placed on 2L NC and needed o2 increased to 4L NC.   CTA chest PE study: Unchanged groundglass opacities and interstitial marking thickening with bibasal predominance. Appearance favors fibrosis but acute process cannot be completely excluded. No pulmonary embolism  Will treat as active infection, start on IV rocephin  Incentive spirometry, supportive measures  Keep o2 saturations >90% and wean off o2 as able.   Will consult pulmonology    Pneumonia  Assessment & Plan  Presented to ED with left sided chest pain and worsening SOB for 3 days.   CTA chest: Unchanged groundglass opacities and interstitial marking thickening with bibasal predominance. Appearance favors fibrosis but acute process cannot be completely excluded. No PE.   COVID/Flu/RSV: COVID positive, but positive since 2/4 during previous admission.   Urine strep and legionella negative, Sputum culture ordered  Blood Cultures no growth 48 hours  Trend fever curve  Initially given rocephin and doxy in the emergency department, will continue and monitor cultures and titrate therapy as indicated  Supplemental oxygen for saturations > 90%; wean as tolerated  Aspiration Precautions  Respiratory Protocol   Incentive Spirometry    Supportive Care    Chest pain  Assessment & Plan  Presents to ED with left sided chest pain and increasing shortness of breath worsening over the last 3 days. Worse with movement.   EKG: sinus tachycardia, no ischemic changes.   CTA chest PE study: Unchanged groundglass opacities and interstitial marking thickening with bibasal predominance. Appearance favors fibrosis but acute process cannot be completely excluded.   MITESH score = 2  Troponin 0Hr 39; serial troponins normal  TSH elevated, normal t4, repeat thyroid studies in 4-6 weeks  A1c 7.1  Lipid panel with elevated triglycerides  Hold NSAIDS  Pain management  Telemetry  Cardiac diet; low sodium   Patient describing epigastric pain, more consistent with GERD, serial troponins normal.     Generalized weakness  Assessment & Plan  POA with complaints of generalized weakness suspect secondary to suspected bacterial pneumonia   Given 1 dose of rocephin and doxy in the ED, will continue  PT/OT consult: II (Moderate Resource Intensity)     GERD (gastroesophageal reflux disease)  Assessment & Plan  Patient with history of acid reflux taking protonix, patient explains epigastric pain, started after eating bbq outpatient. Still having epigastric pain  Started on iv protonix bid  Carafate qid  If still with pain, will consult GI to discuss potential EGD  GI consult: agree with PPI BID and carafate QID, possibly pleuritic pain secondary to recent COVID and pneumonia? Recommend EGD for further eval when symptoms improved from respiratory stance which can be done outpatient if patient is discharged.     History of thymus cancer  Assessment & Plan  Patient with history of thymus cancer s/p removal    Continue outpatient follow up     COVID-19  Assessment & Plan  Recently admitted with COVID-19 infection tested positive on 2/4, still testing positive from previous infection. Completed 10 days of isolation per COVID isolation precautions  Will hold further treatment for COVID  and treat for suspected bacterial pneumonia         VTE Pharmacologic Prophylaxis: VTE Score: 6 High Risk (Score >/= 5) - Pharmacological DVT Prophylaxis Ordered: enoxaparin (Lovenox). Sequential Compression Devices Ordered.    Mobility:   Basic Mobility Inpatient Raw Score: 18  JH-HLM Goal: 6: Walk 10 steps or more  JH-HLM Achieved: 6: Walk 10 steps or more  HLM Goal achieved. Continue to encourage appropriate mobility.    Patient Centered Rounds: I performed bedside rounds with nursing staff today.   Discussions with Specialists or Other Care Team Provider: nursing, case management, pulmonology, GI    Education and Discussions with Family / Patient: Updated  () at bedside.    Total Time Spent on Date of Encounter in care of patient: 40 mins. This time was spent on one or more of the following: performing physical exam; counseling and coordination of care; obtaining or reviewing history; documenting in the medical record; reviewing/ordering tests, medications or procedures; communicating with other healthcare professionals and discussing with patient's family/caregivers.    Current Length of Stay: 3 day(s)  Current Patient Status: Inpatient   Certification Statement: The patient will continue to require additional inpatient hospital stay due to IV abx, acute respiratory failure  Discharge Plan: Anticipate discharge in 48-72 hrs to home with home services.    Code Status: Level 1 - Full Code    Subjective:   Patient seen and examined today. She is doing about the same. States she didn't sleep well overnight. No nausea, vomiting, diarrhea, constipation. Still with epigastric pain. Said she was peeing a lot overnight. Feels that the pain is improved with pain medications.     Objective:     Vitals:   Temp (24hrs), Av.5 °F (36.4 °C), Min:97.5 °F (36.4 °C), Max:97.5 °F (36.4 °C)    Temp:  [97.5 °F (36.4 °C)] 97.5 °F (36.4 °C)  HR:  [] 102  Resp:  [16] 16  BP: ()/(51-73) 134/73  SpO2:   [92 %-97 %] 95 %  Body mass index is 23.03 kg/m².     Input and Output Summary (last 24 hours):     Intake/Output Summary (Last 24 hours) at 2/20/2024 1252  Last data filed at 2/20/2024 1019  Gross per 24 hour   Intake 420 ml   Output 1450 ml   Net -1030 ml       Physical Exam:   Physical Exam  Vitals reviewed.   Constitutional:       General: She is not in acute distress.     Appearance: She is ill-appearing. She is not toxic-appearing.   HENT:      Head: Normocephalic and atraumatic.      Mouth/Throat:      Mouth: Mucous membranes are moist.   Cardiovascular:      Rate and Rhythm: Normal rate and regular rhythm.      Heart sounds: No murmur heard.  Pulmonary:      Effort: No respiratory distress.      Breath sounds: No stridor. Rhonchi present. No wheezing or rales.   Abdominal:      General: Bowel sounds are normal. There is no distension.      Palpations: Abdomen is soft. There is no mass.      Tenderness: There is abdominal tenderness (epigastric).   Musculoskeletal:      Right lower leg: No edema.      Left lower leg: No edema.   Skin:     General: Skin is warm and dry.      Findings: Bruising (bilateral upper and lower extremities) present.   Neurological:      Mental Status: She is alert and oriented to person, place, and time.   Psychiatric:         Mood and Affect: Mood normal.         Behavior: Behavior normal.          Additional Data:     Labs:  Results from last 7 days   Lab Units 02/20/24  0625 02/19/24  0540 02/18/24  0428   WBC Thousand/uL 8.12   < > 6.43   HEMOGLOBIN g/dL 11.5   < > 10.4*   HEMATOCRIT % 36.5   < > 33.4*   PLATELETS Thousands/uL 127*   < > 97*   BANDS PCT %  --   --  18*   LYMPHO PCT %  --   --  2*   MONO PCT %  --   --  2*   EOS PCT %  --   --  0    < > = values in this interval not displayed.     Results from last 7 days   Lab Units 02/20/24  0625   SODIUM mmol/L 132*   POTASSIUM mmol/L 4.3   CHLORIDE mmol/L 99   CO2 mmol/L 23   BUN mg/dL 16   CREATININE mg/dL 0.79   ANION GAP  mmol/L 10   CALCIUM mg/dL 7.7*   ALBUMIN g/dL 3.2*   TOTAL BILIRUBIN mg/dL 0.99   ALK PHOS U/L 145*   ALT U/L 47   AST U/L 40*   GLUCOSE RANDOM mg/dL 94     Results from last 7 days   Lab Units 02/17/24  0753   INR  0.92         Results from last 7 days   Lab Units 02/18/24  0428   HEMOGLOBIN A1C % 7.1*     Results from last 7 days   Lab Units 02/19/24  0540 02/18/24  0428 02/17/24  1052 02/17/24  0753   LACTIC ACID mmol/L  --   --  1.1 2.3*   PROCALCITONIN ng/ml 0.25 0.26*  --  0.32*       Lines/Drains:  Invasive Devices       Peripheral Intravenous Line  Duration             Peripheral IV 02/20/24 Right;Ventral (anterior) Forearm <1 day              Drain  Duration             External Urinary Catheter 2 days                          Imaging: No pertinent imaging reviewed.    Recent Cultures (last 7 days):   Results from last 7 days   Lab Units 02/17/24  1530 02/17/24  0925 02/17/24  0753   BLOOD CULTURE   --   --  No Growth at 48 hrs.  No Growth at 48 hrs.   SPUTUM CULTURE  2+ Growth of  --   --    GRAM STAIN RESULT  No Epithelial cells per low power field*  No polys seen*  Rare Gram positive cocci in pairs*  --   --    LEGIONELLA URINARY ANTIGEN   --  Negative  --        Last 24 Hours Medication List:   Current Facility-Administered Medications   Medication Dose Route Frequency Provider Last Rate    acetaminophen  650 mg Oral Q6H PRN Ann Cardona PA-C      aluminum-magnesium hydroxide-simethicone  30 mL Oral Q4H PRN KD Garrett-ROMARIO      ascorbic acid  500 mg Oral Daily KD Garrett-ROMARIO      atorvastatin  20 mg Oral Daily With Dinner KD Garrett-ROMARIO      atovaquone  1,500 mg Oral Daily Ann Cardona PA-C      cefTRIAXone  1,000 mg Intravenous Q24H KD Garrett-C 1,000 mg (02/20/24 0609)    doxycycline  100 mg Intravenous Q12H Ann Cardona PA-C 100 mg (02/20/24 1017)    enoxaparin  40 mg Subcutaneous Daily Ann Cardona PA-C      guaiFENesin  1,200  mg Oral Q12H [facility] Ann Cardona PA-C      HYDROmorphone  0.2 mg Intravenous Q2H PRN Ann Cardona PA-C      methenamine hippurate  1 g Oral BID With Meals Ann Cardona PA-C      mycophenolate  1,000 mg Oral Q12H [facility] Ann Cardona PA-C      ondansetron  4 mg Intravenous Q6H PRN Ann Cardona PA-C      oxyCODONE  2.5 mg Oral Q4H PRN Ann Cardona PA-C      Or    oxyCODONE  5 mg Oral Q4H PRN Ann Cardona PA-C      pantoprazole  40 mg Intravenous Q12H [facility] Ann Cardona PA-C      predniSONE  20 mg Oral Daily Ann Cardona PA-C      sucralfate  1 g Oral 4x Daily (AC & HS) Ann Cardona PA-C          Today, Patient Was Seen By: Ann Cardona PA-C    **Please Note: This note may have been constructed using a voice recognition system.**

## 2024-02-20 NOTE — PLAN OF CARE
Problem: PAIN - ADULT  Goal: Verbalizes/displays adequate comfort level or baseline comfort level  Description: Interventions:  - Encourage patient to monitor pain and request assistance  - Assess pain using appropriate pain scale0-10  - Administer analgesics based on type and severity of pain and evaluate response  - Implement non-pharmacological measures as appropriate and evaluate response  - Consider cultural and social influences on pain and pain management  - Notify physician/advanced practitioner if interventions unsuccessful or patient reports new pain  Outcome: Progressing     Problem: INFECTION - ADULT  Goal: Absence or prevention of progression during hospitalization  Description: INTERVENTIONS:  - Assess and monitor for signs and symptoms of infection  - Monitor lab/diagnostic results  - Monitor all insertion sites, i.e. indwelling lines, tubes, and drains  - Monitor endotracheal if appropriate and nasal secretions for changes in amount and color  - [place] appropriate cooling/warming therapies per order  - Administer medications as ordered  - Instruct and encourage patient and family to use good hand hygiene technique  - Identify and instruct in appropriate isolation precautions for identified infection/condition  Outcome: Progressing     Problem: SAFETY ADULT  Goal: Patient will remain free of falls  Description: INTERVENTIONS:  - Educate patient/family on patient safety including physical limitations  - Instruct patient to call for assistance with activity   - Consult OT/PT to assist with strengthening/mobility   - Keep Call bell within reach  - Keep bed low and locked with side rails adjusted as appropriate  - Keep care items and personal belongings within reach  - Initiate and maintain comfort rounds  - Make Fall Risk Sign visible to staff  - Offer Toileting every 2 Hours, in advance of need  - Initiate/Maintain bed/chair alarm  - Apply yellow socks and bracelet for high fall risk patients  -  Consider moving patient to room near nurses station  Outcome: Progressing  Goal: Maintain or return to baseline ADL function  Description: INTERVENTIONS:  -  Assess patient's ability to carry out ADLs; assess patient's baseline for ADL function and identify physical deficits which impact ability to perform ADLs (bathing, care of mouth/teeth, toileting, grooming, dressing, etc.)  - Assess/evaluate cause of self-care deficits   - Assess range of motion  - Assess patient's mobility; develop plan if impaired  - Assess patient's need for assistive devices and provide as appropriate  - Encourage maximum independence but intervene and supervise when necessary  - Involve family in performance of ADLs  - Assess for home care needs following discharge   - Consider OT consult to assist with ADL evaluation and planning for discharge  - Provide patient education as appropriate  Outcome: Progressing  Goal: Maintains/Returns to pre admission functional level  Description: INTERVENTIONS:  - Perform AM-PAC 6 Click Basic Mobility/ Daily Activity assessment daily.  - Set and communicate daily mobility goal to care team and patient/family/caregiver.   - Collaborate with rehabilitation services on mobility goals if consulted  - Perform Range of Motion 4 times a day.  - Reposition patient every 2 hours.  - Ambulate patient 4 times a day  - Out of bed to chair 3 times a day   - Out of bed for meals 3 times a day  - Out of bed for toileting  - Record patient progress and toleration of activity level   Outcome: Progressing

## 2024-02-20 NOTE — CONSULTS
Consultation - [facility]  Nina Tran 78 y.o. female MRN: [id number]  Unit/Bed#: -01 Encounter: [id number]      ASSESSMENT & PLAN    Epigastric pain  GERD  Hx of CCY   Patient presented with SOB and chest discomfort, admitted with acute respiratory failure 2/2 pneumonia who reports epigastric pain x 1 week  Hx of GERD on PPI daily at home  Pain is worsened by movement, not affected by eating or drinking   No NSAIDs  Agree with PPI BID and carafate QID   ?pleuritic pain related to pneumonia and recent COVID infection  Recommend EGD to further evaluate symptoms once improved from respiratory standpoint, this can be done as an outpatient     Reason for Consult / Principal Problem: GERD    HPI: Nina Tran is a 78 y.o. year old female with a PMHx thymus cancer, anemia, GERD, HLD, hx COVID (2/2024) who presented with SOB and left sided chest pain x 3 days.     In the ED, CTA with unchanged groundglass opacities and interstitial marking thickening with bibasal predominance, appearance favors fibrosis but acute process cannot be completely excluded, featureless appearance, wall thickening and under distention of the transverse and left colon including rectum, correlate to exclude history or symptoms of colitis, ulcerative colitis is considered but usually presents at earlier age, correlate with history.    She was admitted with acute respiratory failure 2/2 pneumonia. She was started on IV antibiotics and O2 was increased. Suspected chest pain due to GERD and pneumonia. On IV PPI BID and carafate. She is currently on 4L of O2.     Patient reports she started with upper abdominal discomfort about 1 week ago. Per primary team, patient's  thinks it started after eating BBQ. Patient reports the pain was constant and worsened by movement, not affected by eating or drinking. The pain has currently resolved. She has a hx of chronic GERD and takes a PPI daily at home. Denies N/V, dysphagia,  diarrhea or constipation. No blood in stool.    She reports EGD and colonoscopy in 2020/2021 with [facility], told they were normal. Unable to find op notes in chart.     Endoscopic risk assessment:  Anticoagulation use: no  Diabetes medication: no  CVS history: no  Abdominal surgeries: CCY, hysterectomy  Sleep apnea: no  O2 use: no    Past Medical History:   Diagnosis Date    High cholesterol      Past Surgical History:   Procedure Laterality Date    CHOLECYSTECTOMY  11/2023    HYSTERECTOMY      LUNG BIOPSY Right 01/2023     Social History   Social History     Substance and Sexual Activity   Alcohol Use Yes     Social History     Substance and Sexual Activity   Drug Use Never     Social History     Tobacco Use   Smoking Status Never   Smokeless Tobacco Never       History reviewed. No pertinent family history.    Medications Prior to Admission   Medication    ascorbic acid (VITAMIN C) 250 mg tablet    atorvastatin (LIPITOR) 20 mg tablet    atovaquone (MEPRON) 750 mg/5 mL suspension    methenamine hippurate (HIPREX) 1 g tablet    mycophenolate (CELLCEPT) 250 mg capsule    omeprazole (PriLOSEC) 20 mg delayed release capsule    predniSONE 20 mg tablet     Current Facility-Administered Medications   Medication Dose Route Frequency    acetaminophen (TYLENOL) tablet 650 mg  650 mg Oral Q6H PRN    aluminum-magnesium hydroxide-simethicone (MAALOX) oral suspension 30 mL  30 mL Oral Q4H PRN    ascorbic acid (VITAMIN C) tablet 500 mg  500 mg Oral Daily    atorvastatin (LIPITOR) tablet 20 mg  20 mg Oral Daily With Dinner    atovaquone (MEPRON) oral suspension 1,500 mg  1,500 mg Oral Daily    cefTRIAXone (ROCEPHIN) IVPB (premix in dextrose) 1,000 mg 50 mL  1,000 mg Intravenous Q24H    doxycycline (VIBRAMYCIN) 100 mg in sodium chloride 0.9 % 100 mL IVPB  100 mg Intravenous Q12H    enoxaparin (LOVENOX) subcutaneous injection 40 mg  40 mg Subcutaneous Daily    guaiFENesin (MUCINEX) 12 hr tablet 1,200 mg  1,200 mg  Oral Q12H FALLON    HYDROmorphone HCl (DILAUDID) injection 0.2 mg  0.2 mg Intravenous Q2H PRN    magnesium sulfate 2 g/50 mL IVPB (premix) 2 g  2 g Intravenous Once    methenamine hippurate (HIPREX) tablet 1 g  1 g Oral BID With Meals    mycophenolate (CELLCEPT) capsule 1,000 mg  1,000 mg Oral Q12H FALLON    ondansetron (ZOFRAN) injection 4 mg  4 mg Intravenous Q6H PRN    oxyCODONE (ROXICODONE) IR tablet 2.5 mg  2.5 mg Oral Q4H PRN    Or    oxyCODONE (ROXICODONE) IR tablet 5 mg  5 mg Oral Q4H PRN    pantoprazole (PROTONIX) injection 40 mg  40 mg Intravenous Q12H FALLON    predniSONE tablet 20 mg  20 mg Oral Daily    sucralfate (CARAFATE) tablet 1 g  1 g Oral 4x Daily (AC & HS)     Allergies   Allergen Reactions    Tetanus Toxoid Hives     hives          Physical Exam  Constitutional:       General: She is not in acute distress.     Appearance: She is not ill-appearing.      Interventions: Nasal cannula in place.   HENT:      Head: Normocephalic and atraumatic.      Mouth/Throat:      Mouth: Mucous membranes are moist.   Eyes:      General: No scleral icterus.  Pulmonary:      Effort: Pulmonary effort is normal. No respiratory distress.   Abdominal:      General: Abdomen is flat. There is no distension.      Palpations: Abdomen is soft.      Tenderness: There is no abdominal tenderness. There is no guarding.   Skin:     General: Skin is warm and dry.   Neurological:      Mental Status: She is alert.       Most Recent Vital Signs:  Vitals:    02/19/24 1514 02/19/24 1600 02/19/24 2137 02/20/24 0242   BP: 99/60  (!) 83/51 134/73   Pulse: 84 96 88 96   Resp: 16  16    Temp:   97.5 °F (36.4 °C)    SpO2: 97% 95% 96% 95%   Height:           Intake/Output Summary (Last 24 hours) at 2/20/2024 0850  Last data filed at 2/20/2024 0830  Gross per 24 hour   Intake 620 ml   Output 1250 ml   Net -630 ml       LABS/IMAGING  Lab Results: I have reviewed all relevant lab results during this hospitalization.    Imaging Studies:  I have reviewed  all the relevant images during this hospitalizations    Counseling / Coordination of Care  Total time spent today 45 minutes. Greater than 50% of total time was spent with the patient and / or family counseling and / or coordination of care.    Radha Suarez PA-C

## 2024-02-20 NOTE — ASSESSMENT & PLAN NOTE
Patient with history of acid reflux taking protonix, patient explains epigastric pain, started after eating bbq outpatient. Still having epigastric pain  Started on iv protonix bid  Carafate qid  If still with pain, will consult GI to discuss potential EGD  GI consult: agree with PPI BID and carafate QID, possibly pleuritic pain secondary to recent COVID and pneumonia? Recommend EGD for further eval when symptoms improved from respiratory stance which can be done outpatient if patient is discharged.

## 2024-02-20 NOTE — NURSING NOTE
Pt very weak and lethargic when transferring to C. Desaturation and increased heart rate ranging 140s noted during ambulation. Maintained sats on 8L and transferred back to bed. VSS otherwise, no chest discomfort or reports of pain. SOB on exertion. Able to wean oxygen to 4L comfortably. HR still 120s. Offers no complaints at this time. SLIM aware. Oncoming nurse made aware as well.

## 2024-02-20 NOTE — ASSESSMENT & PLAN NOTE
Presented to ED with left sided chest pain and worsening SOB for 3 days.   CTA chest: Unchanged groundglass opacities and interstitial marking thickening with bibasal predominance. Appearance favors fibrosis but acute process cannot be completely excluded. No PE.   COVID/Flu/RSV: COVID positive, but positive since 2/4 during previous admission.   Urine strep and legionella negative, Sputum culture ordered  Blood Cultures no growth 48 hours  Trend fever curve  Initially given rocephin and doxy in the emergency department, will continue and monitor cultures and titrate therapy as indicated  Supplemental oxygen for saturations > 90%; wean as tolerated  Aspiration Precautions  Respiratory Protocol   Incentive Spirometry   Supportive Care

## 2024-02-20 NOTE — CONSULTS
Consultation - Pulmonary Medicine   Nina Tarn 78 y.o. 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  She maintain SpO2 greater than or equal to 88%.  Pulmonary toilet:  Increase activity as tolerated, out of bed as tolerated, cough and deep breathing exercises encourage, incentive spirometry q.1 hour  Complete 5 days ceftriaxone/doxycycline given mildly elevated procalcitonin  Will discontinue prednisone 20 mg and replace with Solu-Medrol 40 mg IV every 12 hours for suspected ILD flare.  Continue Cellcept and Mepron.   Monitor I/Os and daily weights.  Diuresis as necessary but currently appears euvolemic.  Further management per primary team and GI.     History of Present Illness   Physician Requesting Consult: Charlene Archer MD  Reason for Consult / Principal Problem: hypoxia  Hx and PE limited by: n/a  Chief Complaint: shortness of breath   HPI: Nina Tran is a 78 y.o.  female who presented to [facility] with complaints of shortness of breath and epigastric pain.  Patient has past medical is positive for thymus cancer diagnosed in 2012 in remission, anemia, GERD, HLD, pulmonary fibrosis and recent COVID infection earlier this month.  Patient had been admitted at that time and treated appropriately.  She was sent to rehab where she continued to improve but upon discharge reports that she developed worsening shortness of breath and epigastric/left-sided chest pain 3 days prior to coming in.  Denies fevers or chills.  Denies sick contacts.  States that her breathing currently is improved but her pain persists.  She was admitted for possible pneumonia prompting pulmonary consult.  GI is also planning consulted  and is recommending EGD.     Patient has a past medical history positive for COVID diagnosed in 2020.  Following that she had progressive respiratory symptoms and was ultimately referred to [facility].  She had CT scan showing clear evidence of fibrotic disease.  She underwent open lung biopsy that showed fibrosing pattern.  She was started on CellCept, prednisone and PCP prophylaxis.  Her baseline oxygen requirement is 2 L as needed.  Non-smoker.  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    Inpatient consult to Pulmonology  Consult performed by: Sumeet Pathak PA-C  Consult ordered by: Ann Cardona PA-C          Review of Systems   All other systems reviewed and are negative.      Historical Information   Past Medical History:   Diagnosis Date    High cholesterol      Past Surgical History:   Procedure Laterality Date    CHOLECYSTECTOMY  11/2023    HYSTERECTOMY      LUNG BIOPSY Right 01/2023     Social History   Social History     Substance and Sexual Activity   Alcohol Use Yes     Social History     Substance and Sexual Activity   Drug Use Never     Social History     Tobacco Use   Smoking Status Never   Smokeless Tobacco Never     E-Cigarette/Vaping    E-Cigarette Use Never User      E-Cigarette/Vaping Substances     Occupational History: worked in a sewing mill, now retired     Family History: History reviewed. No pertinent family history.    Meds/Allergies   all current active meds have been reviewed, pertinent pulmonary meds have been reviewed, and current meds:   Current Facility-Administered Medications   Medication Dose Route Frequency    acetaminophen (TYLENOL) tablet 650 mg  650 mg Oral Q6H PRN    aluminum-magnesium hydroxide-simethicone (MAALOX) oral suspension 30 mL  30 mL Oral Q4H PRN    ascorbic acid (VITAMIN C) tablet 500 mg  500 mg Oral Daily    atorvastatin (LIPITOR) tablet 20 mg  20 mg Oral Daily With Dinner    atovaquone (MEPRON) oral suspension 1,500 mg  1,500 mg Oral Daily     "cefTRIAXone (ROCEPHIN) IVPB (premix in dextrose) 1,000 mg 50 mL  1,000 mg Intravenous Q24H    doxycycline (VIBRAMYCIN) 100 mg in sodium chloride 0.9 % 100 mL IVPB  100 mg Intravenous Q12H    enoxaparin (LOVENOX) subcutaneous injection 40 mg  40 mg Subcutaneous Daily    guaiFENesin (MUCINEX) 12 hr tablet 1,200 mg  1,200 mg Oral Q12H [place]ON    HYDROmorphone HCl (DILAUDID) injection 0.2 mg  0.2 mg Intravenous Q2H PRN    methenamine hippurate (HIPREX) tablet 1 g  1 g Oral BID With Meals    methylPREDNISolone sodium succinate (Solu-MEDROL) injection 40 mg  40 mg Intravenous Q12H [facility]    mycophenolate (CELLCEPT) capsule 1,000 mg  1,000 mg Oral Q12H F[place]ON    ondansetron (ZOFRAN) injection 4 mg  4 mg Intravenous Q6H PRN    oxyCODONE (ROXICODONE) IR tablet 2.5 mg  2.5 mg Oral Q4H PRN    Or    oxyCODONE (ROXICODONE) IR tablet 5 mg  5 mg Oral Q4H PRN    pantoprazole (PROTONIX) injection 40 mg  40 mg Intravenous Q12H F[place]ON    sucralfate (CARAFATE) tablet 1 g  1 g Oral 4x Daily (AC & HS)       Allergies   Allergen Reactions    Tetanus Toxoid Hives     hives          Objective   Vitals: Blood pressure 118/78, pulse (!) 113, temperature 97.5 °F (36.4 °C), resp. rate 16, height 5' 3\" (1.6 m), SpO2 91%.3L NC ,Body mass index is 23.03 kg/m².    Intake/Output Summary (Last 24 hours) at 2/20/2024 1810  Last data filed at 2/20/2024 1741  Gross per 24 hour   Intake 660 ml   Output 600 ml   Net 60 ml     Invasive Devices       Peripheral Intravenous Line  Duration             Peripheral IV 02/20/24 Right;Ventral (anterior) Forearm <1 day              Drain  Duration             External Urinary Catheter 3 days                    Physical Exam  Vitals and nursing note reviewed.   Constitutional:       General: She is not in acute distress.     Appearance: Normal appearance.   HENT:      Head: Normocephalic and atraumatic.      Mouth/Throat:      Mouth: Mucous membranes are moist.      Pharynx: Oropharynx is clear.   Cardiovascular:      Rate and " "Rhythm: Normal rate and regular rhythm.      Heart sounds: No murmur heard.  Pulmonary:      Effort: Pulmonary effort is normal.      Breath sounds: Rales present. No wheezing or rhonchi.   Musculoskeletal:      Cervical back: Normal range of motion.   Skin:     General: Skin is warm and dry.   Neurological:      General: No focal deficit present.      Mental Status: She is alert. Mental status is at baseline.   Psychiatric:         Mood and Affect: Mood normal.         Behavior: Behavior normal.         Lab Results: I have personally reviewed pertinent lab results., ABG: No results found for: \"PHART\", \"LWP7IBZ\", \"PO2ART\", \"MQT3WIB\", \"K8WHCYMB\", \"BEART\", \"SOURCE\", BNP: No results found for: \"BNP\", CBC:   Lab Results   Component Value Date    WBC 8.12 02/20/2024    HGB 11.5 02/20/2024    HCT 36.5 02/20/2024    MCV 86 02/20/2024     (L) 02/20/2024    RBC 4.27 02/20/2024    MCH 26.9 02/20/2024    MCHC 31.5 02/20/2024    RDW 15.7 (H) 02/20/2024    MPV 10.2 02/20/2024   , CMP:   Lab Results   Component Value Date    SODIUM 132 (L) 02/20/2024    K 4.3 02/20/2024    CL 99 02/20/2024    CO2 23 02/20/2024    BUN 16 02/20/2024    CREATININE 0.79 02/20/2024    CALCIUM 7.7 (L) 02/20/2024    AST 40 (H) 02/20/2024    ALT 47 02/20/2024    ALKPHOS 145 (H) 02/20/2024    EGFR 70 02/20/2024   , PT/INR: No results found for: \"PT\", \"INR\", Troponin: No results found for: \"TROPONINI\"    Imaging Studies: I have personally reviewed pertinent reports.   and I have personally reviewed pertinent films in PACS     CTA chest PE study abdomen pelvis with contrast 2/17/2024.  No PE.  Unchanged groundglass opacities and interstitial markings with basilar predominance.  No pleural effusion.  No pneumothorax.  No mediastinal or hilar adenopathy.    EKG, Pathology, and Other Studies: I have personally reviewed pertinent reports.       Echo 2/5/2024  EF 65 to 69%.  Grade 1 diastolic dysfunction.  Right ventricular size and systolic function " "normal.    Pulmonary Results (PFTs, PSG): none to review      Code Status: Level 1 - Full Code    Portions of the record may have been created with voice recognition software.  Occasional wrong word or \"sound a like\" substitutions may have occurred due to the inherent limitations of voice recognition software.  Read the chart carefully and recognize, using context, where substitutions have occurred.    "

## 2024-02-20 NOTE — PLAN OF CARE
Problem: PAIN - ADULT  Goal: Verbalizes/displays adequate comfort level or baseline comfort level  Description: Interventions:  - Encourage patient to monitor pain and request assistance  - Assess pain using appropriate pain scale0-10  - Administer analgesics based on type and severity of pain and evaluate response  - Implement non-pharmacological measures as appropriate and evaluate response  - Consider cultural and social influences on pain and pain management  - Notify physician/advanced practitioner if interventions unsuccessful or patient reports new pain  Outcome: Progressing     Problem: INFECTION - ADULT  Goal: Absence or prevention of progression during hospitalization  Description: INTERVENTIONS:  - Assess and monitor for signs and symptoms of infection  - Monitor lab/diagnostic results  - Monitor all insertion sites, i.e. indwelling lines, tubes, and drains  - Monitor endotracheal if appropriate and nasal secretions for changes in amount and color  - Lillian appropriate cooling/warming therapies per order  - Administer medications as ordered  - Instruct and encourage patient and family to use good hand hygiene technique  - Identify and instruct in appropriate isolation precautions for identified infection/condition  Outcome: Progressing     Problem: INFECTION - ADULT  Goal: Absence or prevention of progression during hospitalization  Description: INTERVENTIONS:  - Assess and monitor for signs and symptoms of infection  - Monitor lab/diagnostic results  - Monitor all insertion sites, i.e. indwelling lines, tubes, and drains  - Monitor endotracheal if appropriate and nasal secretions for changes in amount and color  - Lillian appropriate cooling/warming therapies per order  - Administer medications as ordered  - Instruct and encourage patient and family to use good hand hygiene technique  - Identify and instruct in appropriate isolation precautions for identified infection/condition  Outcome:  Headaches and L-side abdominal pain x 3 days. Denies fever/vomiting/diarrhea  Tylenol last given at 1530   Progressing     Problem: SAFETY ADULT  Goal: Patient will remain free of falls  Description: INTERVENTIONS:  - Educate patient/family on patient safety including physical limitations  - Instruct patient to call for assistance with activity   - Consult OT/PT to assist with strengthening/mobility   - Keep Call bell within reach  - Keep bed low and locked with side rails adjusted as appropriate  - Keep care items and personal belongings within reach  - Initiate and maintain comfort rounds  - Make Fall Risk Sign visible to staff  - Offer Toileting every 2 Hours, in advance of need  - Initiate/Maintain bed/chair alarm  - Apply yellow socks and bracelet for high fall risk patients  - Consider moving patient to room near nurses station  Outcome: Progressing     Problem: SAFETY ADULT  Goal: Patient will remain free of falls  Description: INTERVENTIONS:  - Educate patient/family on patient safety including physical limitations  - Instruct patient to call for assistance with activity   - Consult OT/PT to assist with strengthening/mobility   - Keep Call bell within reach  - Keep bed low and locked with side rails adjusted as appropriate  - Keep care items and personal belongings within reach  - Initiate and maintain comfort rounds  - Make Fall Risk Sign visible to staff  - Offer Toileting every 2 Hours, in advance of need  - Initiate/Maintain bed/chair alarm  - Apply yellow socks and bracelet for high fall risk patients  - Consider moving patient to room near nurses station  Outcome: Progressing  Goal: Maintain or return to baseline ADL function  Description: INTERVENTIONS:  -  Assess patient's ability to carry out ADLs; assess patient's baseline for ADL function and identify physical deficits which impact ability to perform ADLs (bathing, care of mouth/teeth, toileting, grooming, dressing, etc.)  - Assess/evaluate cause of self-care deficits   - Assess range of motion  - Assess patient's mobility; develop plan if impaired  -  Assess patient's need for assistive devices and provide as appropriate  - Encourage maximum independence but intervene and supervise when necessary  - Involve family in performance of ADLs  - Assess for home care needs following discharge   - Consider OT consult to assist with ADL evaluation and planning for discharge  - Provide patient education as appropriate  Outcome: Progressing  Goal: Maintains/Returns to pre admission functional level  Description: INTERVENTIONS:  - Perform AM-PAC 6 Click Basic Mobility/ Daily Activity assessment daily.  - Set and communicate daily mobility goal to care team and patient/family/caregiver.   - Collaborate with rehabilitation services on mobility goals if consulted  - Perform Range of Motion 4 times a day.  - Reposition patient every 2 hours.  - Ambulate patient 4 times a day  - Out of bed to chair 3 times a day   - Out of bed for meals 3 times a day  - Out of bed for toileting  - Record patient progress and toleration of activity level   Outcome: Progressing     Problem: DISCHARGE PLANNING  Goal: Discharge to home or other facility with appropriate resources  Description: INTERVENTIONS:  - Identify barriers to discharge w/patient and caregiver  - Arrange for needed discharge resources and transportation as appropriate  - Identify discharge learning needs (meds, wound care, etc.)  - Arrange for interpretive services to assist at discharge as needed  - Refer to Case Management Department for coordinating discharge planning if the patient needs post-hospital services based on physician/advanced practitioner order or complex needs related to functional status, cognitive ability, or social support system  Outcome: Progressing     Problem: Knowledge Deficit  Goal: Patient/family/caregiver demonstrates understanding of disease process, treatment plan, medications, and discharge instructions  Description: Complete learning assessment and assess knowledge base.  Interventions:  - Provide  teaching at level of understanding  - Provide teaching via preferred learning methods  Outcome: Progressing     Problem: RESPIRATORY - ADULT  Goal: Achieves optimal ventilation and oxygenation  Description: INTERVENTIONS:  - Assess for changes in respiratory status  - Assess for changes in mentation and behavior  - Position to facilitate oxygenation and minimize respiratory effort  - Oxygen administered by appropriate delivery if ordered  - Initiate smoking cessation education as indicated  - Encourage broncho-pulmonary hygiene including cough, deep breathe, Incentive Spirometry  - Assess the need for suctioning and aspirate as needed  - Assess and instruct to report SOB or any respiratory difficulty  - Respiratory Therapy support as indicated  Outcome: Progressing

## 2024-02-21 PROBLEM — A41.9 SEPSIS (HCC): Status: RESOLVED | Noted: 2024-02-05 | Resolved: 2024-02-21

## 2024-02-21 PROBLEM — J18.9 PNEUMONIA: Status: RESOLVED | Noted: 2024-02-17 | Resolved: 2024-02-21

## 2024-02-21 PROBLEM — N30.00 ACUTE CYSTITIS WITHOUT HEMATURIA: Status: RESOLVED | Noted: 2024-02-04 | Resolved: 2024-02-21

## 2024-02-21 LAB
ALBUMIN SERPL BCP-MCNC: 2.9 G/DL (ref 3.5–5)
ALP SERPL-CCNC: 127 U/L (ref 34–104)
ALT SERPL W P-5'-P-CCNC: 41 U/L (ref 7–52)
ANION GAP SERPL CALCULATED.3IONS-SCNC: 8 MMOL/L
AST SERPL W P-5'-P-CCNC: 32 U/L (ref 13–39)
BILIRUB SERPL-MCNC: 0.73 MG/DL (ref 0.2–1)
BUN SERPL-MCNC: 17 MG/DL (ref 5–25)
CALCIUM ALBUM COR SERPL-MCNC: 8.5 MG/DL (ref 8.3–10.1)
CALCIUM SERPL-MCNC: 7.6 MG/DL (ref 8.4–10.2)
CHLORIDE SERPL-SCNC: 99 MMOL/L (ref 96–108)
CO2 SERPL-SCNC: 23 MMOL/L (ref 21–32)
CREAT SERPL-MCNC: 0.62 MG/DL (ref 0.6–1.3)
ERYTHROCYTE [DISTWIDTH] IN BLOOD BY AUTOMATED COUNT: 15.7 % (ref 11.6–15.1)
GFR SERPL CREATININE-BSD FRML MDRD: 85 ML/MIN/1.73SQ M
GLUCOSE SERPL-MCNC: 143 MG/DL (ref 65–140)
HCT VFR BLD AUTO: 32.8 % (ref 34.8–46.1)
HGB BLD-MCNC: 10.5 G/DL (ref 11.5–15.4)
MAGNESIUM SERPL-MCNC: 2.2 MG/DL (ref 1.9–2.7)
MCH RBC QN AUTO: 27.1 PG (ref 26.8–34.3)
MCHC RBC AUTO-ENTMCNC: 32 G/DL (ref 31.4–37.4)
MCV RBC AUTO: 85 FL (ref 82–98)
PLATELET # BLD AUTO: 110 THOUSANDS/UL (ref 149–390)
PMV BLD AUTO: 10.7 FL (ref 8.9–12.7)
POTASSIUM SERPL-SCNC: 4.1 MMOL/L (ref 3.5–5.3)
PROT SERPL-MCNC: 5 G/DL (ref 6.4–8.4)
RBC # BLD AUTO: 3.87 MILLION/UL (ref 3.81–5.12)
SODIUM SERPL-SCNC: 130 MMOL/L (ref 135–147)
WBC # BLD AUTO: 5.26 THOUSAND/UL (ref 4.31–10.16)

## 2024-02-21 PROCEDURE — 80053 COMPREHEN METABOLIC PANEL: CPT

## 2024-02-21 PROCEDURE — 97110 THERAPEUTIC EXERCISES: CPT

## 2024-02-21 PROCEDURE — C9113 INJ PANTOPRAZOLE SODIUM, VIA: HCPCS

## 2024-02-21 PROCEDURE — 85027 COMPLETE CBC AUTOMATED: CPT

## 2024-02-21 PROCEDURE — 97116 GAIT TRAINING THERAPY: CPT

## 2024-02-21 PROCEDURE — 97530 THERAPEUTIC ACTIVITIES: CPT

## 2024-02-21 PROCEDURE — 83735 ASSAY OF MAGNESIUM: CPT

## 2024-02-21 PROCEDURE — 97535 SELF CARE MNGMENT TRAINING: CPT

## 2024-02-21 PROCEDURE — 99232 SBSQ HOSP IP/OBS MODERATE 35: CPT

## 2024-02-21 RX ORDER — FUROSEMIDE 10 MG/ML
40 INJECTION INTRAMUSCULAR; INTRAVENOUS ONCE
Status: COMPLETED | OUTPATIENT
Start: 2024-02-21 | End: 2024-02-21

## 2024-02-21 RX ADMIN — METHENAMINE HIPPURATE 1 G: 1 TABLET ORAL at 08:08

## 2024-02-21 RX ADMIN — GUAIFENESIN 1200 MG: 600 TABLET ORAL at 21:44

## 2024-02-21 RX ADMIN — OXYCODONE HYDROCHLORIDE 5 MG: 5 TABLET ORAL at 13:53

## 2024-02-21 RX ADMIN — PANTOPRAZOLE SODIUM 40 MG: 40 INJECTION, POWDER, FOR SOLUTION INTRAVENOUS at 08:09

## 2024-02-21 RX ADMIN — OXYCODONE HYDROCHLORIDE 2.5 MG: 5 TABLET ORAL at 05:45

## 2024-02-21 RX ADMIN — GUAIFENESIN 1200 MG: 600 TABLET ORAL at 08:08

## 2024-02-21 RX ADMIN — SUCRALFATE 1 G: 1 TABLET ORAL at 21:44

## 2024-02-21 RX ADMIN — ATOVAQUONE 1500 MG: 750 SUSPENSION ORAL at 08:08

## 2024-02-21 RX ADMIN — ATORVASTATIN CALCIUM 20 MG: 20 TABLET, FILM COATED ORAL at 17:11

## 2024-02-21 RX ADMIN — ENOXAPARIN SODIUM 40 MG: 40 INJECTION SUBCUTANEOUS at 08:09

## 2024-02-21 RX ADMIN — MYCOPHENOLATE MOFETIL 1000 MG: 250 CAPSULE ORAL at 08:09

## 2024-02-21 RX ADMIN — DOXYCYCLINE 100 MG: 100 INJECTION, POWDER, LYOPHILIZED, FOR SOLUTION INTRAVENOUS at 08:13

## 2024-02-21 RX ADMIN — PANTOPRAZOLE SODIUM 40 MG: 40 INJECTION, POWDER, FOR SOLUTION INTRAVENOUS at 21:44

## 2024-02-21 RX ADMIN — OXYCODONE HYDROCHLORIDE AND ACETAMINOPHEN 500 MG: 500 TABLET ORAL at 08:09

## 2024-02-21 RX ADMIN — FUROSEMIDE 40 MG: 10 INJECTION, SOLUTION INTRAMUSCULAR; INTRAVENOUS at 13:54

## 2024-02-21 RX ADMIN — SUCRALFATE 1 G: 1 TABLET ORAL at 10:55

## 2024-02-21 RX ADMIN — METHENAMINE HIPPURATE 1 G: 1 TABLET ORAL at 17:11

## 2024-02-21 RX ADMIN — METHYLPREDNISOLONE SODIUM SUCCINATE 40 MG: 40 INJECTION, POWDER, FOR SOLUTION INTRAMUSCULAR; INTRAVENOUS at 08:08

## 2024-02-21 RX ADMIN — CEFTRIAXONE 1000 MG: 1 INJECTION, SOLUTION INTRAVENOUS at 10:56

## 2024-02-21 RX ADMIN — MYCOPHENOLATE MOFETIL 1000 MG: 250 CAPSULE ORAL at 21:44

## 2024-02-21 RX ADMIN — SUCRALFATE 1 G: 1 TABLET ORAL at 17:11

## 2024-02-21 RX ADMIN — DOXYCYCLINE 100 MG: 100 INJECTION, POWDER, LYOPHILIZED, FOR SOLUTION INTRAVENOUS at 21:44

## 2024-02-21 RX ADMIN — SUCRALFATE 1 G: 1 TABLET ORAL at 08:13

## 2024-02-21 RX ADMIN — METHYLPREDNISOLONE SODIUM SUCCINATE 40 MG: 40 INJECTION, POWDER, FOR SOLUTION INTRAMUSCULAR; INTRAVENOUS at 21:44

## 2024-02-21 NOTE — PLAN OF CARE
Problem: PAIN - ADULT  Goal: Verbalizes/displays adequate comfort level or baseline comfort level  Description: Interventions:  - Encourage patient to monitor pain and request assistance  - Assess pain using appropriate pain scale0-10  - Administer analgesics based on type and severity of pain and evaluate response  - Implement non-pharmacological measures as appropriate and evaluate response  - Consider cultural and social influences on pain and pain management  - Notify physician/advanced practitioner if interventions unsuccessful or patient reports new pain  Outcome: Progressing     Problem: INFECTION - ADULT  Goal: Absence or prevention of progression during hospitalization  Description: INTERVENTIONS:  - Assess and monitor for signs and symptoms of infection  - Monitor lab/diagnostic results  - Monitor all insertion sites, i.e. indwelling lines, tubes, and drains  - Monitor endotracheal if appropriate and nasal secretions for changes in amount and color  - Burt appropriate cooling/warming therapies per order  - Administer medications as ordered  - Instruct and encourage patient and family to use good hand hygiene technique  - Identify and instruct in appropriate isolation precautions for identified infection/condition  Outcome: Progressing     Problem: SAFETY ADULT  Goal: Patient will remain free of falls  Description: INTERVENTIONS:  - Educate patient/family on patient safety including physical limitations  - Instruct patient to call for assistance with activity   - Consult OT/PT to assist with strengthening/mobility   - Keep Call bell within reach  - Keep bed low and locked with side rails adjusted as appropriate  - Keep care items and personal belongings within reach  - Initiate and maintain comfort rounds  - Make Fall Risk Sign visible to staff  - Offer Toileting every 2 Hours, in advance of need  - Initiate/Maintain bed/chair alarm  - Apply yellow socks and bracelet for high fall risk patients  -  Consider moving patient to room near nurses station  Outcome: Progressing  Goal: Maintain or return to baseline ADL function  Description: INTERVENTIONS:  -  Assess patient's ability to carry out ADLs; assess patient's baseline for ADL function and identify physical deficits which impact ability to perform ADLs (bathing, care of mouth/teeth, toileting, grooming, dressing, etc.)  - Assess/evaluate cause of self-care deficits   - Assess range of motion  - Assess patient's mobility; develop plan if impaired  - Assess patient's need for assistive devices and provide as appropriate  - Encourage maximum independence but intervene and supervise when necessary  - Involve family in performance of ADLs  - Assess for home care needs following discharge   - Consider OT consult to assist with ADL evaluation and planning for discharge  - Provide patient education as appropriate  Outcome: Progressing  Goal: Maintains/Returns to pre admission functional level  Description: INTERVENTIONS:  - Perform AM-PAC 6 Click Basic Mobility/ Daily Activity assessment daily.  - Set and communicate daily mobility goal to care team and patient/family/caregiver.   - Collaborate with rehabilitation services on mobility goals if consulted  - Perform Range of Motion 4 times a day.  - Reposition patient every 2 hours.  - Ambulate patient 4 times a day  - Out of bed to chair 3 times a day   - Out of bed for meals 3 times a day  - Out of bed for toileting  - Record patient progress and toleration of activity level   Outcome: Progressing     Problem: DISCHARGE PLANNING  Goal: Discharge to home or other facility with appropriate resources  Description: INTERVENTIONS:  - Identify barriers to discharge w/patient and caregiver  - Arrange for needed discharge resources and transportation as appropriate  - Identify discharge learning needs (meds, wound care, etc.)  - Arrange for interpretive services to assist at discharge as needed  - Refer to Case Management  Department for coordinating discharge planning if the patient needs post-hospital services based on physician/advanced practitioner order or complex needs related to functional status, cognitive ability, or social support system  Outcome: Progressing     Problem: Knowledge Deficit  Goal: Patient/family/caregiver demonstrates understanding of disease process, treatment plan, medications, and discharge instructions  Description: Complete learning assessment and assess knowledge base.  Interventions:  - Provide teaching at level of understanding  - Provide teaching via preferred learning methods  Outcome: Progressing     Problem: RESPIRATORY - ADULT  Goal: Achieves optimal ventilation and oxygenation  Description: INTERVENTIONS:......  - Assess for changes in respiratory status  - Assess for changes in mentation and behavior  - Position to facilitate oxygenation and minimize respiratory effort  - Oxygen administered by appropriate delivery if ordered  - Initiate smoking cessation education as indicated  - Encourage broncho-pulmonary hygiene including cough, deep breathe, Incentive Spirometry  - Assess the need for suctioning and aspirate as needed  - Assess and instruct to report SOB or any respiratory difficulty  - Respiratory Therapy support as indicated  Outcome: Progressing

## 2024-02-21 NOTE — PHYSICAL THERAPY NOTE
PHYSICAL THERAPY TREATMENT NOTE  NAME:  Nina Tran  DATE: 02/21/24    Length Of Stay: 4  Performed at least 2 patient identifiers during session: Name and ID bracelet    TREATMENT:      02/21/24 0945   PT Last Visit   PT Visit Date 02/21/24   Note Type   Note Type Treatment   Pain Assessment   Pain Assessment Tool 0-10   Pain Score No Pain  (reported recieving pain meds)   Restrictions/Precautions   Weight Bearing Precautions Per Order No   Other Precautions Chair Alarm;Bed Alarm;Multiple lines;Telemetry;O2   General   Chart Reviewed Yes   Family/Caregiver Present No   Cognition   Overall Cognitive Status WFL   Arousal/Participation Alert;Responsive;Cooperative   Attention Within functional limits   Orientation Level Oriented X4   Memory Within functional limits   Following Commands Follows one step commands without difficulty   Comments pt agreeable to PT treatment   Bed Mobility   Supine to Sit 5  Supervision   Additional items Assist x 1;Increased time required;Verbal cues   Additional Comments verbal cues for sequencing and proper body mechanics   Transfers   Sit to Stand 5  Supervision   Additional items Assist x 1;Bedrails;Increased time required;Verbal cues   Stand to Sit 5  Supervision   Additional items Assist x 1;Armrests;Increased time required;Verbal cues   Stand pivot 5  Supervision   Additional items Assist x 1;Increased time required;Verbal cues   Additional Comments RW used, verbal cues for proper hand placement and safe technique   Ambulation/Elevation   Gait pattern Improper Weight shift;Decreased foot clearance;Inconsistent santiago;Short stride;Excessively slow;Foward flexed   Gait Assistance 5  Supervision   Additional items Assist x 1;Verbal cues   Assistive Device Rolling walker   Distance 25 ftx1   Balance   Static Sitting Good   Dynamic Sitting Fair +   Static Standing Fair   Dynamic Standing Fair -   Ambulatory Fair -   Endurance Deficit   Endurance Deficit Yes   Endurance Deficit  Description decreased activity tolerance   Activity Tolerance   Activity Tolerance Patient limited by fatigue   Nurse Made Aware yes   Exercises   Quad Sets Sitting;20 reps;AROM;Bilateral   Heelslides Sitting;20 reps;AROM;Bilateral   Hip Abduction Sitting;20 reps;AROM;Bilateral   Hip Adduction Sitting;20 reps;AROM;Bilateral   Knee AROM Long Arc Quad Sitting;20 reps;AROM;Bilateral   Ankle Pumps Sitting;20 reps;AROM;Bilateral   Marching Sitting;20 reps;AROM;Bilateral   Assessment   Prognosis Good   Problem List Decreased strength;Decreased endurance;Impaired balance;Decreased mobility;Decreased safety awareness;Pain   Assessment Pt seen for PT treatment session this date with interventions consisting of gait training w/ emphasis on improving pt's ability to ambulate level surfaces x 25 ft x1 with close S provided by therapist with RW, Therapeutic exercise consisting of: AROM 20 reps B LE in sitting position, and therapeutic activity consisting of training: bed mobility, supine<>sit transfers, sit<>stand transfers, and stand pivot transfers towards B direction. Pt agreeable to PT treatment session upon arrival, pt found supine in bed w/ HOB elevated, in no apparent distress and responsive. In comparison to previous session, pt with improvements in amount of supervision required . Post session: pt returned back to recliner, chair alarm engaged, all needs in reach, and RN notified of session findings/recommendations. Continue to recommend Level III (Minimum Resource Intensity) at time of d/c in order to maximize pt's functional independence and safety w/ mobility. Pt continues to be functioning below baseline level. PT will continue to see pt during current hospitalization in order to address the deficits listed above and provide interventions consistent w/ POC in effort to achieve STGs.    Minda Perez PTA   Barriers to Discharge Comments recommend increased support from spouse uponr eturn to home and use of RW    Goals   Patient Goals to go home   STG Expiration Date 03/02/24   PT Treatment Day 3   Plan   Treatment/Interventions Functional transfer training;LE strengthening/ROM;Therapeutic exercise;Endurance training;Bed mobility;Gait training   Progress Progressing toward goals   PT Frequency 3-5x/wk   Discharge Recommendation   Rehab Resource Intensity Level, PT III (Minimum Resource Intensity)   AM-PAC Basic Mobility Inpatient   Turning in Flat Bed Without Bedrails 3   Lying on Back to Sitting on Edge of Flat Bed Without Bedrails 3   Moving Bed to Chair 3   Standing Up From Chair Using Arms 3   Walk in Room 3   Climb 3-5 Stairs With Railing 2   Basic Mobility Inpatient Raw Score 17   Basic Mobility Standardized Score 39.67   Highest Level Of Mobility   JH-HLM Goal 5: Stand one or more mins   JH-HLM Achieved 7: Walk 25 feet or more   End of Consult   Patient Position at End of Consult Bedside chair;Bed/Chair alarm activated;All needs within reach       The patient's AM-PAC Basic Mobility Inpatient Short Form Raw Score is 17. A Raw score of greater than 16 suggests the patient may benefit from discharge to home. Please also refer to the recommendation of the Physical Therapist for safe discharge planning.      Minda Perez, PTA,PTA

## 2024-02-21 NOTE — OCCUPATIONAL THERAPY NOTE
"  Occupational Therapy Progress Note     Patient Name: Nina Tran  Today's Date: 2/21/2024  Problem List  Principal Problem:    Acute respiratory failure (HCC)  Active Problems:    COVID-19    Generalized weakness    History of thymus cancer    Chest pain    GERD (gastroesophageal reflux disease)            02/21/24 1605   OT Last Visit   OT Visit Date 02/21/24   Note Type   Note Type Treatment   Pain Assessment   Pain Assessment Tool 0-10   Pain Score No Pain   Restrictions/Precautions   Weight Bearing Precautions Per Order No   Other Precautions Bed Alarm;Telemetry   Transfers   Sit to Stand 5  Supervision   Additional items Assist x 1;Increased time required   Stand to Sit 5  Supervision   Additional items Assist x 1;Increased time required   Stand pivot 5  Supervision   Additional items Assist x 1;Increased time required   Toilet transfer 5  Supervision   Additional items Assist x 1;Increased time required;Standard toilet  (grab bar)   Additional Comments Cued for safe hand placement; RW used. Pt observed with uncontrolled descent while sitting due to [place] and anxiety.   Functional Mobility   Functional Mobility 5  Supervision   Additional Comments short distance mobility from EOB to the bathroom with good insight into managing O2 line.   Additional items Rolling walker   Exercise Tools   Exercise Tools Yes   Theraband Red theraband exercises completed in semi-reclined position. Exercises: bicep curls, diagonal pulls, shoulder flexion. 2 x 15. Pt required moderate rest breaks due to deconditioning.   Subjective   Subjective \"I don't want to get up again. I've been up so many times this afternoon running to the bathroom.\"   Cognition   Overall Cognitive Status WFL   Arousal/Participation Alert;Cooperative   Attention Within functional limits   Orientation Level Oriented X4   Memory Within functional limits   Following Commands Follows one step commands without difficulty   Activity Tolerance   Activity Tolerance " Patient limited by fatigue   Assessment   Assessment Pt received seated in bed with daughter and  present at bedside. Pt initially declining transferring OOB stating she had frequent trips to the bathroom this afternoon and just got comfortable in bed. Pt agreeable to completing BUE exercises with red theraband and then requesting to use the bathroom again. Pt completed bed mobility, ambulation, and toilet transfer at Supervision level. Pt reported she gets abdominal discomfort with ambulation and states she's going to get a scope done. Pt received on 2L O2 via NC and desatted to 82% post toileting routine. Reinforced PLB technique to maximize reoxygenation. O2 rebounded to 91% with purposeful breathing. HR:  BPM (rest->exertion). 5/10 reported on RPE scale. Continue OT POC to maximize functional recovery.   Plan   Treatment Interventions ADL retraining;Functional transfer training;Endurance training;Equipment evaluation/education;Compensatory technique education;Continued evaluation;Energy conservation;Activityengagement   Goal Expiration Date 03/03/24   OT Treatment Day 2   OT Frequency 2-3x/wk   Discharge Recommendation   Rehab Resource Intensity Level, OT III (Minimum Resource Intensity)   AM-PAC Daily Activity Inpatient   Lower Body Dressing 3   Bathing 3   Toileting 3   Upper Body Dressing 3   Grooming 3   Eating 4   Daily Activity Raw Score 19   Daily Activity Standardized Score (Calc for Raw Score >=11) 40.22   -PAC Applied Cognition Inpatient   Following a Speech/Presentation 3   Understanding Ordinary Conversation 4   Taking Medications 3   Remembering Where Things Are Placed or Put Away 3   Remembering List of 4-5 Errands 3   Taking Care of Complicated Tasks 2   Applied Cognition Raw Score 18   Applied Cognition Standardized Score 38.07     Assessment:  The patient's raw score on the AM-PAC Daily Activity Inpatient Short Form is 19. A raw score of greater than or equal to 19 suggests the  patient may benefit from discharge to home. Please refer to the recommendation of the Occupational Therapist for safe discharge planning.     Pt goals to be met by 3/3/2024:     Pt will demonstrate ability to complete grooming/hygiene tasks @ mod I after set-up.  Pt will demonstrate ability to complete supine<>sit @ mod I in order to increase safety and independence during ADL tasks.  Pt will demonstrate ability to complete UB ADLs including washing/dressing @ mod I in order to increase performance and participation during meaningful tasks  Pt will demonstrate ability to complete LB dressing @ mod I in order to increase safety and independence during meaningful tasks.   Pt will demonstrate ability to sara/doff socks/shoes while sitting EOB/chair @ mod I in order to increase safety and independence during meaningful tasks.   Pt will demonstrate ability to complete toileting tasks including CM and pericare @ mod I in order to increase safety and independence during meaningful tasks.  Pt will demonstrate ability to complete EOB, chair, toilet/commode transfers @ mod I in order to increase performance and participation during functional tasks.  Pt will demonstrate ability to stand for 10 minutes while maintaining F+ balance with use of RW for UB support PRN.  Pt will demonstrate ability to tolerate 20 minute OT session with no vc'ing for deep breathing or use of energy conservation techniques in order to increase activity tolerance during functional tasks.   Pt will demonstrate Good carryover of use of energy conservation/compensatory strategies during ADLs and functional tasks in order to increase safety and reduce risk for falls.   Pt will demonstrate Good attention and participation in continued evaluation of functional ambulation house hold distances in order to assist with safe d/c planning.  Pt will attend to continued cognitive assessments 100% of the time in order to provide most appropriate d/c recommendations.    Pt will follow 100% simple 2-step commands and be A&O x4 consistently with environmental cues to increase participation in functional activities.   Pt will identify 3 areas of interest/hobbies and 1 intervention on how to incorporate into daily life in order to increase interaction with environment and peers as well as increase participation in meaningful tasks.   Pt will demonstrate 100% carryover of BUE HEP in order to increase BUE MS and increase performance during functional tasks upon d/c home.      Nhung Cummins, OTR/L

## 2024-02-21 NOTE — ASSESSMENT & PLAN NOTE
Presents to ED with left sided chest pain and increasing shortness of breath worsening over the last 3 days. Worse with movement.   EKG: sinus tachycardia, no ischemic changes.   CTA chest PE study: Unchanged groundglass opacities and interstitial marking thickening with bibasal predominance. Appearance favors fibrosis but acute process cannot be completely excluded.   MITESH score = 2  Troponin 0Hr 39; serial troponins normal  TSH elevated, normal t4, repeat thyroid studies in 4-6 weeks  A1c 7.1  Lipid panel with elevated triglycerides  Hold NSAIDS  Pain management  Telemetry  Cardiac diet; low sodium   Patient describing epigastric pain, more consistent with GERD, serial troponins normal.   improving

## 2024-02-21 NOTE — PLAN OF CARE
Problem: OCCUPATIONAL THERAPY ADULT  Goal: Performs self-care activities at highest level of function for planned discharge setting.  See evaluation for individualized goals.  Description: Treatment Interventions: ADL retraining, Functional transfer training, UE strengthening/ROM, Endurance training, Patient/family training, Equipment evaluation/education, Compensatory technique education, Continued evaluation, Cardiac education, Energy conservation, Activityengagement          See flowsheet documentation for full assessment, interventions and recommendations.   Outcome: Progressing  Note: Limitation: Decreased ADL status, Decreased Safe judgement during ADL, Decreased endurance, Decreased self-care trans, Decreased high-level ADLs  Prognosis: Good  Assessment: Pt received seated in bed with daughter and  present at bedside. Pt initially declining transferring OOB stating she had frequent trips to the bathroom this afternoon and just got comfortable in bed. Pt agreeable to completing BUE exercises with red theraband and then requesting to use the bathroom again. Pt completed bed mobility, ambulation, and toilet transfer at Supervision level. Pt reported she gets abdominal discomfort with ambulation and states she's going to get a scope done. Pt received on 2L O2 via NC and desatted to 82% post toileting routine. Reinforced PLB technique to maximize reoxygenation. O2 rebounded to 91% with purposeful breathing. HR:  BPM (rest->exertion). 5/10 reported on RPE scale. Continue OT POC to maximize functional recovery.     Rehab Resource Intensity Level, OT: III (Minimum Resource Intensity)

## 2024-02-21 NOTE — PROGRESS NOTES
[facility]  Progress Note  Name: Nina Tran I  MRN: [id number]  Unit/Bed#: -01 I Date of Admission: 2/17/2024   Date of Service: 2/21/2024 I Hospital Day: 4    Assessment/Plan   * Acute respiratory failure (HCC)  Assessment & Plan  POA with complaints of SOB and left sided chest pain. Recently admitted for COVID infection and sepsis. During previous admission, was requiring 2L NC. Home o2 eval done prior to discharge showed patient requiring no o2 at rest and 2L NC with ambulation. The last 3 days, patient has been using o2 more. Increased pain with movement.   During previous admission, patient stated she had o2 at home she used prn, but did not chronically use o2.   In ED, patient found to be 86% when placed on 2L NC and needed o2 increased to 4L NC.   CTA chest PE study: Unchanged groundglass opacities and interstitial marking thickening with bibasal predominance. Appearance favors fibrosis but acute process cannot be completely excluded. No pulmonary embolism  Will treat as active infection, start on IV rocephin  Incentive spirometry, supportive measures  Keep o2 saturations >90% and wean off o2 as able.   Pulm consult: start solumedrol 40 mg bid for suspected ILD flare, continue cellcept and mepron, complete 5 days of rocephin/doxy.     Pneumonia  Assessment & Plan  Presented to ED with left sided chest pain and worsening SOB for 3 days.   CTA chest: Unchanged groundglass opacities and interstitial marking thickening with bibasal predominance. Appearance favors fibrosis but acute process cannot be completely excluded. No PE.   COVID/Flu/RSV: COVID positive, but positive since 2/4 during previous admission.   Urine strep and legionella negative, Sputum culture ordered  Blood Cultures no growth 72 hours  Trend fever curve  Initially given rocephin and doxy in the emergency department, will continue and monitor cultures and titrate therapy as indicated  Repeat CXR on 2/20: Mild  cardiomegaly. Slight vascular congestion. Peripheral bibasilar groundglass airspace opacities which may reflect COVID-19 pneumonia.  Supplemental oxygen for saturations > 90%; wean as tolerated  Aspiration Precautions  Respiratory Protocol   Incentive Spirometry   Supportive Care    Chest pain  Assessment & Plan  Presents to ED with left sided chest pain and increasing shortness of breath worsening over the last 3 days. Worse with movement.   EKG: sinus tachycardia, no ischemic changes.   CTA chest PE study: Unchanged groundglass opacities and interstitial marking thickening with bibasal predominance. Appearance favors fibrosis but acute process cannot be completely excluded.   MITESH score = 2  Troponin 0Hr 39; serial troponins normal  TSH elevated, normal t4, repeat thyroid studies in 4-6 weeks  A1c 7.1  Lipid panel with elevated triglycerides  Hold NSAIDS  Pain management  Telemetry  Cardiac diet; low sodium   Patient describing epigastric pain, more consistent with GERD, serial troponins normal.   improving    Generalized weakness  Assessment & Plan  POA with complaints of generalized weakness suspect secondary to suspected bacterial pneumonia   Given 1 dose of rocephin and doxy in the ED, will continue  PT/OT consult: II (Moderate Resource Intensity)     GERD (gastroesophageal reflux disease)  Assessment & Plan  Patient with history of acid reflux taking protonix, patient explains epigastric pain, started after eating bbq outpatient. Still having epigastric pain  Started on iv protonix bid  Carafate qid  If still with pain, will consult GI to discuss potential EGD  GI consult: agree with PPI BID and carafate QID, possibly pleuritic pain secondary to recent COVID and pneumonia? Recommend EGD for further eval when symptoms improved from respiratory stance which can be done outpatient if patient is discharged.     History of thymus cancer  Assessment & Plan  Patient with history of thymus cancer s/p removal     Continue outpatient follow up     COVID-19  Assessment & Plan  Recently admitted with COVID-19 infection tested positive on 2/4, still testing positive from previous infection. Completed 10 days of isolation per COVID isolation precautions  Will hold further treatment for COVID and treat for suspected bacterial pneumonia         VTE Pharmacologic Prophylaxis: VTE Score: 6 High Risk (Score >/= 5) - Pharmacological DVT Prophylaxis Ordered: enoxaparin (Lovenox). Sequential Compression Devices Ordered.    Mobility:   Basic Mobility Inpatient Raw Score: 18  JH-HLM Goal: 6: Walk 10 steps or more  JH-HLM Achieved: 7: Walk 25 feet or more  HLM Goal achieved. Continue to encourage appropriate mobility.    Patient Centered Rounds: I performed bedside rounds with nursing staff today.   Discussions with Specialists or Other Care Team Provider: nursing, case management    Education and Discussions with Family / Patient: Updated  () at bedside.    Total Time Spent on Date of Encounter in care of patient: 40 mins. This time was spent on one or more of the following: performing physical exam; counseling and coordination of care; obtaining or reviewing history; documenting in the medical record; reviewing/ordering tests, medications or procedures; communicating with other healthcare professionals and discussing with patient's family/caregivers.    Current Length of Stay: 4 day(s)  Current Patient Status: Inpatient   Certification Statement: The patient will continue to require additional inpatient hospital stay due to iv abx, iv steroids, weaning off oxygen  Discharge Plan: Anticipate discharge in 48-72 hrs to home with home services.    Code Status: Level 1 - Full Code    Subjective:   Patient seen and examined. Doing better today. Said the pain is slightly more controlled. No fever, chills, chest pain, sob. Still with the epigastric pain. Better with the medicine. Tolerating her diet. Feels her breathing is  better.     Objective:     Vitals:   Temp (24hrs), Av.2 °F (36.2 °C), Min:97 °F (36.1 °C), Max:97.3 °F (36.3 °C)    Temp:  [97 °F (36.1 °C)-97.3 °F (36.3 °C)] 97.3 °F (36.3 °C)  HR:  [82-88] 88  Resp:  [17-18] 18  BP: (116-120)/(71-76) 116/71  SpO2:  [92 %-98 %] 96 %  Body mass index is 23.03 kg/m².     Input and Output Summary (last 24 hours):     Intake/Output Summary (Last 24 hours) at 2024 1516  Last data filed at 2024 1441  Gross per 24 hour   Intake 880 ml   Output 1625 ml   Net -745 ml       Physical Exam:   Physical Exam  Vitals reviewed.   Constitutional:       General: She is not in acute distress.     Appearance: She is ill-appearing. She is not toxic-appearing.   HENT:      Head: Normocephalic and atraumatic.      Mouth/Throat:      Mouth: Mucous membranes are moist.   Cardiovascular:      Rate and Rhythm: Normal rate and regular rhythm.      Heart sounds: No murmur heard.  Pulmonary:      Effort: No respiratory distress.      Breath sounds: No stridor. Rhonchi present. No wheezing or rales.      Comments: Saturating well on 2L NC   Abdominal:      General: Bowel sounds are normal. There is no distension.      Palpations: Abdomen is soft. There is no mass.      Tenderness: There is abdominal tenderness (epigastric).   Musculoskeletal:      Right lower leg: No edema.      Left lower leg: No edema.   Skin:     General: Skin is warm and dry.   Neurological:      General: No focal deficit present.      Mental Status: She is alert and oriented to person, place, and time.   Psychiatric:         Mood and Affect: Mood normal.         Behavior: Behavior normal.          Additional Data:     Labs:  Results from last 7 days   Lab Units 24  0522 24  0540 24  0428   WBC Thousand/uL 5.26   < > 6.43   HEMOGLOBIN g/dL 10.5*   < > 10.4*   HEMATOCRIT % 32.8*   < > 33.4*   PLATELETS Thousands/uL 110*   < > 97*   BANDS PCT %  --   --  18*   LYMPHO PCT %  --   --  2*   MONO PCT %  --   --  2*    EOS PCT %  --   --  0    < > = values in this interval not displayed.     Results from last 7 days   Lab Units 02/21/24  0522   SODIUM mmol/L 130*   POTASSIUM mmol/L 4.1   CHLORIDE mmol/L 99   CO2 mmol/L 23   BUN mg/dL 17   CREATININE mg/dL 0.62   ANION GAP mmol/L 8   CALCIUM mg/dL 7.6*   ALBUMIN g/dL 2.9*   TOTAL BILIRUBIN mg/dL 0.73   ALK PHOS U/L 127*   ALT U/L 41   AST U/L 32   GLUCOSE RANDOM mg/dL 143*     Results from last 7 days   Lab Units 02/17/24  0753   INR  0.92         Results from last 7 days   Lab Units 02/18/24  0428   HEMOGLOBIN A1C % 7.1*     Results from last 7 days   Lab Units 02/19/24  0540 02/18/24  0428 02/17/24  1052 02/17/24  0753   LACTIC ACID mmol/L  --   --  1.1 2.3*   PROCALCITONIN ng/ml 0.25 0.26*  --  0.32*       Lines/Drains:  Invasive Devices       Peripheral Intravenous Line  Duration             Peripheral IV 02/20/24 Left;Ventral (anterior) Forearm <1 day              Drain  Duration             External Urinary Catheter 3 days                          Imaging: Reviewed radiology reports from this admission including: chest xray    Recent Cultures (last 7 days):   Results from last 7 days   Lab Units 02/17/24  1530 02/17/24  0925 02/17/24  0753   BLOOD CULTURE   --   --  No Growth at 72 hrs.  No Growth at 72 hrs.   SPUTUM CULTURE  2+ Growth of  --   --    GRAM STAIN RESULT  No Epithelial cells per low power field*  No polys seen*  Rare Gram positive cocci in pairs*  --   --    LEGIONELLA URINARY ANTIGEN   --  Negative  --        Last 24 Hours Medication List:   Current Facility-Administered Medications   Medication Dose Route Frequency Provider Last Rate    acetaminophen  650 mg Oral Q6H PRN Ann Cardona PA-C      aluminum-magnesium hydroxide-simethicone  30 mL Oral Q4H PRN Ann Cardona PA-C      ascorbic acid  500 mg Oral Daily Ann Cardona PA-C      atorvastatin  20 mg Oral Daily With Dinner Ann Cardona PA-C      atovaquone  1,500 mg Oral Daily  Ann Cardona PA-C      cefTRIAXone  1,000 mg Intravenous Q24H Ann Cardona PA-C 1,000 mg (02/21/24 1056)    doxycycline  100 mg Intravenous Q12H Ann Cardona PA-C 100 mg (02/21/24 0813)    enoxaparin  40 mg Subcutaneous Daily Ann Cardona PA-C      guaiFENesin  1,200 mg Oral Q12H [place] Ann Cardona PA-C      HYDROmorphone  0.2 mg Intravenous Q2H PRN Ann Cardona PA-C      methenamine hippurate  1 g Oral BID With Meals Ann Cardona PA-C      methylPREDNISolone sodium succinate  40 mg Intravenous Q12H [facility] Sumeet Pathak PA-C      mycophenolate  1,000 mg Oral Q12H [facility] Ann Cardona PA-C      ondansetron  4 mg Intravenous Q6H PRN Ann Cardona PA-C      oxyCODONE  2.5 mg Oral Q4H PRN Ann Cardona PA-C      Or    oxyCODONE  5 mg Oral Q4H PRN Ann Cardona PA-C      pantoprazole  40 mg Intravenous Q12H [facility] Ann Cardona PA-C      sucralfate  1 g Oral 4x Daily (AC & HS) Ann Cardona PA-C          Today, Patient Was Seen By: Ann Cardona PA-C    **Please Note: This note may have been constructed using a voice recognition system.**

## 2024-02-21 NOTE — PLAN OF CARE
Problem: PAIN - ADULT  Goal: Verbalizes/displays adequate comfort level or baseline comfort level  Description: Interventions:  - Encourage patient to monitor pain and request assistance  - Assess pain using appropriate pain scale0-10  - Administer analgesics based on type and severity of pain and evaluate response  - Implement non-pharmacological measures as appropriate and evaluate response  - Consider cultural and social influences on pain and pain management  - Notify physician/advanced practitioner if interventions unsuccessful or patient reports new pain  Outcome: Progressing     Problem: INFECTION - ADULT  Goal: Absence or prevention of progression during hospitalization  Description: INTERVENTIONS:  - Assess and monitor for signs and symptoms of infection  - Monitor lab/diagnostic results  - Monitor all insertion sites, i.e. indwelling lines, tubes, and drains  - Monitor endotracheal if appropriate and nasal secretions for changes in amount and color  - Winn appropriate cooling/warming therapies per order  - Administer medications as ordered  - Instruct and encourage patient and family to use good hand hygiene technique  - Identify and instruct in appropriate isolation precautions for identified infection/condition  Outcome: Progressing     Problem: SAFETY ADULT  Goal: Patient will remain free of falls  Description: INTERVENTIONS:  - Educate patient/family on patient safety including physical limitations  - Instruct patient to call for assistance with activity   - Consult OT/PT to assist with strengthening/mobility   - Keep Call bell within reach  - Keep bed low and locked with side rails adjusted as appropriate  - Keep care items and personal belongings within reach  - Initiate and maintain comfort rounds  - Make Fall Risk Sign visible to staff  - Offer Toileting every 2 Hours, in advance of need  - Initiate/Maintain bed/chair alarm  - Apply yellow socks and bracelet for high fall risk patients  -  Consider moving patient to room near nurses station  Outcome: Progressing  Goal: Maintain or return to baseline ADL function  Description: INTERVENTIONS:  -  Assess patient's ability to carry out ADLs; assess patient's baseline for ADL function and identify physical deficits which impact ability to perform ADLs (bathing, care of mouth/teeth, toileting, grooming, dressing, etc.)  - Assess/evaluate cause of self-care deficits   - Assess range of motion  - Assess patient's mobility; develop plan if impaired  - Assess patient's need for assistive devices and provide as appropriate  - Encourage maximum independence but intervene and supervise when necessary  - Involve family in performance of ADLs  - Assess for home care needs following discharge   - Consider OT consult to assist with ADL evaluation and planning for discharge  - Provide patient education as appropriate  Outcome: Progressing  Goal: Maintains/Returns to pre admission functional level  Description: INTERVENTIONS:  - Perform AM-PAC 6 Click Basic Mobility/ Daily Activity assessment daily.  - Set and communicate daily mobility goal to care team and patient/family/caregiver.   - Collaborate with rehabilitation services on mobility goals if consulted  - Perform Range of Motion 4 times a day.  - Reposition patient every 2 hours.  - Ambulate patient 4 times a day  - Out of bed to chair 3 times a day   - Out of bed for meals 3 times a day  - Out of bed for toileting  - Record patient progress and toleration of activity level   Outcome: Progressing     Problem: DISCHARGE PLANNING  Goal: Discharge to home or other facility with appropriate resources  Description: INTERVENTIONS:  - Identify barriers to discharge w/patient and caregiver  - Arrange for needed discharge resources and transportation as appropriate  - Identify discharge learning needs (meds, wound care, etc.)  - Arrange for interpretive services to assist at discharge as needed  - Refer to Case Management  Department for coordinating discharge planning if the patient needs post-hospital services based on physician/advanced practitioner order or complex needs related to functional status, cognitive ability, or social support system  Outcome: Progressing     Problem: Knowledge Deficit  Goal: Patient/family/caregiver demonstrates understanding of disease process, treatment plan, medications, and discharge instructions  Description: Complete learning assessment and assess knowledge base.  Interventions:  - Provide teaching at level of understanding  - Provide teaching via preferred learning methods  Outcome: Progressing     Problem: RESPIRATORY - ADULT  Goal: Achieves optimal ventilation and oxygenation  Description: INTERVENTIONS:  - Assess for changes in respiratory status  - Assess for changes in mentation and behavior  - Position to facilitate oxygenation and minimize respiratory effort  - Oxygen administered by appropriate delivery if ordered  - Initiate smoking cessation education as indicated  - Encourage broncho-pulmonary hygiene including cough, deep breathe, Incentive Spirometry  - Assess the need for suctioning and aspirate as needed  - Assess and instruct to report SOB or any respiratory difficulty  - Respiratory Therapy support as indicated  Outcome: Progressing

## 2024-02-21 NOTE — ASSESSMENT & PLAN NOTE
POA with complaints of SOB and left sided chest pain. Recently admitted for COVID infection and sepsis. During previous admission, was requiring 2L NC. Home o2 eval done prior to discharge showed patient requiring no o2 at rest and 2L NC with ambulation. The last 3 days, patient has been using o2 more. Increased pain with movement.   During previous admission, patient stated she had o2 at home she used prn, but did not chronically use o2.   In ED, patient found to be 86% when placed on 2L NC and needed o2 increased to 4L NC.   CTA chest PE study: Unchanged groundglass opacities and interstitial marking thickening with bibasal predominance. Appearance favors fibrosis but acute process cannot be completely excluded. No pulmonary embolism  Will treat as active infection, start on IV rocephin  Incentive spirometry, supportive measures  Keep o2 saturations >90% and wean off o2 as able.   Pulm consult: start solumedrol 40 mg bid for suspected ILD flare, continue cellcept and mepron, complete 5 days of rocephin/doxy.

## 2024-02-21 NOTE — ASSESSMENT & PLAN NOTE
Presented to ED with left sided chest pain and worsening SOB for 3 days.   CTA chest: Unchanged groundglass opacities and interstitial marking thickening with bibasal predominance. Appearance favors fibrosis but acute process cannot be completely excluded. No PE.   COVID/Flu/RSV: COVID positive, but positive since 2/4 during previous admission.   Urine strep and legionella negative, Sputum culture ordered  Blood Cultures no growth 72 hours  Trend fever curve  Initially given rocephin and doxy in the emergency department, will continue and monitor cultures and titrate therapy as indicated  Repeat CXR on 2/20: Mild cardiomegaly. Slight vascular congestion. Peripheral bibasilar groundglass airspace opacities which may reflect COVID-19 pneumonia.  Supplemental oxygen for saturations > 90%; wean as tolerated  Aspiration Precautions  Respiratory Protocol   Incentive Spirometry   Supportive Care

## 2024-02-21 NOTE — PLAN OF CARE
Problem: PHYSICAL THERAPY ADULT  Goal: Performs mobility at highest level of function for planned discharge setting.  See evaluation for individualized goals.  Description: Treatment/Interventions: ADL retraining, Functional transfer training, LE strengthening/ROM, Elevations, Therapeutic exercise, Endurance training, Patient/family training, Equipment eval/education, Bed mobility, Gait training, Compensatory technique education, Spoke to nursing, Spoke to case management, OT  Equipment Recommended:  (walker-pt has)       See flowsheet documentation for full assessment, interventions and recommendations.  Outcome: Progressing  Note: Prognosis: Good  Problem List: Decreased strength, Decreased endurance, Impaired balance, Decreased mobility, Decreased safety awareness, Pain  Assessment: Pt seen for PT treatment session this date with interventions consisting of gait training w/ emphasis on improving pt's ability to ambulate level surfaces x 25 ft x1 with close S provided by therapist with RW, Therapeutic exercise consisting of: AROM 20 reps B LE in sitting position, and therapeutic activity consisting of training: bed mobility, supine<>sit transfers, sit<>stand transfers, and stand pivot transfers towards B direction. Pt agreeable to PT treatment session upon arrival, pt found supine in bed w/ HOB elevated, in no apparent distress and responsive. In comparison to previous session, pt with improvements in amount of supervision required . Post session: pt returned back to recliner, chair alarm engaged, all needs in reach, and RN notified of session findings/recommendations. Continue to recommend Level III (Minimum Resource Intensity) at time of d/c in order to maximize pt's functional independence and safety w/ mobility. Pt continues to be functioning below baseline level. PT will continue to see pt during current hospitalization in order to address the deficits listed above and provide interventions consistent w/  POC in effort to achieve STGs.    Minda Perez, PTA  Barriers to Discharge: None  Barriers to Discharge Comments: recommend increased support from spouse uponr eturn to home and use of RW  Rehab Resource Intensity Level, PT: III (Minimum Resource Intensity)    See flowsheet documentation for full assessment.

## 2024-02-22 LAB
ANION GAP SERPL CALCULATED.3IONS-SCNC: 13 MMOL/L
BACTERIA BLD CULT: NORMAL
BACTERIA BLD CULT: NORMAL
BUN SERPL-MCNC: 22 MG/DL (ref 5–25)
CALCIUM SERPL-MCNC: 8 MG/DL (ref 8.4–10.2)
CHLORIDE SERPL-SCNC: 92 MMOL/L (ref 96–108)
CO2 SERPL-SCNC: 25 MMOL/L (ref 21–32)
CREAT SERPL-MCNC: 0.95 MG/DL (ref 0.6–1.3)
GFR SERPL CREATININE-BSD FRML MDRD: 56 ML/MIN/1.73SQ M
GLUCOSE SERPL-MCNC: 207 MG/DL (ref 65–140)
POTASSIUM SERPL-SCNC: 3.7 MMOL/L (ref 3.5–5.3)
SODIUM SERPL-SCNC: 130 MMOL/L (ref 135–147)

## 2024-02-22 PROCEDURE — 80048 BASIC METABOLIC PNL TOTAL CA: CPT

## 2024-02-22 PROCEDURE — 97530 THERAPEUTIC ACTIVITIES: CPT

## 2024-02-22 PROCEDURE — 99232 SBSQ HOSP IP/OBS MODERATE 35: CPT

## 2024-02-22 PROCEDURE — 97110 THERAPEUTIC EXERCISES: CPT

## 2024-02-22 PROCEDURE — 97535 SELF CARE MNGMENT TRAINING: CPT

## 2024-02-22 PROCEDURE — C9113 INJ PANTOPRAZOLE SODIUM, VIA: HCPCS

## 2024-02-22 RX ORDER — FUROSEMIDE 10 MG/ML
40 INJECTION INTRAMUSCULAR; INTRAVENOUS ONCE
Status: COMPLETED | OUTPATIENT
Start: 2024-02-22 | End: 2024-02-22

## 2024-02-22 RX ORDER — LIDOCAINE 50 MG/G
1 PATCH TOPICAL EVERY 24 HOURS
Status: DISCONTINUED | OUTPATIENT
Start: 2024-02-22 | End: 2024-03-05 | Stop reason: HOSPADM

## 2024-02-22 RX ADMIN — MYCOPHENOLATE MOFETIL 1000 MG: 250 CAPSULE ORAL at 20:16

## 2024-02-22 RX ADMIN — GUAIFENESIN 1200 MG: 600 TABLET ORAL at 08:49

## 2024-02-22 RX ADMIN — METHYLPREDNISOLONE SODIUM SUCCINATE 40 MG: 40 INJECTION, POWDER, FOR SOLUTION INTRAMUSCULAR; INTRAVENOUS at 09:01

## 2024-02-22 RX ADMIN — PANTOPRAZOLE SODIUM 40 MG: 40 INJECTION, POWDER, FOR SOLUTION INTRAVENOUS at 08:50

## 2024-02-22 RX ADMIN — SUCRALFATE 1 G: 1 TABLET ORAL at 16:51

## 2024-02-22 RX ADMIN — METHYLPREDNISOLONE SODIUM SUCCINATE 40 MG: 40 INJECTION, POWDER, FOR SOLUTION INTRAMUSCULAR; INTRAVENOUS at 20:16

## 2024-02-22 RX ADMIN — DOXYCYCLINE 100 MG: 100 INJECTION, POWDER, LYOPHILIZED, FOR SOLUTION INTRAVENOUS at 08:51

## 2024-02-22 RX ADMIN — GUAIFENESIN 1200 MG: 600 TABLET ORAL at 20:16

## 2024-02-22 RX ADMIN — SUCRALFATE 1 G: 1 TABLET ORAL at 21:12

## 2024-02-22 RX ADMIN — ATORVASTATIN CALCIUM 20 MG: 20 TABLET, FILM COATED ORAL at 16:51

## 2024-02-22 RX ADMIN — ATOVAQUONE 1500 MG: 750 SUSPENSION ORAL at 08:42

## 2024-02-22 RX ADMIN — MYCOPHENOLATE MOFETIL 1000 MG: 250 CAPSULE ORAL at 08:50

## 2024-02-22 RX ADMIN — FUROSEMIDE 40 MG: 10 INJECTION, SOLUTION INTRAMUSCULAR; INTRAVENOUS at 03:58

## 2024-02-22 RX ADMIN — SUCRALFATE 1 G: 1 TABLET ORAL at 06:18

## 2024-02-22 RX ADMIN — OXYCODONE HYDROCHLORIDE 5 MG: 5 TABLET ORAL at 03:29

## 2024-02-22 RX ADMIN — OXYCODONE HYDROCHLORIDE AND ACETAMINOPHEN 500 MG: 500 TABLET ORAL at 08:50

## 2024-02-22 RX ADMIN — ENOXAPARIN SODIUM 40 MG: 40 INJECTION SUBCUTANEOUS at 08:50

## 2024-02-22 RX ADMIN — PANTOPRAZOLE SODIUM 40 MG: 40 INJECTION, POWDER, FOR SOLUTION INTRAVENOUS at 20:16

## 2024-02-22 RX ADMIN — SUCRALFATE 1 G: 1 TABLET ORAL at 11:55

## 2024-02-22 RX ADMIN — CEFTRIAXONE 1000 MG: 1 INJECTION, SOLUTION INTRAVENOUS at 08:49

## 2024-02-22 RX ADMIN — DOXYCYCLINE 100 MG: 100 INJECTION, POWDER, LYOPHILIZED, FOR SOLUTION INTRAVENOUS at 20:22

## 2024-02-22 RX ADMIN — METHENAMINE HIPPURATE 1 G: 1 TABLET ORAL at 16:51

## 2024-02-22 RX ADMIN — METHENAMINE HIPPURATE 1 G: 1 TABLET ORAL at 08:30

## 2024-02-22 RX ADMIN — LIDOCAINE 5% 1 PATCH: 700 PATCH TOPICAL at 13:35

## 2024-02-22 NOTE — ASSESSMENT & PLAN NOTE
Presented to ED with left sided chest pain and worsening SOB for 3 days.   CTA chest: Unchanged groundglass opacities and interstitial marking thickening with bibasal predominance. Appearance favors fibrosis but acute process cannot be completely excluded. No PE.   COVID/Flu/RSV: COVID positive, but positive since 2/4 during previous admission.   Urine strep and legionella negative, Sputum culture ordered  Blood Cultures no growth after 5 days  Trend fever curve  Initially given rocephin and doxy in the emergency department, will continue and monitor cultures and titrate therapy as indicated  Completed iv abx on 2/22  Repeat CXR on 2/20: Mild cardiomegaly. Slight vascular congestion. Peripheral bibasilar groundglass airspace opacities which may reflect COVID-19 pneumonia.  Supplemental oxygen for saturations > 90%; wean as tolerated  Aspiration Precautions  Respiratory Protocol   Incentive Spirometry   Supportive Care

## 2024-02-22 NOTE — ASSESSMENT & PLAN NOTE
POA with complaints of SOB and left sided chest pain. Recently admitted for COVID infection and sepsis. During previous admission, was requiring 2L NC. Home o2 eval done prior to discharge showed patient requiring no o2 at rest and 2L NC with ambulation. The last 3 days, patient has been using o2 more. Increased pain with movement.   During previous admission, patient stated she had o2 at home she used prn, but did not chronically use o2.   In ED, patient found to be 86% when placed on 2L NC and needed o2 increased to 4L NC.   CTA chest PE study: Unchanged groundglass opacities and interstitial marking thickening with bibasal predominance. Appearance favors fibrosis but acute process cannot be completely excluded. No pulmonary embolism  Will treat as active infection, start on IV rocephin  Incentive spirometry, supportive measures  Keep o2 saturations >90% and wean off o2 as able.   Pulm consult: start solumedrol 40 mg bid for suspected ILD flare, continue cellcept and mepron, complete 5 days of rocephin/doxy.   Completed 5 days of abx.

## 2024-02-22 NOTE — PLAN OF CARE
Problem: PAIN - ADULT  Goal: Verbalizes/displays adequate comfort level or baseline comfort level  Description: Interventions:  - Encourage patient to monitor pain and request assistance  - Assess pain using appropriate pain scale0-10  - Administer analgesics based on type and severity of pain and evaluate response  - Implement non-pharmacological measures as appropriate and evaluate response  - Consider cultural and social influences on pain and pain management  - Notify physician/advanced practitioner if interventions unsuccessful or patient reports new pain  Outcome: Progressing     Problem: INFECTION - ADULT  Goal: Absence or prevention of progression during hospitalization  Description: INTERVENTIONS:  - Assess and monitor for signs and symptoms of infection  - Monitor lab/diagnostic results  - Monitor all insertion sites, i.e. indwelling lines, tubes, and drains  - Monitor endotracheal if appropriate and nasal secretions for changes in amount and color  - [place] appropriate cooling/warming therapies per order  - Administer medications as ordered  - Instruct and encourage patient and family to use good hand hygiene technique  - Identify and instruct in appropriate isolation precautions for identified infection/condition  Outcome: Progressing     Problem: SAFETY ADULT  Goal: Patient will remain free of falls  Description: INTERVENTIONS:  - Educate patient/family on patient safety including physical limitations  - Instruct patient to call for assistance with activity   - Consult OT/PT to assist with strengthening/mobility   - Keep Call bell within reach  - Keep bed low and locked with side rails adjusted as appropriate  - Keep care items and personal belongings within reach  - Initiate and maintain comfort rounds  - Make Fall Risk Sign visible to staff  - Offer Toileting every 2 Hours, in advance of need  - Initiate/Maintain bed/chair alarm  - Apply yellow socks and bracelet for high fall risk patients  -  Consider moving patient to room near nurses station  Outcome: Progressing  Goal: Maintain or return to baseline ADL function  Description: INTERVENTIONS:  -  Assess patient's ability to carry out ADLs; assess patient's baseline for ADL function and identify physical deficits which impact ability to perform ADLs (bathing, care of mouth/teeth, toileting, grooming, dressing, etc.)  - Assess/evaluate cause of self-care deficits   - Assess range of motion  - Assess patient's mobility; develop plan if impaired  - Assess patient's need for assistive devices and provide as appropriate  - Encourage maximum independence but intervene and supervise when necessary  - Involve family in performance of ADLs  - Assess for home care needs following discharge   - Consider OT consult to assist with ADL evaluation and planning for discharge  - Provide patient education as appropriate  Outcome: Progressing  Goal: Maintains/Returns to pre admission functional level  Description: INTERVENTIONS:  - Perform AM-PAC 6 Click Basic Mobility/ Daily Activity assessment daily.  - Set and communicate daily mobility goal to care team and patient/family/caregiver.   - Collaborate with rehabilitation services on mobility goals if consulted  - Perform Range of Motion 4 times a day.  - Reposition patient every 2 hours.  - Ambulate patient 4 times a day  - Out of bed to chair 3 times a day   - Out of bed for meals 3 times a day  - Out of bed for toileting  - Record patient progress and toleration of activity level   Outcome: Progressing     Problem: DISCHARGE PLANNING  Goal: Discharge to home or other facility with appropriate resources  Description: INTERVENTIONS:  - Identify barriers to discharge w/patient and caregiver  - Arrange for needed discharge resources and transportation as appropriate  - Identify discharge learning needs (meds, wound care, etc.)  - Arrange for interpretive services to assist at discharge as needed  - Refer to Case Management  Department for coordinating discharge planning if the patient needs post-hospital services based on physician/advanced practitioner order or complex needs related to functional status, cognitive ability, or social support system  Outcome: Progressing     Problem: Knowledge Deficit  Goal: Patient/family/caregiver demonstrates understanding of disease process, treatment plan, medications, and discharge instructions  Description: Complete learning assessment and assess knowledge base.  Interventions:  - Provide teaching at level of understanding  - Provide teaching via preferred learning methods  Outcome: Progressing     Problem: RESPIRATORY - ADULT  Goal: Achieves optimal ventilation and oxygenation  Description: INTERVENTIONS:  - Assess for changes in respiratory status  - Assess for changes in mentation and behavior  - Position to facilitate oxygenation and minimize respiratory effort  - Oxygen administered by appropriate delivery if ordered  - Initiate smoking cessation education as indicated  - Encourage broncho-pulmonary hygiene including cough, deep breathe, Incentive Spirometry  - Assess the need for suctioning and aspirate as needed  - Assess and instruct to report SOB or any respiratory difficulty  - Respiratory Therapy support as indicated  Outcome: Progressing

## 2024-02-22 NOTE — PLAN OF CARE
Problem: OCCUPATIONAL THERAPY ADULT  Goal: Performs self-care activities at highest level of function for planned discharge setting.  See evaluation for individualized goals.  Description: Treatment Interventions: ADL retraining, Functional transfer training, UE strengthening/ROM, Endurance training, Patient/family training, Equipment evaluation/education, Compensatory technique education, Continued evaluation, Cardiac education, Energy conservation, Activityengagement          See flowsheet documentation for full assessment, interventions and recommendations.   Outcome: Progressing  Note: Limitation: Decreased ADL status, Decreased Safe judgement during ADL, Decreased endurance, Decreased self-care trans, Decreased high-level ADLs  Prognosis: Good  Assessment: Patient participated in Skilled OT session this date with interventions consisting of Energy Conservation techniques, Work simplification skills , deep breathing technique, safety awareness and fall prevention techniques, therapeutic exercise to: increase functional use of BUEs, increase BUE muscle strength , and  therapeutic activities to: increase activity tolerance . Patient agreeable to OT treatment session, upon arrival patient was found seated OOB to Chair. Patient requiring verbal cues for safety, verbal cues for correct technique, and frequent rest periods. Patient continues to be functioning below baseline level, occupational performance remains limited secondary to factors listed above and increased risk for falls and injury. The patient's raw score on the -PAC Daily Activity Inpatient Short Form is 20. A raw score of greater than or equal to 19 suggests the patient may benefit from discharge to home. Please refer to the recommendation of the Occupational Therapist for safe discharge planning. From OT standpoint, recommendation at time of d/c would be level 3, minimum resource intensity.     Rehab Resource Intensity Level, OT: III (Minimum  Resource Intensity)

## 2024-02-22 NOTE — PLAN OF CARE
Problem: PAIN - ADULT  Goal: Verbalizes/displays adequate comfort level or baseline comfort level  Description: Interventions:  - Encourage patient to monitor pain and request assistance  - Assess pain using appropriate pain scale0-10  - Administer analgesics based on type and severity of pain and evaluate response  - Implement non-pharmacological measures as appropriate and evaluate response  - Consider cultural and social influences on pain and pain management  - Notify physician/advanced practitioner if interventions unsuccessful or patient reports new pain  Outcome: Progressing     Problem: INFECTION - ADULT  Goal: Absence or prevention of progression during hospitalization  Description: INTERVENTIONS:  - Assess and monitor for signs and symptoms of infection  - Monitor lab/diagnostic results  - Monitor all insertion sites, i.e. indwelling lines, tubes, and drains  - Monitor endotracheal if appropriate and nasal secretions for changes in amount and color  - [place] appropriate cooling/warming therapies per order  - Administer medications as ordered  - Instruct and encourage patient and family to use good hand hygiene technique  - Identify and instruct in appropriate isolation precautions for identified infection/condition  Outcome: Progressing     Problem: SAFETY ADULT  Goal: Patient will remain free of falls  Description: INTERVENTIONS:  - Educate patient/family on patient safety including physical limitations  - Instruct patient to call for assistance with activity   - Consult OT/PT to assist with strengthening/mobility   - Keep Call bell within reach  - Keep bed low and locked with side rails adjusted as appropriate  - Keep care items and personal belongings within reach  - Initiate and maintain comfort rounds  - Make Fall Risk Sign visible to staff  - Offer Toileting every 2 Hours, in advance of need  - Initiate/Maintain bed/chair alarm  - Apply yellow socks and bracelet for high fall risk patients  -  Consider moving patient to room near nurses station  Outcome: Progressing  Goal: Maintain or return to baseline ADL function  Description: INTERVENTIONS:  -  Assess patient's ability to carry out ADLs; assess patient's baseline for ADL function and identify physical deficits which impact ability to perform ADLs (bathing, care of mouth/teeth, toileting, grooming, dressing, etc.)  - Assess/evaluate cause of self-care deficits   - Assess range of motion  - Assess patient's mobility; develop plan if impaired  - Assess patient's need for assistive devices and provide as appropriate  - Encourage maximum independence but intervene and supervise when necessary  - Involve family in performance of ADLs  - Assess for home care needs following discharge   - Consider OT consult to assist with ADL evaluation and planning for discharge  - Provide patient education as appropriate  Outcome: Progressing  Goal: Maintains/Returns to pre admission functional level  Description: INTERVENTIONS:  - Perform AM-PAC 6 Click Basic Mobility/ Daily Activity assessment daily.  - Set and communicate daily mobility goal to care team and patient/family/caregiver.   - Collaborate with rehabilitation services on mobility goals if consulted  - Perform Range of Motion 4 times a day.  - Reposition patient every 2 hours.  - Ambulate patient 4 times a day  - Out of bed to chair 3 times a day   - Out of bed for meals 3 times a day  - Out of bed for toileting  - Record patient progress and toleration of activity level   Outcome: Progressing     Problem: DISCHARGE PLANNING  Goal: Discharge to home or other facility with appropriate resources  Description: INTERVENTIONS:  - Identify barriers to discharge w/patient and caregiver  - Arrange for needed discharge resources and transportation as appropriate  - Identify discharge learning needs (meds, wound care, etc.)  - Arrange for interpretive services to assist at discharge as needed  - Refer to Case Management  Department for coordinating discharge planning if the patient needs post-hospital services based on physician/advanced practitioner order or complex needs related to functional status, cognitive ability, or social support system  Outcome: Progressing     Problem: Knowledge Deficit  Goal: Patient/family/caregiver demonstrates understanding of disease process, treatment plan, medications, and discharge instructions  Description: Complete learning assessment and assess knowledge base.  Interventions:  - Provide teaching at level of understanding  - Provide teaching via preferred learning methods  Outcome: Progressing     Problem: RESPIRATORY - ADULT  Goal: Achieves optimal ventilation and oxygenation  Description: INTERVENTIONS:  - Assess for changes in respiratory status  - Assess for changes in mentation and behavior  - Position to facilitate oxygenation and minimize respiratory effort  - Oxygen administered by appropriate delivery if ordered  - Initiate smoking cessation education as indicated  - Encourage broncho-pulmonary hygiene including cough, deep breathe, Incentive Spirometry  - Assess the need for suctioning and aspirate as needed  - Assess and instruct to report SOB or any respiratory difficulty  - Respiratory Therapy support as indicated  Outcome: Progressing

## 2024-02-22 NOTE — CASE MANAGEMENT
Case Management Discharge Planning Note    Patient name Nina Tran  Location /-01 MRN [id number]  : 1945 Date 2024       Current Admission Date: 2024  Current Admission Diagnosis:Acute respiratory failure (HCC)   Patient Active Problem List    Diagnosis Date Noted    GERD (gastroesophageal reflux disease) 2024    Chest pain 2024    Bacteremia 2024    Acute respiratory failure (HCC) 2024    COVID-19 2024    Generalized weakness 2024    Ambulatory dysfunction 2024    History of thymus cancer 2024      LOS (days): 5  Geometric Mean LOS (GMLOS) (days): 5.2  Days to GMLOS:-0.1     OBJECTIVE:  Risk of Unplanned Readmission Score: 16.27         Current admission status: Inpatient   Preferred Pharmacy:   [facility]/pharmacy #[id number] - [address] [address]  Phone: [phone] Fax: [phone]    Primary Care Provider: Emely Will DO    Primary Insurance: MEDICARE  Secondary Insurance: [facility]    DISCHARGE DETAILS:    Discharge planning discussed with:: patient and spouse, Abelardo  Freedom of Choice: Yes  Comments - Freedom of Choice: [facility] resumption  CM contacted family/caregiver?: Yes  Were Treatment Team discharge recommendations reviewed with patient/caregiver?: Yes  Did patient/caregiver verbalize understanding of patient care needs?: Yes  Were patient/caregiver advised of the risks associated with not following Treatment Team discharge recommendations?: Yes    Contacts  Patient Contacts: Abelardo Tran spouse  Relationship to Patient:: Family  Contact Method: In Person  Reason/Outcome: Continuity of Care, Discharge Planning              Other Referral/Resources/Interventions Provided:  Interventions: [facility]  Referral Comments: [facility]    Would you like to participate in our [facility] service program?  : No - Declined    Treatment Team Recommendation: Home with Home Health  Care  Discharge Destination Plan:: Home with Home Health Care  Transport at Discharge : Family                             IMM Given (Date):: 02/22/24  IMM Given to:: Patient  Family notified:: appeal rights provided to patient              CM met with patient and spouse to discuss discharge planning. HC resumption secured in [place] and patient has [employer] as O2 supplier. Spouse will provide transport for patient when medically cleared as he has portable O2 tanks at home.      IMM reviewed with patient, patient agrees with discharge determination. IMM placed in bin for filing.       CM to follow for medical stability.

## 2024-02-22 NOTE — PROGRESS NOTES
Patient:    MRN:  [id number]    Nerissa Request ID:  [id number]    Level of care reserved:  Home Health Agency    Partner Reserved:  [facility] [place] [address] [phone]    Clinical needs requested:    Geography searched:  [phone]    Start of Service:    Request sent:  4:00pm EST on 2/22/2024 by Carri Lowry    Partner reserved:  4:40pm EST on 2/22/2024 by Carri Lowry    Choice list shared:  4:40pm EST on 2/22/2024 by Carri Lowry

## 2024-02-22 NOTE — OCCUPATIONAL THERAPY NOTE
Occupational Therapy         Patient Name: Nina Tran  Today's Date: 2/22/2024              Occupational Therapy         Patient Name: Nina Tran  Today's Date: 2/22/2024 02/22/24 1238   OT Last Visit   OT Visit Date 02/22/24   Note Type   Note Type Treatment   Pain Assessment   Pain Assessment Tool 0-10   Pain Score 6   Pain Location/Orientation Location: Abdomen   Restrictions/Precautions   Weight Bearing Precautions Per Order No   Other Precautions Chair Alarm;Multiple lines;O2;Fall Risk;Pain   ADL   Where Assessed Other (Comment)  (standard toilet)   Grooming Assistance 5  Supervision/Setup   Grooming Deficit Standing with assistive device;Increased time to complete;Wash/dry hands   Toileting Assistance  5  Supervision/Setup   Toileting Deficit Grab bar use;Clothing management up;Clothing management down;Perineal hygiene;Increased time to complete;Supervison/safety;Verbal cueing   Bed Mobility   Additional Comments Pt OOB in chair at start/end of session, all needs within reach. Pt on 2L O2 via NC during session with SPO2 remaining WFL.   Transfers   Sit to Stand 5  Supervision   Additional items Armrests;Increased time required;Verbal cues   Stand to Sit 5  Supervision   Additional items Armrests;Increased time required;Verbal cues   Toilet transfer 5  Supervision   Additional items Increased time required;Verbal cues;Standard toilet;Other  (grab bars)   Additional Comments RW used; verbal cues for proper hand placement and safety throughout   Functional Mobility   Functional Mobility 5  Supervision   Additional Comments Pt performs functional mobility within her room x household distances using RW, SPV for safety. Pt independently manages O2 lines. Pt demo no instability nor LOB. Pt on 2L O2 with SPo2 remaining WFL. HR ranges .   Additional items Rolling walker   Therapeutic Exercise - ROM   UE-ROM Yes   ROM- Right Upper Extremities   R Shoulder AROM;Flexion;Extension;Horizontal  "ABduction   R Elbow AROM;Elbow flexion;Elbow extension   R Wrist AROM;Wrist flexion;Wrist extension   R Position Seated   R Weight/Reps/Sets 2 x 20   RUE ROM Comment Pt requires rest periods between sets due to fatigue.   ROM - Left Upper Extremities    L Shoulder AROM;Flexion;Extension;Horizontal ABduction   L Elbow AROM;Elbow flexion;Elbow extension   L Wrist AROM;Wrist flexion;Wrist extension   L Position Seated   L Weight/Reps/Sets 2 x 20   LUE ROM Comment Pt requires rest periods between sets due to fatigue.   Subjective   Subjective \"I was up and down all morning going to the bathroom. I would rather not.\"   Cognition   Overall Cognitive Status WFL   Arousal/Participation Alert;Cooperative   Attention Within functional limits   Orientation Level Oriented X4   Memory Within functional limits   Following Commands Follows all commands and directions without difficulty   Activity Tolerance   Activity Tolerance Patient limited by fatigue;Patient limited by pain   Assessment   Assessment Patient participated in Skilled OT session this date with interventions consisting of Energy Conservation techniques, Work simplification skills , deep breathing technique, safety awareness and fall prevention techniques, therapeutic exercise to: increase functional use of BUEs, increase BUE muscle strength , and  therapeutic activities to: increase activity tolerance . Patient agreeable to OT treatment session, upon arrival patient was found seated OOB to Chair. Patient requiring verbal cues for safety, verbal cues for correct technique, and frequent rest periods. Patient continues to be functioning below baseline level, occupational performance remains limited secondary to factors listed above and increased risk for falls and injury. The patient's raw score on the -PAC Daily Activity Inpatient Short Form is 20. A raw score of greater than or equal to 19 suggests the patient may benefit from discharge to home. Please refer to the " recommendation of the Occupational Therapist for safe discharge planning. From OT standpoint, recommendation at time of d/c would be level 3, minimum resource intensity.   Plan   Treatment Interventions UE strengthening/ROM;Endurance training;Compensatory technique education;Energy conservation;Activityengagement   Goal Expiration Date 03/03/24   OT Treatment Day 3   OT Frequency 2-3x/wk   Discharge Recommendation   Rehab Resource Intensity Level, OT III (Minimum Resource Intensity)   AM-PAC Daily Activity Inpatient   Lower Body Dressing 3   Bathing 3   Toileting 3   Upper Body Dressing 3   Grooming 4   Eating 4   Daily Activity Raw Score 20   Daily Activity Standardized Score (Calc for Raw Score >=11) 42.03   AM-PAC Applied Cognition Inpatient   Following a Speech/Presentation 3   Understanding Ordinary Conversation 4   Taking Medications 3   Remembering Where Things Are Placed or Put Away 3   Remembering List of 4-5 Errands 3   Taking Care of Complicated Tasks 2   Applied Cognition Raw Score 18   Applied Cognition Standardized Score 38.07           Pt will benefit from continued OT services in order to maximize (I) c ADL performance, FM c RW, and improve overall endurance/strength required to complete functional tasks in preparation for d/c.    Pt left seated in chair at end of session; all needs within reach; all lines intact; scds connected and turned on.    Krystal Mandujano

## 2024-02-22 NOTE — ASSESSMENT & PLAN NOTE
Patient with history of acid reflux taking protonix, patient explains epigastric pain, started after eating bbq outpatient. Still having epigastric pain  Started on iv protonix bid  Carafate qid  If still with pain, will consult GI to discuss potential EGD  GI consult: agree with PPI BID and carafate QID, possibly pleuritic pain secondary to recent COVID and pneumonia? Recommend EGD for further eval when symptoms improved from respiratory stance which can be done outpatient if patient is discharged.   GI unsure if able to do EGD tomorrow and suspect likely Monday, will make NPO at midnight in case they can do EGD tomorrow.

## 2024-02-22 NOTE — PROGRESS NOTES
[facility]  Progress Note  Name: Nina Tran I  MRN: [id number]  Unit/Bed#: -01 I Date of Admission: 2/17/2024   Date of Service: 2/22/2024 I Hospital Day: 5    Assessment/Plan   * Acute respiratory failure (HCC)  Assessment & Plan  POA with complaints of SOB and left sided chest pain. Recently admitted for COVID infection and sepsis. During previous admission, was requiring 2L NC. Home o2 eval done prior to discharge showed patient requiring no o2 at rest and 2L NC with ambulation. The last 3 days, patient has been using o2 more. Increased pain with movement.   During previous admission, patient stated she had o2 at home she used prn, but did not chronically use o2.   In ED, patient found to be 86% when placed on 2L NC and needed o2 increased to 4L NC.   CTA chest PE study: Unchanged groundglass opacities and interstitial marking thickening with bibasal predominance. Appearance favors fibrosis but acute process cannot be completely excluded. No pulmonary embolism  Will treat as active infection, start on IV rocephin  Incentive spirometry, supportive measures  Keep o2 saturations >90% and wean off o2 as able.   Pulm consult: start solumedrol 40 mg bid for suspected ILD flare, continue cellcept and mepron, complete 5 days of rocephin/doxy.   Completed 5 days of abx.     Pneumonia  Assessment & Plan  Presented to ED with left sided chest pain and worsening SOB for 3 days.   CTA chest: Unchanged groundglass opacities and interstitial marking thickening with bibasal predominance. Appearance favors fibrosis but acute process cannot be completely excluded. No PE.   COVID/Flu/RSV: COVID positive, but positive since 2/4 during previous admission.   Urine strep and legionella negative, Sputum culture ordered  Blood Cultures no growth after 5 days  Trend fever curve  Initially given rocephin and doxy in the emergency department, will continue and monitor cultures and titrate therapy as  indicated  Completed iv abx on 2/22  Repeat CXR on 2/20: Mild cardiomegaly. Slight vascular congestion. Peripheral bibasilar groundglass airspace opacities which may reflect COVID-19 pneumonia.  Supplemental oxygen for saturations > 90%; wean as tolerated  Aspiration Precautions  Respiratory Protocol   Incentive Spirometry   Supportive Care    Chest pain  Assessment & Plan  Presents to ED with left sided chest pain and increasing shortness of breath worsening over the last 3 days. Worse with movement.   EKG: sinus tachycardia, no ischemic changes.   CTA chest PE study: Unchanged groundglass opacities and interstitial marking thickening with bibasal predominance. Appearance favors fibrosis but acute process cannot be completely excluded.   MITESH score = 2  Troponin 0Hr 39; serial troponins normal  TSH elevated, normal t4, repeat thyroid studies in 4-6 weeks  A1c 7.1  Lipid panel with elevated triglycerides  Hold NSAIDS  Pain management  Telemetry  Cardiac diet; low sodium   Patient describing epigastric pain, more consistent with GERD, serial troponins normal.   improving    Generalized weakness  Assessment & Plan  POA with complaints of generalized weakness suspect secondary to suspected bacterial pneumonia   Given 1 dose of rocephin and doxy in the ED, will continue  PT/OT consult: II (Moderate Resource Intensity)     GERD (gastroesophageal reflux disease)  Assessment & Plan  Patient with history of acid reflux taking protonix, patient explains epigastric pain, started after eating bbq outpatient. Still having epigastric pain  Started on iv protonix bid  Carafate qid  If still with pain, will consult GI to discuss potential EGD  GI consult: agree with PPI BID and carafate QID, possibly pleuritic pain secondary to recent COVID and pneumonia? Recommend EGD for further eval when symptoms improved from respiratory stance which can be done outpatient if patient is discharged.   GI unsure if able to do EGD tomorrow and  suspect likely Monday, will make NPO at midnight in case they can do EGD tomorrow.     History of thymus cancer  Assessment & Plan  Patient with history of thymus cancer s/p removal    Continue outpatient follow up     COVID-19  Assessment & Plan  Recently admitted with COVID-19 infection tested positive on 2/4, still testing positive from previous infection. Completed 10 days of isolation per COVID isolation precautions  Will hold further treatment for COVID and treat for suspected bacterial pneumonia           VTE Pharmacologic Prophylaxis: VTE Score: 6 High Risk (Score >/= 5) - Pharmacological DVT Prophylaxis Ordered: enoxaparin (Lovenox). Sequential Compression Devices Ordered.    Mobility:   Basic Mobility Inpatient Raw Score: 18  JH-HLM Goal: 6: Walk 10 steps or more  JH-HLM Achieved: 7: Walk 25 feet or more  HLM Goal achieved. Continue to encourage appropriate mobility.    Patient Centered Rounds: I performed bedside rounds with nursing staff today.   Discussions with Specialists or Other Care Team Provider: nursing, case management, GI    Education and Discussions with Family / Patient: Updated  () at bedside.    Total Time Spent on Date of Encounter in care of patient: 40 mins. This time was spent on one or more of the following: performing physical exam; counseling and coordination of care; obtaining or reviewing history; documenting in the medical record; reviewing/ordering tests, medications or procedures; communicating with other healthcare professionals and discussing with patient's family/caregivers.    Current Length of Stay: 5 day(s)  Current Patient Status: Inpatient   Certification Statement: The patient will continue to require additional inpatient hospital stay due to pending EGD, monitoring oxygen saturations  Discharge Plan:  pending improvement of symptoms    Code Status: Level 1 - Full Code    Subjective:   Patient seen and examined today. She is feeling about the same.  Feels that her breathing is about the same and unchanged. No nausea or vomiting. She still has epigastric pain which she says gets worse and better at times.     Objective:     Vitals:   Temp (24hrs), Av.1 °F (36.2 °C), Min:96.8 °F (36 °C), Max:97.2 °F (36.2 °C)    Temp:  [96.8 °F (36 °C)-97.2 °F (36.2 °C)] 97.2 °F (36.2 °C)  HR:  [88-91] 90  Resp:  [18-19] 18  BP: (112-114)/(69-79) 114/78  SpO2:  [93 %-96 %] 94 %  Body mass index is 23.03 kg/m².     Input and Output Summary (last 24 hours):     Intake/Output Summary (Last 24 hours) at 2024 1703  Last data filed at 2024 1300  Gross per 24 hour   Intake 480 ml   Output 1500 ml   Net -1020 ml       Physical Exam:   Physical Exam  Vitals reviewed.   Constitutional:       General: She is not in acute distress.     Appearance: She is ill-appearing. She is not toxic-appearing.   HENT:      Head: Normocephalic and atraumatic.      Mouth/Throat:      Mouth: Mucous membranes are moist.   Cardiovascular:      Rate and Rhythm: Normal rate and regular rhythm.      Heart sounds: No murmur heard.  Pulmonary:      Effort: No respiratory distress.      Breath sounds: No stridor. Rhonchi present. No wheezing or rales.      Comments: Saturating well on 2L NC  Abdominal:      General: Bowel sounds are normal. There is no distension.      Palpations: Abdomen is soft. There is no mass.      Tenderness: There is abdominal tenderness (epigastric).   Musculoskeletal:      Right lower leg: No edema.      Left lower leg: No edema.   Skin:     General: Skin is warm and dry.   Neurological:      General: No focal deficit present.      Mental Status: She is alert and oriented to person, place, and time.   Psychiatric:         Mood and Affect: Mood normal.         Behavior: Behavior normal.          Additional Data:     Labs:  Results from last 7 days   Lab Units 24  0522 24  0540 24  0428   WBC Thousand/uL 5.26   < > 6.43   HEMOGLOBIN g/dL 10.5*   < > 10.4*    HEMATOCRIT % 32.8*   < > 33.4*   PLATELETS Thousands/uL 110*   < > 97*   BANDS PCT %  --   --  18*   LYMPHO PCT %  --   --  2*   MONO PCT %  --   --  2*   EOS PCT %  --   --  0    < > = values in this interval not displayed.     Results from last 7 days   Lab Units 02/22/24  0917 02/21/24  0522   SODIUM mmol/L 130* 130*   POTASSIUM mmol/L 3.7 4.1   CHLORIDE mmol/L 92* 99   CO2 mmol/L 25 23   BUN mg/dL 22 17   CREATININE mg/dL 0.95 0.62   ANION GAP mmol/L 13 8   CALCIUM mg/dL 8.0* 7.6*   ALBUMIN g/dL  --  2.9*   TOTAL BILIRUBIN mg/dL  --  0.73   ALK PHOS U/L  --  127*   ALT U/L  --  41   AST U/L  --  32   GLUCOSE RANDOM mg/dL 207* 143*     Results from last 7 days   Lab Units 02/17/24  0753   INR  0.92         Results from last 7 days   Lab Units 02/18/24  0428   HEMOGLOBIN A1C % 7.1*     Results from last 7 days   Lab Units 02/19/24  0540 02/18/24  0428 02/17/24  1052 02/17/24  0753   LACTIC ACID mmol/L  --   --  1.1 2.3*   PROCALCITONIN ng/ml 0.25 0.26*  --  0.32*       Lines/Drains:  Invasive Devices       Peripheral Intravenous Line  Duration             Peripheral IV 02/20/24 Left;Ventral (anterior) Forearm 1 day              Drain  Duration             External Urinary Catheter 5 days                          Imaging: Reviewed radiology reports from this admission including: chest xray    Recent Cultures (last 7 days):   Results from last 7 days   Lab Units 02/17/24  1530 02/17/24  0925 02/17/24  0753   BLOOD CULTURE   --   --  No Growth After 5 Days.  No Growth After 5 Days.   SPUTUM CULTURE  2+ Growth of  --   --    GRAM STAIN RESULT  No Epithelial cells per low power field*  No polys seen*  Rare Gram positive cocci in pairs*  --   --    LEGIONELLA URINARY ANTIGEN   --  Negative  --        Last 24 Hours Medication List:   Current Facility-Administered Medications   Medication Dose Route Frequency Provider Last Rate    acetaminophen  650 mg Oral Q6H PRN Ann Cardona PA-C      aluminum-magnesium  hydroxide-simethicone  30 mL Oral Q4H PRN Ann Cardona PA-C      ascorbic acid  500 mg Oral Daily Ann Cardona PA-C      atorvastatin  20 mg Oral Daily With Dinner Ann Cardona PA-C      atovaquone  1,500 mg Oral Daily Ann Cardona PA-C      doxycycline  100 mg Intravenous Q12H Ann Cardona PA-C 100 mg (02/22/24 0851)    enoxaparin  40 mg Subcutaneous Daily Ann Cardona PA-C      guaiFENesin  1,200 mg Oral Q12H [place] Ann Cardona PA-C      HYDROmorphone  0.2 mg Intravenous Q2H PRN Ann Cardona PA-C      lidocaine  1 patch Topical Q24H Ann Cardona PA-C      methenamine hippurate  1 g Oral BID With Meals Ann Cardona PA-C      methylPREDNISolone sodium succinate  40 mg Intravenous Q12H [facility] Sumeet Pathak PA-C      mycophenolate  1,000 mg Oral Q12H [facility] Ann Cardona PA-C      ondansetron  4 mg Intravenous Q6H PRN Ann Cardona PA-C      oxyCODONE  2.5 mg Oral Q4H PRN Ann Cardona PA-C      Or    oxyCODONE  5 mg Oral Q4H PRN Ann Cardona PA-C      pantoprazole  40 mg Intravenous Q12H [facility] Ann Cardona PA-C      sucralfate  1 g Oral 4x Daily (AC & HS) Ann Cardona PA-C          Today, Patient Was Seen By: Ann Cardona PA-C    **Please Note: This note may have been constructed using a voice recognition system.**

## 2024-02-23 ENCOUNTER — APPOINTMENT (INPATIENT)
Dept: RADIOLOGY | Facility: HOSPITAL | Age: 79
DRG: 196 | End: 2024-02-23
Payer: MEDICARE

## 2024-02-23 PROBLEM — J96.01 ACUTE RESPIRATORY FAILURE WITH HYPOXIA (HCC): Status: ACTIVE | Noted: 2024-02-05

## 2024-02-23 LAB
ANION GAP SERPL CALCULATED.3IONS-SCNC: 10 MMOL/L
BUN SERPL-MCNC: 24 MG/DL (ref 5–25)
CALCIUM SERPL-MCNC: 8.1 MG/DL (ref 8.4–10.2)
CHLORIDE SERPL-SCNC: 94 MMOL/L (ref 96–108)
CO2 SERPL-SCNC: 25 MMOL/L (ref 21–32)
CREAT SERPL-MCNC: 0.81 MG/DL (ref 0.6–1.3)
ERYTHROCYTE [DISTWIDTH] IN BLOOD BY AUTOMATED COUNT: 15.4 % (ref 11.6–15.1)
GFR SERPL CREATININE-BSD FRML MDRD: 68 ML/MIN/1.73SQ M
GLUCOSE SERPL-MCNC: 167 MG/DL (ref 65–140)
HCT VFR BLD AUTO: 35.1 % (ref 34.8–46.1)
HGB BLD-MCNC: 11.5 G/DL (ref 11.5–15.4)
MCH RBC QN AUTO: 27 PG (ref 26.8–34.3)
MCHC RBC AUTO-ENTMCNC: 32.8 G/DL (ref 31.4–37.4)
MCV RBC AUTO: 82 FL (ref 82–98)
OSMOLALITY UR: 349 MMOL/KG
PLATELET # BLD AUTO: 135 THOUSANDS/UL (ref 149–390)
PMV BLD AUTO: 10.3 FL (ref 8.9–12.7)
POTASSIUM SERPL-SCNC: 3.6 MMOL/L (ref 3.5–5.3)
RBC # BLD AUTO: 4.26 MILLION/UL (ref 3.81–5.12)
SODIUM 24H UR-SCNC: 45 MOL/L
SODIUM SERPL-SCNC: 129 MMOL/L (ref 135–147)
WBC # BLD AUTO: 9.55 THOUSAND/UL (ref 4.31–10.16)

## 2024-02-23 PROCEDURE — C9113 INJ PANTOPRAZOLE SODIUM, VIA: HCPCS

## 2024-02-23 PROCEDURE — 71045 X-RAY EXAM CHEST 1 VIEW: CPT

## 2024-02-23 PROCEDURE — 99232 SBSQ HOSP IP/OBS MODERATE 35: CPT | Performed by: INTERNAL MEDICINE

## 2024-02-23 PROCEDURE — 99232 SBSQ HOSP IP/OBS MODERATE 35: CPT | Performed by: FAMILY MEDICINE

## 2024-02-23 PROCEDURE — 93005 ELECTROCARDIOGRAM TRACING: CPT

## 2024-02-23 PROCEDURE — 84300 ASSAY OF URINE SODIUM: CPT | Performed by: FAMILY MEDICINE

## 2024-02-23 PROCEDURE — 83935 ASSAY OF URINE OSMOLALITY: CPT | Performed by: FAMILY MEDICINE

## 2024-02-23 PROCEDURE — 80048 BASIC METABOLIC PNL TOTAL CA: CPT

## 2024-02-23 PROCEDURE — 85027 COMPLETE CBC AUTOMATED: CPT

## 2024-02-23 RX ORDER — CEFPODOXIME PROXETIL 200 MG/1
400 TABLET, FILM COATED ORAL 2 TIMES DAILY WITH MEALS
Status: DISCONTINUED | OUTPATIENT
Start: 2024-02-23 | End: 2024-02-24

## 2024-02-23 RX ORDER — PANTOPRAZOLE SODIUM 40 MG/1
40 TABLET, DELAYED RELEASE ORAL
Status: DISCONTINUED | OUTPATIENT
Start: 2024-02-23 | End: 2024-03-05 | Stop reason: HOSPADM

## 2024-02-23 RX ORDER — FUROSEMIDE 10 MG/ML
20 INJECTION INTRAMUSCULAR; INTRAVENOUS ONCE
Status: COMPLETED | OUTPATIENT
Start: 2024-02-23 | End: 2024-02-23

## 2024-02-23 RX ORDER — METHYLPREDNISOLONE SODIUM SUCCINATE 40 MG/ML
40 INJECTION, POWDER, LYOPHILIZED, FOR SOLUTION INTRAMUSCULAR; INTRAVENOUS DAILY
Status: DISCONTINUED | OUTPATIENT
Start: 2024-02-24 | End: 2024-02-23

## 2024-02-23 RX ADMIN — ALUMINUM HYDROXIDE, MAGNESIUM HYDROXIDE, AND DIMETHICONE 30 ML: 200; 20; 200 SUSPENSION ORAL at 11:08

## 2024-02-23 RX ADMIN — SUCRALFATE 1 G: 1 TABLET ORAL at 11:07

## 2024-02-23 RX ADMIN — PANTOPRAZOLE SODIUM 40 MG: 40 TABLET, DELAYED RELEASE ORAL at 16:22

## 2024-02-23 RX ADMIN — OXYCODONE HYDROCHLORIDE 5 MG: 5 TABLET ORAL at 05:27

## 2024-02-23 RX ADMIN — SUCRALFATE 1 G: 1 TABLET ORAL at 08:22

## 2024-02-23 RX ADMIN — GUAIFENESIN 1200 MG: 600 TABLET ORAL at 21:16

## 2024-02-23 RX ADMIN — SUCRALFATE 1 G: 1 TABLET ORAL at 16:22

## 2024-02-23 RX ADMIN — PANTOPRAZOLE SODIUM 40 MG: 40 INJECTION, POWDER, FOR SOLUTION INTRAVENOUS at 08:22

## 2024-02-23 RX ADMIN — METHENAMINE HIPPURATE 1 G: 1 TABLET ORAL at 16:22

## 2024-02-23 RX ADMIN — FUROSEMIDE 20 MG: 10 INJECTION, SOLUTION INTRAMUSCULAR; INTRAVENOUS at 17:44

## 2024-02-23 RX ADMIN — ATOVAQUONE 1500 MG: 750 SUSPENSION ORAL at 08:23

## 2024-02-23 RX ADMIN — METHYLPREDNISOLONE SODIUM SUCCINATE 40 MG: 40 INJECTION, POWDER, FOR SOLUTION INTRAMUSCULAR; INTRAVENOUS at 08:22

## 2024-02-23 RX ADMIN — CEFPODOXIME PROXETIL 400 MG: 200 TABLET, FILM COATED ORAL at 16:22

## 2024-02-23 RX ADMIN — BISMUTH SUBSALICYLATE 525 MG: 525 LIQUID ORAL at 16:22

## 2024-02-23 RX ADMIN — SUCRALFATE 1 G: 1 TABLET ORAL at 21:16

## 2024-02-23 RX ADMIN — ATORVASTATIN CALCIUM 20 MG: 20 TABLET, FILM COATED ORAL at 16:22

## 2024-02-23 RX ADMIN — LIDOCAINE 5% 1 PATCH: 700 PATCH TOPICAL at 14:18

## 2024-02-23 RX ADMIN — MYCOPHENOLATE MOFETIL 1000 MG: 250 CAPSULE ORAL at 08:22

## 2024-02-23 RX ADMIN — GUAIFENESIN 1200 MG: 600 TABLET ORAL at 08:22

## 2024-02-23 RX ADMIN — MYCOPHENOLATE MOFETIL 1000 MG: 250 CAPSULE ORAL at 21:16

## 2024-02-23 RX ADMIN — FUROSEMIDE 20 MG: 10 INJECTION, SOLUTION INTRAMUSCULAR; INTRAVENOUS at 11:08

## 2024-02-23 RX ADMIN — OXYCODONE HYDROCHLORIDE AND ACETAMINOPHEN 500 MG: 500 TABLET ORAL at 08:22

## 2024-02-23 NOTE — ASSESSMENT & PLAN NOTE
Patient with history of acid reflux taking protonix, patient explains epigastric pain, started after eating bbq outpatient. Still having epigastric pain  Started on iv protonix bid.  Now switched to oral Protonix  Carafate qid  If still with pain, will consult GI to discuss potential EGD outpt

## 2024-02-23 NOTE — PROGRESS NOTES
[facility]  Progress Note  Name: Nina Tran I  MRN: [id number]  Unit/Bed#: -01 I Date of Admission: 2/17/2024   Date of Service: 2/23/2024 I Hospital Day: 6    Assessment/Plan   * Acute respiratory failure with hypoxia (HCC)  Assessment & Plan  POA with complaints of SOB and left sided chest pain. Recently admitted for COVID infection and sepsis. During previous admission, was requiring 2L NC. Home o2 eval done prior to discharge showed patient requiring no o2 at rest and 2L NC with ambulation. The last 3 days, patient has been using o2 more. Increased pain with movement.   During previous admission, patient stated she had o2 at home she used prn, but did not chronically use o2.   In ED, patient found to be 86% when placed on 2L NC and needed o2 increased to 4L NC.   CTA chest PE study: Unchanged groundglass opacities and interstitial marking thickening with bibasal predominance. Appearance favors fibrosis but acute process cannot be completely excluded. No pulmonary embolism  Will treat as active infection, start on IV rocephin  Incentive spirometry, supportive measures  Keep o2 saturations >90% and wean off o2 as able.   Pulm consult  Given 2 doses of IV Lasix today 20 mg each.  Start weaning down steroids to 40 mg of Solu-Medrol IV daily and then switch to oral prednisone in the next 24 to 48 hours  Placed on Vantin for another 2 to 3 days and then stop antibiotics  Patient requiring up to 5 L of oxygen this morning try to wean down gradually and down to 3 L now.  Will do ambulatory O2 requirement study tomorrow  CTA chest PE study was negative for pulmonary embolism    Chest pain  Assessment & Plan  Presents to ED with left sided chest pain and increasing shortness of breath worsening over the last 3 days. Worse with movement.   EKG: sinus tachycardia, no ischemic changes.   CTA chest PE study: Unchanged groundglass opacities and interstitial marking thickening with bibasal  predominance. Appearance favors fibrosis but acute process cannot be completely excluded.   MITESH score = 2  Troponin 0Hr 39; serial troponins normal  TSH elevated, normal t4, repeat thyroid studies in 4-6 weeks  A1c 7.1  Lipid panel with elevated triglycerides  Hold NSAIDS  Pain management  Cardiac diet; low sodium   Patient describing epigastric pain, more consistent with GERD, serial troponins normal.   Start tapering down steroids and placed on Carafate Protonix and Pepto-Bismol for her epigastric pain.  Suspect GERD.  2D echo shows no regional wall motion abnormality.  Recent troponins and EKG negative for ischemia    Pneumonia  Assessment & Plan  Presented to ED with left sided chest pain and worsening SOB for 3 days.   CTA chest: Unchanged groundglass opacities and interstitial marking thickening with bibasal predominance. Appearance favors fibrosis but acute process cannot be completely excluded. No PE.   COVID/Flu/RSV: COVID positive, but positive since 2/4 during previous admission.   Urine strep and legionella negative, Sputum culture ordered  Blood Cultures no growth after 5 days  Trend fever curve  Initially given rocephin and doxy in the emergency department, will continue and monitor cultures and titrate therapy as indicated  Completed iv abx and switch to oral Vantin for 3 more days  Repeat CXR on 2/20: Mild cardiomegaly. Slight vascular congestion. Peripheral bibasilar groundglass airspace opacities which may reflect COVID-19 pneumonia.  Supplemental oxygen for saturations > 90%; wean as tolerated  Aspiration Precautions  Respiratory Protocol   Incentive Spirometry   Supportive Care    GERD (gastroesophageal reflux disease)  Assessment & Plan  Patient with history of acid reflux taking protonix, patient explains epigastric pain, started after eating bbq outpatient. Still having epigastric pain  Started on iv protonix bid.  Now switched to oral Protonix  Carafate qid  If still with pain, will consult  GI to discuss potential EGD outpt    History of thymus cancer  Assessment & Plan  Patient with history of thymus cancer s/p removal    Continue outpatient follow up     Generalized weakness  Assessment & Plan  POA with complaints of generalized weakness suspect secondary to resp issues  PT/OT consult: II (Moderate Resource Intensity)     COVID-19  Assessment & Plan  Recently admitted with COVID-19 infection tested positive on 2/4, still testing positive from previous infection. Completed 10 days of isolation per COVID isolation precautions  Will hold further treatment for COVID and treat for suspected bacterial pneumonia         Hyponatremia: Received IV Lasix and placed on fluid restriction and repeat BMP in AM.  Also check urine studies to rule out SIADH      VTE Pharmacologic Prophylaxis: VTE Score: 6 Moderate Risk (Score 3-4) - Pharmacological DVT Prophylaxis Ordered: enoxaparin (Lovenox).    Mobility:   Basic Mobility Inpatient Raw Score: 18  JH-HLM Goal: 6: Walk 10 steps or more  JH-HLM Achieved: 6: Walk 10 steps or more  HLM Goal achieved. Continue to encourage appropriate mobility.    Patient Centered Rounds: I performed bedside rounds with nursing staff today.   Discussions with Specialists or Other Care Team Provider: none    Education and Discussions with Family / Patient: will update    Total Time Spent on Date of Encounter in care of patient: 35 mins. This time was spent on one or more of the following: performing physical exam; counseling and coordination of care; obtaining or reviewing history; documenting in the medical record; reviewing/ordering tests, medications or procedures; communicating with other healthcare professionals and discussing with patient's family/caregivers.    Current Length of Stay: 6 day(s)  Current Patient Status: Inpatient   Certification Statement: The patient will continue to require additional inpatient hospital stay due to acute on chronic respiratory failure with  hypoxia  Discharge Plan: Anticipate discharge in 24-48 hrs to discharge location to be determined pending rehab evaluations.    Code Status: Level 1 - Full Code    Subjective:   Patient currently on 4 to 5 L this morning.  Still having some intermittent chest pain but feels a little better today compared to yesterday.    Objective:     Vitals:   Temp (24hrs), Av.2 °F (36.2 °C), Min:97.2 °F (36.2 °C), Max:97.2 °F (36.2 °C)    Temp:  [97.2 °F (36.2 °C)] 97.2 °F (36.2 °C)  HR:  [95] 95  Resp:  [18] 18  BP: (117-118)/(76-78) 117/76  SpO2:  [83 %-97 %] 97 %  Body mass index is 23.03 kg/m².     Input and Output Summary (last 24 hours):     Intake/Output Summary (Last 24 hours) at 2024 1354  Last data filed at 2024 1301  Gross per 24 hour   Intake 480 ml   Output 1450 ml   Net -970 ml       Physical Exam:   Physical Exam  Vitals and nursing note reviewed.   Constitutional:       Appearance: She is ill-appearing.   HENT:      Head: Normocephalic and atraumatic.      Right Ear: External ear normal.      Left Ear: External ear normal.      Nose: Nose normal.      Mouth/Throat:      Pharynx: Oropharynx is clear.   Eyes:      Pupils: Pupils are equal, round, and reactive to light.   Cardiovascular:      Rate and Rhythm: Normal rate and regular rhythm.      Heart sounds: Normal heart sounds.   Pulmonary:      Effort: Pulmonary effort is normal.      Breath sounds: Rales present.   Abdominal:      General: Bowel sounds are normal.      Palpations: Abdomen is soft.      Tenderness: There is no abdominal tenderness.   Musculoskeletal:         General: Normal range of motion.      Cervical back: Normal range of motion and neck supple.   Skin:     General: Skin is warm and dry.      Capillary Refill: Capillary refill takes less than 2 seconds.   Neurological:      General: No focal deficit present.      Mental Status: She is alert and oriented to person, place, and time.   Psychiatric:         Mood and Affect: Mood  normal.            Additional Data:     Labs:  Results from last 7 days   Lab Units 02/23/24  0533 02/19/24  0540 02/18/24  0428   WBC Thousand/uL 9.55   < > 6.43   HEMOGLOBIN g/dL 11.5   < > 10.4*   HEMATOCRIT % 35.1   < > 33.4*   PLATELETS Thousands/uL 135*   < > 97*   BANDS PCT %  --   --  18*   LYMPHO PCT %  --   --  2*   MONO PCT %  --   --  2*   EOS PCT %  --   --  0    < > = values in this interval not displayed.     Results from last 7 days   Lab Units 02/23/24  0533 02/22/24  0917 02/21/24  0522   SODIUM mmol/L 129*   < > 130*   POTASSIUM mmol/L 3.6   < > 4.1   CHLORIDE mmol/L 94*   < > 99   CO2 mmol/L 25   < > 23   BUN mg/dL 24   < > 17   CREATININE mg/dL 0.81   < > 0.62   ANION GAP mmol/L 10   < > 8   CALCIUM mg/dL 8.1*   < > 7.6*   ALBUMIN g/dL  --   --  2.9*   TOTAL BILIRUBIN mg/dL  --   --  0.73   ALK PHOS U/L  --   --  127*   ALT U/L  --   --  41   AST U/L  --   --  32   GLUCOSE RANDOM mg/dL 167*   < > 143*    < > = values in this interval not displayed.     Results from last 7 days   Lab Units 02/17/24  0753   INR  0.92         Results from last 7 days   Lab Units 02/18/24  0428   HEMOGLOBIN A1C % 7.1*     Results from last 7 days   Lab Units 02/19/24  0540 02/18/24  0428 02/17/24  1052 02/17/24  0753   LACTIC ACID mmol/L  --   --  1.1 2.3*   PROCALCITONIN ng/ml 0.25 0.26*  --  0.32*       Lines/Drains:  Invasive Devices       Peripheral Intravenous Line  Duration             Peripheral IV 02/20/24 Left;Ventral (anterior) Forearm 2 days              Drain  Duration             External Urinary Catheter 5 days                          Imaging: Reviewed radiology reports from this admission including: chest CT scan and abdominal/pelvic CT    Recent Cultures (last 7 days):   Results from last 7 days   Lab Units 02/17/24  1530 02/17/24  0925 02/17/24  0753   BLOOD CULTURE   --   --  No Growth After 5 Days.  No Growth After 5 Days.   SPUTUM CULTURE  2+ Growth of  --   --    GRAM STAIN RESULT  No  Epithelial cells per low power field*  No polys seen*  Rare Gram positive cocci in pairs*  --   --    LEGIONELLA URINARY ANTIGEN   --  Negative  --        Last 24 Hours Medication List:   Current Facility-Administered Medications   Medication Dose Route Frequency Provider Last Rate    acetaminophen  650 mg Oral Q6H PRN Ann Cardona PA-C      aluminum-magnesium hydroxide-simethicone  30 mL Oral Q4H PRN Ann Cardona PA-C      ascorbic acid  500 mg Oral Daily Ann Cardona PA-C      atorvastatin  20 mg Oral Daily With Dinner Ann Cardona PA-C      atovaquone  1,500 mg Oral Daily Ann Cardona PA-C      bismuth subsalicylate  524 mg Oral Q6H PRN Charlene Archer MD      cefpodoxime  400 mg Oral BID With Meals Charlene Archer MD      enoxaparin  40 mg Subcutaneous Daily Ann Cardona PA-C      furosemide  20 mg Intravenous Once Charlene Archer MD      guaiFENesin  1,200 mg Oral Q12H [place] Ann Cardona PA-C      HYDROmorphone  0.2 mg Intravenous Q2H PRN Ann Cardona PA-C      lidocaine  1 patch Topical Q24H Ann Cardona PA-C      methenamine hippurate  1 g Oral BID With Meals Ann Cardona PA-C      [START ON 2/24/2024] methylPREDNISolone sodium succinate  40 mg Intravenous Daily Charlene Archer MD      mycophenolate  1,000 mg Oral Q12H [facility] Ann Cardona PA-C      ondansetron  4 mg Intravenous Q6H PRN Ann Cardona PA-C      oxyCODONE  2.5 mg Oral Q4H PRN Ann Cardona PA-C      Or    oxyCODONE  5 mg Oral Q4H PRN Ann Cardona PA-C      pantoprazole  40 mg Oral BID AC Charlene Archer MD      sucralfate  1 g Oral 4x Daily (AC & HS) Ann Cardona PA-C          Today, Patient Was Seen By: Charlene Archer MD    **Please Note: This note may have been constructed using a voice recognition system.**

## 2024-02-23 NOTE — PLAN OF CARE
Problem: PAIN - ADULT  Goal: Verbalizes/displays adequate comfort level or baseline comfort level  Description: Interventions:  - Encourage patient to monitor pain and request assistance  - Assess pain using appropriate pain scale0-10  - Administer analgesics based on type and severity of pain and evaluate response  - Implement non-pharmacological measures as appropriate and evaluate response  - Consider cultural and social influences on pain and pain management  - Notify physician/advanced practitioner if interventions unsuccessful or patient reports new pain  Outcome: Progressing     Problem: INFECTION - ADULT  Goal: Absence or prevention of progression during hospitalization  Description: INTERVENTIONS:  - Assess and monitor for signs and symptoms of infection  - Monitor lab/diagnostic results  - Monitor all insertion sites, i.e. indwelling lines, tubes, and drains  - Monitor endotracheal if appropriate and nasal secretions for changes in amount and color  - [place] appropriate cooling/warming therapies per order  - Administer medications as ordered  - Instruct and encourage patient and family to use good hand hygiene technique  - Identify and instruct in appropriate isolation precautions for identified infection/condition  Outcome: Progressing     Problem: SAFETY ADULT  Goal: Patient will remain free of falls  Description: INTERVENTIONS:  - Educate patient/family on patient safety including physical limitations  - Instruct patient to call for assistance with activity   - Consult OT/PT to assist with strengthening/mobility   - Keep Call bell within reach  - Keep bed low and locked with side rails adjusted as appropriate  - Keep care items and personal belongings within reach  - Initiate and maintain comfort rounds  - Make Fall Risk Sign visible to staff  - Offer Toileting every 2 Hours, in advance of need  - Initiate/Maintain bed/chair alarm  - Apply yellow socks and bracelet for high fall risk patients  -  Consider moving patient to room near nurses station  Outcome: Progressing  Goal: Maintain or return to baseline ADL function  Description: INTERVENTIONS:  -  Assess patient's ability to carry out ADLs; assess patient's baseline for ADL function and identify physical deficits which impact ability to perform ADLs (bathing, care of mouth/teeth, toileting, grooming, dressing, etc.)  - Assess/evaluate cause of self-care deficits   - Assess range of motion  - Assess patient's mobility; develop plan if impaired  - Assess patient's need for assistive devices and provide as appropriate  - Encourage maximum independence but intervene and supervise when necessary  - Involve family in performance of ADLs  - Assess for home care needs following discharge   - Consider OT consult to assist with ADL evaluation and planning for discharge  - Provide patient education as appropriate  Outcome: Progressing  Goal: Maintains/Returns to pre admission functional level  Description: INTERVENTIONS:  - Perform AM-PAC 6 Click Basic Mobility/ Daily Activity assessment daily.  - Set and communicate daily mobility goal to care team and patient/family/caregiver.   - Collaborate with rehabilitation services on mobility goals if consulted  - Perform Range of Motion 4 times a day.  - Reposition patient every 2 hours.  - Ambulate patient 4 times a day  - Out of bed to chair 3 times a day   - Out of bed for meals 3 times a day  - Out of bed for toileting  - Record patient progress and toleration of activity level   Outcome: Progressing     Problem: DISCHARGE PLANNING  Goal: Discharge to home or other facility with appropriate resources  Description: INTERVENTIONS:  - Identify barriers to discharge w/patient and caregiver  - Arrange for needed discharge resources and transportation as appropriate  - Identify discharge learning needs (meds, wound care, etc.)  - Arrange for interpretive services to assist at discharge as needed  - Refer to Case Management  Department for coordinating discharge planning if the patient needs post-hospital services based on physician/advanced practitioner order or complex needs related to functional status, cognitive ability, or social support system  Outcome: Progressing     Problem: Knowledge Deficit  Goal: Patient/family/caregiver demonstrates understanding of disease process, treatment plan, medications, and discharge instructions  Description: Complete learning assessment and assess knowledge base.  Interventions:  - Provide teaching at level of understanding  - Provide teaching via preferred learning methods  Outcome: Progressing     Problem: RESPIRATORY - ADULT  Goal: Achieves optimal ventilation and oxygenation  Description: INTERVENTIONS:  - Assess for changes in respiratory status  - Assess for changes in mentation and behavior  - Position to facilitate oxygenation and minimize respiratory effort  - Oxygen administered by appropriate delivery if ordered  - Initiate smoking cessation education as indicated  - Encourage broncho-pulmonary hygiene including cough, deep breathe, Incentive Spirometry  - Assess the need for suctioning and aspirate as needed  - Assess and instruct to report SOB or any respiratory difficulty  - Respiratory Therapy support as indicated  Outcome: Progressing

## 2024-02-23 NOTE — ASSESSMENT & PLAN NOTE
POA with complaints of generalized weakness suspect secondary to resp issues  PT/OT consult: II (Moderate Resource Intensity)

## 2024-02-23 NOTE — ASSESSMENT & PLAN NOTE
POA with complaints of SOB and left sided chest pain. Recently admitted for COVID infection and sepsis. During previous admission, was requiring 2L NC. Home o2 eval done prior to discharge showed patient requiring no o2 at rest and 2L NC with ambulation. The last 3 days, patient has been using o2 more. Increased pain with movement.   During previous admission, patient stated she had o2 at home she used prn, but did not chronically use o2.   In ED, patient found to be 86% when placed on 2L NC and needed o2 increased to 4L NC.   CTA chest PE study: Unchanged groundglass opacities and interstitial marking thickening with bibasal predominance. Appearance favors fibrosis but acute process cannot be completely excluded. No pulmonary embolism  Will treat as active infection, start on IV rocephin  Incentive spirometry, supportive measures  Keep o2 saturations >90% and wean off o2 as able.   Pulm consult  Given 2 doses of IV Lasix today 20 mg each.  Start weaning down steroids to 40 mg of Solu-Medrol IV daily and then switch to oral prednisone in the next 24 to 48 hours  Placed on Vantin for another 2 to 3 days and then stop antibiotics  Patient requiring up to 5 L of oxygen this morning try to wean down gradually and down to 3 L now.  Will do ambulatory O2 requirement study tomorrow  CTA chest PE study was negative for pulmonary embolism

## 2024-02-23 NOTE — CASE MANAGEMENT
Case Management Discharge Planning Note    Patient name Nina Tran  Location /-01 MRN [id number]  : 1945 Date 2024       Current Admission Date: 2024  Current Admission Diagnosis:Acute respiratory failure with hypoxia (HCC)   Patient Active Problem List    Diagnosis Date Noted    GERD (gastroesophageal reflux disease) 2024    Chest pain 2024    Pneumonia 2024    Bacteremia 2024    Acute respiratory failure with hypoxia (HCC) 2024    COVID-19 2024    Generalized weakness 2024    Ambulatory dysfunction 2024    History of thymus cancer 2024      LOS (days): 6  Geometric Mean LOS (GMLOS) (days): 5.2  Days to GMLOS:-1.1     OBJECTIVE:  Risk of Unplanned Readmission Score: 14.91         Current admission status: Inpatient   Preferred Pharmacy:   [facility]/pharmacy #[id number] [place], [place] - [address] [place] [address]  Phone: [phone] Fax: [phone]    Primary Care Provider: Emely Will DO    Primary Insurance: MEDICARE  Secondary Insurance: [employer]    DISCHARGE DETAILS:     CM updated [facility], in [place], regarding patients medical status.

## 2024-02-23 NOTE — ASSESSMENT & PLAN NOTE
Presents to ED with left sided chest pain and increasing shortness of breath worsening over the last 3 days. Worse with movement.   EKG: sinus tachycardia, no ischemic changes.   CTA chest PE study: Unchanged groundglass opacities and interstitial marking thickening with bibasal predominance. Appearance favors fibrosis but acute process cannot be completely excluded.   MITESH score = 2  Troponin 0Hr 39; serial troponins normal  TSH elevated, normal t4, repeat thyroid studies in 4-6 weeks  A1c 7.1  Lipid panel with elevated triglycerides  Hold NSAIDS  Pain management  Cardiac diet; low sodium   Patient describing epigastric pain, more consistent with GERD, serial troponins normal.   Start tapering down steroids and placed on Carafate Protonix and Pepto-Bismol for her epigastric pain.  Suspect GERD.  2D echo shows no regional wall motion abnormality.  Recent troponins and EKG negative for ischemia

## 2024-02-23 NOTE — PROGRESS NOTES
"Progress Note - Pulmonary   Nnia Tran 78 y.o. 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  Titrate oxygen to maintain SpO2 greater than or equal to 88% with goal to get back to baseline 2 L nasal cannula as needed.  Pulmonary toilet:  Increase activity as tolerated, out of bed as tolerated, cough and deep breathing exercises encourage, incentive spirometry q.1 hour  Pleated 5-day course of ceftriaxone/doxycycline.  Received Solu-Medrol 40 mg IV daily today.  Will discontinue this and replaced with prednisone 50 mg p.o. daily starting tomorrow.  Plan to taper by 10 mg every 5 days until back to 20 mg daily.  Continue Cellcept and Mepron.   Monitor I/Os and daily weights.  Diuresis as necessary but currently appears euvolemic.  Further management per primary team and GI.     Chief Complaint:    \"I feel better.\"    Subjective:    Patient was seen and examined today.  No overnight events reported.  Patient states that overall she feels her breathing is improved but she still has dyspnea when walking around.  Denies significant cough or sputum production.  Denies wheezing.    Objective:    Vitals: Blood pressure 119/76, pulse 95, temperature (!) 97.2 °F (36.2 °C), resp. rate 19, height 5' 3\" (1.6 m), SpO2 97%.4L NC ,Body mass index is 23.03 kg/m².      Intake/Output Summary (Last 24 hours) at 2/23/2024 1651  Last data filed at 2/23/2024 1500  Gross per 24 hour   Intake 480 ml   Output 1850 ml   Net -1370 ml       Invasive Devices       Peripheral Intravenous Line  Duration             Peripheral IV 02/20/24 Left;Ventral (anterior) Forearm 2 days              Drain  Duration             External Urinary Catheter 5 days " "                   Physical Exam:     Physical Exam  Vitals and nursing note reviewed.   Constitutional:       General: She is not in acute distress.     Appearance: Normal appearance.   HENT:      Head: Normocephalic and atraumatic.      Mouth/Throat:      Mouth: Mucous membranes are moist.      Pharynx: Oropharynx is clear.   Cardiovascular:      Rate and Rhythm: Normal rate and regular rhythm.      Heart sounds: No murmur heard.  Pulmonary:      Breath sounds: Rales present.   Musculoskeletal:      Cervical back: Normal range of motion.   Skin:     General: Skin is warm and dry.   Neurological:      General: No focal deficit present.      Mental Status: She is alert. Mental status is at baseline.   Psychiatric:         Mood and Affect: Mood normal.         Behavior: Behavior normal.         Labs: I have personally reviewed pertinent lab results., ABG: No results found for: \"PHART\", \"ENU8JIC\", \"PO2ART\", \"CCZ9YSZ\", \"B7KHYGGR\", \"BEART\", \"SOURCE\", BNP: No results found for: \"BNP\", CBC:   Lab Results   Component Value Date    WBC 9.55 02/23/2024    HGB 11.5 02/23/2024    HCT 35.1 02/23/2024    MCV 82 02/23/2024     (L) 02/23/2024    RBC 4.26 02/23/2024    MCH 27.0 02/23/2024    MCHC 32.8 02/23/2024    RDW 15.4 (H) 02/23/2024    MPV 10.3 02/23/2024   , CMP:   Lab Results   Component Value Date    SODIUM 129 (L) 02/23/2024    K 3.6 02/23/2024    CL 94 (L) 02/23/2024    CO2 25 02/23/2024    BUN 24 02/23/2024    CREATININE 0.81 02/23/2024    CALCIUM 8.1 (L) 02/23/2024    EGFR 68 02/23/2024   , PT/INR: No results found for: \"PT\", \"INR\", Troponin: No results found for: \"TROPONINI\"    Imaging and other studies: I have personally reviewed pertinent reports.   and I have personally reviewed pertinent films in PACS    "

## 2024-02-23 NOTE — ASSESSMENT & PLAN NOTE
Presented to ED with left sided chest pain and worsening SOB for 3 days.   CTA chest: Unchanged groundglass opacities and interstitial marking thickening with bibasal predominance. Appearance favors fibrosis but acute process cannot be completely excluded. No PE.   COVID/Flu/RSV: COVID positive, but positive since 2/4 during previous admission.   Urine strep and legionella negative, Sputum culture ordered  Blood Cultures no growth after 5 days  Trend fever curve  Initially given rocephin and doxy in the emergency department, will continue and monitor cultures and titrate therapy as indicated  Completed iv abx and switch to oral Vantin for 3 more days  Repeat CXR on 2/20: Mild cardiomegaly. Slight vascular congestion. Peripheral bibasilar groundglass airspace opacities which may reflect COVID-19 pneumonia.  Supplemental oxygen for saturations > 90%; wean as tolerated  Aspiration Precautions  Respiratory Protocol   Incentive Spirometry   Supportive Care

## 2024-02-24 ENCOUNTER — APPOINTMENT (INPATIENT)
Dept: RADIOLOGY | Facility: HOSPITAL | Age: 79
DRG: 196 | End: 2024-02-24
Payer: MEDICARE

## 2024-02-24 PROBLEM — J96.21 ACUTE ON CHRONIC RESPIRATORY FAILURE WITH HYPOXIA (HCC): Status: ACTIVE | Noted: 2024-02-05

## 2024-02-24 LAB
ANION GAP SERPL CALCULATED.3IONS-SCNC: 10 MMOL/L
ANION GAP SERPL CALCULATED.3IONS-SCNC: 9 MMOL/L
ARTERIAL PATENCY WRIST A: YES
BASE EXCESS BLDA CALC-SCNC: 1 MMOL/L
BUN SERPL-MCNC: 24 MG/DL (ref 5–25)
BUN SERPL-MCNC: 27 MG/DL (ref 5–25)
CALCIUM SERPL-MCNC: 7.9 MG/DL (ref 8.4–10.2)
CALCIUM SERPL-MCNC: 7.9 MG/DL (ref 8.4–10.2)
CHLORIDE SERPL-SCNC: 91 MMOL/L (ref 96–108)
CHLORIDE SERPL-SCNC: 91 MMOL/L (ref 96–108)
CO2 SERPL-SCNC: 29 MMOL/L (ref 21–32)
CO2 SERPL-SCNC: 30 MMOL/L (ref 21–32)
CREAT SERPL-MCNC: 0.79 MG/DL (ref 0.6–1.3)
CREAT SERPL-MCNC: 0.86 MG/DL (ref 0.6–1.3)
ERYTHROCYTE [DISTWIDTH] IN BLOOD BY AUTOMATED COUNT: 15.4 % (ref 11.6–15.1)
GFR SERPL CREATININE-BSD FRML MDRD: 64 ML/MIN/1.73SQ M
GFR SERPL CREATININE-BSD FRML MDRD: 70 ML/MIN/1.73SQ M
GLUCOSE SERPL-MCNC: 114 MG/DL (ref 65–140)
GLUCOSE SERPL-MCNC: 93 MG/DL (ref 65–140)
HCO3 BLDA-SCNC: 24.2 MMOL/L (ref 22–28)
HCT VFR BLD AUTO: 30 % (ref 34.8–46.1)
HGB BLD-MCNC: 9.4 G/DL (ref 11.5–15.4)
LACTATE SERPL-SCNC: 2.6 MMOL/L (ref 0.5–2)
LACTATE SERPL-SCNC: 2.6 MMOL/L (ref 0.5–2)
MAGNESIUM SERPL-MCNC: 1.6 MG/DL (ref 1.9–2.7)
MCH RBC QN AUTO: 26.7 PG (ref 26.8–34.3)
MCHC RBC AUTO-ENTMCNC: 31.3 G/DL (ref 31.4–37.4)
MCV RBC AUTO: 85 FL (ref 82–98)
O2 CT BLDA-SCNC: 16.8 ML/DL (ref 16–23)
OXYHGB MFR BLDA: 97.3 % (ref 94–97)
PCO2 BLDA: 34 MM HG (ref 36–44)
PH BLDA: 7.47 [PH] (ref 7.35–7.45)
PLATELET # BLD AUTO: 97 THOUSANDS/UL (ref 149–390)
PMV BLD AUTO: 10.5 FL (ref 8.9–12.7)
PO2 BLDA: 106.9 MM HG (ref 75–129)
POTASSIUM SERPL-SCNC: 3.1 MMOL/L (ref 3.5–5.3)
POTASSIUM SERPL-SCNC: 3.4 MMOL/L (ref 3.5–5.3)
PROCALCITONIN SERPL-MCNC: 0.22 NG/ML
RBC # BLD AUTO: 3.52 MILLION/UL (ref 3.81–5.12)
SODIUM SERPL-SCNC: 130 MMOL/L (ref 135–147)
SODIUM SERPL-SCNC: 130 MMOL/L (ref 135–147)
SPECIMEN SOURCE: ABNORMAL
WBC # BLD AUTO: 10.11 THOUSAND/UL (ref 4.31–10.16)

## 2024-02-24 PROCEDURE — 94760 N-INVAS EAR/PLS OXIMETRY 1: CPT

## 2024-02-24 PROCEDURE — 99233 SBSQ HOSP IP/OBS HIGH 50: CPT | Performed by: FAMILY MEDICINE

## 2024-02-24 PROCEDURE — 83735 ASSAY OF MAGNESIUM: CPT | Performed by: NURSE PRACTITIONER

## 2024-02-24 PROCEDURE — 80048 BASIC METABOLIC PNL TOTAL CA: CPT | Performed by: NURSE PRACTITIONER

## 2024-02-24 PROCEDURE — 93005 ELECTROCARDIOGRAM TRACING: CPT

## 2024-02-24 PROCEDURE — 71045 X-RAY EXAM CHEST 1 VIEW: CPT

## 2024-02-24 PROCEDURE — 82805 BLOOD GASES W/O2 SATURATION: CPT | Performed by: FAMILY MEDICINE

## 2024-02-24 PROCEDURE — 94761 N-INVAS EAR/PLS OXIMETRY MLT: CPT

## 2024-02-24 PROCEDURE — 83605 ASSAY OF LACTIC ACID: CPT | Performed by: NURSE PRACTITIONER

## 2024-02-24 PROCEDURE — 36600 WITHDRAWAL OF ARTERIAL BLOOD: CPT

## 2024-02-24 PROCEDURE — 84145 PROCALCITONIN (PCT): CPT | Performed by: FAMILY MEDICINE

## 2024-02-24 PROCEDURE — 87205 SMEAR GRAM STAIN: CPT | Performed by: NURSE PRACTITIONER

## 2024-02-24 PROCEDURE — 80048 BASIC METABOLIC PNL TOTAL CA: CPT | Performed by: FAMILY MEDICINE

## 2024-02-24 PROCEDURE — 87040 BLOOD CULTURE FOR BACTERIA: CPT | Performed by: FAMILY MEDICINE

## 2024-02-24 PROCEDURE — 87070 CULTURE OTHR SPECIMN AEROBIC: CPT | Performed by: NURSE PRACTITIONER

## 2024-02-24 PROCEDURE — 94640 AIRWAY INHALATION TREATMENT: CPT

## 2024-02-24 PROCEDURE — 85027 COMPLETE CBC AUTOMATED: CPT | Performed by: FAMILY MEDICINE

## 2024-02-24 RX ORDER — MIDODRINE HYDROCHLORIDE 5 MG/1
5 TABLET ORAL
Status: DISCONTINUED | OUTPATIENT
Start: 2024-02-24 | End: 2024-03-05 | Stop reason: HOSPADM

## 2024-02-24 RX ORDER — DILTIAZEM HYDROCHLORIDE 5 MG/ML
10 INJECTION INTRAVENOUS ONCE
Status: DISCONTINUED | OUTPATIENT
Start: 2024-02-24 | End: 2024-02-24

## 2024-02-24 RX ORDER — METHYLPREDNISOLONE SODIUM SUCCINATE 40 MG/ML
40 INJECTION, POWDER, LYOPHILIZED, FOR SOLUTION INTRAMUSCULAR; INTRAVENOUS EVERY 12 HOURS SCHEDULED
Status: DISCONTINUED | OUTPATIENT
Start: 2024-02-24 | End: 2024-02-26

## 2024-02-24 RX ORDER — POTASSIUM CHLORIDE 20 MEQ/1
40 TABLET, EXTENDED RELEASE ORAL ONCE
Qty: 2 TABLET | Refills: 0 | Status: COMPLETED | OUTPATIENT
Start: 2024-02-24 | End: 2024-02-24

## 2024-02-24 RX ORDER — MAGNESIUM SULFATE HEPTAHYDRATE 40 MG/ML
2 INJECTION, SOLUTION INTRAVENOUS ONCE
Status: COMPLETED | OUTPATIENT
Start: 2024-02-24 | End: 2024-02-24

## 2024-02-24 RX ORDER — POTASSIUM CHLORIDE 20 MEQ/1
40 TABLET, EXTENDED RELEASE ORAL ONCE
Status: COMPLETED | OUTPATIENT
Start: 2024-02-24 | End: 2024-02-24

## 2024-02-24 RX ORDER — LEVALBUTEROL INHALATION SOLUTION 1.25 MG/3ML
1.25 SOLUTION RESPIRATORY (INHALATION)
Status: DISCONTINUED | OUTPATIENT
Start: 2024-02-24 | End: 2024-02-27

## 2024-02-24 RX ADMIN — IPRATROPIUM BROMIDE 0.5 MG: 0.5 SOLUTION RESPIRATORY (INHALATION) at 14:22

## 2024-02-24 RX ADMIN — CEFPODOXIME PROXETIL 400 MG: 200 TABLET, FILM COATED ORAL at 07:46

## 2024-02-24 RX ADMIN — METHENAMINE HIPPURATE 1 G: 1 TABLET ORAL at 07:46

## 2024-02-24 RX ADMIN — MAGNESIUM SULFATE HEPTAHYDRATE 2 G: 40 INJECTION, SOLUTION INTRAVENOUS at 10:53

## 2024-02-24 RX ADMIN — ENOXAPARIN SODIUM 40 MG: 40 INJECTION SUBCUTANEOUS at 09:34

## 2024-02-24 RX ADMIN — MYCOPHENOLATE MOFETIL 1000 MG: 250 CAPSULE ORAL at 09:33

## 2024-02-24 RX ADMIN — SODIUM CHLORIDE 1000 ML: 0.9 INJECTION, SOLUTION INTRAVENOUS at 12:21

## 2024-02-24 RX ADMIN — METHYLPREDNISOLONE SODIUM SUCCINATE 40 MG: 40 INJECTION, POWDER, FOR SOLUTION INTRAMUSCULAR; INTRAVENOUS at 22:12

## 2024-02-24 RX ADMIN — PANTOPRAZOLE SODIUM 40 MG: 40 TABLET, DELAYED RELEASE ORAL at 06:01

## 2024-02-24 RX ADMIN — SUCRALFATE 1 G: 1 TABLET ORAL at 22:12

## 2024-02-24 RX ADMIN — POTASSIUM CHLORIDE 40 MEQ: 1500 TABLET, EXTENDED RELEASE ORAL at 09:33

## 2024-02-24 RX ADMIN — OXYCODONE HYDROCHLORIDE 5 MG: 5 TABLET ORAL at 15:44

## 2024-02-24 RX ADMIN — BISMUTH SUBSALICYLATE 525 MG: 525 LIQUID ORAL at 15:40

## 2024-02-24 RX ADMIN — LEVALBUTEROL HYDROCHLORIDE 1.25 MG: 1.25 SOLUTION RESPIRATORY (INHALATION) at 14:22

## 2024-02-24 RX ADMIN — LIDOCAINE 5% 1 PATCH: 700 PATCH TOPICAL at 12:23

## 2024-02-24 RX ADMIN — MIDODRINE HYDROCHLORIDE 5 MG: 5 TABLET ORAL at 15:39

## 2024-02-24 RX ADMIN — ACETAMINOPHEN 325MG 650 MG: 325 TABLET ORAL at 10:29

## 2024-02-24 RX ADMIN — IPRATROPIUM BROMIDE 0.5 MG: 0.5 SOLUTION RESPIRATORY (INHALATION) at 20:41

## 2024-02-24 RX ADMIN — GUAIFENESIN 1200 MG: 600 TABLET ORAL at 22:12

## 2024-02-24 RX ADMIN — SUCRALFATE 1 G: 1 TABLET ORAL at 11:06

## 2024-02-24 RX ADMIN — PIPERACILLIN AND TAZOBACTAM 3.38 G: 36; 4.5 INJECTION, POWDER, FOR SOLUTION INTRAVENOUS at 18:23

## 2024-02-24 RX ADMIN — POTASSIUM CHLORIDE 40 MEQ: 1500 TABLET, EXTENDED RELEASE ORAL at 12:21

## 2024-02-24 RX ADMIN — GUAIFENESIN 1200 MG: 600 TABLET ORAL at 09:33

## 2024-02-24 RX ADMIN — OXYCODONE HYDROCHLORIDE AND ACETAMINOPHEN 500 MG: 500 TABLET ORAL at 09:33

## 2024-02-24 RX ADMIN — PIPERACILLIN AND TAZOBACTAM 3.38 G: 36; 4.5 INJECTION, POWDER, FOR SOLUTION INTRAVENOUS at 13:19

## 2024-02-24 RX ADMIN — PANTOPRAZOLE SODIUM 40 MG: 40 TABLET, DELAYED RELEASE ORAL at 15:39

## 2024-02-24 RX ADMIN — SUCRALFATE 1 G: 1 TABLET ORAL at 15:39

## 2024-02-24 RX ADMIN — METHENAMINE HIPPURATE 1 G: 1 TABLET ORAL at 15:40

## 2024-02-24 RX ADMIN — SUCRALFATE 1 G: 1 TABLET ORAL at 06:01

## 2024-02-24 RX ADMIN — METHYLPREDNISOLONE SODIUM SUCCINATE 40 MG: 40 INJECTION, POWDER, FOR SOLUTION INTRAMUSCULAR; INTRAVENOUS at 10:59

## 2024-02-24 RX ADMIN — LEVALBUTEROL HYDROCHLORIDE 1.25 MG: 1.25 SOLUTION RESPIRATORY (INHALATION) at 20:41

## 2024-02-24 RX ADMIN — ATOVAQUONE 1500 MG: 750 SUSPENSION ORAL at 09:34

## 2024-02-24 RX ADMIN — MYCOPHENOLATE MOFETIL 1000 MG: 250 CAPSULE ORAL at 22:00

## 2024-02-24 RX ADMIN — ATORVASTATIN CALCIUM 20 MG: 20 TABLET, FILM COATED ORAL at 15:39

## 2024-02-24 NOTE — PROGRESS NOTES
Spiked fever today of unclear etiology she is already been on antibiotics while she has been here but her steroids were recently decreased.  Patient has known history of fevers for the last few months was seen [place] infectious disease outpatient also being evaluated for cryptogenic organizing pneumonia.  Was on CellCept and prednisone due to chronic pulmonary fibrosis and apparently atovaquone was added by infectious disease however I am unclear exactly why as I do not find documentation of that.  Pulmonologist and infectious disease doctor are at [facility] and they last saw her in October 2023  Blood cultures done will switch to IV Zosyn for now as patient has prior history of Pseudomonas.  Will ask infectious disease for evaluation

## 2024-02-24 NOTE — ASSESSMENT & PLAN NOTE
Presented to ED with left sided chest pain and worsening SOB for 3 days.   CTA chest: Unchanged groundglass opacities and interstitial marking thickening with bibasal predominance. Appearance favors fibrosis but acute process cannot be completely excluded. No PE.   COVID/Flu/RSV: COVID positive, but positive since 2/4 during previous admission.   Urine strep and legionella negative, Sputum culture ordered  Blood Cultures no growth after 5 days  Trend fever curve  Initially given rocephin and doxy in the emergency department, will continue and monitor cultures and titrate therapy as indicated  Completed iv abx and switch to oral Vantin for 3 more days

## 2024-02-24 NOTE — PROGRESS NOTES
[facility]  Progress Note  Name: Nina Tran I  MRN: [id number]  Unit/Bed#: -01 I Date of Admission: 2/17/2024   Date of Service: 2/24/2024 I Hospital Day: 7    Assessment/Plan   * Acute on chronic respiratory failure with hypoxia (HCC)  Assessment & Plan  POA with complaints of SOB and left sided chest pain. Recently admitted for COVID infection and sepsis. During previous admission, was requiring 2L NC   Now requiring around 8 l mid flow  CTA chest PE study: Unchanged groundglass opacities and interstitial marking thickening with bibasal predominance. Appearance favors fibrosis but acute process cannot be completely excluded. No pulmonary embolism  Cxray and abg done today she became tachycardic and hypoxic today moved to level 2 stepdown   Incentive spirometry, supportive measures  placed on lev albuterol and ipratropium.  Recently tapered off from IV steroids to oral prednisone however she decompensated again today and hence placed back on Solu-Medrol 40 mg IV twice daily  completed 5 days of IV Rocephin placed on Vantin for another 2 to 3 days and then stop antibiotics    Chest pain  Assessment & Plan  Presents to ED with left sided chest pain and increasing shortness of breath worsening over the last 3 days. Worse with movement.   EKG: sinus tachycardia, no ischemic changes.  Today noted to have sinus tachycardia heart rate in the 120s to 140s.  Will give 1 dose of Cardizem 10 mg IV and continue to monitor on telemetry  CTA chest PE study: Unchanged groundglass opacities and interstitial marking thickening with bibasal predominance. Appearance favors fibrosis but acute process cannot be completely excluded.   MITESH score = 2  TSH elevated, normal t4, repeat thyroid studies in 4-6 weeks  Lipid panel with elevated triglycerides  Hold NSAIDS. troponins negative  Pain management  cont Carafate Protonix and Pepto-Bismol for her epigastric pain.    Pneumonia  Assessment &  Plan  Presented to ED with left sided chest pain and worsening SOB for 3 days.   CTA chest: Unchanged groundglass opacities and interstitial marking thickening with bibasal predominance. Appearance favors fibrosis but acute process cannot be completely excluded. No PE.   COVID/Flu/RSV: COVID positive, but positive since 2/4 during previous admission.   Urine strep and legionella negative, Sputum culture ordered  Blood Cultures no growth after 5 days  Trend fever curve  Initially given rocephin and doxy in the emergency department, will continue and monitor cultures and titrate therapy as indicated  Completed iv abx and switch to oral Vantin for 3 more days    History of thymus cancer  Assessment & Plan  Patient with history of thymus cancer s/p removal    Continue outpatient follow up     Generalized weakness  Assessment & Plan  POA with complaints of generalized weakness suspect secondary to resp issues  PT/OT consult: II (Moderate Resource Intensity)     COVID-19  Assessment & Plan  Recently admitted with COVID-19 infection tested positive on 2/4, still testing positive from previous infection. Completed 10 days of isolation per COVID isolation precautions  Cont iv steroids         VTE Pharmacologic Prophylaxis: VTE Score: 6 lovenox    Mobility:   Basic Mobility Inpatient Raw Score: 18  JH-HLM Goal: 6: Walk 10 steps or more  JH-HLM Achieved: 6: Walk 10 steps or more  HLM Goal achieved. Continue to encourage appropriate mobility.    Patient Centered Rounds: I performed bedside rounds with nursing staff today.   Discussions with Specialists or Other Care Team Provider: jackie critical care    Education and Discussions with Family / Patient: will update    Total Time Spent on Date of Encounter in care of patient: 45 mins. This time was spent on one or more of the following: performing physical exam; counseling and coordination of care; obtaining or reviewing history; documenting in the medical record; reviewing/ordering  tests, medications or procedures; communicating with other healthcare professionals and discussing with patient's family/caregivers.    Current Length of Stay: 7 day(s)  Current Patient Status: Inpatient   Certification Statement: The patient will continue to require additional inpatient hospital stay due to acute on chronic respiratory failure with hypoxia  Discharge Plan: Anticipate discharge in 24-48 hrs to home with home services.    Code Status: Level 1 - Full Code    Subjective:   she denies any chest pain however she is feeling more short of breath today and found to have sinus tachycardia into the 140s and her oxygen requirements are going up initially placed on nonrebreather and then transitioned to 8 L mid flow    Objective:     Vitals:   Temp (24hrs), Av.4 °F (36.9 °C), Min:97 °F (36.1 °C), Max:102.2 °F (39 °C)    Temp:  [97 °F (36.1 °C)-102.2 °F (39 °C)] 102.2 °F (39 °C)  HR:  [] 122  Resp:  [17-19] 17  BP: (113-119)/(67-76) 113/67  SpO2:  [74 %-97 %] 90 %  Body mass index is 23.03 kg/m².     Input and Output Summary (last 24 hours):     Intake/Output Summary (Last 24 hours) at 2024 1008  Last data filed at 2024 0939  Gross per 24 hour   Intake 400 ml   Output 1225 ml   Net -825 ml       Physical Exam:   Physical Exam  Vitals and nursing note reviewed.   Constitutional:       Appearance: Normal appearance. She is ill-appearing.   HENT:      Head: Normocephalic and atraumatic.      Right Ear: External ear normal.      Left Ear: External ear normal.      Nose: Nose normal.      Mouth/Throat:      Pharynx: Oropharynx is clear.   Eyes:      Pupils: Pupils are equal, round, and reactive to light.   Cardiovascular:      Rate and Rhythm: Regular rhythm. Tachycardia present.      Heart sounds: Normal heart sounds.   Pulmonary:      Effort: Pulmonary effort is normal.      Breath sounds: Rales present.   Abdominal:      General: Bowel sounds are normal.      Palpations: Abdomen is soft.       Tenderness: There is no abdominal tenderness.   Musculoskeletal:         General: Normal range of motion.      Cervical back: Normal range of motion and neck supple.   Skin:     General: Skin is warm and dry.      Capillary Refill: Capillary refill takes less than 2 seconds.   Neurological:      General: No focal deficit present.      Mental Status: She is alert and oriented to person, place, and time.   Psychiatric:         Mood and Affect: Mood normal.            Additional Data:     Labs:  Results from last 7 days   Lab Units 02/23/24  0533 02/19/24  0540 02/18/24  0428   WBC Thousand/uL 9.55   < > 6.43   HEMOGLOBIN g/dL 11.5   < > 10.4*   HEMATOCRIT % 35.1   < > 33.4*   PLATELETS Thousands/uL 135*   < > 97*   BANDS PCT %  --   --  18*   LYMPHO PCT %  --   --  2*   MONO PCT %  --   --  2*   EOS PCT %  --   --  0    < > = values in this interval not displayed.     Results from last 7 days   Lab Units 02/24/24  0602 02/22/24  0917 02/21/24  0522   SODIUM mmol/L 130*   < > 130*   POTASSIUM mmol/L 3.4*   < > 4.1   CHLORIDE mmol/L 91*   < > 99   CO2 mmol/L 30   < > 23   BUN mg/dL 24   < > 17   CREATININE mg/dL 0.79   < > 0.62   ANION GAP mmol/L 9   < > 8   CALCIUM mg/dL 7.9*   < > 7.6*   ALBUMIN g/dL  --   --  2.9*   TOTAL BILIRUBIN mg/dL  --   --  0.73   ALK PHOS U/L  --   --  127*   ALT U/L  --   --  41   AST U/L  --   --  32   GLUCOSE RANDOM mg/dL 114   < > 143*    < > = values in this interval not displayed.             Results from last 7 days   Lab Units 02/18/24  0428   HEMOGLOBIN A1C % 7.1*     Results from last 7 days   Lab Units 02/19/24  0540 02/18/24  0428 02/17/24  1052   LACTIC ACID mmol/L  --   --  1.1   PROCALCITONIN ng/ml 0.25 0.26*  --        Lines/Drains:  Invasive Devices       Peripheral Intravenous Line  Duration             Peripheral IV 02/24/24 Left Antecubital <1 day              Drain  Duration             External Urinary Catheter 6 days                          Imaging: Reviewed radiology  reports from this admission including: chest xray    Recent Cultures (last 7 days):   Results from last 7 days   Lab Units 02/17/24  1530   SPUTUM CULTURE  2+ Growth of   GRAM STAIN RESULT  No Epithelial cells per low power field*  No polys seen*  Rare Gram positive cocci in pairs*       Last 24 Hours Medication List:   Current Facility-Administered Medications   Medication Dose Route Frequency Provider Last Rate    acetaminophen  650 mg Oral Q6H PRN Charlene Archer MD      aluminum-magnesium hydroxide-simethicone  30 mL Oral Q4H PRN Charlene Archer MD      ascorbic acid  500 mg Oral Daily Charlene Archer MD      atorvastatin  20 mg Oral Daily With Dinner Charlene Archer MD      atovaquone  1,500 mg Oral Daily Charlene Archer MD      bismuth subsalicylate  525 mg Oral Q6H PRN Charlene Archer MD      cefpodoxime  400 mg Oral BID With Meals Charlene Archer MD      enoxaparin  40 mg Subcutaneous Daily Charlene Archer MD      guaiFENesin  1,200 mg Oral Q12H FALLON Charlene Archer MD      ipratropium  0.5 mg Nebulization TID Charlene Archer MD      levalbuterol  1.25 mg Nebulization TID Charlene Archer MD      lidocaine  1 patch Topical Q24H Charlene Archer MD      methenamine hippurate  1 g Oral BID With Meals Charlene Archer MD      methylPREDNISolone sodium succinate  40 mg Intravenous Q12H [place] Charlene Archer MD      mycophenolate  1,000 mg Oral Q12H [place] Charlene Archer MD      ondansetron  4 mg Intravenous Q6H PRN Charlene Archer MD      oxyCODONE  2.5 mg Oral Q4H PRN Charlene Archer MD      Or    oxyCODONE  5 mg Oral Q4H PRN Charlene Archer MD      pantoprazole  40 mg Oral BID AC Charlene Archer MD      sucralfate  1 g Oral 4x Daily (AC & HS) Charlene Archer MD          Today, Patient Was Seen By: Charlene Archer MD    **Please Note: This note may have been constructed using a voice recognition system.**

## 2024-02-24 NOTE — RESPIRATORY THERAPY NOTE
RT Protocol Note  Nina Tran 78 y.o. female MRN: [id number]  Unit/Bed#: -01 Encounter: [id number]    Assessment    Principal Problem:    Acute respiratory failure with hypoxia (HCC)  Active Problems:    COVID-19    Generalized weakness    History of thymus cancer    Chest pain    Pneumonia    GERD (gastroesophageal reflux disease)      Home Pulmonary Medications:         Past Medical History:   Diagnosis Date    High cholesterol      Social History     Socioeconomic History    Marital status: /Civil Union     Spouse name: None    Number of children: None    Years of education: None    Highest education level: None   Occupational History    None   Tobacco Use    Smoking status: Never    Smokeless tobacco: Never   Vaping Use    Vaping status: Never Used   Substance and Sexual Activity    Alcohol use: Yes    Drug use: Never    Sexual activity: None   Other Topics Concern    None   Social History Narrative    None     Social Determinants of Health     Financial Resource Strain: Not on file   Food Insecurity: No Food Insecurity (2/19/2024)    Hunger Vital Sign     Worried About Running Out of Food in the Last Year: Never true     Ran Out of Food in the Last Year: Never true   Transportation Needs: No Transportation Needs (2/6/2024)    PRAPARE - Transportation     Lack of Transportation (Medical): No     Lack of Transportation (Non-Medical): No   Physical Activity: Not on file   Stress: Not on file   Social Connections: Not on file   Intimate Partner Violence: Not on file   Housing Stability: Low Risk  (2/19/2024)    Housing Stability Vital Sign     Unable to Pay for Housing in the Last Year: No     Number of Places Lived in the Last Year: 1     Unstable Housing in the Last Year: No       Subjective         Objective    Physical Exam:   Assessment Type: (P) Assess only  General Appearance: (P) Alert, Awake  Bilateral Breath Sounds: (P) Diminished  Cough: (P) Productive  O2 Device: (P) 8L  "    Vitals:  Blood pressure 113/67, pulse (!) 134, temperature (!) 97.3 °F (36.3 °C), resp. rate 17, height 5' 3\" (1.6 m), SpO2 95%.    Results from last 7 days   Lab Units 02/24/24  0905   PH ART  7.470*   PCO2 ART mm Hg 34.0*   PO2 ART mm Hg 106.9   HCO3 ART mmol/L 24.2   BASE EXC ART mmol/L 1.0   O2 CONTENT ART mL/dL 16.8   O2 HGB, ARTERIAL % 97.3*   ABG SOURCE  Radial, Left   TAMIA TEST  Yes       Imaging and other studies:     O2 Device: (P) 8L MF     Plan    Respiratory Plan: No distress/Pulmonary history        Resp Comments: (P) pt on NRB mask. pt had episode of dizziness and desaturations, increased HR. pt denies SOB, just coughing. ABG was done on NRB 12 L. pt placed on 8 L MF post ABG. pt coughing up thick clear secretions.   "

## 2024-02-24 NOTE — ASSESSMENT & PLAN NOTE
Recently admitted with COVID-19 infection tested positive on 2/4, still testing positive from previous infection. Completed 10 days of isolation per COVID isolation precautions  Cont iv steroids

## 2024-02-24 NOTE — ASSESSMENT & PLAN NOTE
Presents to ED with left sided chest pain and increasing shortness of breath worsening over the last 3 days. Worse with movement.   EKG: sinus tachycardia, no ischemic changes.  Today noted to have sinus tachycardia heart rate in the 120s to 140s.  Will give 1 dose of Cardizem 10 mg IV and continue to monitor on telemetry  CTA chest PE study: Unchanged groundglass opacities and interstitial marking thickening with bibasal predominance. Appearance favors fibrosis but acute process cannot be completely excluded.   MITESH score = 2  TSH elevated, normal t4, repeat thyroid studies in 4-6 weeks  Lipid panel with elevated triglycerides  Hold NSAIDS. troponins negative  Pain management  cont Carafate Protonix and Pepto-Bismol for her epigastric pain.

## 2024-02-24 NOTE — ASSESSMENT & PLAN NOTE
Sinus tachycardia, tachypnea, increased O2 requirements  Suspect pna    -Infectious workup in process

## 2024-02-24 NOTE — ASSESSMENT & PLAN NOTE
COVID-19 positive 2/4/24, Repeat PCR positive 2/17/24   Readmitted 2/17/24 likely due to bacterial pna and ILD flair, not COVID-19 infection

## 2024-02-24 NOTE — PLAN OF CARE
Problem: PAIN - ADULT  Goal: Verbalizes/displays adequate comfort level or baseline comfort level  Description: Interventions:  - Encourage patient to monitor pain and request assistance  - Assess pain using appropriate pain scale0-10  - Administer analgesics based on type and severity of pain and evaluate response  - Implement non-pharmacological measures as appropriate and evaluate response  - Consider cultural and social influences on pain and pain management  - Notify physician/advanced practitioner if interventions unsuccessful or patient reports new pain  Outcome: Progressing     Problem: INFECTION - ADULT  Goal: Absence or prevention of progression during hospitalization  Description: INTERVENTIONS:  - Assess and monitor for signs and symptoms of infection  - Monitor lab/diagnostic results  - Monitor all insertion sites, i.e. indwelling lines, tubes, and drains  - Monitor endotracheal if appropriate and nasal secretions for changes in amount and color  - [place] appropriate cooling/warming therapies per order  - Administer medications as ordered  - Instruct and encourage patient and family to use good hand hygiene technique  - Identify and instruct in appropriate isolation precautions for identified infection/condition  Outcome: Progressing     Problem: SAFETY ADULT  Goal: Patient will remain free of falls  Description: INTERVENTIONS:  - Educate patient/family on patient safety including physical limitations  - Instruct patient to call for assistance with activity   - Consult OT/PT to assist with strengthening/mobility   - Keep Call bell within reach  - Keep bed low and locked with side rails adjusted as appropriate  - Keep care items and personal belongings within reach  - Initiate and maintain comfort rounds  - Make Fall Risk Sign visible to staff  - Offer Toileting every 2 Hours, in advance of need  - Initiate/Maintain bed/chair alarm  - Apply yellow socks and bracelet for high fall risk patients  -  Consider moving patient to room near nurses station  Outcome: Progressing  Goal: Maintain or return to baseline ADL function  Description: INTERVENTIONS:  -  Assess patient's ability to carry out ADLs; assess patient's baseline for ADL function and identify physical deficits which impact ability to perform ADLs (bathing, care of mouth/teeth, toileting, grooming, dressing, etc.)  - Assess/evaluate cause of self-care deficits   - Assess range of motion  - Assess patient's mobility; develop plan if impaired  - Assess patient's need for assistive devices and provide as appropriate  - Encourage maximum independence but intervene and supervise when necessary  - Involve family in performance of ADLs  - Assess for home care needs following discharge   - Consider OT consult to assist with ADL evaluation and planning for discharge  - Provide patient education as appropriate  Outcome: Progressing  Goal: Maintains/Returns to pre admission functional level  Description: INTERVENTIONS:  - Perform AM-PAC 6 Click Basic Mobility/ Daily Activity assessment daily.  - Set and communicate daily mobility goal to care team and patient/family/caregiver.   - Collaborate with rehabilitation services on mobility goals if consulted  - Perform Range of Motion 4 times a day.  - Reposition patient every 2 hours.  - Ambulate patient 4 times a day  - Out of bed to chair 3 times a day   - Out of bed for meals 3 times a day  - Out of bed for toileting  - Record patient progress and toleration of activity level   Outcome: Progressing     Problem: DISCHARGE PLANNING  Goal: Discharge to home or other facility with appropriate resources  Description: INTERVENTIONS:  - Identify barriers to discharge w/patient and caregiver  - Arrange for needed discharge resources and transportation as appropriate  - Identify discharge learning needs (meds, wound care, etc.)  - Arrange for interpretive services to assist at discharge as needed  - Refer to Case Management  Department for coordinating discharge planning if the patient needs post-hospital services based on physician/advanced practitioner order or complex needs related to functional status, cognitive ability, or social support system  Outcome: Progressing     Problem: Knowledge Deficit  Goal: Patient/family/caregiver demonstrates understanding of disease process, treatment plan, medications, and discharge instructions  Description: Complete learning assessment and assess knowledge base.  Interventions:  - Provide teaching at level of understanding  - Provide teaching via preferred learning methods  Outcome: Progressing     Problem: RESPIRATORY - ADULT  Goal: Achieves optimal ventilation and oxygenation  Description: INTERVENTIONS:  - Assess for changes in respiratory status  - Assess for changes in mentation and behavior  - Position to facilitate oxygenation and minimize respiratory effort  - Oxygen administered by appropriate delivery if ordered  - Initiate smoking cessation education as indicated  - Encourage broncho-pulmonary hygiene including cough, deep breathe, Incentive Spirometry  - Assess the need for suctioning and aspirate as needed  - Assess and instruct to report SOB or any respiratory difficulty  - Respiratory Therapy support as indicated  Outcome: Progressing     Problem: Prexisting or High Potential for Compromised Skin Integrity  Goal: Skin integrity is maintained or improved  Description: INTERVENTIONS:  - Identify patients at risk for skin breakdown  - Assess and monitor skin integrity  - Assess and monitor nutrition and hydration status  - Monitor labs   - Assess for incontinence   - Turn and reposition patient  - Assist with mobility/ambulation  - Relieve pressure over bony prominences  - Avoid friction and shearing  - Provide appropriate hygiene as needed including keeping skin clean and dry  - Evaluate need for skin moisturizer/barrier cream  - Collaborate with interdisciplinary team   -  Patient/family teaching  - Consider wound care consult   Outcome: Progressing

## 2024-02-24 NOTE — PROGRESS NOTES
Sepsis note       02/24/24 1159   Initial Sepsis Screening    Is the patient's history suggestive of a new or worsening infection? (!) Yes (Proceed)   Suspected source of infection pneumonia   Indicate SIRS criteria Hyperthemia > 38.3C (100.9F) OR Hypothermia <36C (96.8F);Tachycardia > 90 bpm;Tachypnea > 20 resp per min   Are two or more of the above signs & symptoms of infection both present and new to the patient? (!) Yes (Proceed)     Will place on only 1 litre saline bolus as high risk for fluid overload and already on 8 l mid flow and compromised resp status.  The 30ml/kg fluid bolus was not given to the patient despite hypotension and/or significantly elevated lactate of ? 4 and/or presence of septic shock due to: Concern for fluid/volume overload. The patient will be administered 1L of crystalloid fluid instead. Orders for this have been placed in [facility]. The patient may receive additional colloid or crystalloid fluids thereafter based on clinical condition.     Charlene Archer MD

## 2024-02-24 NOTE — CONSULTS
[facility]  Consult  Name: Nina Tran 78 y.o. female I MRN: [id number]  Unit/Bed#: -01 I Date of Admission: 2/17/2024   Date of Service: 2/24/2024 I Hospital Day: 7    Consults    Assessment/Plan   * Acute on chronic respiratory failure with hypoxia (HCC)  Assessment & Plan  -Presented on 2/17/24 with c/o shortness of breath and left sided chest pain. Utilizing more O2 in days preceding re-admit.  -Prior admission 2/4/24-2/8/24 for COVID-19 infection, sepsis, UTI, bacteremia. 2L NC at discharge.  -Hypoxia 86% on presentation, requiring 4L NC at time of admission.  -CTA Chest PE study: no PE.Unchanged groundglass opacities and interstitial marking thickening with bibasal predominance. Appearance favors fibrosis but acute process cannot be completely excluded.    -Completed course of Ctx/Doxy (2/17/24-2/22/24).  -Hx ILD, on prednisone 20 mg daily PTA. Steroids changed to solumedrol 40 mg Q 12 hours on 2/20/24.  -Steroids decreased to pred 50 mg po daily to start 2/24/24. Prolonged taper planned.  -S/p Lasix 20 mg IV BID on 2/23/24. Fluid balance is negative.  -O2 requirements increased to 5L NC on 2/23 and placed on NRB after coughing and desat on AM of 2/24.  -ABG on NRB: 7.47/34/16.9/24.2  -Placed on midflow 8L and upgraded to SD2 on 2/24.    Plan:  -Wean O2 to keep SPO2 > 88%  -Increase steroids back to solumedrol IV  -Add inhaled bronchodilator  -Infectious work up meeting sepsis criteria.  -Pt may remain on SD2 SLIM service. IF increased oxygen requirements, re-consult CC. Pt is full code.  -ISP  -Respiratory protocol  -Pulmonary already on consult    GERD (gastroesophageal reflux disease)  Assessment & Plan  Hx GERD, presented with epigastric pain    -Cont home PPI  -Cont carafate    Pneumonia  Assessment & Plan  See A&P under respiratory failure    -Urine strep and legionella negative  -Sputum cx negative  -Bcx negative  -Repeat infectious workup in process. Resume empiric  "antibiotics and follow up on pending cx. Zosyn started by primary team.    Sepsis (HCC)  Assessment & Plan  Sinus tachycardia, tachypnea, increased O2 requirements  Suspect pna    -Infectious workup in process    COVID-19  Assessment & Plan  COVID-19 positive 2/4/24, Repeat PCR positive 2/17/24   Readmitted 2/17/24 likely due to bacterial pna and ILD flair, not COVID-19 infection           History of Present Illness     HPI: Nina Tran is a 78 y.o. who presented to [facility] on 2/17 with shortness of breath and left sided chest pain. Recently admitted to [facility] for Acute respiratory failure with hypoxia due to COVID-19 infection. Hx of ILD on steroids, thymus cancer, anemia, recurrent UTI. At discharge on 2/8 she qualified for 2L NC. Admitted 2/17 and started on ctx/doxy for bacterial pneumonia. Course completed 2/22. Escalating oxygen requirements and pulmonary consulted. Steroids ordered and planned to transitioned to prednisone with longer taper due to ILD flair. Today, patient meeting sepsis criteria. Infectious workup started and transferred to [facility]. CC asked to evaluate patient.    Admits to cough and lightheaded while cough early this am. Denies chest pain, N/V/D. States \"I feel okay\".  History obtained from chart review and the patient.  Review of Systems   All other systems reviewed and are negative.    Disposition: Stepdown Level 2 under IM. No CC requirements at this time, please reach out to team for questions/concerns.  Historical Information   Past Medical History:  No date: High cholesterol Past Surgical History:  11/2023: CHOLECYSTECTOMY  No date: HYSTERECTOMY  01/2023: LUNG BIOPSY; Right   Current Outpatient Medications   Medication Instructions    ascorbic acid (VITAMIN C) 500 mg, Oral, Daily    atorvastatin (LIPITOR) 20 mg    atovaquone (MEPRON) 1,500 mg, Oral, Daily, Take 10 mL by mouth daily    methenamine hippurate (HIPREX) 1 g, Oral, 2 times daily with meals    mycophenolate (CELLCEPT) 1,000 mg, Oral, " Every 12 hours scheduled    omeprazole (PRILOSEC) 20 mg, Oral, Daily    predniSONE 20 mg, Oral, Daily    Allergies   Allergen Reactions    Tetanus Toxoid Hives     hives         Social History     Tobacco Use    Smoking status: Never    Smokeless tobacco: Never   Vaping Use    Vaping status: Never Used   Substance Use Topics    Alcohol use: Yes    Drug use: Never    History reviewed. No pertinent family history.     Objective                            Vitals I/O      Most Recent Min/Max in 24hrs   Temp (!) 100.8 °F (38.2 °C) Temp  Min: 97 °F (36.1 °C)  Max: 102.2 °F (39 °C)   Pulse (!) 117 Pulse  Min: 96  Max: 144   Resp (!) 29 Resp  Min: 17  Max: 29   /65 BP  Min: 100/65  Max: 119/76   O2 Sat 94 % SpO2  Min: 74 %  Max: 98 %      Intake/Output Summary (Last 24 hours) at 2/24/2024 1224  Last data filed at 2/24/2024 1101  Gross per 24 hour   Intake 400 ml   Output 925 ml   Net -525 ml       Diet Regular; Regular House; Fluid Restriction 1200 ML    Invasive Monitoring           Physical Exam   Physical Exam  Vitals reviewed.   Eyes:      Pupils: Pupils are equal, round, and reactive to light.   Skin:     General: Skin is warm and dry.   HENT:      Mouth/Throat:      Mouth: Mucous membranes are moist.   Cardiovascular:      Rate and Rhythm: Tachycardia present.      Pulses: Normal pulses.   Musculoskeletal:         General: No swelling.   Abdominal: General: Bowel sounds are normal.      Palpations: Abdomen is soft.      Tenderness: There is no abdominal tenderness.   Constitutional:       General: She is not in acute distress.     Appearance: She is well-developed. She is not ill-appearing.   Pulmonary:      Effort: Pulmonary effort is normal. No respiratory distress.      Breath sounds: No wheezing, rhonchi or rales.   Neurological:      General: No focal deficit present.      Mental Status: She is alert and oriented to person, place and time.            Diagnostic Studies      EKG: ST  Imaging:  I have  personally reviewed pertinent reports.   and I have personally reviewed pertinent films in PACS     Medications:  Scheduled PRN   ascorbic acid, 500 mg, Daily  atorvastatin, 20 mg, Daily With Dinner  atovaquone, 1,500 mg, Daily  enoxaparin, 40 mg, Daily  guaiFENesin, 1,200 mg, Q12H FALLON  ipratropium, 0.5 mg, TID  levalbuterol, 1.25 mg, TID  lidocaine, 1 patch, Q24H  magnesium sulfate, 2 g, Once  methenamine hippurate, 1 g, BID With Meals  methylPREDNISolone sodium succinate, 40 mg, Q12H [place]  mycophenolate, 1,000 mg, Q12H [place]  pantoprazole, 40 mg, BID AC  piperacillin-tazobactam, 3.375 g, Q6H  sucralfate, 1 g, 4x Daily (AC & HS)      acetaminophen, 650 mg, Q6H PRN  aluminum-magnesium hydroxide-simethicone, 30 mL, Q4H PRN  bismuth subsalicylate, 525 mg, Q6H PRN  ondansetron, 4 mg, Q6H PRN  oxyCODONE, 2.5 mg, Q4H PRN   Or  oxyCODONE, 5 mg, Q4H PRN       Continuous          Labs:    CBC    Recent Labs     02/23/24  0533   WBC 9.55   HGB 11.5   HCT 35.1   *     BMP    Recent Labs     02/24/24  0602 02/24/24  1126   SODIUM 130* 130*   K 3.4* 3.1*   CL 91* 91*   CO2 30 29   AGAP 9 10   BUN 24 27*   CREATININE 0.79 0.86   CALCIUM 7.9* 7.9*       Coags    No recent results     Additional Electrolytes  Recent Labs     02/24/24  0602   MG 1.6*          Blood Gas    Recent Labs     02/24/24  0905   PHART 7.470*   EMX6YMA 34.0*   PO2ART 106.9   OZM7FNH 24.2   BEART 1.0   SOURCE Radial, Left     Recent Labs     02/24/24  0905   SOURCE Radial, Left    LFTs  No recent results    Infectious  Recent Labs     02/24/24  0602   PROCALCITONI 0.22     Glucose  Recent Labs     02/23/24  0533 02/24/24  0602 02/24/24  1126   GLUC 167* 114 93               ABHAY Sandy

## 2024-02-24 NOTE — PLAN OF CARE
Problem: SAFETY ADULT  Goal: Patient will remain free of falls  Description: INTERVENTIONS:  - Educate patient/family on patient safety including physical limitations  - Instruct patient to call for assistance with activity   - Consult OT/PT to assist with strengthening/mobility   - Keep Call bell within reach  - Keep bed low and locked with side rails adjusted as appropriate  - Keep care items and personal belongings within reach  - Initiate and maintain comfort rounds  - Make Fall Risk Sign visible to staff  - Offer Toileting every 2 Hours, in advance of need  - Initiate/Maintain bed/chair alarm  - Apply yellow socks and bracelet for high fall risk patients  - Consider moving patient to room near nurses station  Outcome: Progressing  Goal: Maintain or return to baseline ADL function  Description: INTERVENTIONS:  -  Assess patient's ability to carry out ADLs; assess patient's baseline for ADL function and identify physical deficits which impact ability to perform ADLs (bathing, care of mouth/teeth, toileting, grooming, dressing, etc.)  - Assess/evaluate cause of self-care deficits   - Assess range of motion  - Assess patient's mobility; develop plan if impaired  - Assess patient's need for assistive devices and provide as appropriate  - Encourage maximum independence but intervene and supervise when necessary  - Involve family in performance of ADLs  - Assess for home care needs following discharge   - Consider OT consult to assist with ADL evaluation and planning for discharge  - Provide patient education as appropriate  Outcome: Progressing  Goal: Maintains/Returns to pre admission functional level  Description: INTERVENTIONS:  - Perform AM-PAC 6 Click Basic Mobility/ Daily Activity assessment daily.  - Set and communicate daily mobility goal to care team and patient/family/caregiver.   - Collaborate with rehabilitation services on mobility goals if consulted  - Perform Range of Motion 4 times a day.  -  Reposition patient every 2 hours.  - Ambulate patient 4 times a day  - Out of bed to chair 3 times a day   - Out of bed for meals 3 times a day  - Out of bed for toileting  - Record patient progress and toleration of activity level   Outcome: Progressing     Problem: DISCHARGE PLANNING  Goal: Discharge to home or other facility with appropriate resources  Description: INTERVENTIONS:  - Identify barriers to discharge w/patient and caregiver  - Arrange for needed discharge resources and transportation as appropriate  - Identify discharge learning needs (meds, wound care, etc.)  - Arrange for interpretive services to assist at discharge as needed  - Refer to Case Management Department for coordinating discharge planning if the patient needs post-hospital services based on physician/advanced practitioner order or complex needs related to functional status, cognitive ability, or social support system  Outcome: Progressing

## 2024-02-24 NOTE — ASSESSMENT & PLAN NOTE
See A&P under respiratory failure    -Urine strep and legionella negative  -Sputum cx negative  -Bcx negative  -Repeat infectious workup in process. Resume empiric antibiotics and follow up on pending cx. Zosyn started by primary team.

## 2024-02-24 NOTE — ASSESSMENT & PLAN NOTE
-Presented on 2/17/24 with c/o shortness of breath and left sided chest pain. Utilizing more O2 in days preceding re-admit.  -Prior admission 2/4/24-2/8/24 for COVID-19 infection, sepsis, UTI, bacteremia. 2L NC at discharge.  -Hypoxia 86% on presentation, requiring 4L NC at time of admission.  -CTA Chest PE study: no PE.Unchanged groundglass opacities and interstitial marking thickening with bibasal predominance. Appearance favors fibrosis but acute process cannot be completely excluded.    -Completed course of Ctx/Doxy (2/17/24-2/22/24).  -Hx ILD, on prednisone 20 mg daily PTA. Steroids changed to solumedrol 40 mg Q 12 hours on 2/20/24.  -Steroids decreased to pred 50 mg po daily to start 2/24/24. Prolonged taper planned.  -S/p Lasix 20 mg IV BID on 2/23/24. Fluid balance is negative.  -O2 requirements increased to 5L NC on 2/23 and placed on NRB after coughing and desat on AM of 2/24.  -ABG on NRB: 7.47/34/16.9/24.2  -Placed on midflow 8L and upgraded to SD2 on 2/24.    Plan:  -Wean O2 to keep SPO2 > 88%  -Increase steroids back to solumedrol IV  -Add inhaled bronchodilator  -Infectious work up meeting sepsis criteria.  -Pt may remain on SD2 SLIM service. IF increased oxygen requirements, re-consult CC. Pt is full code.  -ISP  -Respiratory protocol  -Pulmonary already on consult

## 2024-02-24 NOTE — ASSESSMENT & PLAN NOTE
POA with complaints of SOB and left sided chest pain. Recently admitted for COVID infection and sepsis. During previous admission, was requiring 2L NC   Now requiring around 8 l mid flow  CTA chest PE study: Unchanged groundglass opacities and interstitial marking thickening with bibasal predominance. Appearance favors fibrosis but acute process cannot be completely excluded. No pulmonary embolism  Cxray and abg done today she became tachycardic and hypoxic today moved to level 2 stepdown   Incentive spirometry, supportive measures  placed on lev albuterol and ipratropium.  Recently tapered off from IV steroids to oral prednisone however she decompensated again today and hence placed back on Solu-Medrol 40 mg IV twice daily  completed 5 days of IV Rocephin placed on Vantin for another 2 to 3 days and then stop antibiotics

## 2024-02-25 LAB
ATRIAL RATE: 126 BPM
ATRIAL RATE: 92 BPM
LACTATE SERPL-SCNC: 1.9 MMOL/L (ref 0.5–2)
P AXIS: 33 DEGREES
P AXIS: 35 DEGREES
PR INTERVAL: 182 MS
PR INTERVAL: 192 MS
PROCALCITONIN SERPL-MCNC: 0.23 NG/ML
QRS AXIS: 13 DEGREES
QRS AXIS: 20 DEGREES
QRSD INTERVAL: 72 MS
QRSD INTERVAL: 84 MS
QT INTERVAL: 282 MS
QT INTERVAL: 358 MS
QTC INTERVAL: 408 MS
QTC INTERVAL: 442 MS
T WAVE AXIS: 41 DEGREES
T WAVE AXIS: 53 DEGREES
VENTRICULAR RATE: 126 BPM
VENTRICULAR RATE: 92 BPM

## 2024-02-25 PROCEDURE — 83605 ASSAY OF LACTIC ACID: CPT | Performed by: FAMILY MEDICINE

## 2024-02-25 PROCEDURE — 94640 AIRWAY INHALATION TREATMENT: CPT

## 2024-02-25 PROCEDURE — 93010 ELECTROCARDIOGRAM REPORT: CPT | Performed by: INTERNAL MEDICINE

## 2024-02-25 PROCEDURE — 84145 PROCALCITONIN (PCT): CPT | Performed by: FAMILY MEDICINE

## 2024-02-25 PROCEDURE — 99233 SBSQ HOSP IP/OBS HIGH 50: CPT | Performed by: FAMILY MEDICINE

## 2024-02-25 PROCEDURE — 94760 N-INVAS EAR/PLS OXIMETRY 1: CPT

## 2024-02-25 RX ORDER — POTASSIUM CHLORIDE 20 MEQ/1
40 TABLET, EXTENDED RELEASE ORAL ONCE
Qty: 2 TABLET | Refills: 0 | Status: COMPLETED | OUTPATIENT
Start: 2024-02-25 | End: 2024-02-25

## 2024-02-25 RX ADMIN — MYCOPHENOLATE MOFETIL 1000 MG: 250 CAPSULE ORAL at 21:34

## 2024-02-25 RX ADMIN — PANTOPRAZOLE SODIUM 40 MG: 40 TABLET, DELAYED RELEASE ORAL at 16:52

## 2024-02-25 RX ADMIN — PIPERACILLIN AND TAZOBACTAM 3.38 G: 36; 4.5 INJECTION, POWDER, FOR SOLUTION INTRAVENOUS at 07:42

## 2024-02-25 RX ADMIN — GUAIFENESIN 1200 MG: 600 TABLET ORAL at 08:34

## 2024-02-25 RX ADMIN — PIPERACILLIN AND TAZOBACTAM 3.38 G: 36; 4.5 INJECTION, POWDER, FOR SOLUTION INTRAVENOUS at 19:13

## 2024-02-25 RX ADMIN — METHENAMINE HIPPURATE 1 G: 1 TABLET ORAL at 08:35

## 2024-02-25 RX ADMIN — LEVALBUTEROL HYDROCHLORIDE 1.25 MG: 1.25 SOLUTION RESPIRATORY (INHALATION) at 19:30

## 2024-02-25 RX ADMIN — LIDOCAINE 5% 1 PATCH: 700 PATCH TOPICAL at 13:50

## 2024-02-25 RX ADMIN — SUCRALFATE 1 G: 1 TABLET ORAL at 16:52

## 2024-02-25 RX ADMIN — MIDODRINE HYDROCHLORIDE 5 MG: 5 TABLET ORAL at 16:52

## 2024-02-25 RX ADMIN — MIDODRINE HYDROCHLORIDE 5 MG: 5 TABLET ORAL at 11:16

## 2024-02-25 RX ADMIN — METHYLPREDNISOLONE SODIUM SUCCINATE 40 MG: 40 INJECTION, POWDER, FOR SOLUTION INTRAMUSCULAR; INTRAVENOUS at 21:34

## 2024-02-25 RX ADMIN — ENOXAPARIN SODIUM 40 MG: 40 INJECTION SUBCUTANEOUS at 08:34

## 2024-02-25 RX ADMIN — PIPERACILLIN AND TAZOBACTAM 3.38 G: 36; 4.5 INJECTION, POWDER, FOR SOLUTION INTRAVENOUS at 13:50

## 2024-02-25 RX ADMIN — PANTOPRAZOLE SODIUM 40 MG: 40 TABLET, DELAYED RELEASE ORAL at 06:18

## 2024-02-25 RX ADMIN — ATOVAQUONE 1500 MG: 750 SUSPENSION ORAL at 08:36

## 2024-02-25 RX ADMIN — OXYCODONE HYDROCHLORIDE 5 MG: 5 TABLET ORAL at 03:29

## 2024-02-25 RX ADMIN — OXYCODONE HYDROCHLORIDE 5 MG: 5 TABLET ORAL at 19:17

## 2024-02-25 RX ADMIN — IPRATROPIUM BROMIDE 0.5 MG: 0.5 SOLUTION RESPIRATORY (INHALATION) at 13:22

## 2024-02-25 RX ADMIN — OXYCODONE HYDROCHLORIDE AND ACETAMINOPHEN 500 MG: 500 TABLET ORAL at 08:34

## 2024-02-25 RX ADMIN — METHYLPREDNISOLONE SODIUM SUCCINATE 40 MG: 40 INJECTION, POWDER, FOR SOLUTION INTRAMUSCULAR; INTRAVENOUS at 08:34

## 2024-02-25 RX ADMIN — METHENAMINE HIPPURATE 1 G: 1 TABLET ORAL at 16:53

## 2024-02-25 RX ADMIN — SUCRALFATE 1 G: 1 TABLET ORAL at 21:34

## 2024-02-25 RX ADMIN — ATORVASTATIN CALCIUM 20 MG: 20 TABLET, FILM COATED ORAL at 16:52

## 2024-02-25 RX ADMIN — LEVALBUTEROL HYDROCHLORIDE 1.25 MG: 1.25 SOLUTION RESPIRATORY (INHALATION) at 13:22

## 2024-02-25 RX ADMIN — IPRATROPIUM BROMIDE 0.5 MG: 0.5 SOLUTION RESPIRATORY (INHALATION) at 07:52

## 2024-02-25 RX ADMIN — SUCRALFATE 1 G: 1 TABLET ORAL at 06:18

## 2024-02-25 RX ADMIN — SUCRALFATE 1 G: 1 TABLET ORAL at 11:16

## 2024-02-25 RX ADMIN — MYCOPHENOLATE MOFETIL 1000 MG: 250 CAPSULE ORAL at 08:36

## 2024-02-25 RX ADMIN — GUAIFENESIN 1200 MG: 600 TABLET ORAL at 21:34

## 2024-02-25 RX ADMIN — MIDODRINE HYDROCHLORIDE 5 MG: 5 TABLET ORAL at 06:19

## 2024-02-25 RX ADMIN — POTASSIUM CHLORIDE 40 MEQ: 1500 TABLET, EXTENDED RELEASE ORAL at 11:16

## 2024-02-25 RX ADMIN — IPRATROPIUM BROMIDE 0.5 MG: 0.5 SOLUTION RESPIRATORY (INHALATION) at 19:30

## 2024-02-25 RX ADMIN — PIPERACILLIN AND TAZOBACTAM 3.38 G: 36; 4.5 INJECTION, POWDER, FOR SOLUTION INTRAVENOUS at 02:00

## 2024-02-25 RX ADMIN — LEVALBUTEROL HYDROCHLORIDE 1.25 MG: 1.25 SOLUTION RESPIRATORY (INHALATION) at 07:52

## 2024-02-25 NOTE — ASSESSMENT & PLAN NOTE
POA with complaints of SOB and left sided chest pain. Recently admitted for COVID infection and sepsis. During previous admission, was requiring 2L NC   2/24 tachycardic tachypneic spiked a fever and oxygen requirement went up to 8 L mid flow along with mild hypotension noted.  Repeat blood Cultures done. moved to level 2 stepdown.  Now weaned down to 2 L.  CTA chest PE study: Unchanged groundglass opacities and interstitial marking thickening with bibasal predominance. Appearance favors fibrosis but acute process cannot be completely excluded. No pulmonary embolism  Incentive spirometry, supportive measures  placed on lev albuterol and ipratropium.  Recently tapered off from IV steroids to oral prednisone due to decompensation placed back on Solu-Medrol 40 mg IV twice daily  completed 5 days of IV Rocephin placed on zosyn on 2/24 due to fever spike  Patient has history of intermittent fevers since COVID-19 infection 2 to 3 years ago.  Underwent lung biopsy possible cryptogenic organizing pneumonia and has been placed on atovaquone,cellcept and chronic prednisone therapy by [place] [facility] since October 2023.  Will asked pulmonology and infectious disease to evaluate patient here for further recommendations

## 2024-02-25 NOTE — PLAN OF CARE
Problem: PAIN - ADULT  Goal: Verbalizes/displays adequate comfort level or baseline comfort level  Description: Interventions:  - Encourage patient to monitor pain and request assistance  - Assess pain using appropriate pain scale0-10  - Administer analgesics based on type and severity of pain and evaluate response  - Implement non-pharmacological measures as appropriate and evaluate response  - Consider cultural and social influences on pain and pain management  - Notify physician/advanced practitioner if interventions unsuccessful or patient reports new pain  Outcome: Progressing     Problem: INFECTION - ADULT  Goal: Absence or prevention of progression during hospitalization  Description: INTERVENTIONS:  - Assess and monitor for signs and symptoms of infection  - Monitor lab/diagnostic results  - Monitor all insertion sites, i.e. indwelling lines, tubes, and drains  - Monitor endotracheal if appropriate and nasal secretions for changes in amount and color  - [place] appropriate cooling/warming therapies per order  - Administer medications as ordered  - Instruct and encourage patient and family to use good hand hygiene technique  - Identify and instruct in appropriate isolation precautions for identified infection/condition  Outcome: Progressing     Problem: SAFETY ADULT  Goal: Patient will remain free of falls  Description: INTERVENTIONS:  - Educate patient/family on patient safety including physical limitations  - Instruct patient to call for assistance with activity   - Consult OT/PT to assist with strengthening/mobility   - Keep Call bell within reach  - Keep bed low and locked with side rails adjusted as appropriate  - Keep care items and personal belongings within reach  - Initiate and maintain comfort rounds  - Make Fall Risk Sign visible to staff  - Offer Toileting every 2 Hours, in advance of need  - Initiate/Maintain bed/chair alarm  - Apply yellow socks and bracelet for high fall risk patients  -  Consider moving patient to room near nurses station  Outcome: Progressing  Goal: Maintain or return to baseline ADL function  Description: INTERVENTIONS:  -  Assess patient's ability to carry out ADLs; assess patient's baseline for ADL function and identify physical deficits which impact ability to perform ADLs (bathing, care of mouth/teeth, toileting, grooming, dressing, etc.)  - Assess/evaluate cause of self-care deficits   - Assess range of motion  - Assess patient's mobility; develop plan if impaired  - Assess patient's need for assistive devices and provide as appropriate  - Encourage maximum independence but intervene and supervise when necessary  - Involve family in performance of ADLs  - Assess for home care needs following discharge   - Consider OT consult to assist with ADL evaluation and planning for discharge  - Provide patient education as appropriate  Outcome: Progressing  Goal: Maintains/Returns to pre admission functional level  Description: INTERVENTIONS:  - Perform AM-PAC 6 Click Basic Mobility/ Daily Activity assessment daily.  - Set and communicate daily mobility goal to care team and patient/family/caregiver.   - Collaborate with rehabilitation services on mobility goals if consulted  - Perform Range of Motion 4 times a day.  - Reposition patient every 2 hours.  - Ambulate patient 4 times a day  - Out of bed to chair 3 times a day   - Out of bed for meals 3 times a day  - Out of bed for toileting  - Record patient progress and toleration of activity level   Outcome: Progressing     Problem: DISCHARGE PLANNING  Goal: Discharge to home or other facility with appropriate resources  Description: INTERVENTIONS:  - Identify barriers to discharge w/patient and caregiver  - Arrange for needed discharge resources and transportation as appropriate  - Identify discharge learning needs (meds, wound care, etc.)  - Arrange for interpretive services to assist at discharge as needed  - Refer to Case Management  Department for coordinating discharge planning if the patient needs post-hospital services based on physician/advanced practitioner order or complex needs related to functional status, cognitive ability, or social support system  Outcome: Progressing     Problem: Knowledge Deficit  Goal: Patient/family/caregiver demonstrates understanding of disease process, treatment plan, medications, and discharge instructions  Description: Complete learning assessment and assess knowledge base.  Interventions:  - Provide teaching at level of understanding  - Provide teaching via preferred learning methods  Outcome: Progressing     Problem: RESPIRATORY - ADULT  Goal: Achieves optimal ventilation and oxygenation  Description: INTERVENTIONS:  - Assess for changes in respiratory status  - Assess for changes in mentation and behavior  - Position to facilitate oxygenation and minimize respiratory effort  - Oxygen administered by appropriate delivery if ordered  - Initiate smoking cessation education as indicated  - Encourage broncho-pulmonary hygiene including cough, deep breathe, Incentive Spirometry  - Assess the need for suctioning and aspirate as needed  - Assess and instruct to report SOB or any respiratory difficulty  - Respiratory Therapy support as indicated  Outcome: Progressing     Problem: Prexisting or High Potential for Compromised Skin Integrity  Goal: Skin integrity is maintained or improved  Description: INTERVENTIONS:  - Identify patients at risk for skin breakdown  - Assess and monitor skin integrity  - Assess and monitor nutrition and hydration status  - Monitor labs   - Assess for incontinence   - Turn and reposition patient  - Assist with mobility/ambulation  - Relieve pressure over bony prominences  - Avoid friction and shearing  - Provide appropriate hygiene as needed including keeping skin clean and dry  - Evaluate need for skin moisturizer/barrier cream  - Collaborate with interdisciplinary team   -  Patient/family teaching  - Consider wound care consult   Outcome: Progressing

## 2024-02-25 NOTE — ASSESSMENT & PLAN NOTE
Presents to ED with left sided chest pain and increasing shortness of breath worsening over the last 3 days. Worse with movement.  No chest pain reported today so far  CTA chest PE study: Unchanged groundglass opacities and interstitial marking thickening with bibasal predominance. Appearance favors fibrosis but acute process cannot be completely excluded.   MITESH score = 2  TSH elevated, normal t4, repeat thyroid studies in 4-6 weeks  Lipid panel with elevated triglycerides  Hold NSAIDS. troponins negative  Pain management  cont Carafate Protonix and Pepto-Bismol for her epigastric pain.  Will keep n.p.o. after midnight and possible EGD in a.m.

## 2024-02-25 NOTE — PROGRESS NOTES
[facility]  Progress Note  Name: Nina Tran I  MRN: [id number]  Unit/Bed#: -01 I Date of Admission: 2/17/2024   Date of Service: 2/25/2024 I Hospital Day: 8    Assessment/Plan   * Acute on chronic respiratory failure with hypoxia (HCC)  Assessment & Plan  POA with complaints of SOB and left sided chest pain. Recently admitted for COVID infection and sepsis. During previous admission, was requiring 2L NC   2/24 tachycardic tachypneic spiked a fever and oxygen requirement went up to 8 L mid flow along with mild hypotension noted.  Repeat blood Cultures done. moved to level 2 stepdown.  Now weaned down to 2 L.  CTA chest PE study: Unchanged groundglass opacities and interstitial marking thickening with bibasal predominance. Appearance favors fibrosis but acute process cannot be completely excluded. No pulmonary embolism  Incentive spirometry, supportive measures  placed on lev albuterol and ipratropium.  Recently tapered off from IV steroids to oral prednisone due to decompensation placed back on Solu-Medrol 40 mg IV twice daily  completed 5 days of IV Rocephin placed on zosyn on 2/24 due to fever spike  Patient has history of intermittent fevers since COVID-19 infection 2 to 3 years ago.  Underwent lung biopsy possible cryptogenic organizing pneumonia and has been placed on atovaquone,cellcept and chronic prednisone therapy by [facility] since October 2023.  Will asked pulmonology and infectious disease to evaluate patient here for further recommendations    Chest pain  Assessment & Plan  Presents to ED with left sided chest pain and increasing shortness of breath worsening over the last 3 days. Worse with movement.  No chest pain reported today so far  CTA chest PE study: Unchanged groundglass opacities and interstitial marking thickening with bibasal predominance. Appearance favors fibrosis but acute process cannot be completely excluded.   MITESH score = 2  TSH  elevated, normal t4, repeat thyroid studies in 4-6 weeks  Lipid panel with elevated triglycerides  Hold NSAIDS. troponins negative  Pain management  cont Carafate Protonix and Pepto-Bismol for her epigastric pain.  Will keep n.p.o. after midnight and possible EGD in a.m.    Pneumonia  Assessment & Plan  Presented to ED with left sided chest pain and worsening SOB for 3 days.   CTA chest: Unchanged groundglass opacities and interstitial marking thickening with bibasal predominance. Appearance favors fibrosis but acute process cannot be completely excluded. No PE.   History of pulmonary fibrosis and possible cryptogenic organizing pneumonia  COVID/Flu/RSV: COVID positive, but positive since 2/4 during previous admission.   Urine strep and legionella negative, Sputum culture ordered  Blood Cultures no growth after 5 days  Initially given rocephin and doxy now switched to Zosyn as she spiked a fever on 2/24 procalcitonin level is negative    History of thymus cancer  Assessment & Plan  Patient with history of thymus cancer s/p removal    Continue outpatient follow up     Generalized weakness  Assessment & Plan  POA with complaints of generalized weakness suspect secondary to resp issues  PT/OT consult: II (Moderate Resource Intensity)     COVID-19  Assessment & Plan  Recently admitted with COVID-19 infection tested positive on 2/4, still testing positive from previous infection. Completed 10 days of isolation per COVID isolation precautions  Cont iv steroids         VTE Pharmacologic Prophylaxis: VTE Score: 6 Moderate Risk (Score 3-4) - Pharmacological DVT Prophylaxis Ordered: enoxaparin (Lovenox).    Mobility:   Basic Mobility Inpatient Raw Score: 10  -HLM Goal: 4: Move to chair/commode  JH-HLM Achieved: 4: Move to chair/commode  HLM Goal achieved. Continue to encourage appropriate mobility.    Patient Centered Rounds: I performed bedside rounds with nursing staff today.   Discussions with Specialists or Other Care  Team Provider: None today    Education and Discussions with Family / Patient: Will update spouse    Total Time Spent on Date of Encounter in care of patient: 45 mins. This time was spent on one or more of the following: performing physical exam; counseling and coordination of care; obtaining or reviewing history; documenting in the medical record; reviewing/ordering tests, medications or procedures; communicating with other healthcare professionals and discussing with patient's family/caregivers.    Current Length of Stay: 8 day(s)  Current Patient Status: Inpatient   Certification Statement: The patient will continue to require additional inpatient hospital stay due to acute on chronic respiratory failure with hypoxia  Discharge Plan: Anticipate discharge in 24-48 hrs to discharge location to be determined pending rehab evaluations.    Code Status: Level 1 - Full Code    Subjective:   Patient states that she feels much better today denies any chest pain today.  No fevers so far overnight or this morning    Objective:     Vitals:   Temp (24hrs), Av.9 °F (36.6 °C), Min:97 °F (36.1 °C), Max:100.8 °F (38.2 °C)    Temp:  [97 °F (36.1 °C)-100.8 °F (38.2 °C)] 97.4 °F (36.3 °C)  HR:  [] 70  Resp:  [17-29] 17  BP: ()/(49-65) 105/59  SpO2:  [86 %-100 %] 100 %  Body mass index is 23.52 kg/m².     Input and Output Summary (last 24 hours):     Intake/Output Summary (Last 24 hours) at 2024 1017  Last data filed at 2024 0901  Gross per 24 hour   Intake 2200 ml   Output 650 ml   Net 1550 ml       Physical Exam:   Physical Exam  Vitals and nursing note reviewed.   Constitutional:       Appearance: Normal appearance.   HENT:      Head: Normocephalic and atraumatic.      Right Ear: External ear normal.      Left Ear: External ear normal.      Nose: Nose normal.      Mouth/Throat:      Pharynx: Oropharynx is clear.   Cardiovascular:      Rate and Rhythm: Normal rate and regular rhythm.      Heart sounds:  Normal heart sounds.   Pulmonary:      Effort: Pulmonary effort is normal.      Breath sounds: Normal breath sounds.      Comments: Moderate air entry bilaterally with mild decreased breath sounds bilateral bases  Abdominal:      General: Bowel sounds are normal.      Palpations: Abdomen is soft.      Tenderness: There is no abdominal tenderness.   Musculoskeletal:         General: Normal range of motion.      Cervical back: Normal range of motion and neck supple.   Skin:     General: Skin is warm and dry.      Capillary Refill: Capillary refill takes less than 2 seconds.   Neurological:      Mental Status: She is alert and oriented to person, place, and time. Mental status is at baseline.   Psychiatric:         Mood and Affect: Mood normal.            Additional Data:     Labs:  Results from last 7 days   Lab Units 02/24/24  1340   WBC Thousand/uL 10.11   HEMOGLOBIN g/dL 9.4*   HEMATOCRIT % 30.0*   PLATELETS Thousands/uL 97*     Results from last 7 days   Lab Units 02/24/24  1126 02/22/24  0917 02/21/24  0522   SODIUM mmol/L 130*   < > 130*   POTASSIUM mmol/L 3.1*   < > 4.1   CHLORIDE mmol/L 91*   < > 99   CO2 mmol/L 29   < > 23   BUN mg/dL 27*   < > 17   CREATININE mg/dL 0.86   < > 0.62   ANION GAP mmol/L 10   < > 8   CALCIUM mg/dL 7.9*   < > 7.6*   ALBUMIN g/dL  --   --  2.9*   TOTAL BILIRUBIN mg/dL  --   --  0.73   ALK PHOS U/L  --   --  127*   ALT U/L  --   --  41   AST U/L  --   --  32   GLUCOSE RANDOM mg/dL 93   < > 143*    < > = values in this interval not displayed.                 Results from last 7 days   Lab Units 02/25/24  0618 02/24/24  1340 02/24/24  1126 02/24/24  0602 02/19/24  0540   LACTIC ACID mmol/L 1.9 2.6* 2.6*  --   --    PROCALCITONIN ng/ml 0.23  --   --  0.22 0.25       Lines/Drains:  Invasive Devices       Peripheral Intravenous Line  Duration             Long-Dwell Peripheral IV (Midline) 02/24/24 Right Brachial <1 day              Drain  Duration             External Urinary Catheter 7  days                          Imaging: Reviewed radiology reports from this admission including: chest xray    Recent Cultures (last 7 days):   Results from last 7 days   Lab Units 02/24/24  1101 02/24/24  1047   BLOOD CULTURE  Received in Microbiology Lab. Culture in Progress. Received in Microbiology Lab. Culture in Progress.       Last 24 Hours Medication List:   Current Facility-Administered Medications   Medication Dose Route Frequency Provider Last Rate    acetaminophen  650 mg Oral Q6H PRN Charlene Archer MD      aluminum-magnesium hydroxide-simethicone  30 mL Oral Q4H PRN Charlene Archer MD      ascorbic acid  500 mg Oral Daily Charlene Archer MD      atorvastatin  20 mg Oral Daily With Dinner Charlene Archer MD      atovaquone  1,500 mg Oral Daily Charlene Archer MD      bismuth subsalicylate  525 mg Oral Q6H PRN Charlene Archer MD      enoxaparin  40 mg Subcutaneous Daily Charlene Archer MD      guaiFENesin  1,200 mg Oral Q12H [place] Charlene Archer MD      ipratropium  0.5 mg Nebulization TID Charlene Archer MD      levalbuterol  1.25 mg Nebulization TID Charlene Archer MD      lidocaine  1 patch Topical Q24H Charlene Archer MD      methenamine hippurate  1 g Oral BID With Meals Charlene Archer MD      methylPREDNISolone sodium succinate  40 mg Intravenous Q12H [place] Charlene Archer MD      midodrine  5 mg Oral TID AC Charlene Archer MD      mycophenolate  1,000 mg Oral Q12H [facility] Charlene Archer MD      ondansetron  4 mg Intravenous Q6H PRN Charlene Archer MD      oxyCODONE  2.5 mg Oral Q4H PRN Charlene Archer MD      Or    oxyCODONE  5 mg Oral Q4H PRN Charlene Archer MD      pantoprazole  40 mg Oral BID AC Charlene Archer MD      piperacillin-tazobactam  3.375 g Intravenous Q6H Charlene Archer MD Stopped (02/25/24 0901)    potassium chloride  40 mEq Oral Once Charlene Archer MD      sucralfate  1 g Oral 4x Daily (AC & HS) Charlene Archer MD          Today, Patient Was Seen By: Charlene Archer MD    **Please Note: This note may have been constructed using a voice recognition  system.**

## 2024-02-25 NOTE — ASSESSMENT & PLAN NOTE
Presented to ED with left sided chest pain and worsening SOB for 3 days.   CTA chest: Unchanged groundglass opacities and interstitial marking thickening with bibasal predominance. Appearance favors fibrosis but acute process cannot be completely excluded. No PE.   History of pulmonary fibrosis and possible cryptogenic organizing pneumonia  COVID/Flu/RSV: COVID positive, but positive since 2/4 during previous admission.   Urine strep and legionella negative, Sputum culture ordered  Blood Cultures no growth after 5 days  Initially given rocephin and doxy now switched to Zosyn as she spiked a fever on 2/24 procalcitonin level is negative

## 2024-02-26 ENCOUNTER — ANESTHESIA EVENT (OUTPATIENT)
Dept: ANESTHESIOLOGY | Facility: HOSPITAL | Age: 79
End: 2024-02-26

## 2024-02-26 ENCOUNTER — ANESTHESIA (OUTPATIENT)
Dept: ANESTHESIOLOGY | Facility: HOSPITAL | Age: 79
End: 2024-02-26

## 2024-02-26 LAB
ALBUMIN SERPL BCP-MCNC: 2.9 G/DL (ref 3.5–5)
ALP SERPL-CCNC: 130 U/L (ref 34–104)
ALT SERPL W P-5'-P-CCNC: 63 U/L (ref 7–52)
ANION GAP SERPL CALCULATED.3IONS-SCNC: 6 MMOL/L
AST SERPL W P-5'-P-CCNC: 37 U/L (ref 13–39)
BACTERIA SPT RESP CULT: ABNORMAL
BILIRUB SERPL-MCNC: 0.76 MG/DL (ref 0.2–1)
BUN SERPL-MCNC: 19 MG/DL (ref 5–25)
CALCIUM ALBUM COR SERPL-MCNC: 8.7 MG/DL (ref 8.3–10.1)
CALCIUM SERPL-MCNC: 7.8 MG/DL (ref 8.4–10.2)
CHLORIDE SERPL-SCNC: 96 MMOL/L (ref 96–108)
CO2 SERPL-SCNC: 28 MMOL/L (ref 21–32)
CREAT SERPL-MCNC: 0.67 MG/DL (ref 0.6–1.3)
ERYTHROCYTE [DISTWIDTH] IN BLOOD BY AUTOMATED COUNT: 15.8 % (ref 11.6–15.1)
GFR SERPL CREATININE-BSD FRML MDRD: 83 ML/MIN/1.73SQ M
GLUCOSE SERPL-MCNC: 167 MG/DL (ref 65–140)
GLUCOSE SERPL-MCNC: 170 MG/DL (ref 65–140)
GLUCOSE SERPL-MCNC: 212 MG/DL (ref 65–140)
GRAM STN SPEC: ABNORMAL
HCT VFR BLD AUTO: 30.6 % (ref 34.8–46.1)
HGB BLD-MCNC: 9.7 G/DL (ref 11.5–15.4)
MCH RBC QN AUTO: 26.9 PG (ref 26.8–34.3)
MCHC RBC AUTO-ENTMCNC: 31.7 G/DL (ref 31.4–37.4)
MCV RBC AUTO: 85 FL (ref 82–98)
PLATELET # BLD AUTO: 120 THOUSANDS/UL (ref 149–390)
PMV BLD AUTO: 10.4 FL (ref 8.9–12.7)
POTASSIUM SERPL-SCNC: 5 MMOL/L (ref 3.5–5.3)
PROT SERPL-MCNC: 4.7 G/DL (ref 6.4–8.4)
RBC # BLD AUTO: 3.6 MILLION/UL (ref 3.81–5.12)
SODIUM SERPL-SCNC: 130 MMOL/L (ref 135–147)
WBC # BLD AUTO: 10.21 THOUSAND/UL (ref 4.31–10.16)

## 2024-02-26 PROCEDURE — 99232 SBSQ HOSP IP/OBS MODERATE 35: CPT | Performed by: FAMILY MEDICINE

## 2024-02-26 PROCEDURE — 80053 COMPREHEN METABOLIC PANEL: CPT | Performed by: FAMILY MEDICINE

## 2024-02-26 PROCEDURE — 82948 REAGENT STRIP/BLOOD GLUCOSE: CPT

## 2024-02-26 PROCEDURE — 85027 COMPLETE CBC AUTOMATED: CPT | Performed by: FAMILY MEDICINE

## 2024-02-26 PROCEDURE — G0427 INPT/ED TELECONSULT70: HCPCS | Performed by: INTERNAL MEDICINE

## 2024-02-26 PROCEDURE — 94760 N-INVAS EAR/PLS OXIMETRY 1: CPT

## 2024-02-26 PROCEDURE — 94761 N-INVAS EAR/PLS OXIMETRY MLT: CPT

## 2024-02-26 PROCEDURE — 94640 AIRWAY INHALATION TREATMENT: CPT

## 2024-02-26 RX ORDER — INSULIN LISPRO 100 [IU]/ML
1-6 INJECTION, SOLUTION INTRAVENOUS; SUBCUTANEOUS
Status: DISCONTINUED | OUTPATIENT
Start: 2024-02-26 | End: 2024-03-05 | Stop reason: HOSPADM

## 2024-02-26 RX ORDER — METHYLPREDNISOLONE SODIUM SUCCINATE 40 MG/ML
40 INJECTION, POWDER, LYOPHILIZED, FOR SOLUTION INTRAMUSCULAR; INTRAVENOUS DAILY
Status: DISCONTINUED | OUTPATIENT
Start: 2024-02-27 | End: 2024-02-26

## 2024-02-26 RX ADMIN — OXYCODONE HYDROCHLORIDE 5 MG: 5 TABLET ORAL at 02:05

## 2024-02-26 RX ADMIN — IPRATROPIUM BROMIDE 0.5 MG: 0.5 SOLUTION RESPIRATORY (INHALATION) at 08:22

## 2024-02-26 RX ADMIN — LEVALBUTEROL HYDROCHLORIDE 1.25 MG: 1.25 SOLUTION RESPIRATORY (INHALATION) at 13:57

## 2024-02-26 RX ADMIN — SUCRALFATE 1 G: 1 TABLET ORAL at 12:03

## 2024-02-26 RX ADMIN — ENOXAPARIN SODIUM 40 MG: 40 INJECTION SUBCUTANEOUS at 09:14

## 2024-02-26 RX ADMIN — MIDODRINE HYDROCHLORIDE 5 MG: 5 TABLET ORAL at 12:03

## 2024-02-26 RX ADMIN — SUCRALFATE 1 G: 1 TABLET ORAL at 09:14

## 2024-02-26 RX ADMIN — GUAIFENESIN 1200 MG: 600 TABLET ORAL at 09:13

## 2024-02-26 RX ADMIN — SUCRALFATE 1 G: 1 TABLET ORAL at 18:00

## 2024-02-26 RX ADMIN — MYCOPHENOLATE MOFETIL 1000 MG: 250 CAPSULE ORAL at 21:42

## 2024-02-26 RX ADMIN — METHYLPREDNISOLONE SODIUM SUCCINATE 40 MG: 40 INJECTION, POWDER, FOR SOLUTION INTRAMUSCULAR; INTRAVENOUS at 09:14

## 2024-02-26 RX ADMIN — ATOVAQUONE 1500 MG: 750 SUSPENSION ORAL at 09:13

## 2024-02-26 RX ADMIN — IPRATROPIUM BROMIDE 0.5 MG: 0.5 SOLUTION RESPIRATORY (INHALATION) at 13:57

## 2024-02-26 RX ADMIN — METHENAMINE HIPPURATE 1 G: 1 TABLET ORAL at 09:13

## 2024-02-26 RX ADMIN — GUAIFENESIN 1200 MG: 600 TABLET ORAL at 21:42

## 2024-02-26 RX ADMIN — LEVALBUTEROL HYDROCHLORIDE 1.25 MG: 1.25 SOLUTION RESPIRATORY (INHALATION) at 08:22

## 2024-02-26 RX ADMIN — MIDODRINE HYDROCHLORIDE 5 MG: 5 TABLET ORAL at 18:00

## 2024-02-26 RX ADMIN — PANTOPRAZOLE SODIUM 40 MG: 40 TABLET, DELAYED RELEASE ORAL at 09:13

## 2024-02-26 RX ADMIN — LIDOCAINE 5% 1 PATCH: 700 PATCH TOPICAL at 12:03

## 2024-02-26 RX ADMIN — OXYCODONE HYDROCHLORIDE 2.5 MG: 5 TABLET ORAL at 23:14

## 2024-02-26 RX ADMIN — PIPERACILLIN AND TAZOBACTAM 3.38 G: 36; 4.5 INJECTION, POWDER, FOR SOLUTION INTRAVENOUS at 09:31

## 2024-02-26 RX ADMIN — ATORVASTATIN CALCIUM 20 MG: 20 TABLET, FILM COATED ORAL at 18:00

## 2024-02-26 RX ADMIN — ACETAMINOPHEN 325MG 650 MG: 325 TABLET ORAL at 09:36

## 2024-02-26 RX ADMIN — PANTOPRAZOLE SODIUM 40 MG: 40 TABLET, DELAYED RELEASE ORAL at 18:00

## 2024-02-26 RX ADMIN — MYCOPHENOLATE MOFETIL 1000 MG: 250 CAPSULE ORAL at 09:14

## 2024-02-26 RX ADMIN — ACETAMINOPHEN 325MG 650 MG: 325 TABLET ORAL at 19:48

## 2024-02-26 RX ADMIN — PIPERACILLIN AND TAZOBACTAM 3.38 G: 36; 4.5 INJECTION, POWDER, FOR SOLUTION INTRAVENOUS at 02:00

## 2024-02-26 RX ADMIN — OXYCODONE HYDROCHLORIDE AND ACETAMINOPHEN 500 MG: 500 TABLET ORAL at 09:13

## 2024-02-26 RX ADMIN — SUCRALFATE 1 G: 1 TABLET ORAL at 21:42

## 2024-02-26 NOTE — ASSESSMENT & PLAN NOTE
Presented to ED with left sided chest pain and worsening SOB for 3 days.   CTA chest: Unchanged groundglass opacities and interstitial marking thickening with bibasal predominance. Appearance favors fibrosis but acute process cannot be completely excluded. No PE.   History of pulmonary fibrosis and possible cryptogenic organizing pneumonia  COVID/Flu/RSV: COVID positive, but positive since 2/4 during previous admission.   Urine strep and legionella negative, Sputum culture ordered  Blood Cultures no growth after 5 days  Initially given rocephin and doxy now switched to Zosyn as she spiked a fever on 2/24 procalcitonin level is negative  Stop zosyn and observe

## 2024-02-26 NOTE — ASSESSMENT & PLAN NOTE
Recently admitted with COVID-19 infection tested positive on 2/4, still testing positive from previous infection. Completed 10 days of isolation per COVID isolation precautions  Taper to oral steroids

## 2024-02-26 NOTE — CONSULTS
Consultation - Infectious Disease   Nina Tran 78 y.o. female MRN: [id number]  Unit/Bed#: -01 Encounter: [id number]      Inpatient consult to Infectious Diseases  Consult performed by: Masha Altman MD  Consult ordered by: Charlene Archer MD            REQUIRED DOCUMENTATION:     1. This service was provided via Telemedicine.  2. Provider located at [facility].  3. TeleMed provider: Masha Altman MD.  4. Identify all parties in room with patient during tele consult:RN  5. After connecting through [employer], patient was identified by name and date of birth and assistant checked wristband.  Patient was then informed that this was a Telemedicine visit and that the exam was being conducted confidentially over secure lines. My office door was closed. No one else was in the room.  Patient acknowledged consent and understanding of privacy and security of the Telemedicine visit, and gave us permission to have the assistant stay in the room in order to assist with the history and to conduct the exam.  I informed the patient that I have reviewed their record in Epic and presented the opportunity for them to ask any questions regarding the visit today.  The patient agreed to participate.       Assessment/Recommendations     Fever  Mild acute transaminitis  Acute on chronic hypoxic respiratory failure  Covid pneumonia  Interstitial lung disease/pulmonary fibrosis with probable acute exacerbation  Hx organizing pneumonia, chronic hypoxic respiratory failure on cellcept, prednisone     -Patient recently hospitalized with COVID pneumonia and E. coli bacteremia from UTI.  She returns with epigastric pain, worsening hypoxia, dyspnea suspicious for ILD flare triggered by recent COVID infection.  No evidence of secondary bacterial pneumonia.  Given chronic steroid use, would be concerned for pneumocystis but she has been on appropriate prophylaxis and would not expect clinical improvement if it was untreated    -She  has a past complicated hx of organizing pneumonia/fibrosis following covid in 2020 (s/p lung bx ) and is followed by [facility] and ID on immunosuppression as above    -Hospital course complicated by single isolated fever to 102 but without leukocytosis, chest imaging is stable, she has no new worsening respiratory symptoms or other infectious focus clinically    -She is now afebrile without leukocytosis, on 2 L/min O2 but with ongoing epigastric pain     Discontinue pip-tazo  Monitor clinical course off antibiotics  Trend LFTs, check right upper quadrant ultrasound  If fever recurs, would repeat blood cultures, check urinalysis and CT chest/abdomen and resume pip-tazo  If epigastric pain fails to resolve would consider GI re-evaluation for inpatient endoscopy  Steroid management as per pulmonary and primary team, continue pneumocystis prophylaxis with atovaquone  Recommend outpatient follow-up with pulmonology for consideration of resuming CellCept and repeat imaging  Recommend outpatient follow-up with urogynecology, continue Premarin cream and continue Hip-[place] 1g twice daily with vitamin C  Check daily CBC, CMP to monitor for any evolving antibiotic toxicity    CT chest/abdomen images personally reviewed and show unchanged groundglass opacities, thickened interstitial markings without other infectious focus    Discussed above plan in detail with the primary service who is in agreement.    History     Reason for Consult: Fever  HPI: Nina Tran is a 78 y.o. year old female  with long covid on intermittent home oxygen as well as CellCept/chronic prednisone/atovaquone, malignant thymoma s/p thymectomy, Hx vaginal atrophy and cystocele and recurrent uti, recent cholecystectomy in November with chronic diarrhea.    She was recently hospitalized on 2/4 with E. coli bacteremia and acute hypoxic respiratory insufficiency and tested positive for COVID at that time.  She completed a course of therapy for her  bacteremia as well as remdesivir and steroids.  She returned to the ER on 2/17 with few days of increasing pain around her epigastrium radiating to her left chest. She has a hx reflux but denies any worsening heartburn or nausea. She does not recall having any significant worsening of shortness of breath or chronic cough.  CT of the chest and abdomen showed no acute infectious focus and showed unchanged groundglass opacities and interstitial markings.  She was started on ceftriaxone and doxycycline then transitioned to cefpodoxime and also received high-dose steroids for possible interstitial lung disease exacerbation.  On 2/24, hospital day 8, she developed a fever to 102.2 but had no other symptoms other than ongoing epigastric pain.  She was switched to Zosyn.  Blood cultures were checked and are negative.  Procalcitonin was negative.  Chest x-ray shows no change. She has been afebrile.   Infectious disease is being consulted for diagnostic work up and antibiotic management.    Review of Systems  Pertinent positives and negatives as noted in HPI. Rest complete 12 point system-based review of systems is otherwise negative.    PAST MEDICAL HISTORY:  Past Medical History:   Diagnosis Date    High cholesterol      Past Surgical History:   Procedure Laterality Date    CHOLECYSTECTOMY  11/2023    HYSTERECTOMY      LUNG BIOPSY Right 01/2023       FAMILY HISTORY:  Non-contributory    SOCIAL HISTORY:  Social History   /Civil Union  Social History     Substance and Sexual Activity   Alcohol Use Yes     Social History     Substance and Sexual Activity   Drug Use Never     Social History     Tobacco Use   Smoking Status Never   Smokeless Tobacco Never       ALLERGIES:  Allergies   Allergen Reactions    Tetanus Toxoid Hives     hives          MEDICATIONS:  All current active medications have been reviewed.    Physical Exam     Temp:  [97.3 °F (36.3 °C)-98.7 °F (37.1 °C)] 97.8 °F (36.6 °C)  HR:  [77-98] 77  Resp:   [16-24] 16  BP: ()/(54-70) 129/70  SpO2:  [93 %-100 %] 98 %  Temp (24hrs), Av.9 °F (36.6 °C), Min:97.3 °F (36.3 °C), Max:98.7 °F (37.1 °C)  Current: Temperature: 97.8 °F (36.6 °C)    Intake/Output Summary (Last 24 hours) at 2024 0844  Last data filed at 2024 2130  Gross per 24 hour   Intake 440 ml   Output 250 ml   Net 190 ml         Physical exam findings reported by bedside and primary medical team staff    General Appearance:  Appearing frail and chronically ill, nontoxic, and in some distress from pain, appears stated age   Head:  Normocephalic, without obvious abnormality, atraumatic   Eyes:  PERRL, conjunctiva pink and sclera anicteric, both eyes   Nose: Nares normal, mucosa normal, no drainage   Throat: Oropharynx moist without lesions; lips, mucosa, and tongue normal; teeth and gums normal   Neck: Supple, symmetrical, trachea midline, no adenopathy, no tenderness/mass/nodules   Back:   Symmetric, no curvature, ROM normal, no CVA tenderness   Lungs:   Coarse and diminished to auscultation bilaterally with bibasilar rales, respirations unlabored   Chest Wall:  No tenderness or deformity   Heart:  Regular rate and rhythm, S1, S2 normal, no murmur, rub or gallop   Abdomen:   Soft, non-tender, non-distended, positive bowel sounds, no masses, no organomegaly    No CVA tenderness   Extremities: Extremities normal, atraumatic, no cyanosis, clubbing , trace edema   Skin: Skin color, texture, turgor normal, no rashes or lesions. No draining wounds noted.   Lymph nodes: Cervical, supraclavicular, and axillary nodes normal   Neurologic: Alert and oriented times 3       Invasive Devices:   Long-Dwell Peripheral IV (Midline) 24 Right Brachial (Active)   Site Assessment WDL;Clean;Dry;Intact 24   Dressing Type Transparent 24   Line Status Blood return noted;Flushed & Clamped 24   Dressing Status Clean;Dry;Intact 24   Dressing Change Due 24  02/24/24 1100       External Urinary Catheter (Active)   Collection Container Canister and suction tubing (For Female) 02/24/24 2000   Suction Pressure (mmHg) 110 mmHg 02/25/24 2130   Interventions Removed and skin assessed;Pericare performed;Device changed 02/25/24 2130   Output (mL) 250 mL 02/25/24 2130       Labs, Imaging, & Other Studies     Lab Results:    I have personally reviewed pertinent labs.    Results from last 7 days   Lab Units 02/26/24  0614 02/24/24  1340 02/23/24  0533   WBC Thousand/uL 10.21* 10.11 9.55   HEMOGLOBIN g/dL 9.7* 9.4* 11.5   PLATELETS Thousands/uL 120* 97* 135*     Results from last 7 days   Lab Units 02/26/24  0614 02/22/24  0917 02/21/24  0522 02/20/24  0625   POTASSIUM mmol/L 5.0   < > 4.1 4.3   CHLORIDE mmol/L 96   < > 99 99   CO2 mmol/L 28   < > 23 23   BUN mg/dL 19   < > 17 16   CREATININE mg/dL 0.67   < > 0.62 0.79   EGFR ml/min/1.73sq m 83   < > 85 70   CALCIUM mg/dL 7.8*   < > 7.6* 7.7*   AST U/L 37  --  32 40*   ALT U/L 63*  --  41 47   ALK PHOS U/L 130*  --  127* 145*    < > = values in this interval not displayed.     Results from last 7 days   Lab Units 02/24/24  1137 02/24/24  1101 02/24/24  1047   BLOOD CULTURE   --  No Growth at 24 hrs. No Growth at 24 hrs.   SPUTUM CULTURE  Culture too young- will reincubate  --   --    GRAM STAIN RESULT  Rare Epithelial cells per low power field*  Rare Polys*  4+ Gram negative rods*  2+ Gram positive cocci in clusters*  --   --        Imaging Studies:   I have personally reviewed pertinent imaging study reports and images in PACS.      EKG, Pathology, and Other Studies:   I have personally reviewed pertinent reports and reviewed external records.    Counseling/Coordination of care:       Total 90 minutes communication with the patient via telehealth.  Labs, medical tests and imaging studies were independently and extensively reviewed by me as noted above in HPI and old records were obtained and summarized as noted above in HPI.    My recommendations were discussed with the patient in detail who verbalized understanding.

## 2024-02-26 NOTE — CASE MANAGEMENT
Case Management Discharge Planning Note    Patient name Nina Tran  Location /-01 MRN [id number]  : 1945 Date 2024       Current Admission Date: 2024  Current Admission Diagnosis:Acute on chronic respiratory failure with hypoxia (HCC)   Patient Active Problem List    Diagnosis Date Noted    GERD (gastroesophageal reflux disease) 2024    Chest pain 2024    Pneumonia 2024    Bacteremia 2024    Acute on chronic respiratory failure with hypoxia (HCC) 2024    Sepsis (HCC) 2024    COVID-19 2024    Generalized weakness 2024    Ambulatory dysfunction 2024    History of thymus cancer 2024      LOS (days): 9  Geometric Mean LOS (GMLOS) (days): 5.2  Days to GMLOS:-3.8     OBJECTIVE:  Risk of Unplanned Readmission Score: 17.57         Current admission status: Inpatient   Preferred Pharmacy:   [facility]/pharmacy #[id number] [place] [address]  Phone: [phone] Fax: [phone]    Primary Care Provider: Emely Will DO    Primary Insurance: MEDICARE  Secondary Insurance: [facility]    DISCHARGE DETAILS:    Chart reviewed.    Remains on IV Zosyn, solumedrol  IV q12, nebs, NPO for GI to see      Discharge planning discussed with:: patient  Freedom of Choice: Yes  Comments - Freedom of Choice: [facility]  CM contacted family/caregiver?: No- see comments                  Requested Home Health Care         Home Health Agency Name:: [facility]         Other Referral/Resources/Interventions Provided:  Referral Comments: [facility] is set up         Treatment Team Recommendation: Home with Home Health Care  Discharge Destination Plan:: Home with Home Health Care  Transport at Discharge : Family           IMM Given (Date):: 24 (830)         CM will continue to follow.   DCP _ [facility]

## 2024-02-26 NOTE — ASSESSMENT & PLAN NOTE
POA with complaints of SOB and left sided chest pain. Recently admitted for COVID infection and sepsis. During previous admission, was requiring 2L NC   2/24 tachycardic tachypneic spiked a fever and oxygen requirement went up to 8 L mid flow along with mild hypotension noted.  Repeat blood Cultures done. moved to level 2 stepdown.  Now weaned down to 2 L. Transition back to med surg  CTA chest PE study: Unchanged groundglass opacities and interstitial marking thickening with bibasal predominance. Appearance favors fibrosis but acute process cannot be completely excluded. No pulmonary embolism  Incentive spirometry, supportive measures  placed on lev albuterol and ipratropium.  Recently tapered off from IV steroids to oral prednisone due to decompensation placed back on Solu-Medrol 40 mg IV twice daily  completed 5 days of IV Rocephin placed on zosyn on 2/24 due to fever spike.will stop zosyn today and observe  Patient has history of intermittent fevers since COVID-19 infection 2 to 3 years ago.  Underwent lung biopsy possible cryptogenic organizing pneumonia and has been placed on atovaquone,cellcept and chronic prednisone therapy by [facility] since October 2023.  Will asked pulmonology and infectious disease to evaluate patient here for further recommendations  Also stop iv steroids and switch to prednisone 50 mg daily and slow taper

## 2024-02-26 NOTE — PROGRESS NOTES
[facility]  Progress Note  Name: Nina Tran I  MRN: [id number]  Unit/Bed#: -01 I Date of Admission: 2/17/2024   Date of Service: 2/26/2024 I Hospital Day: 9    Assessment/Plan   * Acute on chronic respiratory failure with hypoxia (HCC)  Assessment & Plan  POA with complaints of SOB and left sided chest pain. Recently admitted for COVID infection and sepsis. During previous admission, was requiring 2L NC   2/24 tachycardic tachypneic spiked a fever and oxygen requirement went up to 8 L mid flow along with mild hypotension noted.  Repeat blood Cultures done. moved to level 2 stepdown.  Now weaned down to 2 L. Transition back to med surg  CTA chest PE study: Unchanged groundglass opacities and interstitial marking thickening with bibasal predominance. Appearance favors fibrosis but acute process cannot be completely excluded. No pulmonary embolism  Incentive spirometry, supportive measures  placed on lev albuterol and ipratropium.  Recently tapered off from IV steroids to oral prednisone due to decompensation placed back on Solu-Medrol 40 mg IV twice daily  completed 5 days of IV Rocephin placed on zosyn on 2/24 due to fever spike.will stop zosyn today and observe  Patient has history of intermittent fevers since COVID-19 infection 2 to 3 years ago.  Underwent lung biopsy possible cryptogenic organizing pneumonia and has been placed on atovaquone,cellcept and chronic prednisone therapy by [facility] since October 2023.  Will asked pulmonology and infectious disease to evaluate patient here for further recommendations  Also stop iv steroids and switch to prednisone 50 mg daily and slow taper    Chest pain  Assessment & Plan  Presents to ED with left sided chest pain and increasing shortness of breath worsening over the last 3 days. Worse with movement.  No chest pain reported today so far  CTA chest PE study: Unchanged groundglass opacities and interstitial marking  thickening with bibasal predominance. Appearance favors fibrosis but acute process cannot be completely excluded.   MITESH score = 2  TSH elevated, normal t4, repeat thyroid studies in 4-6 weeks  Lipid panel with elevated triglycerides  Hold NSAIDS. troponins negative  Pain management  cont Carafate Protonix and Pepto-Bismol for her epigastric pain.  Egd outpatient.will do barium swallow for now    Pneumonia  Assessment & Plan  Presented to ED with left sided chest pain and worsening SOB for 3 days.   CTA chest: Unchanged groundglass opacities and interstitial marking thickening with bibasal predominance. Appearance favors fibrosis but acute process cannot be completely excluded. No PE.   History of pulmonary fibrosis and possible cryptogenic organizing pneumonia  COVID/Flu/RSV: COVID positive, but positive since 2/4 during previous admission.   Urine strep and legionella negative, Sputum culture ordered  Blood Cultures no growth after 5 days  Initially given rocephin and doxy now switched to Zosyn as she spiked a fever on 2/24 procalcitonin level is negative  Stop zosyn and observe    Generalized weakness  Assessment & Plan  POA with complaints of generalized weakness suspect secondary to resp issues  PT/OT consult: II (Moderate Resource Intensity)     COVID-19  Assessment & Plan  Recently admitted with COVID-19 infection tested positive on 2/4, still testing positive from previous infection. Completed 10 days of isolation per COVID isolation precautions  Taper to oral steroids               VTE Pharmacologic Prophylaxis: VTE Score: 6 Moderate Risk (Score 3-4) - Pharmacological DVT Prophylaxis Ordered: enoxaparin (Lovenox).    Mobility:   Basic Mobility Inpatient Raw Score: 14  -HLM Goal: 4: Move to chair/commode  JH-HLM Achieved: 2: Bed activities/Dependent transfer  HLM Goal achieved. Continue to encourage appropriate mobility.    Patient Centered Rounds: I performed bedside rounds with nursing staff today.    Discussions with Specialists or Other Care Team Provider: [facility]    Education and Discussions with Family / Patient: Updated  () via phone.    Total Time Spent on Date of Encounter in care of patient: 35 mins. This time was spent on one or more of the following: performing physical exam; counseling and coordination of care; obtaining or reviewing history; documenting in the medical record; reviewing/ordering tests, medications or procedures; communicating with other healthcare professionals and discussing with patient's family/caregivers.    Current Length of Stay: 9 day(s)  Current Patient Status: Inpatient   Certification Statement: The patient will continue to require additional inpatient hospital stay due to acute on chronic respiratory failure  Discharge Plan: Anticipate discharge in 24-48 hrs to home with home services.    Code Status: Level 1 - Full Code    Subjective:   Patient states that she is feeling better today no fevers or chills reported and down to 2 L.    Objective:     Vitals:   Temp (24hrs), Av.9 °F (36.6 °C), Min:97.1 °F (36.2 °C), Max:98.7 °F (37.1 °C)    Temp:  [97.1 °F (36.2 °C)-98.7 °F (37.1 °C)] 97.1 °F (36.2 °C)  HR:  [77-98] 79  Resp:  [16-24] 18  BP: (104-129)/(58-70) 104/65  SpO2:  [93 %-100 %] 98 %  Body mass index is 23.52 kg/m².     Input and Output Summary (last 24 hours):     Intake/Output Summary (Last 24 hours) at 2024 1405  Last data filed at 2024 2130  Gross per 24 hour   Intake 100 ml   Output 250 ml   Net -150 ml       Physical Exam:   Physical Exam  Vitals and nursing note reviewed.   Constitutional:       Appearance: She is ill-appearing.   HENT:      Head: Normocephalic and atraumatic.      Right Ear: External ear normal.      Left Ear: External ear normal.      Nose: Nose normal.      Mouth/Throat:      Pharynx: Oropharynx is clear.   Eyes:      Pupils: Pupils are equal, round, and reactive to light.   Cardiovascular:      Rate  and Rhythm: Normal rate and regular rhythm.      Heart sounds: Normal heart sounds.   Pulmonary:      Effort: Pulmonary effort is normal.      Breath sounds: Rales present.   Abdominal:      General: Bowel sounds are normal.      Palpations: Abdomen is soft.      Tenderness: There is no abdominal tenderness.   Musculoskeletal:         General: Normal range of motion.      Cervical back: Normal range of motion and neck supple.   Skin:     General: Skin is warm and dry.      Capillary Refill: Capillary refill takes less than 2 seconds.   Neurological:      General: No focal deficit present.      Mental Status: She is alert and oriented to person, place, and time.   Psychiatric:         Mood and Affect: Mood normal.            Additional Data:     Labs:  Results from last 7 days   Lab Units 02/26/24  0614   WBC Thousand/uL 10.21*   HEMOGLOBIN g/dL 9.7*   HEMATOCRIT % 30.6*   PLATELETS Thousands/uL 120*     Results from last 7 days   Lab Units 02/26/24  0614   SODIUM mmol/L 130*   POTASSIUM mmol/L 5.0   CHLORIDE mmol/L 96   CO2 mmol/L 28   BUN mg/dL 19   CREATININE mg/dL 0.67   ANION GAP mmol/L 6   CALCIUM mg/dL 7.8*   ALBUMIN g/dL 2.9*   TOTAL BILIRUBIN mg/dL 0.76   ALK PHOS U/L 130*   ALT U/L 63*   AST U/L 37   GLUCOSE RANDOM mg/dL 170*                 Results from last 7 days   Lab Units 02/25/24  0618 02/24/24  1340 02/24/24  1126 02/24/24  0602   LACTIC ACID mmol/L 1.9 2.6* 2.6*  --    PROCALCITONIN ng/ml 0.23  --   --  0.22       Lines/Drains:  Invasive Devices       Peripheral Intravenous Line  Duration             Long-Dwell Peripheral IV (Midline) 02/24/24 Right Brachial 2 days              Drain  Duration             External Urinary Catheter 8 days                          Imaging: Reviewed radiology reports from this admission including: chest xray    Recent Cultures (last 7 days):   Results from last 7 days   Lab Units 02/24/24  1137 02/24/24  1101 02/24/24  1047   BLOOD CULTURE   --  No Growth at 24 hrs.  No Growth at 24 hrs.   SPUTUM CULTURE  Commensal respiratory emelia only; No significant growth of Staph aureus/MRSA or Pseudomonas aeruginosa.  --   --    GRAM STAIN RESULT  Rare Epithelial cells per low power field*  Rare Polys*  4+ Gram negative rods*  2+ Gram positive cocci in clusters*  --   --        Last 24 Hours Medication List:   Current Facility-Administered Medications   Medication Dose Route Frequency Provider Last Rate    acetaminophen  650 mg Oral Q6H PRN Charlene Archer MD      aluminum-magnesium hydroxide-simethicone  30 mL Oral Q4H PRN Charlene Archer MD      ascorbic acid  500 mg Oral Daily Charlene Archer MD      atorvastatin  20 mg Oral Daily With Dinner Charlene Archer MD      atovaquone  1,500 mg Oral Daily Charlene Archer MD      bismuth subsalicylate  525 mg Oral Q6H PRN Charlene Archer MD      enoxaparin  40 mg Subcutaneous Daily Charlene Archer MD      guaiFENesin  1,200 mg Oral Q12H FALLON Charlene Archer MD      ipratropium  0.5 mg Nebulization TID Charlene Archer MD      levalbuterol  1.25 mg Nebulization TID Charlene Archer MD      lidocaine  1 patch Topical Q24H Charlene Archer MD      methenamine hippurate  1 g Oral BID With Meals Charlene Archer MD      midodrine  5 mg Oral TID AC Charlene Archer MD      mycophenolate  1,000 mg Oral Q12H FALLON Charlene Archer MD      ondansetron  4 mg Intravenous Q6H PRN Charlene Archer MD      oxyCODONE  2.5 mg Oral Q4H PRN Charlene Archer MD      Or    oxyCODONE  5 mg Oral Q4H PRN Charlene Archer MD      pantoprazole  40 mg Oral BID AC Charlene Archer MD      [START ON 2/27/2024] predniSONE  50 mg Oral Daily Charlene Archer MD      sucralfate  1 g Oral 4x Daily (AC & HS) Charlene Archer MD          Today, Patient Was Seen By: Charlene Archer MD    **Please Note: This note may have been constructed using a voice recognition system.**

## 2024-02-26 NOTE — NURSING NOTE
Patient transferred. Denies any pain or discomfort. Resting comfortably. VS stable. Call bell within reach. Bed alarm on.

## 2024-02-26 NOTE — ANESTHESIA PREPROCEDURE EVALUATION
Procedure:  PRE-OP ONLY    Relevant Problems   CARDIO   (+) Chest pain      GI/HEPATIC   (+) GERD (gastroesophageal reflux disease)      PULMONARY   (+) Acute on chronic respiratory failure with hypoxia (HCC)   (+) Pneumonia        Physical Exam    Airway    Mallampati score: II  TM Distance: >3 FB  Neck ROM: full     Dental   No notable dental hx     Cardiovascular  Cardiovascular exam normal    Pulmonary   Rhonchi    Other Findings  post-pubertal.  Fever  Mild acute transaminitis  Acute on chronic hypoxic respiratory failure  Covid pneumonia  Interstitial lung disease/pulmonary fibrosis with probable acute exacerbation  Hx organizing pneumonia, chronic hypoxic respiratory failure on cellcept, prednisone    Anesthesia Plan  ASA Score- 4     Anesthesia Type- IV sedation with anesthesia with ASA Monitors.         Additional Monitors:     Airway Plan:            Plan Factors-Exercise tolerance (METS): <4 METS.    Chart reviewed. EKG reviewed. Imaging results reviewed. Existing labs reviewed. Patient summary reviewed.    Patient is not a current smoker.      There is medical exclusion for perioperative obstructive sleep apnea risk education.        Induction-     Postoperative Plan-     Informed Consent- Anesthetic plan and risks discussed with patient.  I personally reviewed this patient with the CRNA. Discussed and agreed on the Anesthesia Plan with the CRNA..              Hb 9.7    2/17/24 covid positive

## 2024-02-26 NOTE — RESPIRATORY THERAPY NOTE
RT Protocol Note  Nina Tran 78 y.o. female MRN: [id number]  Unit/Bed#: -01 Encounter: [id number]    Assessment    Principal Problem:    Acute on chronic respiratory failure with hypoxia (HCC)  Active Problems:    COVID-19    Generalized weakness    History of thymus cancer    Sepsis (HCC)    Chest pain    Pneumonia      Home Pulmonary Medications:         Past Medical History:   Diagnosis Date    High cholesterol      Social History     Socioeconomic History    Marital status: /Civil Union     Spouse name: None    Number of children: None    Years of education: None    Highest education level: None   Occupational History    None   Tobacco Use    Smoking status: Never    Smokeless tobacco: Never   Vaping Use    Vaping status: Never Used   Substance and Sexual Activity    Alcohol use: Yes    Drug use: Never    Sexual activity: None   Other Topics Concern    None   Social History Narrative    None     Social Determinants of Health     Financial Resource Strain: Not on file   Food Insecurity: No Food Insecurity (2/19/2024)    Hunger Vital Sign     Worried About Running Out of Food in the Last Year: Never true     Ran Out of Food in the Last Year: Never true   Transportation Needs: No Transportation Needs (2/6/2024)    PRAPARE - Transportation     Lack of Transportation (Medical): No     Lack of Transportation (Non-Medical): No   Physical Activity: Not on file   Stress: Not on file   Social Connections: Not on file   Intimate Partner Violence: Not on file   Housing Stability: Low Risk  (2/19/2024)    Housing Stability Vital Sign     Unable to Pay for Housing in the Last Year: No     Number of Places Lived in the Last Year: 1     Unstable Housing in the Last Year: No       Subjective         Objective    Physical Exam:   Assessment Type: During-treatment  General Appearance: Alert, Awake  Respiratory Pattern: Normal  Chest Assessment: Chest expansion symmetrical  Bilateral Breath Sounds: Diminished,  "Clear  O2 Device: 2L NC    Vitals:  Blood pressure 129/70, pulse 77, temperature 97.8 °F (36.6 °C), temperature source Temporal, resp. rate 16, height 5' 5\" (1.651 m), weight 64.1 kg (141 lb 5 oz), SpO2 98%.    Results from last 7 days   Lab Units 02/24/24  0905   PH ART  7.470*   PCO2 ART mm Hg 34.0*   PO2 ART mm Hg 106.9   HCO3 ART mmol/L 24.2   BASE EXC ART mmol/L 1.0   O2 CONTENT ART mL/dL 16.8   O2 HGB, ARTERIAL % 97.3*   ABG SOURCE  Radial, Left   TAMIA TEST  Yes       Imaging and other studies: I have personally reviewed pertinent reports.      O2 Device: 2L NC     Plan    Respiratory Plan: No distress/Pulmonary history        Resp Comments: pt states her breathing feels likeit is improving. Feels that the breathing treatment are helping but does not take them at home. Wears 2L of oxygen at home. Pt has a nonproductive cough   "

## 2024-02-26 NOTE — PLAN OF CARE
Problem: PAIN - ADULT  Goal: Verbalizes/displays adequate comfort level or baseline comfort level  Description: Interventions:  - Encourage patient to monitor pain and request assistance  - Assess pain using appropriate pain scale0-10  - Administer analgesics based on type and severity of pain and evaluate response  - Implement non-pharmacological measures as appropriate and evaluate response  - Consider cultural and social influences on pain and pain management  - Notify physician/advanced practitioner if interventions unsuccessful or patient reports new pain  Outcome: Not Progressing     Problem: INFECTION - ADULT  Goal: Absence or prevention of progression during hospitalization  Description: INTERVENTIONS:  - Assess and monitor for signs and symptoms of infection  - Monitor lab/diagnostic results  - Monitor all insertion sites, i.e. indwelling lines, tubes, and drains  - Monitor endotracheal if appropriate and nasal secretions for changes in amount and color  - [place] appropriate cooling/warming therapies per order  - Administer medications as ordered  - Instruct and encourage patient and family to use good hand hygiene technique  - Identify and instruct in appropriate isolation precautions for identified infection/condition  Outcome: Not Progressing     Problem: SAFETY ADULT  Goal: Patient will remain free of falls  Description: INTERVENTIONS:  - Educate patient/family on patient safety including physical limitations  - Instruct patient to call for assistance with activity   - Consult OT/PT to assist with strengthening/mobility   - Keep Call bell within reach  - Keep bed low and locked with side rails adjusted as appropriate  - Keep care items and personal belongings within reach  - Initiate and maintain comfort rounds  - Make Fall Risk Sign visible to staff  - Offer Toileting every 2 Hours, in advance of need  - Initiate/Maintain bed/chair alarm  - Apply yellow socks and bracelet for high fall risk  patients  - Consider moving patient to room near nurses station  Outcome: Not Progressing  Goal: Maintain or return to baseline ADL function  Description: INTERVENTIONS:  -  Assess patient's ability to carry out ADLs; assess patient's baseline for ADL function and identify physical deficits which impact ability to perform ADLs (bathing, care of mouth/teeth, toileting, grooming, dressing, etc.)  - Assess/evaluate cause of self-care deficits   - Assess range of motion  - Assess patient's mobility; develop plan if impaired  - Assess patient's need for assistive devices and provide as appropriate  - Encourage maximum independence but intervene and supervise when necessary  - Involve family in performance of ADLs  - Assess for home care needs following discharge   - Consider OT consult to assist with ADL evaluation and planning for discharge  - Provide patient education as appropriate  Outcome: Not Progressing  Goal: Maintains/Returns to pre admission functional level  Description: INTERVENTIONS:  - Perform AM-PAC 6 Click Basic Mobility/ Daily Activity assessment daily.  - Set and communicate daily mobility goal to care team and patient/family/caregiver.   - Collaborate with rehabilitation services on mobility goals if consulted  - Perform Range of Motion 4 times a day.  - Reposition patient every 2 hours.  - Ambulate patient 4 times a day  - Out of bed to chair 3 times a day   - Out of bed for meals 3 times a day  - Out of bed for toileting  - Record patient progress and toleration of activity level   Outcome: Not Progressing     Problem: DISCHARGE PLANNING  Goal: Discharge to home or other facility with appropriate resources  Description: INTERVENTIONS:  - Identify barriers to discharge w/patient and caregiver  - Arrange for needed discharge resources and transportation as appropriate  - Identify discharge learning needs (meds, wound care, etc.)  - Arrange for interpretive services to assist at discharge as needed  -  Refer to Case Management Department for coordinating discharge planning if the patient needs post-hospital services based on physician/advanced practitioner order or complex needs related to functional status, cognitive ability, or social support system  Outcome: Not Progressing     Problem: Knowledge Deficit  Goal: Patient/family/caregiver demonstrates understanding of disease process, treatment plan, medications, and discharge instructions  Description: Complete learning assessment and assess knowledge base.  Interventions:  - Provide teaching at level of understanding  - Provide teaching via preferred learning methods  Outcome: Not Progressing     Problem: RESPIRATORY - ADULT  Goal: Achieves optimal ventilation and oxygenation  Description: INTERVENTIONS:  - Assess for changes in respiratory status  - Assess for changes in mentation and behavior  - Position to facilitate oxygenation and minimize respiratory effort  - Oxygen administered by appropriate delivery if ordered  - Initiate smoking cessation education as indicated  - Encourage broncho-pulmonary hygiene including cough, deep breathe, Incentive Spirometry  - Assess the need for suctioning and aspirate as needed  - Assess and instruct to report SOB or any respiratory difficulty  - Respiratory Therapy support as indicated  Outcome: Not Progressing     Problem: Prexisting or High Potential for Compromised Skin Integrity  Goal: Skin integrity is maintained or improved  Description: INTERVENTIONS:  - Identify patients at risk for skin breakdown  - Assess and monitor skin integrity  - Assess and monitor nutrition and hydration status  - Monitor labs   - Assess for incontinence   - Turn and reposition patient  - Assist with mobility/ambulation  - Relieve pressure over bony prominences  - Avoid friction and shearing  - Provide appropriate hygiene as needed including keeping skin clean and dry  - Evaluate need for skin moisturizer/barrier cream  - Collaborate with  interdisciplinary team   - Patient/family teaching  - Consider wound care consult   Outcome: Not Progressing

## 2024-02-26 NOTE — RESPIRATORY THERAPY NOTE
Home Oxygen Qualifying Test     Patient name: Nina Tran        : 1945   Date of Test:  2024  Diagnosis:    Home Oxygen Test:    **Medicare Guidelines require item(s) 1-5 on all ambulatory patients or 1 and 2 on non-ambulatory patients.    1. Baseline SPO2 on Room Air at rest 90 %   If <= 88% on Room Air add O2 via NC to obtain SpO2 >=88%. If LPM needed, document LPM  needed to reach =>88%    SPO2 during exertion on Room Air 86  %  During exertion monitor SPO2. If SPO2 increases >=89%, do not add supplemental oxygen    SPO2 on Oxygen at Rest N/A % at N/AL PM    SPO2 during exertion on Oxygen 83 % at 6 LPM    Test performed during exertion activity.      [x]  Supplemental Home Oxygen is indicated.    []  Client does not qualify for home oxygen.    Respiratory Additional Notes- Pt was breath holding and unable to maintain SpO2>88% on 6LPM NC during exertion.     Tiki Wong, RT

## 2024-02-26 NOTE — ASSESSMENT & PLAN NOTE
Presents to ED with left sided chest pain and increasing shortness of breath worsening over the last 3 days. Worse with movement.  No chest pain reported today so far  CTA chest PE study: Unchanged groundglass opacities and interstitial marking thickening with bibasal predominance. Appearance favors fibrosis but acute process cannot be completely excluded.   MITESH score = 2  TSH elevated, normal t4, repeat thyroid studies in 4-6 weeks  Lipid panel with elevated triglycerides  Hold NSAIDS. troponins negative  Pain management  cont Carafate Protonix and Pepto-Bismol for her epigastric pain.  Egd outpatient.will do barium swallow for now

## 2024-02-27 ENCOUNTER — APPOINTMENT (INPATIENT)
Dept: GASTROENTEROLOGY | Facility: HOSPITAL | Age: 79
DRG: 196 | End: 2024-02-27
Attending: HOSPITALIST
Payer: MEDICARE

## 2024-02-27 ENCOUNTER — ANESTHESIA (INPATIENT)
Dept: GASTROENTEROLOGY | Facility: HOSPITAL | Age: 79
DRG: 196 | End: 2024-02-27
Payer: MEDICARE

## 2024-02-27 ENCOUNTER — ANESTHESIA EVENT (INPATIENT)
Dept: GASTROENTEROLOGY | Facility: HOSPITAL | Age: 79
DRG: 196 | End: 2024-02-27
Payer: MEDICARE

## 2024-02-27 PROBLEM — R10.13 EPIGASTRIC PAIN: Status: ACTIVE | Noted: 2024-02-27

## 2024-02-27 LAB
GLUCOSE SERPL-MCNC: 120 MG/DL (ref 65–140)
GLUCOSE SERPL-MCNC: 125 MG/DL (ref 65–140)
GLUCOSE SERPL-MCNC: 128 MG/DL (ref 65–140)
GLUCOSE SERPL-MCNC: 97 MG/DL (ref 65–140)
GLUCOSE SERPL-MCNC: 99 MG/DL (ref 65–140)

## 2024-02-27 PROCEDURE — 99232 SBSQ HOSP IP/OBS MODERATE 35: CPT | Performed by: FAMILY MEDICINE

## 2024-02-27 PROCEDURE — G0408 INPT/TELE FOLLOW UP 35: HCPCS | Performed by: INTERNAL MEDICINE

## 2024-02-27 PROCEDURE — 97530 THERAPEUTIC ACTIVITIES: CPT

## 2024-02-27 PROCEDURE — 94760 N-INVAS EAR/PLS OXIMETRY 1: CPT

## 2024-02-27 PROCEDURE — 82948 REAGENT STRIP/BLOOD GLUCOSE: CPT

## 2024-02-27 PROCEDURE — 94640 AIRWAY INHALATION TREATMENT: CPT

## 2024-02-27 PROCEDURE — 0DJ08ZZ INSPECTION OF UPPER INTESTINAL TRACT, VIA NATURAL OR ARTIFICIAL OPENING ENDOSCOPIC: ICD-10-PCS | Performed by: HOSPITALIST

## 2024-02-27 RX ORDER — SODIUM CHLORIDE, SODIUM LACTATE, POTASSIUM CHLORIDE, CALCIUM CHLORIDE 600; 310; 30; 20 MG/100ML; MG/100ML; MG/100ML; MG/100ML
INJECTION, SOLUTION INTRAVENOUS CONTINUOUS PRN
Status: DISCONTINUED | OUTPATIENT
Start: 2024-02-27 | End: 2024-02-27

## 2024-02-27 RX ORDER — IPRATROPIUM BROMIDE AND ALBUTEROL SULFATE 2.5; .5 MG/3ML; MG/3ML
3 SOLUTION RESPIRATORY (INHALATION) EVERY 6 HOURS PRN
Status: DISCONTINUED | OUTPATIENT
Start: 2024-02-27 | End: 2024-03-05 | Stop reason: HOSPADM

## 2024-02-27 RX ORDER — LIDOCAINE HYDROCHLORIDE 10 MG/ML
INJECTION, SOLUTION EPIDURAL; INFILTRATION; INTRACAUDAL; PERINEURAL AS NEEDED
Status: DISCONTINUED | OUTPATIENT
Start: 2024-02-27 | End: 2024-02-27

## 2024-02-27 RX ORDER — PROPOFOL 10 MG/ML
INJECTION, EMULSION INTRAVENOUS AS NEEDED
Status: DISCONTINUED | OUTPATIENT
Start: 2024-02-27 | End: 2024-02-27

## 2024-02-27 RX ORDER — IPRATROPIUM BROMIDE AND ALBUTEROL SULFATE 2.5; .5 MG/3ML; MG/3ML
3 SOLUTION RESPIRATORY (INHALATION) ONCE
Status: COMPLETED | OUTPATIENT
Start: 2024-02-27 | End: 2024-02-27

## 2024-02-27 RX ORDER — PREDNISONE 20 MG/1
60 TABLET ORAL DAILY
Status: DISCONTINUED | OUTPATIENT
Start: 2024-02-28 | End: 2024-03-05 | Stop reason: HOSPADM

## 2024-02-27 RX ADMIN — GUAIFENESIN 1200 MG: 600 TABLET ORAL at 21:48

## 2024-02-27 RX ADMIN — TOPICAL ANESTHETIC 1 SPRAY: 200 SPRAY DENTAL; PERIODONTAL at 14:24

## 2024-02-27 RX ADMIN — PANTOPRAZOLE SODIUM 40 MG: 40 TABLET, DELAYED RELEASE ORAL at 16:38

## 2024-02-27 RX ADMIN — SUCRALFATE 1 G: 1 TABLET ORAL at 12:05

## 2024-02-27 RX ADMIN — MIDODRINE HYDROCHLORIDE 5 MG: 5 TABLET ORAL at 06:20

## 2024-02-27 RX ADMIN — SUCRALFATE 1 G: 1 TABLET ORAL at 06:20

## 2024-02-27 RX ADMIN — SUCRALFATE 1 G: 1 TABLET ORAL at 16:38

## 2024-02-27 RX ADMIN — LIDOCAINE 5% 1 PATCH: 700 PATCH TOPICAL at 12:01

## 2024-02-27 RX ADMIN — OXYCODONE HYDROCHLORIDE 2.5 MG: 5 TABLET ORAL at 16:45

## 2024-02-27 RX ADMIN — PANTOPRAZOLE SODIUM 40 MG: 40 TABLET, DELAYED RELEASE ORAL at 06:20

## 2024-02-27 RX ADMIN — IPRATROPIUM BROMIDE 0.5 MG: 0.5 SOLUTION RESPIRATORY (INHALATION) at 07:51

## 2024-02-27 RX ADMIN — PROPOFOL 30 MG: 10 INJECTION, EMULSION INTRAVENOUS at 14:30

## 2024-02-27 RX ADMIN — ATORVASTATIN CALCIUM 20 MG: 20 TABLET, FILM COATED ORAL at 16:38

## 2024-02-27 RX ADMIN — PROPOFOL 30 MG: 10 INJECTION, EMULSION INTRAVENOUS at 14:29

## 2024-02-27 RX ADMIN — ENOXAPARIN SODIUM 40 MG: 40 INJECTION SUBCUTANEOUS at 09:49

## 2024-02-27 RX ADMIN — METHENAMINE HIPPURATE 1 G: 1 TABLET ORAL at 16:38

## 2024-02-27 RX ADMIN — MYCOPHENOLATE MOFETIL 1000 MG: 250 CAPSULE ORAL at 09:44

## 2024-02-27 RX ADMIN — LIDOCAINE HYDROCHLORIDE 50 MG: 10 INJECTION, SOLUTION EPIDURAL; INFILTRATION; INTRACAUDAL; PERINEURAL at 14:26

## 2024-02-27 RX ADMIN — PROPOFOL 30 MG: 10 INJECTION, EMULSION INTRAVENOUS at 14:28

## 2024-02-27 RX ADMIN — PROPOFOL 30 MG: 10 INJECTION, EMULSION INTRAVENOUS at 14:26

## 2024-02-27 RX ADMIN — LEVALBUTEROL HYDROCHLORIDE 1.25 MG: 1.25 SOLUTION RESPIRATORY (INHALATION) at 07:51

## 2024-02-27 RX ADMIN — SODIUM CHLORIDE, SODIUM LACTATE, POTASSIUM CHLORIDE, AND CALCIUM CHLORIDE: .6; .31; .03; .02 INJECTION, SOLUTION INTRAVENOUS at 14:24

## 2024-02-27 RX ADMIN — SUCRALFATE 1 G: 1 TABLET ORAL at 21:49

## 2024-02-27 RX ADMIN — MYCOPHENOLATE MOFETIL 1000 MG: 250 CAPSULE ORAL at 21:47

## 2024-02-27 RX ADMIN — OXYCODONE HYDROCHLORIDE AND ACETAMINOPHEN 500 MG: 500 TABLET ORAL at 09:49

## 2024-02-27 RX ADMIN — PREDNISONE 50 MG: 20 TABLET ORAL at 09:47

## 2024-02-27 RX ADMIN — GUAIFENESIN 1200 MG: 600 TABLET ORAL at 09:45

## 2024-02-27 RX ADMIN — IPRATROPIUM BROMIDE AND ALBUTEROL SULFATE 3 ML: .5; 3 SOLUTION RESPIRATORY (INHALATION) at 14:07

## 2024-02-27 RX ADMIN — OXYCODONE HYDROCHLORIDE 5 MG: 5 TABLET ORAL at 21:49

## 2024-02-27 NOTE — PROGRESS NOTES
[facility]  Progress Note  Name: Nina Tran I  MRN: [id number]  Unit/Bed#: MS 231Yogi I Date of Admission: 2/17/2024   Date of Service: 2/27/2024 I Hospital Day: 10    Assessment/Plan   * Acute on chronic respiratory failure with hypoxia (HCC)  Assessment & Plan  POA with complaints of SOB and left sided chest pain. Recently admitted for COVID infection and sepsis. During previous admission, was requiring 2L NC   2/24 tachycardic tachypneic spiked a fever and oxygen requirement went up to 8 L mid flow along with mild hypotension noted.  Repeat blood Cultures done. moved to level 2 stepdown.  Now weaned down to 2 L. Transition back to med surg  CTA chest PE study: Unchanged groundglass opacities and interstitial marking thickening with bibasal predominance. Appearance favors fibrosis but acute process cannot be completely excluded. No pulmonary embolism  Incentive spirometry, supportive measures  placed on lev albuterol and ipratropium.  Recently tapered off from IV steroids to oral prednisone due to decompensation placed back on Solu-Medrol 40 mg IV twice daily  completed 5 days of IV Rocephin placed on zosyn on 2/24 due to fever spike.will stop zosyn today and observe  Patient has history of intermittent fevers since COVID-19 infection 2 to 3 years ago.  Underwent lung biopsy possible cryptogenic organizing pneumonia and has been placed on atovaquone,cellcept and chronic prednisone therapy by [place] infectious disease since October 2023.     pulmonology and infectious disease on board  Also stop iv steroids and switch to prednisone 50 mg daily and slow taper    Chest pain  Assessment & Plan  Presents to ED with left sided chest pain and increasing shortness of breath worsening over the last 3 days. Worse with movement.  No chest pain reported today so far  CTA chest PE study: Unchanged groundglass opacities and interstitial marking thickening with bibasal predominance. Appearance  favors fibrosis but acute process cannot be completely excluded.   MITESH score = 2  TSH elevated, normal t4, repeat thyroid studies in 4-6 weeks  Lipid panel with elevated triglycerides  Hold NSAIDS. troponins negative  Pain management  cont Carafate Protonix and Pepto-Bismol for her epigastric pain.  Egd  today due to persistent epigastric pain    Pneumonia  Assessment & Plan  Presented to ED with left sided chest pain and worsening SOB for 3 days.   CTA chest: Unchanged groundglass opacities and interstitial marking thickening with bibasal predominance. Appearance favors fibrosis but acute process cannot be completely excluded. No PE.   History of pulmonary fibrosis and possible cryptogenic organizing pneumonia  COVID/Flu/RSV: COVID positive, but positive since 2/4 during previous admission.   Urine strep and legionella negative, Sputum culture ordered  Blood Cultures no growth after 5 days  Initially given rocephin and doxy now switched to Zosyn as she spiked a fever on 2/24 procalcitonin level is negative  Stop zosyn and observe    History of thymus cancer  Assessment & Plan  Patient with history of thymus cancer s/p removal    Continue outpatient follow up     Generalized weakness  Assessment & Plan  POA with complaints of generalized weakness suspect secondary to resp issues  PT/OT consult: II (Moderate Resource Intensity)     COVID-19  Assessment & Plan  Recently admitted with COVID-19 infection tested positive on 2/4, still testing positive from previous infection. Completed 10 days of isolation per COVID isolation precautions  Taper to oral steroids               VTE Pharmacologic Prophylaxis: VTE Score: 6 Moderate Risk (Score 3-4) - Pharmacological DVT Prophylaxis Ordered: enoxaparin (Lovenox).    Mobility:   Basic Mobility Inpatient Raw Score: 16  JH-HLM Goal: 5: Stand one or more mins  JH-HLM Achieved: 4: Move to chair/commode  HLM Goal achieved. Continue to encourage appropriate mobility.    Patient  Centered Rounds: I performed bedside rounds with nursing staff today.   Discussions with Specialists or Other Care Team Provider: jackie gi    Education and Discussions with Family / Patient: will update spouse    Total Time Spent on Date of Encounter in care of patient: 35 mins. This time was spent on one or more of the following: performing physical exam; counseling and coordination of care; obtaining or reviewing history; documenting in the medical record; reviewing/ordering tests, medications or procedures; communicating with other healthcare professionals and discussing with patient's family/caregivers.    Current Length of Stay: 10 day(s)  Current Patient Status: Inpatient   Certification Statement: The patient will continue to require additional inpatient hospital stay due to acute on chronic respiratory failure  Discharge Plan: Anticipate discharge in 24-48 hrs to rehab facility.    Code Status: Level 1 - Full Code    Subjective:   Patient keeps complaining of her epigastric pain feels her breathing is slowly improving    Objective:     Vitals:   Temp (24hrs), Av.7 °F (36.5 °C), Min:97 °F (36.1 °C), Max:98.8 °F (37.1 °C)    Temp:  [97 °F (36.1 °C)-98.8 °F (37.1 °C)] 98.8 °F (37.1 °C)  HR:  [] 113  Resp:  [17-18] 18  BP: (114-122)/(62-78) 114/62  SpO2:  [87 %-95 %] 92 %  Body mass index is 23.52 kg/m².     Input and Output Summary (last 24 hours):     Intake/Output Summary (Last 24 hours) at 2024 1423  Last data filed at 2024 1208  Gross per 24 hour   Intake 120 ml   Output 650 ml   Net -530 ml       Physical Exam:   Physical Exam  Vitals and nursing note reviewed.   Constitutional:       Appearance: She is ill-appearing.   HENT:      Head: Normocephalic and atraumatic.      Right Ear: External ear normal.      Left Ear: External ear normal.      Nose: Nose normal.      Mouth/Throat:      Pharynx: Oropharynx is clear.   Eyes:      Pupils: Pupils are equal, round, and reactive to light.    Cardiovascular:      Rate and Rhythm: Normal rate and regular rhythm.      Heart sounds: Normal heart sounds.   Pulmonary:      Effort: Pulmonary effort is normal.      Breath sounds: Rales present.   Abdominal:      General: Bowel sounds are normal.      Palpations: Abdomen is soft.      Tenderness: There is no abdominal tenderness.   Musculoskeletal:         General: Normal range of motion.      Cervical back: Normal range of motion and neck supple.   Skin:     General: Skin is warm and dry.      Capillary Refill: Capillary refill takes less than 2 seconds.   Neurological:      General: No focal deficit present.      Mental Status: She is alert and oriented to person, place, and time.   Psychiatric:         Mood and Affect: Mood normal.            Additional Data:     Labs:  Results from last 7 days   Lab Units 02/26/24  0614   WBC Thousand/uL 10.21*   HEMOGLOBIN g/dL 9.7*   HEMATOCRIT % 30.6*   PLATELETS Thousands/uL 120*     Results from last 7 days   Lab Units 02/26/24  0614   SODIUM mmol/L 130*   POTASSIUM mmol/L 5.0   CHLORIDE mmol/L 96   CO2 mmol/L 28   BUN mg/dL 19   CREATININE mg/dL 0.67   ANION GAP mmol/L 6   CALCIUM mg/dL 7.8*   ALBUMIN g/dL 2.9*   TOTAL BILIRUBIN mg/dL 0.76   ALK PHOS U/L 130*   ALT U/L 63*   AST U/L 37   GLUCOSE RANDOM mg/dL 170*         Results from last 7 days   Lab Units 02/27/24  1358 02/27/24  1138 02/27/24  0737 02/26/24  2059 02/26/24  1644   POC GLUCOSE mg/dl 99 125 97 167* 212*         Results from last 7 days   Lab Units 02/25/24  0618 02/24/24  1340 02/24/24  1126 02/24/24  0602   LACTIC ACID mmol/L 1.9 2.6* 2.6*  --    PROCALCITONIN ng/ml 0.23  --   --  0.22       Lines/Drains:  Invasive Devices       Peripheral Intravenous Line  Duration             Long-Dwell Peripheral IV (Midline) 02/24/24 Right Brachial 3 days    Peripheral IV 02/27/24 Distal;Dorsal (posterior);Left Forearm <1 day              Drain  Duration             External Urinary Catheter 9 days                           Imaging: Reviewed radiology reports from this admission including: chest xray    Recent Cultures (last 7 days):   Results from last 7 days   Lab Units 02/24/24  1137 02/24/24  1101 02/24/24  1047   BLOOD CULTURE   --  No Growth at 48 hrs. No Growth at 48 hrs.   SPUTUM CULTURE  Commensal respiratory emelia only; No significant growth of Staph aureus/MRSA or Pseudomonas aeruginosa.  --   --    GRAM STAIN RESULT  Rare Epithelial cells per low power field*  Rare Polys*  4+ Gram negative rods*  2+ Gram positive cocci in clusters*  --   --        Last 24 Hours Medication List:   Current Facility-Administered Medications   Medication Dose Route Frequency Provider Last Rate    acetaminophen  650 mg Oral Q6H PRN Charlene Archer MD      aluminum-magnesium hydroxide-simethicone  30 mL Oral Q4H PRN Charlene Archer MD      ascorbic acid  500 mg Oral Daily Charlene Archer MD      atorvastatin  20 mg Oral Daily With Dinner Charlene Archer MD      atovaquone  1,500 mg Oral Daily Charlene Archer MD      bismuth subsalicylate  525 mg Oral Q6H PRN Charlene Archer MD      enoxaparin  40 mg Subcutaneous Daily Charlene Archer MD      guaiFENesin  1,200 mg Oral Q12H F[place]ON Charlene Archer MD      insulin lispro  1-6 Units Subcutaneous TID AC Charlene Archer MD      ipratropium  0.5 mg Nebulization TID Charlene Archer MD      levalbuterol  1.25 mg Nebulization TID Charlene Archer MD      lidocaine  1 patch Topical Q24H Charlene Archer MD      methenamine hippurate  1 g Oral BID With Meals Charlene Archer MD      midodrine  5 mg Oral TID AC Charlene Archer MD      mycophenolate  1,000 mg Oral Q12H F[place]ON Charlene Archer MD      ondansetron  4 mg Intravenous Q6H PRN Charlene Archer MD      oxyCODONE  2.5 mg Oral Q4H PRN Charlene Archer MD      Or    oxyCODONE  5 mg Oral Q4H PRN Charlene Archer MD      pantoprazole  40 mg Oral BID AC Charlene Archer MD      predniSONE  50 mg Oral Daily Charlene Archer MD      sucralfate  1 g Oral 4x Daily (AC & HS) Charlene Archer MD          Today, Patient Was  Seen By: Charlene Archer MD    **Please Note: This note may have been constructed using a voice recognition system.**

## 2024-02-27 NOTE — PLAN OF CARE
Problem: PHYSICAL THERAPY ADULT  Goal: Performs mobility at highest level of function for planned discharge setting.  See evaluation for individualized goals.  Description: Treatment/Interventions: ADL retraining, Functional transfer training, LE strengthening/ROM, Elevations, Therapeutic exercise, Endurance training, Patient/family training, Equipment eval/education, Bed mobility, Gait training, Compensatory technique education, Spoke to nursing, Spoke to case management, OT  Equipment Recommended:  (walker-pt has)       See flowsheet documentation for full assessment, interventions and recommendations.  Outcome: Not Progressing  Note: Prognosis: Good  Problem List: Decreased strength, Decreased endurance, Impaired balance, Decreased mobility, Impaired judgement, Decreased safety awareness, Decreased skin integrity, Pain  Assessment: Patient seen for skilled PT session 4 this date with interventions to include therapeutic activity for bed mobility, transfer training, balance and endurance progression as well as vitals monitoring and O2 management.   Pt anxious and NPO at the time of this session D/T awaiting esophagram.  Pt also noted to have increased complaints of abdominal pain with nsg aware. Pt required min A overall for all functional transfers with Rw for UB support and max cues/encouragement.  Declined ambulation attempts. Patient with decreased activity tolerance and ECHEVERRIA noted with min activity as well as Spo2 77-92% on 3 LPM with increased time for recovery. Based on patient functional performance and response to min activity, patient should cont skilled PT during her inpatient stay F/B transition to moderate/level II resources upon D/C as she is functioning below her baseline.  Should patient have improved response to activity/increased functional performance during next session, may consider HHPT.  Continue and progress as able.  The patient's AM-PAC Basic Mobility Inpatient Short Form Raw Score is  16. A Raw score of less than or equal to 16 suggests the patient may benefit from discharge to post-acute rehabilitation services. Please also refer to the recommendation of the Physical Therapist for safe discharge planning.  Barriers to Discharge: Decreased caregiver support  Barriers to Discharge Comments: increased need for O2, decreased activity tolerance  Rehab Resource Intensity Level, PT: II (Moderate Resource Intensity)    See flowsheet documentation for full assessment.

## 2024-02-27 NOTE — PROGRESS NOTES
Progress Note - Infectious Disease   Nina Tran 78 y.o. female MRN: [id number]  Unit/Bed#: -01 Encounter: [id number]        REQUIRED DOCUMENTATION:     1. This service was provided via Telemedicine.  2. Provider located at [place].  3. TeleMed provider: Masha Altman MD.  4. Identify all parties in room with patient during tele consult:RN  5. After connecting through [employer], patient was identified by name and date of birth and assistant checked wristband.  Patient was then informed that this was a Telemedicine visit and that the exam was being conducted confidentially over secure lines. My office door was closed. No one else was in the room.  Patient acknowledged consent and understanding of privacy and security of the Telemedicine visit, and gave us permission to have the assistant stay in the room in order to assist with the history and to conduct the exam.  I informed the patient that I have reviewed their record in Epic and presented the opportunity for them to ask any questions regarding the visit today.  The patient agreed to participate.       Assessment/Recommendations     Probable nosocomial pneumonia  Mild acute transaminitis  Acute on chronic hypoxic respiratory failure  Covid pneumonia  Interstitial lung disease/pulmonary fibrosis with probable acute exacerbation  Hx organizing pneumonia, chronic hypoxic respiratory failure on cellcept, prednisone     -Patient recently hospitalized with COVID pneumonia and E. coli bacteremia from UTI.  She returns with epigastric pain, worsening hypoxia, dyspnea suspicious for ILD flare triggered by recent COVID infection.       -She has a past complicated hx of organizing pneumonia/fibrosis following covid in 2020 (s/p lung bx ) and is followed by [facility] and ID on immunosuppression as above     -Hospital course complicated by single isolated fever to 102 but without leukocytosis and now with worsening hypoxia and fu chest imaging shows  interval development of left sided infiltrate suspicious for secondary bacterial pneumonia v/s ongoing acute exacaerbation of ILD and/or pulmonary vascular congestion. She is afebrile but tachycardic and now on mid flow o2. Has ongoing epigastric pain s/p egd yesterday noting gastritis     Start cefepime 2 q12h  Check blood and sputum cultures  Repeat CBC/diff and CMP  If LFTs are elevated please check right upper quadrant ultrasound  Steroid management as per pulmonary and primary team, continue pneumocystis prophylaxis with atovaquone  Recommend outpatient follow-up with urogynecology, continue Premarin cream and continue Hip-Richard 1g twice daily with vitamin C  Check daily CBC, CMP to monitor for any evolving antibiotic toxicity    Discussed above plan in detail with the primary service who is in agreement.    History       Subjective:  The patient reports she had been doing well until this morning when she felt acutely sick with worsening productive cough and shortness of breath and malaise. Had EGD yesterday  Denies fevers, chills, or sweats.  Denies nausea, vomiting, or diarrhea.    Antibiotics:  none    Physical Exam     Temp:  [97 °F (36.1 °C)-97.5 °F (36.4 °C)] 97 °F (36.1 °C)  HR:  [79-92] 89  Resp:  [17-18] 17  BP: (104-122)/(64-72) 119/72  SpO2:  [93 %-98 %] 93 %  Temp (24hrs), Av.2 °F (36.2 °C), Min:97 °F (36.1 °C), Max:97.5 °F (36.4 °C)  Current: Temperature: (!) 97 °F (36.1 °C)    Intake/Output Summary (Last 24 hours) at 2024 0850  Last data filed at 2024 1501  Gross per 24 hour   Intake --   Output 400 ml   Net -400 ml       Physical exam findings reported by bedside and primary medical team staff      General Appearance:  Appearing frail and chronically ill, nontoxic, and in no distress, appears stated age   Lungs:   Coarse to auscultation bilaterally with bibasilar rales, respirations unlabored   Heart:  Regular rate and rhythm, S1, S2 normal, no murmur, rub or gallop   Abdomen:   Soft,  non-tender, non-distended, positive bowel sounds, no masses, no organomegaly    No CVA tenderness   Extremities: Extremities normal, atraumatic, no cyanosis, clubbing or edema   Skin: Skin color, texture, turgor normal, no rashes or lesions. No draining wounds noted.   Neurologic: Alert and oriented times 3,         Invasive Devices:   Long-Dwell Peripheral IV (Midline) 02/24/24 Right Brachial (Active)   Site Assessment WDL 02/26/24 2000   Dressing Type Transparent 02/26/24 2000   Line Status Flushed & Clamped;Blood return noted 02/26/24 2000   Dressing Status Clean;Dry;Intact 02/26/24 2000   Dressing Change Due 02/28/24 02/26/24 2000   Reason Not Rotated Not due 02/26/24 2000       External Urinary Catheter (Active)   Collection Container Canister and suction tubing (For Female) 02/24/24 2000   Suction Pressure (mmHg) 110 mmHg 02/25/24 2130   Interventions Removed and skin assessed;Pericare performed;Device changed 02/25/24 2130   Output (mL) 250 mL 02/26/24 1411       Labs, Imaging, & Other Studies     Lab Results:    I have personally reviewed pertinent labs.    Results from last 7 days   Lab Units 02/26/24  0614 02/24/24  1340 02/23/24  0533   WBC Thousand/uL 10.21* 10.11 9.55   HEMOGLOBIN g/dL 9.7* 9.4* 11.5   PLATELETS Thousands/uL 120* 97* 135*     Results from last 7 days   Lab Units 02/26/24  0614 02/22/24  0917 02/21/24  0522   POTASSIUM mmol/L 5.0   < > 4.1   CHLORIDE mmol/L 96   < > 99   CO2 mmol/L 28   < > 23   BUN mg/dL 19   < > 17   CREATININE mg/dL 0.67   < > 0.62   EGFR ml/min/1.73sq m 83   < > 85   CALCIUM mg/dL 7.8*   < > 7.6*   AST U/L 37  --  32   ALT U/L 63*  --  41   ALK PHOS U/L 130*  --  127*    < > = values in this interval not displayed.     Results from last 7 days   Lab Units 02/24/24  1137 02/24/24  1101 02/24/24  1047   BLOOD CULTURE   --  No Growth at 48 hrs. No Growth at 48 hrs.   SPUTUM CULTURE  Commensal respiratory emelia only; No significant growth of Staph aureus/MRSA or  Pseudomonas aeruginosa.  --   --    GRAM STAIN RESULT  Rare Epithelial cells per low power field*  Rare Polys*  4+ Gram negative rods*  2+ Gram positive cocci in clusters*  --   --        Imaging Studies:   I have personally reviewed pertinent imaging study reports and images in PACS.      EKG, Pathology, and Other Studies:   I have personally reviewed pertinent reports.        Counseling/Coordination of care:       Total 50 minutes communication with the patient via telehealth.  Labs, medical tests and imaging studies were independently reviewed by me as noted above.

## 2024-02-27 NOTE — ANESTHESIA POSTPROCEDURE EVALUATION
Post-Op Assessment Note    CV Status:  Stable    Pain management: adequate       Mental Status:  Alert and awake   Hydration Status:  Euvolemic   PONV Controlled:  Controlled   Airway Patency:  Patent     Post Op Vitals Reviewed: Yes      Staff: CRNA               BP   117/63   Temp   98.3   Pulse  105   Resp   24   SpO2   98

## 2024-02-27 NOTE — ASSESSMENT & PLAN NOTE
Presents to ED with left sided chest pain and increasing shortness of breath worsening over the last 3 days. Worse with movement.  No chest pain reported today so far  CTA chest PE study: Unchanged groundglass opacities and interstitial marking thickening with bibasal predominance. Appearance favors fibrosis but acute process cannot be completely excluded.   MITESH score = 2  TSH elevated, normal t4, repeat thyroid studies in 4-6 weeks  Lipid panel with elevated triglycerides  Hold NSAIDS. troponins negative  Pain management  cont Carafate Protonix and Pepto-Bismol for her epigastric pain.  Egd  today due to persistent epigastric pain

## 2024-02-27 NOTE — PHYSICAL THERAPY NOTE
"  PHYSICAL THERAPY TREATMENT NOTE  NAME:  Nina Tran  DATE: 02/27/24    Length Of Stay: 10  Performed at least 2 patient identifiers during session: Name and Birthday    TREATMENT FLOW SHEET:    02/27/24 0953   PT Last Visit   PT Visit Date 02/27/24   Note Type   Note Type Treatment   Pain Assessment   Pain Assessment Tool 0-10   Pain Score 7   Pain Location/Orientation Orientation: Upper;Location: Abdomen   Restrictions/Precautions   Weight Bearing Precautions Per Order No   Other Precautions Chair Alarm;Bed Alarm;O2;Fall Risk;Pain   General   Chart Reviewed Yes   Additional Pertinent History Recently admitted to this campus 2/4/24 to 2/8/24, discharged to home with [facility], room air at rest and 2 lpm with exertion.   Response to Previous Treatment Other (Comment)  (When asked regarding her last PT session, patient stated \" I don't remember\"  Patient somewhat agitated and hyperfocused on being uncomfortable in the bed.  re-direction and encouragement provided.)   Family/Caregiver Present Yes   Cognition   Arousal/Participation Alert   Attention Difficulty attending to directions   Comments   ([facility] and awaiting esophagram.  Reporting abdominal pain and generally not feeling well.  Patient required redirection and encouragement t/o session.)   Subjective   Subjective \"This is torture\"   Bed Mobility   Supine to Sit 4  Minimal assistance   Additional items Assist x 1;HOB elevated;Trapeze bar;Increased time required;LE management;Verbal cues   Additional Comments Patient supine in bed at start of session on 3 LPM of supplemental O2 with SPO2 94%.  Pt c/o abdominal pain and feeling uncomforatable in the bed.  With encouragement, patient agreeable to attempt OOB. Supine > sit with min A x 1 for LE management with HOB elevated @ 30 degrees, use of bed enabler and increased time.   Transfers   Sit to Stand   (steadying A)   Additional items Assist x 1;Increased time required;Verbal cues "   Stand pivot 4  Minimal assistance   Additional items Assist x 1;Increased time required;Verbal cues   Additional Comments Patient completed STS EOB with steadying A and RW for UB support with max VC's for encouragement, proper hand placement and sequencing.  Pt noted to be anxious and fearful of falling.  Patient completed static standing balance at F-  x 45 sec with Vc's for max performance.  Pt requesting to sit several times t/o static standing. SPT to bedside chair min A x 1 with Rw for AD management, sequencing and safety prior to sittng.   Ambulation/Elevation   Distance Patient declined ambulation attempts this date   Balance   Static Sitting Good   Dynamic Sitting Fair +   Static Standing Fair -   Dynamic Standing Poor +   Endurance Deficit   Endurance Deficit Yes   Endurance Deficit Description Patient in 3 LPM at start of session.  SPo2 77-94% during session with increased need for rest breaks, increased recovery time and VC's for proper breathing technique   Activity Tolerance   Activity Tolerance Patient limited by fatigue   Nurse Made Aware Spoke with RN Kate   Assessment   Prognosis Good   Problem List Decreased strength;Decreased endurance;Impaired balance;Decreased mobility;Impaired judgement;Decreased safety awareness;Decreased skin integrity;Pain   Barriers to Discharge Decreased caregiver support   Barriers to Discharge Comments increased need for O2, decreased activity tolerance   Goals   Patient Goals To go home   STG Expiration Date 03/02/24   PT Treatment Day 4   Plan   Treatment/Interventions Functional transfer training;LE strengthening/ROM;Elevations;Therapeutic exercise;Endurance training;Patient/family training;Equipment eval/education;Bed mobility;Gait training;Compensatory technique education;Spoke to nursing   Progress Slow progress, medical status limitations   PT Frequency 3-5x/wk   Discharge Recommendation   Rehab Resource Intensity Level, PT II (Moderate Resource Intensity)    Equipment Recommended   (TBD by rehab)   AM-PAC Basic Mobility Inpatient   Turning in Flat Bed Without Bedrails 3   Lying on Back to Sitting on Edge of Flat Bed Without Bedrails 3   Moving Bed to Chair 3   Standing Up From Chair Using Arms 3   Walk in Room 2   Climb 3-5 Stairs With Railing 2   Basic Mobility Inpatient Raw Score 16   Basic Mobility Standardized Score 38.32   Highest Level Of Mobility   -HL Goal 5: Stand one or more mins   -HLM Achieved 4: Move to chair/commode   Education   Education Provided Mobility training;Home exercise program;Assistive device   Patient Reinforcement needed   End of Consult   Patient Position at End of Consult Bedside chair;Bed/Chair alarm activated;All needs within reach         Patient seen for skilled PT session 4 this date with interventions to include therapeutic activity for bed mobility, transfer training, balance and endurance progression as well as vitals monitoring and O2 management.   Pt anxious and NPO at the time of this session D/T awaiting esophagram.  Pt also noted to have increased complaints of abdominal pain with nsg aware. Pt required min A overall for all functional transfers with Rw for UB support and max cues/encouragement.  Declined ambulation attempts. Patient with decreased activity tolerance and ECHEVERRIA noted with min activity as well as Spo2 77-92% on 3 LPM with increased time for recovery. Based on patient functional performance and response to min activity, patient should cont skilled PT during her inpatient stay F/B transition to moderate/level II resources upon D/C as she is functioning below her baseline.  Should patient have improved response to activity/increased functional performance during next session, may consider HHPT.  Continue and progress as able.  The patient's AM-PAC Basic Mobility Inpatient Short Form Raw Score is 16. A Raw score of less than or equal to 16 suggests the patient may benefit from discharge to post-acute  rehabilitation services. Please also refer to the recommendation of the Physical Therapist for safe discharge planning.         Radha Tuttle PT, MSPT

## 2024-02-27 NOTE — H&P
Procedure(s):    Esophagogastroduodenuoscopy with the indication(s) of non resolving abdominal pain    Endoscopy Pre-Procedure Assessment:  Prior to the procedure, the patient is identified.  The patient's history, medications, and allergies have been reviewed.  The patient is competent.  The risks and benefits of the procedure procedure and the planned sedation have been discussed with the patient.  All questions have been answered and informed consent for the procedure has been obtained.    Vitals:    02/27/24 1315   BP: 114/62   Pulse: (!) 113   Resp: 18   Temp: 98.8 °F (37.1 °C)   SpO2: 92%       Physical Exam:  Physical Exam  Eyes:      Conjunctiva/sclera: Conjunctivae normal.   Cardiovascular:      Rate and Rhythm: Normal rate.   Pulmonary:      Effort: Pulmonary effort is normal.   Abdominal:      Palpations: Abdomen is soft.   Neurological:      General: No focal deficit present.      Mental Status: She is alert.   Psychiatric:         Mood and Affect: Mood normal.                Consent:    We have discussed the procedure in detail. We reviewed risks, benefits and alternative as well as protentional complications including and not limited to medication side effect , infection, bleeding, perforation and the potential need for surgery, ICU admission, CPR, as well as the need for blood product transfusion. Patient verbalized understanding and agreement. All patient questions were answered.     After reviewed the risks and benefits, the patient is deemed in satisfactory condition to undergo the procedure.  The anesthesia plan is to use monitored anesthesia care (MAC).      02/27/24

## 2024-02-27 NOTE — CASE MANAGEMENT
Case Management Progress Note    Patient name Nina Tran  Location /-01 MRN [id number]  : 1945 Date 2024       LOS (days): 10  Geometric Mean LOS (GMLOS) (days): 5.2  Days to GMLOS:-4.9        OBJECTIVE:        Current admission status: Inpatient  Preferred Pharmacy:   [id number] [place] [address]  Phone: [phone] Fax: [phone]    Primary Care Provider: Emely Will DO    Primary Insurance: MEDICARE  Secondary Insurance: A[employer]    PROGRESS NOTE:        Day 10 of inpatient stay. Acute on chronic respiratory failure. COVID + . Oxygen test done with patient needing 6L and not able to sat above 83%.  Not stable for dc at this time. This writer assuming care for dc planning.     Met briefly at bedside with patient and spouse Abelardo, phone [phone].  Reviewed current dc plan of home with [facility].  Asked patient to consider SNF referrals as a second option.  Patient agreeable with no preference of facility at this time.      DCP: [facility] (accepting) vs SNF (referrals today, follow up needed)

## 2024-02-27 NOTE — ANESTHESIA PREPROCEDURE EVALUATION
"Procedure:  EGD    Relevant Problems   CARDIO   (+) Chest pain      GI/HEPATIC   (+) GERD (gastroesophageal reflux disease)      PULMONARY   (+) Acute on chronic respiratory failure with hypoxia (HCC)      Other   (+) COVID-19   (+) Epigastric pain   (+) Generalized weakness        Physical Exam    Airway    Mallampati score: IV  TM Distance: >3 FB  Neck ROM: limited     Dental    lower dentures and upper dentures,     Cardiovascular      Pulmonary   Rhonchi, Rales    Other Findings  post-pubertal.    Anesthesia Plan  ASA Score- 3     Anesthesia Type- IV sedation with anesthesia with ASA Monitors.         Additional Monitors:     Airway Plan:     Comment: Duoneb now.  Evaluate indications for procedure..       Plan Factors-    Chart reviewed. EKG reviewed. Imaging results reviewed. Existing labs reviewed. Patient summary reviewed.    Patient is not a current smoker.  Patient did not smoke on day of surgery.            Induction- intravenous.    Postoperative Plan-     Informed Consent- Anesthetic plan and risks discussed with patient.  I personally reviewed this patient with the CRNA. Discussed and agreed on the Anesthesia Plan with the CRNA..          VITALS  /62   Pulse (!) 113   Temp 98.8 °F (37.1 °C) (Temporal)   Resp 18   Ht 5' 5\" (1.651 m)   Wt 64.1 kg (141 lb 5 oz)   SpO2 92%   BMI 23.52 kg/m²   BP Readings from Last 3 Encounters:   02/27/24 114/62   02/08/24 128/70   04/14/21 140/72     LABS  Results from Last 12 Months   Lab Units 02/26/24  0614 02/24/24  1126 02/24/24  0602 02/22/24  0917 02/21/24  0522 02/19/24  0540 02/18/24  0428   SODIUM mmol/L 130* 130* 130*   < > 130*   < > 133*   POTASSIUM mmol/L 5.0 3.1* 3.4*   < > 4.1   < > 3.9   CHLORIDE mmol/L 96 91* 91*   < > 99   < > 102   CO2 mmol/L 28 29 30   < > 23   < > 26   ANION GAP mmol/L 6 10 9   < > 8   < > 5   BUN mg/dL 19 27* 24   < > 17   < > 16   CREATININE mg/dL 0.67 0.86 0.79   < > 0.62   < > 0.65   CALCIUM mg/dL 7.8* 7.9* 7.9*   < " > 7.6*   < > 6.9*   GLUCOSE RANDOM mg/dL 170* 93 114   < > 143*   < > 88   MAGNESIUM mg/dL  --   --  1.6*  --  2.2   < > 1.7*   AST U/L 37  --   --   --  32   < > 31   ALT U/L 63*  --   --   --  41   < > 40   ALK PHOS U/L 130*  --   --   --  127*   < > 94   TOTAL BILIRUBIN mg/dL 0.76  --   --   --  0.73   < > 0.83   ALBUMIN g/dL 2.9*  --   --   --  2.9*   < > 2.6*   HEMOGLOBIN A1C %  --   --   --   --   --   --  7.1*    < > = values in this interval not displayed.     Results from Last 12 Months   Lab Units 02/26/24  0614 02/24/24  1340   WBC Thousand/uL 10.21* 10.11   HEMOGLOBIN g/dL 9.7* 9.4*   HEMATOCRIT % 30.6* 30.0*   PLATELETS Thousands/uL 120* 97*     Results from Last 12 Months   Lab Units 02/17/24  0753 02/04/24  0539   INR  0.92 0.93   PTT seconds 25 25       ECG  Encounter Date: 02/17/24   ECG 12 lead   Result Value    Ventricular Rate 126    Atrial Rate 126    OH Interval 182    QRSD Interval 72    QT Interval 282    QTC Interval 408    P Axis 33    QRS Axis 20    T Wave Axis 53    Narrative    Sinus tachycardia  Possible Left atrial enlargement  Nonspecific ST abnormality  Abnormal ECG  When compared with ECG of 23-FEB-2024 14:35,  No significant change was found  Confirmed by Jo Darden ([id number]) on 2/25/2024 4:14:07 PM     No results found for this or any previous visit.    ECHOCARDIOGRAPHY AND OTHER TESTING/IMAGING  2/5/2024 TTE    History    Fall  Covid  pneumonia     Interpretation Summary       •  Left Ventricle: Left ventricular cavity size is normal. Wall thickness is moderately increased. The left ventricular ejection fraction is 65-69 %. Systolic function is normal. Wall motion is normal. Diastolic function is mildly abnormal, consistent with grade I (abnormal) relaxation.  •  Right Ventricle: Right ventricular cavity size is normal. Systolic function is normal.  •  Mitral Valve: There is mild regurgitation.  •  Tricuspid Valve: There is mild regurgitation.  •  IVC/SVC: The inferior vena cava  size is 13.0 mm. The right atrial pressure is estimated at 3.0 mmHg. The inferior vena cava is normal in size. Respirophasic changes were normal.    ANESTHESIA RISK-BENEFIT DISCUSSION  BENEFITS INCLUDE (NBK [id number], PMID [id number]):   (1) A specialized anesthesia team reduces mortality and morbidity for major surgeries.  (2) The team provides analgesia/sedation/amnesia/akinesia as safely as possible.  (3) The team strives to reduce discomfort as much as reasonably possible.    RISKS ASSESSMENT (AND PLANS TO MITIGATE RISKS) INCLUDE:    Neurologic system: IntraOp awareness (Risk is ~1:1,000 - 1:14,000; PMID 83733019), Stroke (Risk ~<0.1-2% for most cases; PMID 79654798), nerve injury, vision loss, and POCD.     Airway and Pulmonary system: Dental or mouth injury, throat pain, critical hypoxia, pneumothorax, prolonged intubation, post-op respiratory compromise.  Airway/Intubation risks and prior data: No prior advanced [facility]  Major ARISCAT risk factors for pulmonary complications include: Age >80: 1 pt, Recent URI/PNA requiring Abx: 1 pt, and PreOp SpO2 <91%: 1 pt, yielding a score 2-3= Intermediate risk, 13.3%.  Cardiovascular system: Hypotension/Vasoplegia, arrhythmias, blood clot, walter-op cardiac injury/MACE, bleeding, infection, or injury to vascular structures.  Signs of active, severe cardiac instability: none  Lionel's RCRI score items: none, yielding an RCRI Score of 0= 0.4% risk of MACE  Are walter-op or intra-op beta blockers indicated? (PMID [id number]): no  FEN/GI system: Aspiration risk (~0.5% [facility] [id number]) and PONV (10-80% per Apfel score).  ASA NPO guideline compliance?: Yes  Medication risk assessment: Allergic reactions, bleeding due to anticoagulant use, overdoses, drug-drug interactions, injury to a fetus or  in pregnant or breastfeeding patients, sedation while driving/operating heavy machinery.  Recent relevant medications: See MAR or Med Review  Personal or family history of  anesthesia complications: no  Pregnancy Status: N/A  Estimate mortality risks associated with anesthesia based on ASA-PS (PMID [id number]): ASA-PS III: 1:3,500

## 2024-02-27 NOTE — PLAN OF CARE
Problem: PAIN - ADULT  Goal: Verbalizes/displays adequate comfort level or baseline comfort level  Description: Interventions:  - Encourage patient to monitor pain and request assistance  - Assess pain using appropriate pain scale0-10  - Administer analgesics based on type and severity of pain and evaluate response  - Implement non-pharmacological measures as appropriate and evaluate response  - Consider cultural and social influences on pain and pain management  - Notify physician/advanced practitioner if interventions unsuccessful or patient reports new pain  Outcome: Progressing     Problem: INFECTION - ADULT  Goal: Absence or prevention of progression during hospitalization  Description: INTERVENTIONS:  - Assess and monitor for signs and symptoms of infection  - Monitor lab/diagnostic results  - Monitor all insertion sites, i.e. indwelling lines, tubes, and drains  - Monitor endotracheal if appropriate and nasal secretions for changes in amount and color  - [place] appropriate cooling/warming therapies per order  - Administer medications as ordered  - Instruct and encourage patient and family to use good hand hygiene technique  - Identify and instruct in appropriate isolation precautions for identified infection/condition  Outcome: Progressing     Problem: SAFETY ADULT  Goal: Patient will remain free of falls  Description: INTERVENTIONS:  - Educate patient/family on patient safety including physical limitations  - Instruct patient to call for assistance with activity   - Consult OT/PT to assist with strengthening/mobility   - Keep Call bell within reach  - Keep bed low and locked with side rails adjusted as appropriate  - Keep care items and personal belongings within reach  - Initiate and maintain comfort rounds  - Make Fall Risk Sign visible to staff  - Offer Toileting every 2 Hours, in advance of need  - Initiate/Maintain bed/chair alarm  - Apply yellow socks and bracelet for high fall risk patients  -  Consider moving patient to room near nurses station  Outcome: Progressing  Goal: Maintain or return to baseline ADL function  Description: INTERVENTIONS:  -  Assess patient's ability to carry out ADLs; assess patient's baseline for ADL function and identify physical deficits which impact ability to perform ADLs (bathing, care of mouth/teeth, toileting, grooming, dressing, etc.)  - Assess/evaluate cause of self-care deficits   - Assess range of motion  - Assess patient's mobility; develop plan if impaired  - Assess patient's need for assistive devices and provide as appropriate  - Encourage maximum independence but intervene and supervise when necessary  - Involve family in performance of ADLs  - Assess for home care needs following discharge   - Consider OT consult to assist with ADL evaluation and planning for discharge  - Provide patient education as appropriate  Outcome: Progressing  Goal: Maintains/Returns to pre admission functional level  Description: INTERVENTIONS:  - Perform AM-PAC 6 Click Basic Mobility/ Daily Activity assessment daily.  - Set and communicate daily mobility goal to care team and patient/family/caregiver.   - Collaborate with rehabilitation services on mobility goals if consulted  - Perform Range of Motion 4 times a day.  - Reposition patient every 2 hours.  - Ambulate patient 4 times a day  - Out of bed to chair 3 times a day   - Out of bed for meals 3 times a day  - Out of bed for toileting  - Record patient progress and toleration of activity level   Outcome: Progressing

## 2024-02-27 NOTE — ASSESSMENT & PLAN NOTE
POA with complaints of SOB and left sided chest pain. Recently admitted for COVID infection and sepsis. During previous admission, was requiring 2L NC   2/24 tachycardic tachypneic spiked a fever and oxygen requirement went up to 8 L mid flow along with mild hypotension noted.  Repeat blood Cultures done. moved to level 2 stepdown.  Now weaned down to 2 L. Transition back to med surg  CTA chest PE study: Unchanged groundglass opacities and interstitial marking thickening with bibasal predominance. Appearance favors fibrosis but acute process cannot be completely excluded. No pulmonary embolism  Incentive spirometry, supportive measures  placed on lev albuterol and ipratropium.  Recently tapered off from IV steroids to oral prednisone due to decompensation placed back on Solu-Medrol 40 mg IV twice daily  completed 5 days of IV Rocephin placed on zosyn on 2/24 due to fever spike.will stop zosyn today and observe  Patient has history of intermittent fevers since COVID-19 infection 2 to 3 years ago.  Underwent lung biopsy possible cryptogenic organizing pneumonia and has been placed on atovaquone,cellcept and chronic prednisone therapy by [place] [facility] since October 2023.     pulmonology and infectious disease on board  Also stop iv steroids and switch to prednisone 50 mg daily and slow taper

## 2024-02-28 ENCOUNTER — APPOINTMENT (INPATIENT)
Dept: RADIOLOGY | Facility: HOSPITAL | Age: 79
DRG: 196 | End: 2024-02-28
Payer: MEDICARE

## 2024-02-28 LAB
ANION GAP SERPL CALCULATED.3IONS-SCNC: 8 MMOL/L
ARTERIAL PATENCY WRIST A: YES
BASE EXCESS BLDA CALC-SCNC: 3 MMOL/L
BUN SERPL-MCNC: 17 MG/DL (ref 5–25)
CALCIUM SERPL-MCNC: 7.7 MG/DL (ref 8.4–10.2)
CHLORIDE SERPL-SCNC: 91 MMOL/L (ref 96–108)
CO2 SERPL-SCNC: 28 MMOL/L (ref 21–32)
CREAT SERPL-MCNC: 0.62 MG/DL (ref 0.6–1.3)
GFR SERPL CREATININE-BSD FRML MDRD: 85 ML/MIN/1.73SQ M
GLUCOSE SERPL-MCNC: 108 MG/DL (ref 65–140)
GLUCOSE SERPL-MCNC: 141 MG/DL (ref 65–140)
GLUCOSE SERPL-MCNC: 149 MG/DL (ref 65–140)
GLUCOSE SERPL-MCNC: 85 MG/DL (ref 65–140)
GLUCOSE SERPL-MCNC: 91 MG/DL (ref 65–140)
HCO3 BLDA-SCNC: 26.2 MMOL/L (ref 22–28)
LACTATE SERPL-SCNC: 1.4 MMOL/L (ref 0.5–2)
NASAL CANNULA: 10
O2 CT BLDA-SCNC: 15.1 ML/DL (ref 16–23)
OXYHGB MFR BLDA: 95.6 % (ref 94–97)
PCO2 BLDA: 35.2 MM HG (ref 36–44)
PH BLDA: 7.49 [PH] (ref 7.35–7.45)
PO2 BLDA: 78.4 MM HG (ref 75–129)
POTASSIUM SERPL-SCNC: 3.7 MMOL/L (ref 3.5–5.3)
SODIUM SERPL-SCNC: 127 MMOL/L (ref 135–147)
SPECIMEN SOURCE: ABNORMAL

## 2024-02-28 PROCEDURE — 80048 BASIC METABOLIC PNL TOTAL CA: CPT | Performed by: FAMILY MEDICINE

## 2024-02-28 PROCEDURE — 83605 ASSAY OF LACTIC ACID: CPT | Performed by: FAMILY MEDICINE

## 2024-02-28 PROCEDURE — 97110 THERAPEUTIC EXERCISES: CPT

## 2024-02-28 PROCEDURE — 36600 WITHDRAWAL OF ARTERIAL BLOOD: CPT

## 2024-02-28 PROCEDURE — 99233 SBSQ HOSP IP/OBS HIGH 50: CPT | Performed by: FAMILY MEDICINE

## 2024-02-28 PROCEDURE — 97116 GAIT TRAINING THERAPY: CPT

## 2024-02-28 PROCEDURE — 82805 BLOOD GASES W/O2 SATURATION: CPT | Performed by: FAMILY MEDICINE

## 2024-02-28 PROCEDURE — 82948 REAGENT STRIP/BLOOD GLUCOSE: CPT

## 2024-02-28 PROCEDURE — 71045 X-RAY EXAM CHEST 1 VIEW: CPT

## 2024-02-28 PROCEDURE — 94760 N-INVAS EAR/PLS OXIMETRY 1: CPT

## 2024-02-28 RX ORDER — METHYLPREDNISOLONE SODIUM SUCCINATE 40 MG/ML
40 INJECTION, POWDER, LYOPHILIZED, FOR SOLUTION INTRAMUSCULAR; INTRAVENOUS ONCE
Status: COMPLETED | OUTPATIENT
Start: 2024-02-28 | End: 2024-02-28

## 2024-02-28 RX ADMIN — ATORVASTATIN CALCIUM 20 MG: 20 TABLET, FILM COATED ORAL at 17:19

## 2024-02-28 RX ADMIN — ACETAMINOPHEN 325MG 650 MG: 325 TABLET ORAL at 08:48

## 2024-02-28 RX ADMIN — METHENAMINE HIPPURATE 1 G: 1 TABLET ORAL at 17:20

## 2024-02-28 RX ADMIN — MIDODRINE HYDROCHLORIDE 5 MG: 5 TABLET ORAL at 12:18

## 2024-02-28 RX ADMIN — METHYLPREDNISOLONE SODIUM SUCCINATE 40 MG: 40 INJECTION, POWDER, FOR SOLUTION INTRAMUSCULAR; INTRAVENOUS at 12:19

## 2024-02-28 RX ADMIN — ENOXAPARIN SODIUM 40 MG: 40 INJECTION SUBCUTANEOUS at 08:48

## 2024-02-28 RX ADMIN — GUAIFENESIN 1200 MG: 600 TABLET ORAL at 08:48

## 2024-02-28 RX ADMIN — ATOVAQUONE 1500 MG: 750 SUSPENSION ORAL at 08:50

## 2024-02-28 RX ADMIN — GUAIFENESIN 1200 MG: 600 TABLET ORAL at 21:05

## 2024-02-28 RX ADMIN — SUCRALFATE 1 G: 1 TABLET ORAL at 21:05

## 2024-02-28 RX ADMIN — METHENAMINE HIPPURATE 1 G: 1 TABLET ORAL at 08:50

## 2024-02-28 RX ADMIN — ACETAMINOPHEN 325MG 650 MG: 325 TABLET ORAL at 21:05

## 2024-02-28 RX ADMIN — SUCRALFATE 1 G: 1 TABLET ORAL at 17:19

## 2024-02-28 RX ADMIN — PREDNISONE 60 MG: 20 TABLET ORAL at 08:49

## 2024-02-28 RX ADMIN — SUCRALFATE 1 G: 1 TABLET ORAL at 05:12

## 2024-02-28 RX ADMIN — OXYCODONE HYDROCHLORIDE 5 MG: 5 TABLET ORAL at 05:12

## 2024-02-28 RX ADMIN — OXYCODONE HYDROCHLORIDE AND ACETAMINOPHEN 500 MG: 500 TABLET ORAL at 08:49

## 2024-02-28 RX ADMIN — MYCOPHENOLATE MOFETIL 1000 MG: 250 CAPSULE ORAL at 21:05

## 2024-02-28 RX ADMIN — MYCOPHENOLATE MOFETIL 1000 MG: 250 CAPSULE ORAL at 08:48

## 2024-02-28 RX ADMIN — SUCRALFATE 1 G: 1 TABLET ORAL at 12:19

## 2024-02-28 RX ADMIN — LIDOCAINE 5% 1 PATCH: 700 PATCH TOPICAL at 12:19

## 2024-02-28 RX ADMIN — PANTOPRAZOLE SODIUM 40 MG: 40 TABLET, DELAYED RELEASE ORAL at 05:12

## 2024-02-28 RX ADMIN — PANTOPRAZOLE SODIUM 40 MG: 40 TABLET, DELAYED RELEASE ORAL at 17:19

## 2024-02-28 NOTE — PLAN OF CARE
ESTABLISHED BREAST PATIENT FOLLOW-UP NOTE    Nevaeh Barton  1952  [id number]    Patient Care Team:  Mone Estevez NP as PCP - General (Nurse Practitioner)  Corinna Sanches RN as Registered Nurse (Registered Nurse)  Nishi Wilhelm MD (Internal Medicine)  Osvaldo Lao MD as General Surgeon (General Surgery)    CC:   No chief complaint on file.  #Triple negative breast cancer, stage I    Oncologic history:  --1/14/19        Screening mammography, bilateral - 2 spiculated densities in the central to central medial left breast, recommended for diagnostic mammography  --2/8/19          Diagnostic mammography, left with ultrasound - 1 cm 6 clock position mass  --2/18/19        Ultrasound-guided biopsy, left breast mass - moderately differentiated invasive ductal carcinoma, ER/AR negative, HER-2/laila equivocal (2+ by IHC), negative by FISH  --3/6/19          Left breast lumpectomy - invasive moderately differentiated ductal carcinoma with minor component of intermediate grade ductal carcinoma in situ, margins resection are negative for in situ and invasive neoplasm  --3/28/19        Taxotere/Cytoxan, cycle 1 day 1  --5/30/19        Taxotere/Cytoxan, cycle 4 day 1  --8/5/19          complete radiation therapy, left breast    INTERVAL HISTORY:   Nevaeh Barton is a 67 year old female that is here for follow-up breast cancer.  She has no breast specific complaints.  She is feeling well.      RECENT STUDIES:  Imaging- EXAM:  MAMMO DIAGNOSTIC W SUPRIYA BILATERAL     DATE OF EXAM:  1/16/2020 10:16 AM     COMPARISON EXAMS:  03/06/2019, 02/18/2019, 01/14/2019, 12/09/2016,  11/26/2013, 06/20/2012, 01/06/2011.     CLINICAL INDICATION:  Personal history of left breast cancer status post  lumpectomy. No complaint.     TECHNIQUE:  MAMMO DIAGNOSTIC W SUPRIYA BILATERAL      DENSITY:  The breasts are heterogeneously dense which may obscure small  masses.      MAMMO FINDINGS:  There are expected postsurgical changes to the central  aspect    Problem: Prexisting or High Potential for Compromised Skin Integrity  Goal: Skin integrity is maintained or improved  Description: INTERVENTIONS:  - Identify patients at risk for skin breakdown  - Assess and monitor skin integrity  - Assess and monitor nutrition and hydration status  - Monitor labs   - Assess for incontinence   - Turn and reposition patient  - Assist with mobility/ambulation  - Relieve pressure over bony prominences  - Avoid friction and shearing  - Provide appropriate hygiene as needed including keeping skin clean and dry  - Evaluate need for skin moisturizer/barrier cream  - Collaborate with interdisciplinary team   - Patient/family teaching  - Consider wound care consult   Outcome: Progressing      left breast status post lumpectomy with slight architectural  distortion associated with surgical clips. Slight volume loss. No  additional discrete mass, focal asymmetry, architectural distortion, or  suspicious microcalcifications are identified within either breast. There  is a moderate to marked amount of fibroglandular tissue with moderate to  marked heterogeneity.                  IMPRESSION:    1. Stable mammogram negative for malignancy.  2. Bilateral mammogram in one year is recommended.     RECOMMENDATIONS:  Annual mammogram     BI-RADS:  2 - Benign.      FAMILY CANCER HISTORY:      Past Medical History:   Diagnosis Date   • Arthritis    • Cancer of left breast (CMS/HCC) 02/18/2019        • Diabetes Mellitus     8 years   • Diabetes mellitus (CMS/HCC)    • Essential (primary) hypertension    • Fibroids    • Hypertension     20 years   • Port-A-Cath in place 4/4/2019   • Sleep apnea     CPAP       Past Surgical History:   Procedure Laterality Date   • Bx/remv,lymph node,deep axill Left 03/06/2019    Dr. Lao   • Cholecystectomy  age 18    open   • D and c  1980   • Epidural      injection spinal stenosis   • Hysteroscopy  2000   • Intraoperative id mapping sentinel lymph node w dye non rad Left 03/06/2019    Dr. Lao   • Ir implantable port vein access  03/25/2019   • Knee arthroscopy w/ meniscectomy Right 11/07/2013   • Mastectomy partial Left 03/06/2019    Dr. Lao   • Tendon transfer Right 2010    R arm   • Us guided breast core biopsy Left 02/18/2019    Left Breast Biopsy with Clip Placement       Current Outpatient Medications   Medication Sig   • meloxicam (MOBIC) 7.5 MG tablet Take 1 tablet by mouth daily. May take 1 additional tablet daily as needed.   • Cyanocobalamin (B-12) 1000 MCG Tab Take 1,000 mcg by mouth daily.   • cyclobenzaprine (FLEXERIL) 10 MG tablet Take 1 tablet by mouth every 8 hours as needed for muscle spasm.   • amLODIPine (NORVASC) 10 MG tablet Take 1 tablet by  mouth daily.   • lisinopril-hydroCHLOROthiazide (PRINZIDE,ZESTORETIC) 20-25 MG per tablet Take 1 tablet by mouth daily.   • atenolol (TENORMIN) 50 MG tablet Take 1 tablet by mouth daily.   • omeprazole (PRILOSEC) 10 MG capsule Take 1 capsule by mouth daily.   • simvastatin (ZOCOR) 40 MG tablet Take 1 tablet by mouth daily.   • metFORMIN (GLUCOPHAGE) 850 MG tablet Take 1 tablet by mouth 2 times daily.   • hydroCORTisone (CORTIZONE) 2.5 % ointment Apply topically 3 times daily.   • fluticasone (FLOVENT HFA) 110 MCG/ACT inhaler Inhale 1 puff into the lungs 2 times daily.   • albuterol 108 (90 Base) MCG/ACT inhaler Inhale 2 puffs into the lungs every 4 hours as needed for Shortness of Breath or Wheezing.   • diphenhydrAMINE HCl (BENADRYL PO) Take 25 mg by mouth at bedtime as needed. To help sleep as needed    • Melatonin 5 MG Cap Take 5 mg by mouth nightly as needed.   • ibuprofen (MOTRIN) 600 MG tablet Take 1 tablet by mouth 2 times daily. (Patient taking differently: Take 600 mg by mouth daily. )   • loratadine (CLARITIN) 10 MG tablet Take 1 tablet by mouth as needed.   • Magnesium Oxide 400 (241.3 Mg) MG Tab Take 1 tablet by mouth daily.   • aspirin 81 MG EC tablet Take 1 tablet by mouth daily.   • fluticasone (FLONASE) 50 MCG/ACT nasal spray Spray 2 sprays in each nostril daily. (Patient taking differently: Spray 2 sprays in each nostril daily as needed. )     No current facility-administered medications for this visit.        ALLERGIES:   Allergen Reactions   • Sulfa Antibiotics HIVES   • Codeine GI UPSET     Severe stomach pain       Social History     Tobacco Use   Smoking Status Never Smoker   Smokeless Tobacco Never Used       REVIEW OF SYSTEMS:  A 12 point review of systems is negative other than noted in HPI (History of Present Illness).      PHYSICAL EXAMINATION:  GENERAL: Well developed woman in no acute distress.  There were no vitals taken for this visit.  HEENT: Sclerae anicteric, conjunctivae clear.    LYMPHATICS: No cervical or supraclavicular lymphadenopathy.  CARDIAC: Normal rate, regular rhythm and heart sounds. No murmur gallop or rub.   PULMONARY/CHEST: Clear without any wheezing or rhonchi. No use of accessory muscles. No respiratory distress.   ABDOMEN: Soft, non tender, no hepatosplenomegaly.  MUSCULOSKELETAL: No edema noted. No focal spinal tenderness. Normal ROM to upper extremities.   NEUROLOGIC: Alert, Awake, oriented X 3  SKIN: Warm and dry  Psychiatric: Normal mood and affect.     BREASTS AND AXILLAE:   Breasts:  Chaperoned examination performed.  Right breast without masses, nipple discharge, skin changes, axillary or supraclavicular lymphadenopathy.  Left breast with slightly thickened and tanned skin consistent with prior radiation therapy.  Left breast slightly diminished in size compared to right.  No obvious masses, nipple discharge or other skin changes.  No axillary or supraclavicular lymphadenopathy.    ASSESSMENT:   67 year old  female history left breast triple negative breast cancer status post lumpectomy, sentinel lymph node biopsy, radiation therapy, chemotherapy.  No evidence of local recurrence.     PLAN:  Follow-up 6 months, yearly mammograms.  Continue monthly self breast exams. Call the office with any questions or worrisome findings.

## 2024-02-28 NOTE — RESPIRATORY THERAPY NOTE
"RT Protocol Note  Nina Tran 78 y.o. female MRN: [id number]  Unit/Bed#: -01 Encounter: [id number]    Assessment    Principal Problem:    Acute on chronic respiratory failure with hypoxia (HCC)  Active Problems:    COVID-19    Generalized weakness    History of thymus cancer    Chest pain    Pneumonia      Home Pulmonary Medications:         Past Medical History:   Diagnosis Date    High cholesterol      Social History     Socioeconomic History    Marital status: /Civil Union     Spouse name: None    Number of children: None    Years of education: None    Highest education level: None   Occupational History    None   Tobacco Use    Smoking status: Never    Smokeless tobacco: Never   Vaping Use    Vaping status: Never Used   Substance and Sexual Activity    Alcohol use: Yes    Drug use: Never    Sexual activity: None   Other Topics Concern    None   Social History Narrative    None     Social Determinants of Health     Financial Resource Strain: Not on file   Food Insecurity: No Food Insecurity (2/19/2024)    Hunger Vital Sign     Worried About Running Out of Food in the Last Year: Never true     Ran Out of Food in the Last Year: Never true   Transportation Needs: No Transportation Needs (2/6/2024)    PRAPARE - Transportation     Lack of Transportation (Medical): No     Lack of Transportation (Non-Medical): No   Physical Activity: Not on file   Stress: Not on file   Social Connections: Not on file   Intimate Partner Violence: Not on file   Housing Stability: Low Risk  (2/19/2024)    Housing Stability Vital Sign     Unable to Pay for Housing in the Last Year: No     Number of Places Lived in the Last Year: 1     Unstable Housing in the Last Year: No       Subjective         Objective    Physical Exam:   O2 Device: 4L    Vitals:  Blood pressure 102/59, pulse 100, temperature 98.8 °F (37.1 °C), resp. rate 16, height 5' 5\" (1.651 m), weight 64.1 kg (141 lb 5 oz), SpO2 93%.    Results from last 7 days   Lab " Units 02/28/24  0938   PH ART  7.490*   PCO2 ART mm Hg 35.2*   PO2 ART mm Hg 78.4   HCO3 ART mmol/L 26.2   BASE EXC ART mmol/L 3.0   O2 CONTENT ART mL/dL 15.1*   O2 HGB, ARTERIAL % 95.6   ABG SOURCE  Radial, Left   TAMIA TEST  Yes       Imaging and other studies: I have personally reviewed pertinent reports.      O2 Device: 4L     Plan    Respiratory Plan: No distress/Pulmonary history        Resp Comments: off mid flow on 4L N[place] , no distress CO being tired

## 2024-02-28 NOTE — QUICK NOTE
Dr. Archer notified via tiger text patient remains tachycardic and is requiring increased oxygen needs since procedure yesterday. Patient requiring 4 to 5 L at rest. Patient requiring increased assistance of two to transfer to recliner chair, patient fatigued easily.

## 2024-02-28 NOTE — ASSESSMENT & PLAN NOTE
POA with complaints of SOB and left sided chest pain. Recently admitted for COVID infection and sepsis. During previous admission, was requiring 2L NC   2/24 tachycardic tachypneic spiked a fever and oxygen requirement went up to 8 L mid flow along with mild hypotension noted.  Repeat blood Cultures done. moved to level 2 stepdown.  Now weaned down to 2 L. Transition back to med surg  CTA chest PE study: Unchanged groundglass opacities and interstitial marking thickening with bibasal predominance. Appearance favors fibrosis but acute process cannot be completely excluded. No pulmonary embolism  Incentive spirometry, supportive measures  placed on lev albuterol and ipratropium.  Recently tapered off from IV steroids to oral prednisone due to decompensation placed back on Solu-Medrol 40 mg IV twice daily  completed 5 days of IV Rocephin placed on zosyn on 2/24 due to fever spike.will stop zosyn today and observe  Patient has history of intermittent fevers since COVID-19 infection 2 to 3 years ago.  Underwent lung biopsy possible cryptogenic organizing pneumonia and has been placed on atovaquone,cellcept and chronic prednisone therapy by [facility] since October 2023.     pulmonology and infectious disease on board    Had EGD done yesterday and probably due to the stress of the procedure and anesthesia she is feeling more short of breath and her oxygen requirements went higher and she is currently on 6-8 L mid flow.  He is also tachycardic however lactic acid is normal.  Chest x-ray done and ABG done.  X-ray shows concerns for pneumonia however she is afebrile I highly doubt she has pneumonia and it is her underlying lung fibrosis.  Will continue to hold off on antibiotics.  Will give her additional IV steroids today in addition to her oral prednisone and monitor her white count and procalcitonin level and clinical status.

## 2024-02-28 NOTE — PHYSICAL THERAPY NOTE
PHYSICAL THERAPY TREATMENT NOTE  NAME:  Nina Tran  DATE: 02/28/24    Length Of Stay: 11  Performed at least 2 patient identifiers during session: Name and Birthday  Treatment time: 2574-5792  Treatment length: 24 min       02/28/24 1344   Note Type   Note Type Treatment   Pain Assessment   Pain Assessment Tool 0-10   Pain Score 7   Pain Location/Orientation Location: Chest   Restrictions/Precautions   Other Precautions Chair Alarm;Bed Alarm;Multiple lines;O2;Fall Risk;Pain;Telemetry  (4 lpm throughout session)   General   Chart Reviewed Yes   Response to Previous Treatment Patient with no complaints from previous session.   Cognition   Overall Cognitive Status WFL   Orientation Level Oriented X4   Following Commands Follows one step commands without difficulty   Bed Mobility   Additional Comments Pt seated OOB in recliner at start and end of session; bed mobility not assessed this session   Transfers   Sit to Stand   (steadying assist)   Additional items Increased time required;Verbal cues   Stand to Sit 3  Moderate assistance   Additional items Assist x 1;Increased time required;Verbal cues   Stand pivot 3  Moderate assistance   Additional items Assist x 1;Increased time required;Verbal cues   Additional Comments RW for transfers, STS with steadying assist to stand, then mod A for SPT/ stand>sit as pt attempting to sit prematurely, with uncontrolled descent into recliner despite max VC for continued ambulation until pt was closer to chair   Ambulation/Elevation   Gait pattern Improper Weight shift;Wide ANDRA;Decreased foot clearance;Foward flexed;Short stride;Excessively slow   Gait Assistance 3  Moderate assist   Additional items Assist x 1;Verbal cues   Assistive Device Rolling walker   Distance 6' with RW and mod x 1 for balance and weight shifting. VC for RW management. Pt with increased fatigue during ambulation, attempting to turn and side step while ambulating, VC for continued forward ambulation however  pt unable, see transfer section for details   Balance   Static Sitting Fair +   Dynamic Sitting Fair   Static Standing Fair -   Dynamic Standing Poor +   Ambulatory Poor   Endurance Deficit   Endurance Deficit Yes   Endurance Deficit Description fatigue, O2 desat to 86% breifly during ambulation returning to >/= 90% on 4 lpm within 15-20 sec. Educated pt on pursed lip breathing   Activity Tolerance   Activity Tolerance Patient limited by fatigue   Nurse Made Aware Kate SILVA   Exercises   Hip Flexion Sitting;20 reps;AROM;Bilateral   Hip Abduction Sitting;20 reps;AROM;Bilateral   Hip Adduction Sitting;20 reps;AROM;Bilateral   Knee AROM Long Arc Quad Sitting;20 reps;AROM;Bilateral   Ankle Pumps Sitting;20 reps;AROM;Bilateral   Assessment   Prognosis Good   Problem List Decreased strength;Decreased endurance;Impaired balance;Decreased mobility;Impaired judgement;Decreased safety awareness   Barriers to Discharge Inaccessible home environment   Barriers to Discharge Comments Pt requires assistance for mobility, limited activity tolerance   Goals   PT Treatment Day 5   Plan   Treatment/Interventions Functional transfer training;LE strengthening/ROM;Elevations;Therapeutic exercise;Endurance training;Patient/family training;Equipment eval/education;Bed mobility;Gait training;Compensatory technique education;Spoke to nursing;OT   Progress Slow progress, decreased activity tolerance   PT Frequency 3-5x/wk   Discharge Recommendation   Rehab Resource Intensity Level, PT II (Moderate Resource Intensity)   AM-PAC Basic Mobility Inpatient   Turning in Flat Bed Without Bedrails 3   Lying on Back to Sitting on Edge of Flat Bed Without Bedrails 3   Moving Bed to Chair 3   Standing Up From Chair Using Arms 3   Walk in Room 2   Climb 3-5 Stairs With Railing 1   Basic Mobility Inpatient Raw Score 15   Basic Mobility Standardized Score 36.97   Highest Level Of Mobility   JH-HLM Goal 4: Move to chair/commode   JH-HLM Achieved 6: Walk 10  steps or more   Education   Education Provided Mobility training;Home exercise program;Assistive device   Patient Reinforcement needed   End of Consult   Patient Position at End of Consult Bedside chair;Bed/Chair alarm activated;All needs within reach     The patient's AM-PAC Basic Mobility Inpatient Short Form Raw Score is 15. A Raw score of less than or equal to 16 suggests the patient may benefit from discharge to post-acute rehabilitation services. Please also refer to the recommendation of the Physical Therapist for safe discharge planning.    Assessment: Pt seen for PT treatment session this date with interventions consisting of gait training w/ emphasis on improving pt's ability to ambulate level surfaces x 6' with mod A provided by therapist with RW and Therapeutic exercise consisting of: AROM 20 reps B LE in sitting position. Pt agreeable to PT treatment session upon arrival, pt found seated OOB in recliner, in no apparent distress. In comparison to previous session, pt with no improvements as evidenced by pt ambulating decreased distance with increased support, able to complete limited seated therex . Post session: pt returned back to recliner, chair alarm engaged, all needs in reach, and RN notified of session findings/recommendations Continue to recommend  Level II Resource Intensity  at time of d/c in order to maximize pt's functional independence and safety w/ mobility. Pt continues to be functioning below baseline level, and remains limited 2* factors listed above and including decreased strength, balance, mobility, and activity tolerance. PT will continue to see pt while here in order to address the deficits listed above and provide interventions consistent w/ POC in effort to achieve STGs.     Adriana Marte, PT,DPT

## 2024-02-28 NOTE — PLAN OF CARE
Problem: Potential for Falls  Goal: Patient will remain free of falls  Description: INTERVENTIONS:  - Educate patient/family on patient safety including physical limitations  - Instruct patient to call for assistance with activity   - Consult OT/PT to assist with strengthening/mobility   - Keep Call bell within reach  - Keep bed low and locked with side rails adjusted as appropriate  - Keep care items and personal belongings within reach  - Initiate and maintain comfort rounds  - Make Fall Risk Sign visible to staff  - Offer Toileting every 2 Hours, in advance of need  - Initiate/Maintain bed/chair alarm  - Apply yellow socks and bracelet for high fall risk patients  - Consider moving patient to room near nurses station  Outcome: Progressing     Problem: Prexisting or High Potential for Compromised Skin Integrity  Goal: Skin integrity is maintained or improved  Description: INTERVENTIONS:  - Identify patients at risk for skin breakdown  - Assess and monitor skin integrity  - Assess and monitor nutrition and hydration status  - Monitor labs   - Assess for incontinence   - Turn and reposition patient  - Assist with mobility/ambulation  - Relieve pressure over bony prominences  - Avoid friction and shearing  - Provide appropriate hygiene as needed including keeping skin clean and dry  - Evaluate need for skin moisturizer/barrier cream  - Collaborate with interdisciplinary team   - Patient/family teaching  - Consider wound care consult   Outcome: Progressing     Problem: RESPIRATORY - ADULT  Goal: Achieves optimal ventilation and oxygenation  Description: INTERVENTIONS:  - Assess for changes in respiratory status  - Assess for changes in mentation and behavior  - Position to facilitate oxygenation and minimize respiratory effort  - Oxygen administered by appropriate delivery if ordered  - Initiate smoking cessation education as indicated  - Encourage broncho-pulmonary hygiene including cough, deep breathe, Incentive  Spirometry  - Assess the need for suctioning and aspirate as needed  - Assess and instruct to report SOB or any respiratory difficulty  - Respiratory Therapy support as indicated  Outcome: Progressing

## 2024-02-28 NOTE — ASSESSMENT & PLAN NOTE
Presents to ED with left sided chest pain and increasing shortness of breath worsening over the last 3 days. Worse with movement.  No chest pain reported today so far  CTA chest PE study: Unchanged groundglass opacities and interstitial marking thickening with bibasal predominance. Appearance favors fibrosis but acute process cannot be completely excluded.   MITESH score = 2  TSH elevated, normal t4, repeat thyroid studies in 4-6 weeks  Lipid panel with elevated triglycerides  Hold NSAIDS. troponins negative  Pain management  cont Carafate Protonix and Pepto-Bismol for her epigastric pain.  Egd showed Large hiatal hernia  Moderate abnormal mucosa in the antrum, consistent with gastritis  Erythematous mucosa in the duodenal bulb  The 2nd part of the duodenum and ampullary region appeared normal.  Bezoar (food and pills) in the esophagus and body of the stomach

## 2024-02-28 NOTE — ASSESSMENT & PLAN NOTE
Presented to ED with left sided chest pain and worsening SOB for 3 days.   CTA chest: Unchanged groundglass opacities and interstitial marking thickening with bibasal predominance. Appearance favors fibrosis but acute process cannot be completely excluded. No PE.   History of pulmonary fibrosis and possible cryptogenic organizing pneumonia  COVID/Flu/RSV: COVID positive, but positive since 2/4 during previous admission.   Urine strep and legionella negative, Sputum culture ordered  Blood Cultures no growth after 5 days  Initially given rocephin and doxy now switched to Zosyn as she spiked a fever on 2/24 procalcitonin level is negative  Stop zosyn and observe for now.  Patient requirement escalated today but afebrile.  Monitor for now off antibiotics

## 2024-02-28 NOTE — PROGRESS NOTES
[facility]  Progress Note  Name: Nina Tran I  MRN: [id number]  Unit/Bed#: MS [facility] I Date of Admission: 2/17/2024   Date of Service: 2/28/2024 I Hospital Day: 11    Assessment/Plan   * Acute on chronic respiratory failure with hypoxia (HCC)  Assessment & Plan  POA with complaints of SOB and left sided chest pain. Recently admitted for COVID infection and sepsis. During previous admission, was requiring 2L NC   2/24 tachycardic tachypneic spiked a fever and oxygen requirement went up to 8 L mid flow along with mild hypotension noted.  Repeat blood Cultures done. moved to level 2 stepdown.  Now weaned down to 2 L. Transition back to med surg  CTA chest PE study: Unchanged groundglass opacities and interstitial marking thickening with bibasal predominance. Appearance favors fibrosis but acute process cannot be completely excluded. No pulmonary embolism  Incentive spirometry, supportive measures  placed on lev albuterol and ipratropium.  Recently tapered off from IV steroids to oral prednisone due to decompensation placed back on Solu-Medrol 40 mg IV twice daily  completed 5 days of IV Rocephin placed on zosyn on 2/24 due to fever spike.will stop zosyn today and observe  Patient has history of intermittent fevers since COVID-19 infection 2 to 3 years ago.  Underwent lung biopsy possible cryptogenic organizing pneumonia and has been placed on atovaquone,cellcept and chronic prednisone therapy by [place] [facility] since October 2023.     pulmonology and infectious disease on board    Had EGD done yesterday and probably due to the stress of the procedure and anesthesia she is feeling more short of breath and her oxygen requirements went higher and she is currently on 6-8 L mid flow.  He is also tachycardic however lactic acid is normal.  Chest x-ray done and ABG done.  X-ray shows concerns for pneumonia however she is afebrile I highly doubt she has pneumonia and it is her  underlying lung fibrosis.  Will continue to hold off on antibiotics.  Will give her additional IV steroids today in addition to her oral prednisone and monitor her white count and procalcitonin level and clinical status.    Chest pain  Assessment & Plan  Presents to ED with left sided chest pain and increasing shortness of breath worsening over the last 3 days. Worse with movement.  No chest pain reported today so far  CTA chest PE study: Unchanged groundglass opacities and interstitial marking thickening with bibasal predominance. Appearance favors fibrosis but acute process cannot be completely excluded.   MITESH score = 2  TSH elevated, normal t4, repeat thyroid studies in 4-6 weeks  Lipid panel with elevated triglycerides  Hold NSAIDS. troponins negative  Pain management  cont Carafate Protonix and Pepto-Bismol for her epigastric pain.  Egd showed Large hiatal hernia  Moderate abnormal mucosa in the antrum, consistent with gastritis  Erythematous mucosa in the duodenal bulb  The 2nd part of the duodenum and ampullary region appeared normal.  Bezoar (food and pills) in the esophagus and body of the stomach    Pneumonia  Assessment & Plan  Presented to ED with left sided chest pain and worsening SOB for 3 days.   CTA chest: Unchanged groundglass opacities and interstitial marking thickening with bibasal predominance. Appearance favors fibrosis but acute process cannot be completely excluded. No PE.   History of pulmonary fibrosis and possible cryptogenic organizing pneumonia  COVID/Flu/RSV: COVID positive, but positive since 2/4 during previous admission.   Urine strep and legionella negative, Sputum culture ordered  Blood Cultures no growth after 5 days  Initially given rocephin and doxy now switched to Zosyn as she spiked a fever on 2/24 procalcitonin level is negative  Stop zosyn and observe for now.  Patient requirement escalated today but afebrile.  Monitor for now off antibiotics    History of thymus  cancer  Assessment & Plan  Patient with history of thymus cancer s/p removal    Continue outpatient follow up     Generalized weakness  Assessment & Plan  POA with complaints of generalized weakness suspect secondary to resp issues  PT/OT consult: II (Moderate Resource Intensity)     COVID-19  Assessment & Plan  Recently admitted with COVID-19 infection tested positive on 2/4, still testing positive from previous infection. Completed 10 days of isolation per COVID isolation precautions  Taper to oral steroids               VTE Pharmacologic Prophylaxis: VTE Score: 6 Moderate Risk (Score 3-4) - Pharmacological DVT Prophylaxis Ordered: enoxaparin (Lovenox).    Mobility:   Basic Mobility Inpatient Raw Score: 16  JH-HLM Goal: 5: Stand one or more mins  JH-HLM Achieved: 4: Move to chair/commode  HLM Goal achieved. Continue to encourage appropriate mobility.    Patient Centered Rounds: I performed bedside rounds with nursing staff today.   Discussions with Specialists or Other Care Team Provider: [facility]    Education and Discussions with Family / Patient: will update spouse    Total Time Spent on Date of Encounter in care of patient: 45 mins. This time was spent on one or more of the following: performing physical exam; counseling and coordination of care; obtaining or reviewing history; documenting in the medical record; reviewing/ordering tests, medications or procedures; communicating with other healthcare professionals and discussing with patient's family/caregivers.    Current Length of Stay: 11 day(s)  Current Patient Status: Inpatient   Certification Statement: The patient will continue to require additional inpatient hospital stay due to acute on chronic respiratory failure with hypoxia  Discharge Plan: Anticipate discharge in 48-72 hrs to rehab facility.    Code Status: Level 1 - Full Code    Subjective:   Patient noted to have worsening shortness of breath again oxygen requirement escalated to mid flow up  to 8 L and now plateaued at 5 L noted to be tachycardic but no fever reported today    Objective:     Vitals:   Temp (24hrs), Av.3 °F (36.8 °C), Min:97.9 °F (36.6 °C), Max:98.8 °F (37.1 °C)    Temp:  [97.9 °F (36.6 °C)-98.8 °F (37.1 °C)] 98.8 °F (37.1 °C)  HR:  [] 111  Resp:  [16-24] 16  BP: ()/(53-78) 106/65  SpO2:  [86 %-98 %] 95 %  Body mass index is 23.52 kg/m².     Input and Output Summary (last 24 hours):     Intake/Output Summary (Last 24 hours) at 2024 1153  Last data filed at 2024 0545  Gross per 24 hour   Intake 430 ml   Output 300 ml   Net 130 ml       Physical Exam:   Physical Exam  Vitals and nursing note reviewed.   Constitutional:       Appearance: She is ill-appearing.   HENT:      Head: Normocephalic and atraumatic.      Right Ear: External ear normal.      Left Ear: External ear normal.      Nose: Nose normal.      Mouth/Throat:      Pharynx: Oropharynx is clear.   Eyes:      Pupils: Pupils are equal, round, and reactive to light.   Cardiovascular:      Rate and Rhythm: Regular rhythm. Tachycardia present.      Heart sounds: Normal heart sounds.   Pulmonary:      Effort: Pulmonary effort is normal.      Breath sounds: Rales present.   Abdominal:      General: Bowel sounds are normal.      Palpations: Abdomen is soft.      Tenderness: There is no abdominal tenderness.   Musculoskeletal:         General: Normal range of motion.      Cervical back: Normal range of motion and neck supple.   Skin:     General: Skin is warm and dry.      Capillary Refill: Capillary refill takes less than 2 seconds.   Neurological:      General: No focal deficit present.      Mental Status: She is alert and oriented to person, place, and time.   Psychiatric:         Mood and Affect: Mood normal.            Additional Data:     Labs:  Results from last 7 days   Lab Units 24  0614   WBC Thousand/uL 10.21*   HEMOGLOBIN g/dL 9.7*   HEMATOCRIT % 30.6*   PLATELETS Thousands/uL 120*     Results  from last 7 days   Lab Units 02/28/24  1009 02/26/24  0614   SODIUM mmol/L 127* 130*   POTASSIUM mmol/L 3.7 5.0   CHLORIDE mmol/L 91* 96   CO2 mmol/L 28 28   BUN mg/dL 17 19   CREATININE mg/dL 0.62 0.67   ANION GAP mmol/L 8 6   CALCIUM mg/dL 7.7* 7.8*   ALBUMIN g/dL  --  2.9*   TOTAL BILIRUBIN mg/dL  --  0.76   ALK PHOS U/L  --  130*   ALT U/L  --  63*   AST U/L  --  37   GLUCOSE RANDOM mg/dL 85 170*         Results from last 7 days   Lab Units 02/28/24  0802 02/27/24  2146 02/27/24  1702 02/27/24  1358 02/27/24  1138 02/27/24  0737 02/26/24  2059 02/26/24  1644   POC GLUCOSE mg/dl 108 120 128 99 125 97 167* 212*         Results from last 7 days   Lab Units 02/28/24  1009 02/25/24  0618 02/24/24  1340 02/24/24  1126 02/24/24  0602   LACTIC ACID mmol/L 1.4 1.9 2.6* 2.6*  --    PROCALCITONIN ng/ml  --  0.23  --   --  0.22       Lines/Drains:  Invasive Devices       Peripheral Intravenous Line  Duration             Long-Dwell Peripheral IV (Midline) 02/24/24 Right Brachial 4 days    Peripheral IV 02/27/24 Distal;Dorsal (posterior);Left Forearm <1 day              Drain  Duration             External Urinary Catheter 10 days                      Telemetry:  Telemetry Orders (From admission, onward)               24 Hour Telemetry Monitoring  Continuous x 24 Hours (Telem)        Question:  Reason for 24 Hour Telemetry  Answer:  Arrhythmias requiring acute medical intervention / PPM or ICD malfunction                     Telemetry Reviewed: Sinus Tachycardia  Indication for Continued Telemetry Use: Arrthymias requiring medical therapy             Imaging: Reviewed radiology reports from this admission including: chest xray    Recent Cultures (last 7 days):   Results from last 7 days   Lab Units 02/24/24  1137 02/24/24  1101 02/24/24  1047   BLOOD CULTURE   --  No Growth at 72 hrs. No Growth at 72 hrs.   SPUTUM CULTURE  Commensal respiratory emelia only; No significant growth of Staph aureus/MRSA or Pseudomonas aeruginosa.   --   --    GRAM STAIN RESULT  Rare Epithelial cells per low power field*  Rare Polys*  4+ Gram negative rods*  2+ Gram positive cocci in clusters*  --   --        Last 24 Hours Medication List:   Current Facility-Administered Medications   Medication Dose Route Frequency Provider Last Rate    acetaminophen  650 mg Oral Q6H PRN Charlene Archer MD      aluminum-magnesium hydroxide-simethicone  30 mL Oral Q4H PRN Charlene Archer MD      ascorbic acid  500 mg Oral Daily Charlene Archer MD      atorvastatin  20 mg Oral Daily With Dinner Charlene Archer MD      atovaquone  1,500 mg Oral Daily Charlene Archer MD      bismuth subsalicylate  525 mg Oral Q6H PRN Charlene Archer MD      diltiazem  30 mg Oral Once Charlene Archer MD      enoxaparin  40 mg Subcutaneous Daily Charlene Archer MD      guaiFENesin  1,200 mg Oral Q12H F[place]ON Charlene Archer MD      insulin lispro  1-6 Units Subcutaneous TID AC Charlene Archer MD      ipratropium-albuterol  3 mL Nebulization Q6H PRN Charlene Archer MD      lidocaine  1 patch Topical Q24H Charlene Archer MD      methenamine hippurate  1 g Oral BID With Meals Charlene Archer MD      methylPREDNISolone sodium succinate  40 mg Intravenous Once Charlene Archer MD      midodrine  5 mg Oral TID AC Charlene Archer MD      mycophenolate  1,000 mg Oral Q12H [place] Charlene Archer MD      ondansetron  4 mg Intravenous Q6H PRN Charlene Archer MD      oxyCODONE  2.5 mg Oral Q4H PRN Charlene Archer MD      Or    oxyCODONE  5 mg Oral Q4H PRN Charlene Archer MD      pantoprazole  40 mg Oral BID AC Charlene Archer MD      predniSONE  60 mg Oral Daily Charlene Archer MD      sucralfate  1 g Oral 4x Daily (AC & HS) Charlene Archer MD          Today, Patient Was Seen By: Charlene Archer MD    **Please Note: This note may have been constructed using a voice recognition system.**

## 2024-02-28 NOTE — QUICK NOTE
Dr. Archer made aware via tiger text patients BP is below the parameter to receive the cardizem. Patients heart rate has been in the 90s.

## 2024-02-28 NOTE — PLAN OF CARE
Problem: PHYSICAL THERAPY ADULT  Goal: Performs mobility at highest level of function for planned discharge setting.  See evaluation for individualized goals.  Description: Treatment/Interventions: ADL retraining, Functional transfer training, LE strengthening/ROM, Elevations, Therapeutic exercise, Endurance training, Patient/family training, Equipment eval/education, Bed mobility, Gait training, Compensatory technique education, Spoke to nursing, Spoke to case management, OT  Equipment Recommended:  (walker-pt has)       See flowsheet documentation for full assessment, interventions and recommendations.  Outcome: Not Progressing  Note: Prognosis: Good  Problem List: Decreased strength, Decreased endurance, Impaired balance, Decreased mobility, Impaired judgement, Decreased safety awareness  Assessment: Pt seen for PT treatment session this date with interventions consisting of gait training w/ emphasis on improving pt's ability to ambulate level surfaces x 6' with mod A provided by therapist with RW and Therapeutic exercise consisting of: AROM 20 reps B LE in sitting position. Pt agreeable to PT treatment session upon arrival, pt found seated OOB in recliner, in no apparent distress. In comparison to previous session, pt with no improvements as evidenced by pt ambulating decreased distance with increased support, able to complete limited seated therex . Post session: pt returned back to recliner, chair alarm engaged, all needs in reach, and RN notified of session findings/recommendations Continue to recommend  Level II Resource Intensity  at time of d/c in order to maximize pt's functional independence and safety w/ mobility. Pt continues to be functioning below baseline level, and remains limited 2* factors listed above and including decreased strength, balance, mobility, and activity tolerance. PT will continue to see pt while here in order to address the deficits listed above and provide interventions  consistent w/ POC in effort to achieve STGs.  Barriers to Discharge: Inaccessible home environment  Barriers to Discharge Comments: Pt requires assistance for mobility, limited activity tolerance  Rehab Resource Intensity Level, PT: II (Moderate Resource Intensity)    See flowsheet documentation for full assessment.

## 2024-02-29 LAB
ANION GAP SERPL CALCULATED.3IONS-SCNC: 8 MMOL/L
BACTERIA BLD CULT: NORMAL
BACTERIA BLD CULT: NORMAL
BUN SERPL-MCNC: 20 MG/DL (ref 5–25)
CALCIUM SERPL-MCNC: 7.6 MG/DL (ref 8.4–10.2)
CHLORIDE SERPL-SCNC: 92 MMOL/L (ref 96–108)
CO2 SERPL-SCNC: 29 MMOL/L (ref 21–32)
CREAT SERPL-MCNC: 0.61 MG/DL (ref 0.6–1.3)
ERYTHROCYTE [DISTWIDTH] IN BLOOD BY AUTOMATED COUNT: 15.6 % (ref 11.6–15.1)
GFR SERPL CREATININE-BSD FRML MDRD: 85 ML/MIN/1.73SQ M
GLUCOSE SERPL-MCNC: 115 MG/DL (ref 65–140)
GLUCOSE SERPL-MCNC: 149 MG/DL (ref 65–140)
GLUCOSE SERPL-MCNC: 158 MG/DL (ref 65–140)
GLUCOSE SERPL-MCNC: 97 MG/DL (ref 65–140)
HCT VFR BLD AUTO: 28.4 % (ref 34.8–46.1)
HGB BLD-MCNC: 9.2 G/DL (ref 11.5–15.4)
MCH RBC QN AUTO: 27.3 PG (ref 26.8–34.3)
MCHC RBC AUTO-ENTMCNC: 32.4 G/DL (ref 31.4–37.4)
MCV RBC AUTO: 84 FL (ref 82–98)
PLATELET # BLD AUTO: 127 THOUSANDS/UL (ref 149–390)
PMV BLD AUTO: 10.3 FL (ref 8.9–12.7)
POTASSIUM SERPL-SCNC: 3.3 MMOL/L (ref 3.5–5.3)
PROCALCITONIN SERPL-MCNC: 0.33 NG/ML
RBC # BLD AUTO: 3.37 MILLION/UL (ref 3.81–5.12)
SODIUM SERPL-SCNC: 129 MMOL/L (ref 135–147)
WBC # BLD AUTO: 9.88 THOUSAND/UL (ref 4.31–10.16)

## 2024-02-29 PROCEDURE — 84145 PROCALCITONIN (PCT): CPT | Performed by: FAMILY MEDICINE

## 2024-02-29 PROCEDURE — 99232 SBSQ HOSP IP/OBS MODERATE 35: CPT | Performed by: FAMILY MEDICINE

## 2024-02-29 PROCEDURE — 82948 REAGENT STRIP/BLOOD GLUCOSE: CPT

## 2024-02-29 PROCEDURE — 85027 COMPLETE CBC AUTOMATED: CPT | Performed by: FAMILY MEDICINE

## 2024-02-29 PROCEDURE — 80048 BASIC METABOLIC PNL TOTAL CA: CPT | Performed by: FAMILY MEDICINE

## 2024-02-29 PROCEDURE — G0408 INPT/TELE FOLLOW UP 35: HCPCS | Performed by: INTERNAL MEDICINE

## 2024-02-29 RX ORDER — POTASSIUM CHLORIDE 20 MEQ/1
40 TABLET, EXTENDED RELEASE ORAL ONCE
Status: COMPLETED | OUTPATIENT
Start: 2024-02-29 | End: 2024-02-29

## 2024-02-29 RX ORDER — CEFEPIME HYDROCHLORIDE 2 G/50ML
2000 INJECTION, SOLUTION INTRAVENOUS EVERY 12 HOURS
Status: DISCONTINUED | OUTPATIENT
Start: 2024-02-29 | End: 2024-03-05 | Stop reason: HOSPADM

## 2024-02-29 RX ADMIN — ATORVASTATIN CALCIUM 20 MG: 20 TABLET, FILM COATED ORAL at 16:43

## 2024-02-29 RX ADMIN — METHENAMINE HIPPURATE 1 G: 1 TABLET ORAL at 16:44

## 2024-02-29 RX ADMIN — ACETAMINOPHEN 325MG 650 MG: 325 TABLET ORAL at 05:33

## 2024-02-29 RX ADMIN — SUCRALFATE 1 G: 1 TABLET ORAL at 21:14

## 2024-02-29 RX ADMIN — MYCOPHENOLATE MOFETIL 1000 MG: 250 CAPSULE ORAL at 09:32

## 2024-02-29 RX ADMIN — MIDODRINE HYDROCHLORIDE 5 MG: 5 TABLET ORAL at 11:26

## 2024-02-29 RX ADMIN — METHENAMINE HIPPURATE 1 G: 1 TABLET ORAL at 09:33

## 2024-02-29 RX ADMIN — PANTOPRAZOLE SODIUM 40 MG: 40 TABLET, DELAYED RELEASE ORAL at 16:43

## 2024-02-29 RX ADMIN — SUCRALFATE 1 G: 1 TABLET ORAL at 16:43

## 2024-02-29 RX ADMIN — LIDOCAINE 5% 1 PATCH: 700 PATCH TOPICAL at 16:44

## 2024-02-29 RX ADMIN — SUCRALFATE 1 G: 1 TABLET ORAL at 05:33

## 2024-02-29 RX ADMIN — GUAIFENESIN 1200 MG: 600 TABLET ORAL at 09:32

## 2024-02-29 RX ADMIN — OXYCODONE HYDROCHLORIDE 2.5 MG: 5 TABLET ORAL at 19:34

## 2024-02-29 RX ADMIN — CEFEPIME HYDROCHLORIDE 2000 MG: 2 INJECTION, SOLUTION INTRAVENOUS at 21:08

## 2024-02-29 RX ADMIN — OXYCODONE HYDROCHLORIDE AND ACETAMINOPHEN 500 MG: 500 TABLET ORAL at 09:32

## 2024-02-29 RX ADMIN — PREDNISONE 60 MG: 20 TABLET ORAL at 09:32

## 2024-02-29 RX ADMIN — POTASSIUM CHLORIDE 40 MEQ: 1500 TABLET, EXTENDED RELEASE ORAL at 16:43

## 2024-02-29 RX ADMIN — SUCRALFATE 1 G: 1 TABLET ORAL at 11:26

## 2024-02-29 RX ADMIN — CEFEPIME HYDROCHLORIDE 2000 MG: 2 INJECTION, SOLUTION INTRAVENOUS at 09:39

## 2024-02-29 RX ADMIN — ATOVAQUONE 1500 MG: 750 SUSPENSION ORAL at 09:33

## 2024-02-29 RX ADMIN — ENOXAPARIN SODIUM 40 MG: 40 INJECTION SUBCUTANEOUS at 09:32

## 2024-02-29 RX ADMIN — GUAIFENESIN 1200 MG: 600 TABLET ORAL at 21:10

## 2024-02-29 RX ADMIN — PANTOPRAZOLE SODIUM 40 MG: 40 TABLET, DELAYED RELEASE ORAL at 05:33

## 2024-02-29 RX ADMIN — MYCOPHENOLATE MOFETIL 1000 MG: 250 CAPSULE ORAL at 21:10

## 2024-02-29 NOTE — ASSESSMENT & PLAN NOTE
POA with complaints of generalized weakness suspect secondary to resp issues  PT/OT consult: II (Moderate Resource Intensity) will need to go to rehab on discharge but overall her prognosis is guarded due to her worsening respiratory status

## 2024-02-29 NOTE — CASE MANAGEMENT
Case Management Discharge Planning Note    Patient name Nina Tran  Location /-01 MRN [id number]  : 1945 Date 2024       Current Admission Date: 2024  Current Admission Diagnosis:Acute on chronic respiratory failure with hypoxia (HCC)   Patient Active Problem List    Diagnosis Date Noted    Epigastric pain 2024    GERD (gastroesophageal reflux disease) 2024    Chest pain 2024    Pneumonia 2024    Bacteremia 2024    Acute on chronic respiratory failure with hypoxia (HCC) 2024    Sepsis (HCC) 2024    COVID-19 2024    Generalized weakness 2024    Ambulatory dysfunction 2024    History of thymus cancer 2024      LOS (days): 12  Geometric Mean LOS (GMLOS) (days): 5.2  Days to GMLOS:-7     OBJECTIVE:  Risk of Unplanned Readmission Score: 18.05         Current admission status: Inpatient   Preferred Pharmacy:   [facility] #[id number] [address] [address]  Phone: [phone] Fax: [phone]    Primary Care Provider: Emely Will DO    Primary Insurance: MEDICARE  Secondary Insurance: [facility]    DISCHARGE DETAILS:                    STR has been recommended for pt at time of discharge, pt is agreeable.  Pt does not have a preference to any STR.  CM placing blanket referrals to STR in [place]    A post acute care recommendation was made by your care team for STR.  Discussed Freedom of Choice with patient. List of facilities given to patient via in person. patient aware the list is custom filtered for them by zip code location and that [facility] post acute providers are designated.       [facility] [facility] have accepted pt.  CM discussing STR choices, pt sts she would like to discuss with family members this evening before she makes a decision.  CM to follow up in the AM

## 2024-02-29 NOTE — PLAN OF CARE
Problem: PAIN - ADULT  Goal: Verbalizes/displays adequate comfort level or baseline comfort level  Description: Interventions:  - Encourage patient to monitor pain and request assistance  - Assess pain using appropriate pain scale0-10  - Administer analgesics based on type and severity of pain and evaluate response  - Implement non-pharmacological measures as appropriate and evaluate response  - Consider cultural and social influences on pain and pain management  - Notify physician/advanced practitioner if interventions unsuccessful or patient reports new pain  Outcome: Progressing     Problem: INFECTION - ADULT  Goal: Absence or prevention of progression during hospitalization  Description: INTERVENTIONS:  - Assess and monitor for signs and symptoms of infection  - Monitor lab/diagnostic results  - Monitor all insertion sites, i.e. indwelling lines, tubes, and drains  - Monitor endotracheal if appropriate and nasal secretions for changes in amount and color  - Adams appropriate cooling/warming therapies per order  - Administer medications as ordered  - Instruct and encourage patient and family to use good hand hygiene technique  - Identify and instruct in appropriate isolation precautions for identified infection/condition  Outcome: Progressing

## 2024-02-29 NOTE — PROGRESS NOTES
[facility]  Progress Note  Name: Nina Tran I  MRN: [id number]  Unit/Bed#: [facility] I Date of Admission: 2/17/2024   Date of Service: 2/29/2024 I Hospital Day: 12    Assessment/Plan   * Acute on chronic respiratory failure with hypoxia (HCC)  Assessment & Plan  POA with complaints of SOB and left sided chest pain. Recently admitted for COVID infection and sepsis. During previous admission, was requiring 2L NC   2/24 tachycardic tachypneic spiked a fever and oxygen requirement went up to 8 L mid flow along with mild hypotension noted.  Repeat blood Cultures done. moved to level 2 stepdown.  Now weaned down to 2 L. Transition back to med surg  CTA chest PE study: Unchanged groundglass opacities and interstitial marking thickening with bibasal predominance. Appearance favors fibrosis but acute process cannot be completely excluded. No pulmonary embolism  Incentive spirometry, supportive measures  placed on lev albuterol and ipratropium.  Recently tapered off from IV steroids to oral prednisone due to decompensation placed back on Solu-Medrol 40 mg IV twice daily  completed 5 days of IV Rocephin placed on zosyn on 2/24 due to fever spike.will stop zosyn today and observe  Patient has history of intermittent fevers since COVID-19 infection 2 to 3 years ago.  Underwent lung biopsy possible cryptogenic organizing pneumonia and has been placed on atovaquone,cellcept and chronic prednisone therapy by [facility] since October 2023.     pulmonology and infectious disease on board    Had EGD done 2/27 and probably due to the stress of the procedure and anesthesia she is feeling more short of breath and her oxygen requirements went higher and she is currently on 6-8 L mid flow.  He is also tachycardic however lactic acid is normal.  Chest x-ray done and ABG done.  X-ray shows concerns for pneumonia however she is afebrile I highly doubt she has pneumonia and it is her underlying  lung fibrosis.  And does have elevated procalcitonin level.  Placed back on cefepime until 3/4 and then on Levaquin on discharge  Continue prednisone 60 mg daily and gradually decrease by 10 mg every 5 days.  She should be on 20 mg daily as her baseline dose    Chest pain  Assessment & Plan  Presents to ED with left sided chest pain and increasing shortness of breath worsening over the last 3 days. Worse with movement.  No chest pain reported today so far  CTA chest PE study: Unchanged groundglass opacities and interstitial marking thickening with bibasal predominance. Appearance favors fibrosis but acute process cannot be completely excluded.   MITESH score = 2  TSH elevated, normal t4, repeat thyroid studies in 4-6 weeks  Lipid panel with elevated triglycerides  Hold NSAIDS. troponins negative  Pain management  cont Carafate Protonix and Pepto-Bismol for her epigastric pain.  Egd showed Large hiatal hernia  Moderate abnormal mucosa in the antrum, consistent with gastritis  Erythematous mucosa in the duodenal bulb  The 2nd part of the duodenum and ampullary region appeared normal.  Bezoar (food and pills) in the esophagus and body of the stomach    Pneumonia  Assessment & Plan  Presented to ED with left sided chest pain and worsening SOB for 3 days.   CTA chest: Unchanged groundglass opacities and interstitial marking thickening with bibasal predominance. Appearance favors fibrosis but acute process cannot be completely excluded. No PE.   History of pulmonary fibrosis and possible cryptogenic organizing pneumonia  COVID/Flu/RSV: COVID positive, but positive since 2/4 during previous admission.   Urine strep and legionella negative, Sputum culture ordered  Blood Cultures no growth after 5 days  Initially given rocephin and doxy now switched to Zosyn as she spiked a fever on 2/24 procalcitonin level is negative  Now on IV cefepime and will continue that until 3/4 and switch to oral Levaquin on discharge.  Clinically  patient is improving not spiking fevers anymore however due to elevated procalcitonin level her antibiotics were restarted today and discussed with infectious disease    History of thymus cancer  Assessment & Plan  Patient with history of thymus cancer s/p removal    Continue outpatient follow up     Generalized weakness  Assessment & Plan  POA with complaints of generalized weakness suspect secondary to resp issues  PT/OT consult: II (Moderate Resource Intensity) will need to go to rehab on discharge but overall her prognosis is guarded due to her worsening respiratory status    COVID-19  Assessment & Plan  Recently admitted with COVID-19 infection tested positive on 2/4, still testing positive from previous infection. Completed 10 days of isolation per COVID isolation precautions  Taper to oral steroids         VTE Pharmacologic Prophylaxis: VTE Score: 6 Moderate Risk (Score 3-4) - Pharmacological DVT Prophylaxis Ordered: enoxaparin (Lovenox).    Mobility:   Basic Mobility Inpatient Raw Score: 15  JH-HLM Goal: 4: Move to chair/commode  JH-HLM Achieved: 4: Move to chair/commode  HLM Goal achieved. Continue to encourage appropriate mobility.    Patient Centered Rounds: I performed bedside rounds with nursing staff today.   Discussions with Specialists or Other Care Team Provider: [facility]    Education and Discussions with Family / Patient:     Total Time Spent on Date of Encounter in care of patient: 35 mins. This time was spent on one or more of the following: performing physical exam; counseling and coordination of care; obtaining or reviewing history; documenting in the medical record; reviewing/ordering tests, medications or procedures; communicating with other healthcare professionals and discussing with patient's family/caregivers.    Current Length of Stay: 12 day(s)  Current Patient Status: Inpatient   Certification Statement: The patient will continue to require additional inpatient hospital stay due  to acute on chronic respiratory failure with hypoxia  Discharge Plan: Anticipate discharge in 48-72 hrs to rehab facility.    Code Status: Level 1 - Full Code    Subjective:   Patient feels a little bit better today her breathing is trying to settle down .explained to her that her overall respiratory status has declined compared to her previous baseline    Objective:     Vitals:   Temp (24hrs), Av.6 °F (36.4 °C), Min:97.5 °F (36.4 °C), Max:97.9 °F (36.6 °C)    Temp:  [97.5 °F (36.4 °C)-97.9 °F (36.6 °C)] 97.5 °F (36.4 °C)  HR:  [87-91] 87  Resp:  [18] 18  BP: (103-121)/(56-74) 107/56  SpO2:  [92 %-97 %] 92 %  Body mass index is 23.52 kg/m².     Input and Output Summary (last 24 hours):     Intake/Output Summary (Last 24 hours) at 2024 1415  Last data filed at 2024 1722  Gross per 24 hour   Intake 60 ml   Output 100 ml   Net -40 ml       Physical Exam:   Physical Exam  Vitals and nursing note reviewed.   Constitutional:       Appearance: She is ill-appearing.   HENT:      Head: Normocephalic and atraumatic.      Right Ear: External ear normal.      Left Ear: External ear normal.      Nose: Nose normal.      Mouth/Throat:      Pharynx: Oropharynx is clear.   Eyes:      Pupils: Pupils are equal, round, and reactive to light.   Cardiovascular:      Rate and Rhythm: Normal rate and regular rhythm.      Heart sounds: Normal heart sounds.   Pulmonary:      Effort: Pulmonary effort is normal.      Breath sounds: Rales present.   Abdominal:      General: Bowel sounds are normal.      Palpations: Abdomen is soft.      Tenderness: There is no abdominal tenderness.   Musculoskeletal:         General: Normal range of motion.      Cervical back: Normal range of motion and neck supple.   Skin:     General: Skin is warm and dry.      Capillary Refill: Capillary refill takes less than 2 seconds.   Neurological:      General: No focal deficit present.      Mental Status: She is alert and oriented to person, place, and  time.   Psychiatric:         Mood and Affect: Mood normal.            Additional Data:     Labs:  Results from last 7 days   Lab Units 02/29/24  0542   WBC Thousand/uL 9.88   HEMOGLOBIN g/dL 9.2*   HEMATOCRIT % 28.4*   PLATELETS Thousands/uL 127*     Results from last 7 days   Lab Units 02/29/24  0542 02/28/24  1009 02/26/24  0614   SODIUM mmol/L 129*   < > 130*   POTASSIUM mmol/L 3.3*   < > 5.0   CHLORIDE mmol/L 92*   < > 96   CO2 mmol/L 29   < > 28   BUN mg/dL 20   < > 19   CREATININE mg/dL 0.61   < > 0.67   ANION GAP mmol/L 8   < > 6   CALCIUM mg/dL 7.6*   < > 7.8*   ALBUMIN g/dL  --   --  2.9*   TOTAL BILIRUBIN mg/dL  --   --  0.76   ALK PHOS U/L  --   --  130*   ALT U/L  --   --  63*   AST U/L  --   --  37   GLUCOSE RANDOM mg/dL 115   < > 170*    < > = values in this interval not displayed.         Results from last 7 days   Lab Units 02/29/24  1123 02/29/24  0738 02/28/24  2139 02/28/24  1628 02/28/24  1210 02/28/24  0802 02/27/24  2146 02/27/24  1702 02/27/24  1358 02/27/24  1138 02/27/24  0737 02/26/24  2059   POC GLUCOSE mg/dl 149* 97 149* 141* 91 108 120 128 99 125 97 167*         Results from last 7 days   Lab Units 02/29/24  0542 02/28/24  1009 02/25/24  0618 02/24/24  1340 02/24/24  1126 02/24/24  0602   LACTIC ACID mmol/L  --  1.4 1.9 2.6* 2.6*  --    PROCALCITONIN ng/ml 0.33*  --  0.23  --   --  0.22       Lines/Drains:  Invasive Devices       Peripheral Intravenous Line  Duration             Long-Dwell Peripheral IV (Midline) 02/24/24 Right Brachial 5 days    Peripheral IV 02/27/24 Distal;Dorsal (posterior);Left Forearm 2 days              Drain  Duration             External Urinary Catheter 11 days                          Imaging: Reviewed radiology reports from this admission including: chest xray    Recent Cultures (last 7 days):   Results from last 7 days   Lab Units 02/24/24  1137 02/24/24  1101 02/24/24  1047   BLOOD CULTURE   --  No Growth After 4 Days. No Growth After 4 Days.   SPUTUM  CULTURE  Commensal respiratory emelia only; No significant growth of Staph aureus/MRSA or Pseudomonas aeruginosa.  --   --    GRAM STAIN RESULT  Rare Epithelial cells per low power field*  Rare Polys*  4+ Gram negative rods*  2+ Gram positive cocci in clusters*  --   --        Last 24 Hours Medication List:   Current Facility-Administered Medications   Medication Dose Route Frequency Provider Last Rate    acetaminophen  650 mg Oral Q6H PRN Charlene Archer MD      aluminum-magnesium hydroxide-simethicone  30 mL Oral Q4H PRN Charlene Archer MD      ascorbic acid  500 mg Oral Daily Charlene Archer MD      atorvastatin  20 mg Oral Daily With Dinner Charlene Archer MD      atovaquone  1,500 mg Oral Daily Charlene Archer MD      bismuth subsalicylate  525 mg Oral Q6H PRN Charlene Archer MD      cefepime  2,000 mg Intravenous Q12H Charlene Archer MD 2,000 mg (02/29/24 0939)    diltiazem  30 mg Oral Once Charlene Archer MD      enoxaparin  40 mg Subcutaneous Daily Charlene Archer MD      guaiFENesin  1,200 mg Oral Q12H FALLON Charlene Archer MD      insulin lispro  1-6 Units Subcutaneous TID AC Charlene Archer MD      ipratropium-albuterol  3 mL Nebulization Q6H PRN Charlene Archer MD      lidocaine  1 patch Topical Q24H Charlene Archer MD      methenamine hippurate  1 g Oral BID With Meals Charlene Archer MD      midodrine  5 mg Oral TID AC Charlene Archer MD      mycophenolate  1,000 mg Oral Q12H F[place]ON Charlene Archer MD      ondansetron  4 mg Intravenous Q6H PRN Charlene Archer MD      oxyCODONE  2.5 mg Oral Q4H PRN Charlene Archer MD      Or    oxyCODONE  5 mg Oral Q4H PRN Charlene Archer MD      pantoprazole  40 mg Oral BID AC Charlene Archer MD      potassium chloride  40 mEq Oral Once Charlene Archer MD      predniSONE  60 mg Oral Daily Charlene Archer MD      sucralfate  1 g Oral 4x Daily (AC & HS) Charlene Archer MD          Today, Patient Was Seen By: Charlene Archer MD    **Please Note: This note may have been constructed using a voice recognition system.**

## 2024-02-29 NOTE — PROGRESS NOTES
Progress Note - Infectious Disease   Nina Tran 78 y.o. female MRN: [id number]  Unit/Bed#: -01 Encounter: [id number]        REQUIRED DOCUMENTATION:     1. This service was provided via Telemedicine.  2. Provider located at [place].  3. TeleMed provider: Masha Altman MD.  4. Identify all parties in room with patient during tele consult:RN  5. After connecting through televideo, patient was identified by name and date of birth and assistant checked wristband.  Patient was then informed that this was a Telemedicine visit and that the exam was being conducted confidentially over secure lines. My office door was closed. No one else was in the room.  Patient acknowledged consent and understanding of privacy and security of the Telemedicine visit, and gave us permission to have the assistant stay in the room in order to assist with the history and to conduct the exam.  I informed the patient that I have reviewed their record in Epic and presented the opportunity for them to ask any questions regarding the visit today.  The patient agreed to participate.       Assessment/Recommendations     Possible nosocomial pneumonia  Mild acute transaminitis  Acute gastritis  Acute on chronic hypoxic respiratory failure  Covid pneumonia  Interstitial lung disease/pulmonary fibrosis with probable acute exacerbation  Hx organizing pneumonia, chronic hypoxic respiratory failure on cellcept, prednisone     -Patient recently hospitalized with COVID pneumonia and E. coli bacteremia from UTI.  She returns with epigastric pain, worsening hypoxia, dyspnea suspicious for ILD flare triggered by recent COVID infection.       -She has a past complicated hx of organizing pneumonia/fibrosis following covid in 2020 (s/p lung bx ) and is followed by [facility] and ID on immunosuppression as above     -Hospital course complicated by single isolated fever to 102 but without leukocytosis. She then had worsening hypoxia and fu chest  imaging showed interval development of left sided infiltrate suspicious for secondary bacterial pneumonia. However she is much improved today, afebrile without leukocytosis, on 4 lpm o2 . Improving epigastric pain      Continue cefepime 2 q12h  Favor completing 5 days of therapy through 3/4, can transition to po levaquin 750 mg daily on discharge  Would repeat LFTs, if elevated please check right upper quadrant ultrasound  Steroid management as per pulmonary and primary team, continue pneumocystis prophylaxis with atovaquone and outpatient fu with [facility] [place] [place] [facility]  Recommend outpatient follow-up with urogynecology, continue Premarin cream and continue Hip-Richard 1g twice daily with vitamin C  Check daily CBC, CMP while inpatient to monitor for any evolving antibiotic toxicity or treatment failure    Discussed above plan in detail with the primary service who is in agreement.    History       Subjective:  The patient reports she had been doing well until this morning when she felt acutely sick with worsening productive cough and shortness of breath and malaise. Had EGD yesterday  Denies fevers, chills, or sweats.  Denies nausea, vomiting, or diarrhea.    Antibiotics:  none    Physical Exam     Temp:  [97.5 °F (36.4 °C)-97.9 °F (36.6 °C)] 97.5 °F (36.4 °C)  HR:  [] 88  Resp:  [18-22] 18  BP: ()/(57-74) 117/64  SpO2:  [86 %-97 %] 95 %  Temp (24hrs), Av.6 °F (36.4 °C), Min:97.5 °F (36.4 °C), Max:97.9 °F (36.6 °C)  Current: Temperature: 97.5 °F (36.4 °C)    Intake/Output Summary (Last 24 hours) at 2024 0813  Last data filed at 2024 1722  Gross per 24 hour   Intake 420 ml   Output 300 ml   Net 120 ml       Physical exam findings reported by bedside and primary medical team staff      General Appearance:  Appearing frail and chronically ill, nontoxic, and in no distress, appears stated age   Lungs:   Coarse to auscultation bilaterally with bibasilar rales, respirations unlabored   Heart:  Regular rate and  rhythm, S1, S2 normal, no murmur, rub or gallop   Abdomen:   Soft, non-tender, non-distended, positive bowel sounds, no masses, no organomegaly    No CVA tenderness   Extremities: Extremities normal, atraumatic, no cyanosis, clubbing or edema   Skin: Skin color, texture, turgor normal, no rashes or lesions. No draining wounds noted.   Neurologic: Alert and oriented times 3,         Invasive Devices:   Long-Dwell Peripheral IV (Midline) 02/24/24 Right Brachial (Active)   Site Assessment WDL 02/26/24 2000   Dressing Type Transparent 02/26/24 2000   Line Status Flushed & Clamped;Blood return noted 02/26/24 2000   Dressing Status Clean;Dry;Intact 02/26/24 2000   Dressing Change Due 02/28/24 02/26/24 2000   Reason Not Rotated Not due 02/26/24 2000       External Urinary Catheter (Active)   Collection Container Canister and suction tubing (For Female) 02/24/24 2000   Suction Pressure (mmHg) 110 mmHg 02/25/24 2130   Interventions Removed and skin assessed;Pericare performed;Device changed 02/25/24 2130   Output (mL) 250 mL 02/26/24 1411       Labs, Imaging, & Other Studies     Lab Results:    I have personally reviewed pertinent labs.    Results from last 7 days   Lab Units 02/29/24  0542 02/26/24  0614 02/24/24  1340   WBC Thousand/uL 9.88 10.21* 10.11   HEMOGLOBIN g/dL 9.2* 9.7* 9.4*   PLATELETS Thousands/uL 127* 120* 97*     Results from last 7 days   Lab Units 02/29/24  0542 02/28/24  1009 02/26/24  0614   POTASSIUM mmol/L 3.3*   < > 5.0   CHLORIDE mmol/L 92*   < > 96   CO2 mmol/L 29   < > 28   BUN mg/dL 20   < > 19   CREATININE mg/dL 0.61   < > 0.67   EGFR ml/min/1.73sq m 85   < > 83   CALCIUM mg/dL 7.6*   < > 7.8*   AST U/L  --   --  37   ALT U/L  --   --  63*   ALK PHOS U/L  --   --  130*    < > = values in this interval not displayed.     Results from last 7 days   Lab Units 02/24/24  1137 02/24/24  1101 02/24/24  1047   BLOOD CULTURE   --  No Growth After 4 Days. No Growth After 4 Days.   SPUTUM CULTURE   Commensal respiratory emelia only; No significant growth of Staph aureus/MRSA or Pseudomonas aeruginosa.  --   --    GRAM STAIN RESULT  Rare Epithelial cells per low power field*  Rare Polys*  4+ Gram negative rods*  2+ Gram positive cocci in clusters*  --   --        Imaging Studies:   I have personally reviewed pertinent imaging study reports and images in PACS.      EKG, Pathology, and Other Studies:   I have personally reviewed pertinent reports.        Counseling/Coordination of care:       Total 50 minutes communication with the patient via telehealth.  Labs, medical tests and imaging studies were independently reviewed by me as noted above.

## 2024-02-29 NOTE — ASSESSMENT & PLAN NOTE
Presented to ED with left sided chest pain and worsening SOB for 3 days.   CTA chest: Unchanged groundglass opacities and interstitial marking thickening with bibasal predominance. Appearance favors fibrosis but acute process cannot be completely excluded. No PE.   History of pulmonary fibrosis and possible cryptogenic organizing pneumonia  COVID/Flu/RSV: COVID positive, but positive since 2/4 during previous admission.   Urine strep and legionella negative, Sputum culture ordered  Blood Cultures no growth after 5 days  Initially given rocephin and doxy now switched to Zosyn as she spiked a fever on 2/24 procalcitonin level is negative  Now on IV cefepime and will continue that until 3/4 and switch to oral Levaquin on discharge.  Clinically patient is improving not spiking fevers anymore however due to elevated procalcitonin level her antibiotics were restarted today and discussed with infectious disease

## 2024-02-29 NOTE — ASSESSMENT & PLAN NOTE
POA with complaints of SOB and left sided chest pain. Recently admitted for COVID infection and sepsis. During previous admission, was requiring 2L NC   2/24 tachycardic tachypneic spiked a fever and oxygen requirement went up to 8 L mid flow along with mild hypotension noted.  Repeat blood Cultures done. moved to level 2 stepdown.  Now weaned down to 2 L. Transition back to med surg  CTA chest PE study: Unchanged groundglass opacities and interstitial marking thickening with bibasal predominance. Appearance favors fibrosis but acute process cannot be completely excluded. No pulmonary embolism  Incentive spirometry, supportive measures  placed on lev albuterol and ipratropium.  Recently tapered off from IV steroids to oral prednisone due to decompensation placed back on Solu-Medrol 40 mg IV twice daily  completed 5 days of IV Rocephin placed on zosyn on 2/24 due to fever spike.will stop zosyn today and observe  Patient has history of intermittent fevers since COVID-19 infection 2 to 3 years ago.  Underwent lung biopsy possible cryptogenic organizing pneumonia and has been placed on atovaquone,cellcept and chronic prednisone therapy by [facility] since October 2023.     pulmonology and infectious disease on board    Had EGD done 2/27 and probably due to the stress of the procedure and anesthesia she is feeling more short of breath and her oxygen requirements went higher and she is currently on 6-8 L mid flow.  He is also tachycardic however lactic acid is normal.  Chest x-ray done and ABG done.  X-ray shows concerns for pneumonia however she is afebrile I highly doubt she has pneumonia and it is her underlying lung fibrosis.  And does have elevated procalcitonin level.  Placed back on cefepime until 3/4 and then on Levaquin on discharge  Continue prednisone 60 mg daily and gradually decrease by 10 mg every 5 days.  She should be on 20 mg daily as her baseline dose

## 2024-02-29 NOTE — RESPIRATORY THERAPY NOTE
RT Protocol Note  Nina Tran 78 y.o. female MRN: [id number]  Unit/Bed#: -01 Encounter: [id number]    Assessment    Principal Problem:    Acute on chronic respiratory failure with hypoxia (HCC)  Active Problems:    COVID-19    Generalized weakness    History of thymus cancer    Chest pain    Pneumonia      Home Pulmonary Medications:         Past Medical History:   Diagnosis Date    High cholesterol      Social History     Socioeconomic History    Marital status: /Civil Union     Spouse name: None    Number of children: None    Years of education: None    Highest education level: None   Occupational History    None   Tobacco Use    Smoking status: Never    Smokeless tobacco: Never   Vaping Use    Vaping status: Never Used   Substance and Sexual Activity    Alcohol use: Yes    Drug use: Never    Sexual activity: None   Other Topics Concern    None   Social History Narrative    None     Social Determinants of Health     Financial Resource Strain: Not on file   Food Insecurity: No Food Insecurity (2/19/2024)    Hunger Vital Sign     Worried About Running Out of Food in the Last Year: Never true     Ran Out of Food in the Last Year: Never true   Transportation Needs: No Transportation Needs (2/6/2024)    PRAPARE - Transportation     Lack of Transportation (Medical): No     Lack of Transportation (Non-Medical): No   Physical Activity: Not on file   Stress: Not on file   Social Connections: Not on file   Intimate Partner Violence: Not on file   Housing Stability: Low Risk  (2/19/2024)    Housing Stability Vital Sign     Unable to Pay for Housing in the Last Year: No     Number of Places Lived in the Last Year: 1     Unstable Housing in the Last Year: No       Subjective         Objective    Physical Exam:   Assessment Type: (P) Assess only  General Appearance: (P) Alert, Awake  Respiratory Pattern: (P) Dyspnea with exertion  Bilateral Breath Sounds: (P) Diminished, Crackles  O2 Device: (P) 4  "L    Vitals:  Blood pressure 117/64, pulse 88, temperature 97.5 °F (36.4 °C), resp. rate 18, height 5' 5\" (1.651 m), weight 64.1 kg (141 lb 5 oz), SpO2 95%.    Results from last 7 days   Lab Units 02/28/24  0938   PH ART  7.490*   PCO2 ART mm Hg 35.2*   PO2 ART mm Hg 78.4   HCO3 ART mmol/L 26.2   BASE EXC ART mmol/L 3.0   O2 CONTENT ART mL/dL 15.1*   O2 HGB, ARTERIAL % 95.6   ABG SOURCE  Radial, Left   TAMIA TEST  Yes       Imaging and other studies: I have personally reviewed pertinent reports.      O2 Device: (P) 4 L     Plan    Respiratory Plan: No distress/Pulmonary history        Resp Comments: (P) pt comfortable on NC 4 L. respirations unlabored   "

## 2024-02-29 NOTE — PLAN OF CARE
Problem: INFECTION - ADULT  Goal: Absence or prevention of progression during hospitalization  Description: INTERVENTIONS:  - Assess and monitor for signs and symptoms of infection  - Monitor lab/diagnostic results  - Monitor all insertion sites, i.e. indwelling lines, tubes, and drains  - Monitor endotracheal if appropriate and nasal secretions for changes in amount and color  - [place] appropriate cooling/warming therapies per order  - Administer medications as ordered  - Instruct and encourage patient and family to use good hand hygiene technique  - Identify and instruct in appropriate isolation precautions for identified infection/condition  Outcome: Progressing     Problem: SAFETY ADULT  Goal: Patient will remain free of falls  Description: INTERVENTIONS:  - Educate patient/family on patient safety including physical limitations  - Instruct patient to call for assistance with activity   - Consult OT/PT to assist with strengthening/mobility   - Keep Call bell within reach  - Keep bed low and locked with side rails adjusted as appropriate  - Keep care items and personal belongings within reach  - Initiate and maintain comfort rounds  - Make Fall Risk Sign visible to staff  - Offer Toileting every 2 Hours, in advance of need  - Initiate/Maintain bed/chair alarm  - Apply yellow socks and bracelet for high fall risk patients  - Consider moving patient to room near nurses station  Outcome: Progressing

## 2024-03-01 LAB
ANION GAP SERPL CALCULATED.3IONS-SCNC: 4 MMOL/L
BUN SERPL-MCNC: 18 MG/DL (ref 5–25)
CALCIUM SERPL-MCNC: 7.7 MG/DL (ref 8.4–10.2)
CHLORIDE SERPL-SCNC: 96 MMOL/L (ref 96–108)
CO2 SERPL-SCNC: 31 MMOL/L (ref 21–32)
CREAT SERPL-MCNC: 0.58 MG/DL (ref 0.6–1.3)
GFR SERPL CREATININE-BSD FRML MDRD: 87 ML/MIN/1.73SQ M
GLUCOSE SERPL-MCNC: 116 MG/DL (ref 65–140)
GLUCOSE SERPL-MCNC: 141 MG/DL (ref 65–140)
GLUCOSE SERPL-MCNC: 158 MG/DL (ref 65–140)
GLUCOSE SERPL-MCNC: 178 MG/DL (ref 65–140)
GLUCOSE SERPL-MCNC: 97 MG/DL (ref 65–140)
POTASSIUM SERPL-SCNC: 4.2 MMOL/L (ref 3.5–5.3)
SARS-COV-2 RNA RESP QL NAA+PROBE: POSITIVE
SODIUM SERPL-SCNC: 131 MMOL/L (ref 135–147)

## 2024-03-01 PROCEDURE — 97110 THERAPEUTIC EXERCISES: CPT

## 2024-03-01 PROCEDURE — 99232 SBSQ HOSP IP/OBS MODERATE 35: CPT | Performed by: STUDENT IN AN ORGANIZED HEALTH CARE EDUCATION/TRAINING PROGRAM

## 2024-03-01 PROCEDURE — 80048 BASIC METABOLIC PNL TOTAL CA: CPT | Performed by: FAMILY MEDICINE

## 2024-03-01 PROCEDURE — 82948 REAGENT STRIP/BLOOD GLUCOSE: CPT

## 2024-03-01 PROCEDURE — 97116 GAIT TRAINING THERAPY: CPT

## 2024-03-01 PROCEDURE — 87635 SARS-COV-2 COVID-19 AMP PRB: CPT | Performed by: STUDENT IN AN ORGANIZED HEALTH CARE EDUCATION/TRAINING PROGRAM

## 2024-03-01 PROCEDURE — 97535 SELF CARE MNGMENT TRAINING: CPT

## 2024-03-01 PROCEDURE — 97530 THERAPEUTIC ACTIVITIES: CPT

## 2024-03-01 RX ADMIN — ATOVAQUONE 1500 MG: 750 SUSPENSION ORAL at 08:36

## 2024-03-01 RX ADMIN — OXYCODONE HYDROCHLORIDE 5 MG: 5 TABLET ORAL at 16:42

## 2024-03-01 RX ADMIN — MYCOPHENOLATE MOFETIL 1000 MG: 250 CAPSULE ORAL at 19:47

## 2024-03-01 RX ADMIN — SUCRALFATE 1 G: 1 TABLET ORAL at 21:17

## 2024-03-01 RX ADMIN — OXYCODONE HYDROCHLORIDE AND ACETAMINOPHEN 500 MG: 500 TABLET ORAL at 08:33

## 2024-03-01 RX ADMIN — GUAIFENESIN 1200 MG: 600 TABLET ORAL at 19:46

## 2024-03-01 RX ADMIN — PREDNISONE 60 MG: 20 TABLET ORAL at 08:33

## 2024-03-01 RX ADMIN — INSULIN LISPRO 1 UNITS: 100 INJECTION, SOLUTION INTRAVENOUS; SUBCUTANEOUS at 11:47

## 2024-03-01 RX ADMIN — PANTOPRAZOLE SODIUM 40 MG: 40 TABLET, DELAYED RELEASE ORAL at 16:42

## 2024-03-01 RX ADMIN — PANTOPRAZOLE SODIUM 40 MG: 40 TABLET, DELAYED RELEASE ORAL at 06:16

## 2024-03-01 RX ADMIN — OXYCODONE HYDROCHLORIDE 5 MG: 5 TABLET ORAL at 01:38

## 2024-03-01 RX ADMIN — ATORVASTATIN CALCIUM 20 MG: 20 TABLET, FILM COATED ORAL at 16:43

## 2024-03-01 RX ADMIN — LIDOCAINE 5% 1 PATCH: 700 PATCH TOPICAL at 13:51

## 2024-03-01 RX ADMIN — METHENAMINE HIPPURATE 1 G: 1 TABLET ORAL at 08:33

## 2024-03-01 RX ADMIN — GUAIFENESIN 1200 MG: 600 TABLET ORAL at 08:33

## 2024-03-01 RX ADMIN — OXYCODONE HYDROCHLORIDE 5 MG: 5 TABLET ORAL at 06:15

## 2024-03-01 RX ADMIN — SUCRALFATE 1 G: 1 TABLET ORAL at 11:47

## 2024-03-01 RX ADMIN — ENOXAPARIN SODIUM 40 MG: 40 INJECTION SUBCUTANEOUS at 08:32

## 2024-03-01 RX ADMIN — MIDODRINE HYDROCHLORIDE 5 MG: 5 TABLET ORAL at 16:43

## 2024-03-01 RX ADMIN — SUCRALFATE 1 G: 1 TABLET ORAL at 06:16

## 2024-03-01 RX ADMIN — INSULIN LISPRO 1 UNITS: 100 INJECTION, SOLUTION INTRAVENOUS; SUBCUTANEOUS at 16:44

## 2024-03-01 RX ADMIN — SUCRALFATE 1 G: 1 TABLET ORAL at 16:42

## 2024-03-01 RX ADMIN — CEFEPIME HYDROCHLORIDE 2000 MG: 2 INJECTION, SOLUTION INTRAVENOUS at 08:32

## 2024-03-01 RX ADMIN — METHENAMINE HIPPURATE 1 G: 1 TABLET ORAL at 16:43

## 2024-03-01 RX ADMIN — MIDODRINE HYDROCHLORIDE 5 MG: 5 TABLET ORAL at 11:49

## 2024-03-01 RX ADMIN — MYCOPHENOLATE MOFETIL 1000 MG: 250 CAPSULE ORAL at 08:33

## 2024-03-01 RX ADMIN — CEFEPIME HYDROCHLORIDE 2000 MG: 2 INJECTION, SOLUTION INTRAVENOUS at 19:37

## 2024-03-01 NOTE — PHYSICAL THERAPY NOTE
PHYSICAL THERAPY TREATMENT NOTE  NAME:  Nina Tran  DATE: 03/01/24    Length Of Stay: 13  Performed at least 2 patient identifiers during session: Name and Birthday  Treatment time: 907-950  Treatment length: 43 min       03/01/24 0907   Note Type   Note Type Treatment   Pain Assessment   Pain Assessment Tool 0-10   Pain Score No Pain   Restrictions/Precautions   Other Precautions Chair Alarm;Bed Alarm;Multiple lines;O2;Fall Risk   General   Chart Reviewed Yes   Response to Previous Treatment Patient with no complaints from previous session.   Cognition   Overall Cognitive Status WFL   Orientation Level Oriented X4   Following Commands Follows one step commands without difficulty   Comments Pt with increased fatigue, requires encouragement for participation   Bed Mobility   Supine to Sit 3  Moderate assistance   Additional items Assist x 1;Increased time required;Verbal cues  (trunk management)   Additional Comments HOB elevated, bedrails, mod A for trunk and LE management. Pt seated EOB with mild-mod R lateral lean. Max VC for hand placement and orientation to midline. Pt requiring mod A initially progressing to min A, then SBA. Pt seated EOB x 15 min with F - Poor + static sitting balance. Pt satting in mid 80s on 3 lpm, increased to 4 lpm increasing to high 80s. Repetitive education provided throughout session on pursed lip breathing as pt with difficulty completing   Transfers   Sit to Stand 3  Moderate assistance   Additional items Assist x 1;Increased time required;Verbal cues   Stand to Sit 3  Moderate assistance   Additional items Assist x 1;Increased time required;Verbal cues   Stand pivot 4  Minimal assistance   Additional items Assist x 1;Increased time required;Verbal cues   Additional Comments RW for transfers with mod VC for hand placement and safety. SPT with min A for balance and weight shifting.   Ambulation/Elevation   Gait pattern Improper Weight shift;Narrow ANDRA;Forward Flexion;Decreased  foot clearance;Short stride;Excessively slow   Gait Assistance 4  Minimal assist   Additional items Assist x 1;Verbal cues   Assistive Device Rolling walker   Distance 3' with RW and min A for balance and weight shifting with chair follow for safety. O2 increased to 6 lpm prior to mobility with pt desatting to 77%, O2 increased to 8 lpm as pt requiring > 5 min to recover to > 82%   Balance   Static Sitting Poor +   Dynamic Sitting Poor +   Static Standing Poor +   Dynamic Standing Poor   Ambulatory Poor   Endurance Deficit   Endurance Deficit Yes   Endurance Deficit Description fatigue, increased O2 demand   Activity Tolerance   Activity Tolerance Patient limited by fatigue   Nurse Made Aware RNWinter   Exercises   Hip Flexion Sitting;15 reps;AROM;Bilateral   Hip Abduction Sitting;15 reps;AROM;Bilateral   Knee AROM Long Arc Quad Sitting;15 reps;AROM;Bilateral   Assessment   Prognosis Good   Problem List Decreased strength;Decreased endurance;Impaired balance;Decreased mobility;Impaired judgement;Decreased safety awareness   Barriers to Discharge Inaccessible home environment   Barriers to Discharge Comments pt requires increased assistance for mobility, limited activity tolerance   Goals   PT Treatment Day 6   Plan   Treatment/Interventions Functional transfer training;LE strengthening/ROM;Elevations;Therapeutic exercise;Endurance training;Patient/family training;Equipment eval/education;Bed mobility;Gait training;Compensatory technique education;Spoke to nursing;OT   Progress Slow progress, decreased activity tolerance   PT Frequency 3-5x/wk   Discharge Recommendation   Rehab Resource Intensity Level, PT II (Moderate Resource Intensity)   AM-PAC Basic Mobility Inpatient   Turning in Flat Bed Without Bedrails 3   Lying on Back to Sitting on Edge of Flat Bed Without Bedrails 2   Moving Bed to Chair 2   Standing Up From Chair Using Arms 2   Walk in Room 3   Climb 3-5 Stairs With Railing 1   Basic Mobility Inpatient  Raw Score 13   Basic Mobility Standardized Score 33.99   Highest Level Of Mobility   -[facility] Goal 4: Move to chair/commode   -HL Achieved 5: Stand (1 or more minutes)   Education   Education Provided Mobility training;Home exercise program;Assistive device   Patient Reinforcement needed   End of Consult   Patient Position at End of Consult Bedside chair;Bed/Chair alarm activated;All needs within reach     The patient's AM-PAC Basic Mobility Inpatient Short Form Raw Score is 13. A Raw score of less than or equal to 16 suggests the patient may benefit from discharge to post-acute rehabilitation services. Please also refer to the recommendation of the Physical Therapist for safe discharge planning.      Assessment: Pt seen for PT treatment session this date with interventions consisting of gait training w/ emphasis on improving pt's ability to ambulate level surfaces x 3' with min A provided by therapist with RW, Therapeutic exercise consisting of: AROM 15 reps B LE in sitting position, and therapeutic activity consisting of training: supine<>sit transfers and static sitting tolerance at EOB for 15 minutes w/ B/L UE support. Pt agreeable to PT treatment session upon arrival, pt found supine in bed w/ HOB elevated, in no apparent distress. In comparison to previous session, pt with no improvements as evidenced by Pt ambulating decreased distance, requires increased assistance for bed mobility and static sitting balance . Post session: pt returned back to recliner, chair alarm engaged, all needs in reach, and RN notified of session findings/recommendations Continue to recommend  Level II Resource Intensity  at time of d/c in order to maximize pt's functional independence and safety w/ mobility. Pt continues to be functioning below baseline level, and remains limited 2* factors listed above and including decreased strength, balance, mobility, and activity tolerance. PT will continue to see pt while here in order to  address the deficits listed above and provide interventions consistent w/ POC in effort to achieve STGs.     Adriana Marte, PT,DPT

## 2024-03-01 NOTE — PLAN OF CARE
Problem: OCCUPATIONAL THERAPY ADULT  Goal: Performs self-care activities at highest level of function for planned discharge setting.  See evaluation for individualized goals.  Description: Treatment Interventions: ADL retraining, Functional transfer training, UE strengthening/ROM, Endurance training, Patient/family training, Equipment evaluation/education, Compensatory technique education, Continued evaluation, Cardiac education, Energy conservation, Activity engagement    See flowsheet documentation for full assessment, interventions and recommendations.   Outcome: Progressing  Note: Limitation: Decreased ADL status, Decreased Safe judgement during ADL, Decreased endurance, Decreased self-care trans, Decreased high-level ADLs  Prognosis: Good  Assessment: Nina was seen for a follow up treatment focused on improving safety/independence with ADLs and functional transfers. Pt demo's decline in performance due to fatigue, decreased strength, impaired balance and decreased motivation/depressed affect. Pt received on 4L O2 with sats in high 80's. Desatted to 78% on 4L during transfer and required 6L O2 to rebound to mid-high 80's with extended seated rest break and max cues for pursed lip breathing. Pt observed shallow breathing with mouth open. HR: 130-140's with exertion. RN made aware. Continue OT POC.     Rehab Resource Intensity Level, OT: III (Minimum Resource Intensity)

## 2024-03-01 NOTE — PLAN OF CARE
Problem: PAIN - ADULT  Goal: Verbalizes/displays adequate comfort level or baseline comfort level  Description: Interventions:  - Encourage patient to monitor pain and request assistance  - Assess pain using appropriate pain scale0-10  - Administer analgesics based on type and severity of pain and evaluate response  - Implement non-pharmacological measures as appropriate and evaluate response  - Consider cultural and social influences on pain and pain management  - Notify physician/advanced practitioner if interventions unsuccessful or patient reports new pain  Outcome: Progressing     Problem: INFECTION - ADULT  Goal: Absence or prevention of progression during hospitalization  Description: INTERVENTIONS:  - Assess and monitor for signs and symptoms of infection  - Monitor lab/diagnostic results  - Monitor all insertion sites, i.e. indwelling lines, tubes, and drains  - Monitor endotracheal if appropriate and nasal secretions for changes in amount and color  - [place] appropriate cooling/warming therapies per order  - Administer medications as ordered  - Instruct and encourage patient and family to use good hand hygiene technique  - Identify and instruct in appropriate isolation precautions for identified infection/condition  Outcome: Progressing

## 2024-03-01 NOTE — PROGRESS NOTES
[facility]  Progress Note  Name: Nina Tran I  MRN: [id number]  Unit/Bed#: -[facility] I Date of Admission: 2/17/2024   Date of Service: 3/1/2024 I Hospital Day: 13    Assessment/Plan   Pneumonia  Assessment & Plan  Presented to ED with left sided chest pain and worsening SOB for 3 days.   CTA chest: Unchanged groundglass opacities and interstitial marking thickening with bibasal predominance. Appearance favors fibrosis but acute process cannot be completely excluded. No PE.   History of pulmonary fibrosis and possible cryptogenic organizing pneumonia  COVID/Flu/RSV: COVID positive, but positive since 2/4 during previous admission.   Urine strep and legionella negative, Sputum culture ordered  Blood Cultures no growth after 5 days  Initially given rocephin and doxy now switched to Zosyn as she spiked a fever on 2/24 procalcitonin level is negative  Now on IV cefepime and will continue that until 3/4 and switch to oral Levaquin on discharge.  Clinically patient is improving not spiking fevers anymore however due to elevated procalcitonin level her antibiotics were restarted today and discussed with infectious disease    Chest pain  Assessment & Plan  Presents to ED with left sided chest pain and increasing shortness of breath worsening over the last 3 days. Worse with movement.  No chest pain reported today so far  CTA chest PE study: Unchanged groundglass opacities and interstitial marking thickening with bibasal predominance. Appearance favors fibrosis but acute process cannot be completely excluded.   MITESH score = 2  TSH elevated, normal t4, repeat thyroid studies in 4-6 weeks  Lipid panel with elevated triglycerides  Hold NSAIDS. troponins negative  Pain management  cont Carafate Protonix and Pepto-Bismol for her epigastric pain.  Egd showed Large hiatal hernia  Moderate abnormal mucosa in the antrum, consistent with gastritis  Erythematous mucosa in the duodenal bulb  The 2nd part of  the duodenum and ampullary region appeared normal.  Bezoar (food and pills) in the esophagus and body of the stomach    History of thymus cancer  Assessment & Plan  Patient with history of thymus cancer s/p removal    Continue outpatient follow up     Generalized weakness  Assessment & Plan  POA with complaints of generalized weakness suspect secondary to resp issues  PT/OT consult: II (Moderate Resource Intensity) will need to go to rehab on discharge but overall her prognosis is guarded due to her worsening respiratory status    COVID-19  Assessment & Plan  Recently admitted with COVID-19 infection tested positive on 2/4, still testing positive from previous infection. Completed 10 days of isolation per COVID isolation precautions  Was initially on solumedrol, now on Prednisone 60 mg daily    * Acute on chronic respiratory failure with hypoxia (HCC)  Assessment & Plan  POA with complaints of SOB and left sided chest pain. Recently admitted for COVID infection and sepsis. During previous admission, was requiring 2L NC   2/24 tachycardic tachypneic spiked a fever and oxygen requirement went up to 8 L mid flow along with mild hypotension noted.  Repeat blood Cultures done. moved to level 2 stepdown.  Now weaned down to 2 L. Transition back to med surg  CTA chest PE study: Unchanged groundglass opacities and interstitial marking thickening with bibasal predominance. Appearance favors fibrosis but acute process cannot be completely excluded. No pulmonary embolism  Incentive spirometry, supportive measures  placed on lev albuterol and ipratropium.  Was initially on IV steroids however now down to po prednisone  completed 5 days of IV Rocephin placed on zosyn on 2/24 due to fever spike  Patient has history of intermittent fevers since COVID-19 infection 2 to 3 years ago.  Underwent lung biopsy possible cryptogenic organizing pneumonia and has been placed on atovaquone,cellcept and chronic prednisone therapy by [facility]  infectious disease since October 2023.     pulmonology and infectious disease on board    Had EGD done 2/27 and probably due to the stress of the procedure and anesthesia she is feeling more short of breath and her oxygen requirements went higher and she is currently on 6-8 L mid flow.  He is also tachycardic however lactic acid is normal.  Chest x-ray done and ABG done.  X-ray shows concerns for pneumonia however she is afebrile I highly doubt she has pneumonia and it is her underlying lung fibrosis.  And does have elevated procalcitonin level.  Placed back on cefepime until 3/4 and then on Levaquin on discharge  Continue prednisone 60 mg daily and gradually decrease by 10 mg every 5 days.  She should be on 20 mg daily as her baseline dose           VTE Pharmacologic Prophylaxis: VTE Score: 6 High Risk (Score >/= 5) - Pharmacological DVT Prophylaxis Ordered: enoxaparin (Lovenox). Sequential Compression Devices Ordered.    Mobility:   Basic Mobility Inpatient Raw Score: 15  -HLM Goal: 4: Move to chair/commode  -HLM Achieved: 5: Stand (1 or more minutes)  HLM Goal achieved. Continue to encourage appropriate mobility.    Patient Centered Rounds: I performed bedside rounds with nursing staff today.   Discussions with Specialists or Other Care Team Provider: case management    Education and Discussions with Family / Patient: Updated  () at bedside.    Current Length of Stay: 13 day(s)  Current Patient Status: Inpatient   Certification Statement: The patient will continue to require additional inpatient hospital stay due to ongoing medical management  Discharge Plan: Anticipate discharge tomorrow to rehab facility.    Code Status: Level 1 - Full Code    Subjective:   Patient seen and examined at bedside.  Still complaining of left sided rib pain which she states has been there since admission.  Pain is reproducible and aggravated by her cough.  Denies any other symptoms at this time.  Endorsing  stable breathing.    Objective:     Vitals:   Temp (24hrs), Av.5 °F (36.4 °C), Min:97.2 °F (36.2 °C), Max:97.7 °F (36.5 °C)    Temp:  [97.2 °F (36.2 °C)-97.7 °F (36.5 °C)] 97.7 °F (36.5 °C)  HR:  [87-91] 87  Resp:  [16-18] 18  BP: (105-128)/(61-75) 106/63  SpO2:  [91 %-97 %] 92 %  Body mass index is 23.52 kg/m².     Input and Output Summary (last 24 hours):     Intake/Output Summary (Last 24 hours) at 3/1/2024 1554  Last data filed at 3/1/2024 0142  Gross per 24 hour   Intake 290 ml   Output 650 ml   Net -360 ml       Physical Exam:   Physical Exam  Vitals and nursing note reviewed.   Constitutional:       General: She is not in acute distress.     Appearance: She is well-developed.   HENT:      Head: Normocephalic and atraumatic.   Eyes:      Conjunctiva/sclera: Conjunctivae normal.   Cardiovascular:      Rate and Rhythm: Normal rate and regular rhythm.      Heart sounds: No murmur heard.  Pulmonary:      Effort: Pulmonary effort is normal. No respiratory distress.      Comments: Coarse crackles over bilateral lung fields  Abdominal:      Palpations: Abdomen is soft.      Tenderness: There is no abdominal tenderness.   Musculoskeletal:         General: No swelling.      Cervical back: Neck supple.   Skin:     General: Skin is warm and dry.      Capillary Refill: Capillary refill takes less than 2 seconds.   Neurological:      Mental Status: She is alert.   Psychiatric:         Mood and Affect: Mood normal.            Additional Data:     Labs:  Results from last 7 days   Lab Units 24  0542   WBC Thousand/uL 9.88   HEMOGLOBIN g/dL 9.2*   HEMATOCRIT % 28.4*   PLATELETS Thousands/uL 127*     Results from last 7 days   Lab Units 24  0554 24  1009 24  0614   SODIUM mmol/L 131*   < > 130*   POTASSIUM mmol/L 4.2   < > 5.0   CHLORIDE mmol/L 96   < > 96   CO2 mmol/L 31   < > 28   BUN mg/dL 18   < > 19   CREATININE mg/dL 0.58*   < > 0.67   ANION GAP mmol/L 4   < > 6   CALCIUM mg/dL 7.7*   < > 7.8*    ALBUMIN g/dL  --   --  2.9*   TOTAL BILIRUBIN mg/dL  --   --  0.76   ALK PHOS U/L  --   --  130*   ALT U/L  --   --  63*   AST U/L  --   --  37   GLUCOSE RANDOM mg/dL 116   < > 170*    < > = values in this interval not displayed.         Results from last 7 days   Lab Units 03/01/24  1115 03/01/24  0803 02/29/24  1617 02/29/24  1123 02/29/24  0738 02/28/24  2139 02/28/24  1628 02/28/24  1210 02/28/24  0802 02/27/24  2146 02/27/24  1702 02/27/24  1358   POC GLUCOSE mg/dl 158* 97 158* 149* 97 149* 141* 91 108 120 128 99         Results from last 7 days   Lab Units 02/29/24  0542 02/28/24  1009 02/25/24  0618 02/24/24  1340 02/24/24  1126 02/24/24  0602   LACTIC ACID mmol/L  --  1.4 1.9 2.6* 2.6*  --    PROCALCITONIN ng/ml 0.33*  --  0.23  --   --  0.22       Lines/Drains:  Invasive Devices       Peripheral Intravenous Line  Duration             Long-Dwell Peripheral IV (Midline) 02/24/24 Right Brachial 6 days    Peripheral IV 02/27/24 Distal;Dorsal (posterior);Left Forearm 3 days              Drain  Duration             External Urinary Catheter 12 days                          Imaging: Reviewed radiology reports from this admission including: chest xray and chest CT scan    Recent Cultures (last 7 days):   Results from last 7 days   Lab Units 02/24/24  1137 02/24/24  1101 02/24/24  1047   BLOOD CULTURE   --  No Growth After 5 Days. No Growth After 5 Days.   SPUTUM CULTURE  Commensal respiratory emelia only; No significant growth of Staph aureus/MRSA or Pseudomonas aeruginosa.  --   --    GRAM STAIN RESULT  Rare Epithelial cells per low power field*  Rare Polys*  4+ Gram negative rods*  2+ Gram positive cocci in clusters*  --   --        Last 24 Hours Medication List:   Current Facility-Administered Medications   Medication Dose Route Frequency Provider Last Rate    acetaminophen  650 mg Oral Q6H PRN Charlene Archer MD      aluminum-magnesium hydroxide-simethicone  30 mL Oral Q4H PRN Charlene Archer MD      ascorbic  acid  500 mg Oral Daily Charlene Archer MD      atorvastatin  20 mg Oral Daily With Dinner Charlene Archer MD      atovaquone  1,500 mg Oral Daily Charlene Archer MD      bismuth subsalicylate  525 mg Oral Q6H PRN Charlene Archer MD      cefepime  2,000 mg Intravenous Q12H Charlene Archer MD 2,000 mg (03/01/24 0832)    diltiazem  30 mg Oral Once Charlene Archer MD      enoxaparin  40 mg Subcutaneous Daily Charlene Archer MD      guaiFENesin  1,200 mg Oral Q12H [place] Charlene Archer MD      insulin lispro  1-6 Units Subcutaneous TID AC Charlene Archer MD      ipratropium-albuterol  3 mL Nebulization Q6H PRN Charlene Archer MD      lidocaine  1 patch Topical Q24H Charlene Archer MD      methenamine hippurate  1 g Oral BID With Meals Charlene Archer MD      midodrine  5 mg Oral TID AC Charlene Archer MD      mycophenolate  1,000 mg Oral Q12H [place] Charlene Archer MD      ondansetron  4 mg Intravenous Q6H PRN Charlene Archer MD      oxyCODONE  2.5 mg Oral Q4H PRN Charlene Archer MD      Or    oxyCODONE  5 mg Oral Q4H PRN Charlene Archer MD      pantoprazole  40 mg Oral BID AC Charlene Archer MD      predniSONE  60 mg Oral Daily Charlene Archer MD      sucralfate  1 g Oral 4x Daily (AC & HS) Charlene Archer MD          Today, Patient Was Seen By: Alice Moreno MD    **Please Note: This note may have been constructed using a voice recognition system.**

## 2024-03-01 NOTE — ASSESSMENT & PLAN NOTE
POA with complaints of SOB and left sided chest pain. Recently admitted for COVID infection and sepsis. During previous admission, was requiring 2L NC   2/24 tachycardic tachypneic spiked a fever and oxygen requirement went up to 8 L mid flow along with mild hypotension noted.  Repeat blood Cultures done. moved to level 2 stepdown.  Now weaned down to 2 L. Transition back to med surg  CTA chest PE study: Unchanged groundglass opacities and interstitial marking thickening with bibasal predominance. Appearance favors fibrosis but acute process cannot be completely excluded. No pulmonary embolism  Incentive spirometry, supportive measures  placed on lev albuterol and ipratropium.  Was initially on IV steroids however now down to po prednisone  completed 5 days of IV Rocephin placed on zosyn on 2/24 due to fever spike  Patient has history of intermittent fevers since COVID-19 infection 2 to 3 years ago.  Underwent lung biopsy possible cryptogenic organizing pneumonia and has been placed on atovaquone,cellcept and chronic prednisone therapy by [place] infectious disease since October 2023.     pulmonology and infectious disease on board    Had EGD done 2/27 and probably due to the stress of the procedure and anesthesia she is feeling more short of breath and her oxygen requirements went higher and she is currently on 6-8 L mid flow.  He is also tachycardic however lactic acid is normal.  Chest x-ray done and ABG done.  X-ray shows concerns for pneumonia however she is afebrile I highly doubt she has pneumonia and it is her underlying lung fibrosis.  And does have elevated procalcitonin level.  Placed back on cefepime until 3/4 and then on Levaquin on discharge  Continue prednisone 60 mg daily and gradually decrease by 10 mg every 5 days.  She should be on 20 mg daily as her baseline dose

## 2024-03-01 NOTE — CONSULTS
TeleConsultation - Palliative Care   Nina Tran 78 y.o. female MRN: [id number]  Unit/Bed#: -01 Encounter: [id number]    REQUIRED DOCUMENTATION:     1. This service was provided via Telemedicine.  2. Provider located at [facility].  3. TeleMed provider: Magdi Andrade PA-C.  4. Identify all parties in room with patient during tele consult:  Spouse - Abelardo Tran  5.Patient was then informed that this was a Telemedicine visit and that the exam was being conducted confidentially over secure lines. My office door was closed. No one else was in the room.  Patient acknowledged consent and understanding of privacy and security of the Telemedicine visit, and gave us permission to have the assistant stay in the room in order to assist with the history and to conduct the exam.  I informed the patient that I have reviewed their record in Epic and presented the opportunity for them to ask any questions regarding the visit today.  The patient or their Representative/Agent agreed to participate.     Assessment  Present on Admission:   COVID-19   Acute on chronic respiratory failure with hypoxia (HCC)   Generalized weakness   Chest pain   Pneumonia     Active issues addressed today:  Acute on chronic respiratory failure with hypoxia  Sepsis  COVID 19  Pneumonia  Hx of thymus cancer  Restrictive lung disease s/p wedge resection  Ambulatory dysfunction  Palliative care consult  Goals of care counseling    Plan  Recommendations:  Symptom management  Defer to primary treatment team  No symptom complaints    2.   Goals  Level 1 code status. Full code. Disease focused care. No limits placed.   CODE status reviewed - would like to remain full code  Wants to pursue rehab.  Would not want to return to hospital if condition worsens. Discussed hospice care in length. Patient needs more time to consider and see how rehab goes.   Is agreeable to further discussions outpatient regarding GOC, hospice, and to address lack of medical  advanced directives.   Placed on hospital follow up list.   Consider investigating home health agencies that also provide in-home palliative/hospice services when/if home discharge becomes appropriate to simplify transition to hospice care and provide additional home supports.      Decisional apparatus:  Patient is competent with questionable lack of insight to her diagnosis/prognosis on my exam today.  If competence is lost, patient's substitute decision maker would default to spouse by [place] 169.    Advance Directive and Living Will:   no  Power of :  no  POLST:  no    3.  Social support  Patient is supported by spouse  Supportive listening provided  Normalized experience of patient/family  Provided anxiety containment  Provided anticipatory guidance           We appreciate the invitation to be involved in this patient's care.  We will not be able to follow in person, but please do not hesitate to reach our on-call provider for acute symptom control concerns.  We can continue to support the primary team remotely via phone and [employer].  After hours and on weekends, please use our clinic answering service at [phone].      Magdi Andrade PA-C  Palliative and Supportive Care  Clinic/Answering Service: [phone]  You can find me on [employer]!     Identification:  Inpatient consult to Palliative Care  Consult performed by: Magdi Andrade PA-C  Consult ordered by: Charlene Archer MD        Physician Requesting Consult:Alice Nuno *  Reason for Consult / Principal Problem: GOC counseling secondary to respiratory failure  Date of admission:2/17/2024  Length of stay:13  Hx and PE limited by: virtual    History of Present Illness   Nina Tran is a 78 y.o. year old female who presents with hx of restrictive lung disease s/p wedge resection on continuous O2 via NC, acute respiratory failure with hypoxia, COVID-19, pneumonia, ambulatory dysfunction, hx of thymus cancer. PSC team consulted  for goals of care.     Patient visited by her  Abelardo, whom she has been  for 63 years. She was brought to [facility] ED for worsening shortness of breath and chest pain. Was recently admitted for covid. Had EGD 02/27 which showed large hiatal hernia. Following procedure she complained of increased WOB and required titration of supplemental O2 to 6-8L. She has underlying restrictive lung disease and was maintained on 2L O2 prior to admission. Plan to discharge to rehab.    We had an in-depth [facility] discussion. Patient would like to pursue rehab. When asked if she would return to the hospital for new or worsening symptoms or preference to remain at home and treat symptomatically she had chose the latter. We discussed hospice in depth and patient agreed it had aligned with her goals. However, she would like to pursue rehab and disease-focused cares to see if her respiratory status can improve. She may consider hospice if her condition fails to improve. We discussed CODE status and patient would like to be resuscitated.    Patient appears fully competent on exam but may lack understanding of her prognosis. She is able to answer all questions appropriately. She had expressed that she needed time to think about her goals more. She agreed to continued palliative care support outpatient in our  office if she were able to graduate from rehab.       Review of Systems   Constitutional:  Negative for activity change, appetite change, fatigue and unexpected weight change.   HENT:  Negative for mouth sores, trouble swallowing and voice change.    Eyes: Negative.    Respiratory:  Positive for shortness of breath.    Cardiovascular:  Negative for chest pain.   Gastrointestinal:  Negative for abdominal pain, constipation, diarrhea, nausea and vomiting.   Genitourinary: Negative.    Musculoskeletal:  Negative for arthralgias, back pain, gait problem and myalgias.   Skin:  Negative for color change, pallor and wound.    Neurological:  Negative for dizziness, weakness, light-headedness and headaches.   Hematological: Negative.    Psychiatric/Behavioral:  Negative for confusion and sleep disturbance. The patient is not nervous/anxious.        Historical Information   Past Medical History:   Diagnosis Date    High cholesterol      Past Surgical History:   Procedure Laterality Date    CHOLECYSTECTOMY  11/2023    HYSTERECTOMY      LUNG BIOPSY Right 01/2023     E-Cigarette/Vaping    E-Cigarette Use Never User      E-Cigarette/Vaping Substances     Social History     Socioeconomic History    Marital status: /Civil Union     Spouse name: None    Number of children: None    Years of education: None    Highest education level: None   Occupational History    None   Tobacco Use    Smoking status: Never    Smokeless tobacco: Never   Vaping Use    Vaping status: Never Used   Substance and Sexual Activity    Alcohol use: Yes    Drug use: Never    Sexual activity: None   Other Topics Concern    None   Social History Narrative    None     Social Determinants of Health     Financial Resource Strain: Not on file   Food Insecurity: No Food Insecurity (2/19/2024)    Hunger Vital Sign     Worried About Running Out of Food in the Last Year: Never true     Ran Out of Food in the Last Year: Never true   Transportation Needs: No Transportation Needs (2/6/2024)    PRAPARE - Transportation     Lack of Transportation (Medical): No     Lack of Transportation (Non-Medical): No   Physical Activity: Not on file   Stress: Not on file   Social Connections: Not on file   Intimate Partner Violence: Not on file   Housing Stability: Low Risk  (2/19/2024)    Housing Stability Vital Sign     Unable to Pay for Housing in the Last Year: No     Number of Places Lived in the Last Year: 1     Unstable Housing in the Last Year: No     History reviewed. No pertinent family history.    Meds/Allergies   all current active meds have been reviewed and current meds:  Current  Facility-Administered Medications   Medication Dose Route Frequency    acetaminophen (TYLENOL) tablet 650 mg  650 mg Oral Q6H PRN    aluminum-magnesium hydroxide-simethicone (MAALOX) oral suspension 30 mL  30 mL Oral Q4H PRN    ascorbic acid (VITAMIN C) tablet 500 mg  500 mg Oral Daily    atorvastatin (LIPITOR) tablet 20 mg  20 mg Oral Daily With Dinner    atovaquone (MEPRON) oral suspension 1,500 mg  1,500 mg Oral Daily    bismuth subsalicylate (PEPTO BISMOL) oral suspension 525 mg  525 mg Oral Q6H PRN    cefepime (MAXIPIME) IVPB (premix in dextrose) 2,000 mg 50 mL  2,000 mg Intravenous Q12H    diltiazem (CARDIZEM) tablet 30 mg  30 mg Oral Once    enoxaparin (LOVENOX) subcutaneous injection 40 mg  40 mg Subcutaneous Daily    guaiFENesin (MUCINEX) 12 hr tablet 1,200 mg  1,200 mg Oral Q12H FALLON    insulin lispro (HumaLOG) 100 units/mL subcutaneous injection 1-6 Units  1-6 Units Subcutaneous TID AC    ipratropium-albuterol (DUO-NEB) 0.5-2.5 mg/3 mL inhalation solution 3 mL  3 mL Nebulization Q6H PRN    lidocaine (LIDODERM) 5 % patch 1 patch  1 patch Topical Q24H    methenamine hippurate (HIPREX) tablet 1 g  1 g Oral BID With Meals    midodrine (PROAMATINE) tablet 5 mg  5 mg Oral TID AC    mycophenolate (CELLCEPT) capsule 1,000 mg  1,000 mg Oral Q12H FALLON    ondansetron (ZOFRAN) injection 4 mg  4 mg Intravenous Q6H PRN    oxyCODONE (ROXICODONE) IR tablet 2.5 mg  2.5 mg Oral Q4H PRN    Or    oxyCODONE (ROXICODONE) IR tablet 5 mg  5 mg Oral Q4H PRN    pantoprazole (PROTONIX) EC tablet 40 mg  40 mg Oral BID AC    predniSONE tablet 60 mg  60 mg Oral Daily    sucralfate (CARAFATE) tablet 1 g  1 g Oral 4x Daily (AC & HS)       Allergies   Allergen Reactions    Tetanus Toxoid Hives     hives          Objective     Physical Exam  Vitals and nursing note reviewed.   Constitutional:       General: She is not in acute distress.     Appearance: She is well-developed. She is ill-appearing.      Interventions: Nasal cannula in place.    HENT:      Head: Normocephalic and atraumatic.      Right Ear: External ear normal.      Left Ear: External ear normal.      Nose: Nose normal.      Mouth/Throat:      Pharynx: Oropharynx is clear.   Eyes:      Conjunctiva/sclera: Conjunctivae normal.   Cardiovascular:      Rate and Rhythm: Normal rate.   Pulmonary:      Effort: Pulmonary effort is normal. No respiratory distress.   Abdominal:      General: Abdomen is flat.   Musculoskeletal:         General: No deformity.      Cervical back: Normal range of motion.   Skin:     General: Skin is warm and dry.      Capillary Refill: Capillary refill takes less than 2 seconds.   Neurological:      Mental Status: She is alert and oriented to person, place, and time.   Psychiatric:         Mood and Affect: Mood normal.         Behavior: Behavior normal.         Lab Results: I have personally reviewed pertinent labs.,   CMP:   Lab Results   Component Value Date    SODIUM 131 (L) 03/01/2024    K 4.2 03/01/2024    CL 96 03/01/2024    CO2 31 03/01/2024    BUN 18 03/01/2024    CREATININE 0.58 (L) 03/01/2024    CALCIUM 7.7 (L) 03/01/2024    EGFR 87 03/01/2024     Imaging Studies: I have personally reviewed pertinent reports.    XR chest portable    Result Date: 2/28/2024  Narrative: XR CHEST PORTABLE INDICATION: sob. COMPARISON: 2/24/2024 FINDINGS: Left-sided infiltrate suggestive of pneumonia and hazy ill-defined infiltrate in the medial right lung base may also represent pneumonia. Trace left pleural fluid. No pneumothorax seen. Heart size within normal limits. Median sternotomy wires present. Bones are unremarkable for age. Normal upper abdomen.     Impression: Infiltrate suspicious for bilateral pneumonia with trace left effusion Workstation performed: [id number]     EGD    Result Date: 2/27/2024  Narrative: Table formatting from the original result was not included. [facility] [address] [facility] [id number] [phone] DATE OF SERVICE:  2/27/24 PHYSICIAN(S): Attending: Bhakti Garcia MD Fellow: No Staff Documented INDICATION: Epigastric pain, Gastroesophageal reflux disease without esophagitis, Gastroesophageal reflux disease, unspecified whether esophagitis present POST-OP DIAGNOSIS: See the impression below. PREPROCEDURE: Informed consent was obtained for the procedure, including sedation.  Risks of perforation, hemorrhage, adverse drug reaction and aspiration were discussed. The patient was placed in the left lateral decubitus position. Patient was explained about the risks and benefits of the procedure. Risks including but not limited to bleeding, infection, and perforation were explained in detail. Also explained about less than 100% sensitivity with the exam and other alternatives. PROCEDURE: EGD DETAILS OF PROCEDURE: Patient was taken to the procedure room where a time out was performed to confirm correct patient and correct procedure. The patient underwent monitored anesthesia care, which was administered by an anesthesia professional. The patient's blood pressure, heart rate, level of consciousness, respirations and oxygen were monitored throughout the procedure. The scope was advanced to the second part of the duodenum. Retroflexion was performed in the fundus. The patient experienced no blood loss. The procedure was not difficult. The patient tolerated the procedure well. There were no apparent adverse events. ANESTHESIA INFORMATION: ASA: III Anesthesia Type: IV Sedation with Anesthesia MEDICATIONS: No administrations occurring from 1413 to 1433 on 02/27/24 FINDINGS: The esophagus appeared normal. Regular Z-line 33 cm from the incisors Large hiatal hernia:  Hill classification: Grade IV Moderate, patchy erythematous mucosa with erosion in the antrum, consistent with gastritis Erythematous mucosa in the duodenal bulb The 2nd part of the duodenum and ampullary region appeared normal. Bezoar (food and pills) in the esophagus and body of  the stomach SPECIMENS: * No specimens in log *     Impression: The esophagus appeared normal. Large hiatal hernia Moderate abnormal mucosa in the antrum, consistent with gastritis Erythematous mucosa in the duodenal bulb The 2nd part of the duodenum and ampullary region appeared normal. Bezoar (food and pills) in the esophagus and body of the stomach RECOMMENDATION:  Return to the floor for ongoing care Continue PPI Start Carafate 1gm po TID for 2 weeks Check for H.pylori in  8-12 weeks once she is off Antibiotics and PPI Continue GERD lifestyle & Diet modifications Avoid NSAIDs Avoid ETOH Avoid smoking    Bhakti Garcia MD     XR chest portable    Result Date: 2/25/2024  Narrative: XR CHEST PORTABLE INDICATION: sob. COMPARISON: 2/23/2024 FINDINGS: No significant interval change in bibasilar groundglass opacities and interstitial thickening. Right upper lobe wedge resection. No pneumothorax or pleural effusion. Normal cardiomediastinal silhouette. Median sternotomy. Bones are unremarkable for age. Normal upper abdomen.     Impression: No significant interval change in bibasilar groundglass opacities and interstitial thickening. Workstation performed: [id number]     XR chest portable    Result Date: 2/23/2024  Narrative: XR CHEST PORTABLE INDICATION: worsening hypoxia. COMPARISON: 2/20/2024 FINDINGS: Persistent increased peripheral bibasilar and right upper lung groundglass airspace opacities, left greater. Medial right apex surgical wire staple chain. No pneumothorax or pleural effusion. Normal cardiomediastinal silhouette. Bones are unremarkable for age. Normal upper abdomen.     Impression: Persistent increased bilateral groundglass airspace infiltrates which may reflect worsening COVID-19 pneumonia, especially left base The study was marked in EPIC for immediate notification. Workstation performed: [id number]     XR chest portable    Result Date: 2/21/2024  Narrative: XR CHEST PORTABLE INDICATION: shortness  of breath. COMPARISON: CT 2/17/2024. CXR  4/14/2021 FINDINGS: Mild cardiomegaly. Slight vascular accentuation superimposed upon mild chronic interstitial accentuation. Peripheral bibasilar slight prominent groundglass airspace opacities, left greater. Right upper lung, peripheral right midlung zone, surgical wire staple chains. Median sternotomy. No pneumothorax or pleural effusion. Bones are unremarkable for age. Normal upper abdomen.     Impression: Mild cardiomegaly. Slight vascular congestion. Peripheral bibasilar groundglass airspace opacities which may reflect COVID-19 pneumonia. The study was marked in EPIC for immediate notification. Workstation performed: [id number]     PE Study with CT Abdomen and Pelvis with contrast    Result Date: 2/17/2024  Narrative: CT PULMONARY ANGIOGRAM OF THE CHEST AND CT ABDOMEN AND PELVIS WITH INTRAVENOUS CONTRAST INDICATION: Chest pain, short of breath, abdominal pain, COVID. COMPARISON: 2/4/2024 TECHNIQUE: CT examination of the chest, abdomen and pelvis was performed. Thin section CT angiographic technique was used in the chest in order to evaluate for pulmonary embolus and coronal 3D MIP postprocessing was performed on the acquisition scanner. Multiplanar 2D reformatted images were created from the source data. This examination, like all CT scans performed in the [facility], was performed utilizing techniques to minimize radiation dose exposure, including the use of iterative reconstruction and automated exposure control. Radiation dose length product (DLP) for this visit: 677 mGy-cm IV Contrast: 100 mL of iohexol (OMNIPAQUE) Enteric Contrast: Not administered. FINDINGS: CHEST PULMONARY ARTERIAL TREE: No pulmonary embolus is seen. LUNGS: Unchanged groundglass opacities and interstitial marking thickening with bibasal predominance. Appearance favors fibrosis but acute process cannot be completely excluded. PLEURA: Unremarkable. HEART/AORTA: Mildly  enlarged. No thoracic aortic aneurysm. MEDIASTINUM AND GABE: Unremarkable. CHEST WALL AND LOWER NECK: Unremarkable. ABDOMEN LIVER/BILIARY TREE: Unremarkable. GALLBLADDER: No calcified gallstones. No pericholecystic inflammatory change. SPLEEN: Unremarkable. PANCREAS: Unremarkable. ADRENAL GLANDS: Unremarkable. KIDNEYS/URETERS: No hydronephrosis. Nonobstructive punctate left renal calculus. Cortical scarring in the lower pole of the left kidney. Subcentimeter hypoattenuating renal lesion(s), too small to characterize but statistically likely benign, which do not warrant follow-up (Radiology June 2019). STOMACH AND BOWEL: No evidence of small bowel obstruction. Polyp-like appearance, nondistention and wall thickening in the transverse and left colon. No abscess or perforation. APPENDIX: Normal appendix. ABDOMINOPELVIC CAVITY: No ascites. No pneumoperitoneum. No lymphadenopathy. VESSELS: Atherosclerosis without abdominal aortic aneurysm. 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 Normal caliber SMA. PELVIS REPRODUCTIVE ORGANS: Post hysterectomy. URINARY BLADDER: Mucosal hyperenhancement in the posterior bladder, not further characterized. Small amount of air in the nondependent aspect. ABDOMINAL WALL/INGUINAL REGIONS: Unremarkable. BONES: No acute fracture or suspicious osseous lesion.     Impression: No pulmonary embolism identified. Unchanged groundglass opacities and interstitial marking thickening with bibasal predominance. Appearance favors fibrosis but acute process cannot be completely excluded. Featureless appearance, wall thickening and under distention of the transverse and left colon including rectum. Correlate to exclude history or symptoms of colitis. Ulcerative colitis is considered but usually presents at earlier age, correlate with history. Chronic findings, as per the body of the report. Workstation performed: [id number]     Echo complete w/ contrast if indicated    Result Date:  2/5/2024  Narrative:   Left Ventricle: Left ventricular cavity size is normal. Wall thickness is moderately increased. The left ventricular ejection fraction is 65-69 %. Systolic function is normal. Wall motion is normal. Diastolic function is mildly abnormal, consistent with grade I (abnormal) relaxation.   Right Ventricle: Right ventricular cavity size is normal. Systolic function is normal.   Mitral Valve: There is mild regurgitation.   Tricuspid Valve: There is mild regurgitation.   IVC/SVC: The inferior vena cava size is 13.0 mm. The right atrial pressure is estimated at 3.0 mmHg. The inferior vena cava is normal in size. Respirophasic changes were normal.     XR humerus LEFT    Result Date: 2/5/2024  Narrative: XR HUMERUS LEFT INDICATION: Patient presents with bilateral arm pain following fall.. COMPARISON: None FINDINGS: Mild osteopenia. No acute fracture or dislocation. No significant degenerative changes. No lytic or blastic osseous lesion. Increased reticulonodular opacities and streaky consolidative opacities at the left lung base, at least partially accentuated by low lung volumes. Soft tissues are, otherwise, unremarkable.     Impression: No acute osseous abnormality. Opacities in the left lung could represent pneumonia or sequelae of interstitial lung disease. Correlate for acute respiratory symptoms. Resident: ETHAN Flowers I, the attending radiologist, have reviewed the images and agree with the final report above. Workstation performed: [id number]     XR humerus RIGHT    Result Date: 2/5/2024  Narrative: XR HUMERUS RIGHT INDICATION: Patient presents with bilateral arm pain following fall. COMPARISON: None FINDINGS: Mild osteopenia. Small pedunculated osseous excrescence with cortical medullary continuity at the distal humerus on lateral view suggesting benign osteochondroma. No suspicious lytic or blastic lesions. No acute fracture or dislocation. Mild acromioclavicular degenerative changes. No lytic  or blastic osseous lesion. Unremarkable soft tissues.     Impression: No acute osseous abnormality. Resident: ETHAN Flowers I, the attending radiologist, have reviewed the images and agree with the final report above. Workstation performed: [id number]     CTA chest pe study    Result Date: 2/4/2024  Narrative: CTA - CHEST WITH IV CONTRAST - PULMONARY ANGIOGRAM INDICATION: Pulmonary embolism (PE) suspected, positive D-dimer acute respiratory failure, elevated d-dimer, COVID. COMPARISON: CT chest, abdomen, pelvis 2/4/2024. TECHNIQUE: CTA examination of the chest was performed using angiographic technique according to a protocol specifically tailored to evaluate for pulmonary embolism. Multiplanar 2D reformatted images were created from the source data. In addition, coronal  3D MIP postprocessing was performed on the acquisition scanner. Radiation dose length product (DLP) for this visit: 188.81 mGy-cm . This examination, like all CT scans performed in the [facility], was performed utilizing techniques to minimize radiation dose exposure, including the use of iterative reconstruction and automated exposure control. IV Contrast: 65 mL of iohexol (OMNIPAQUE) FINDINGS: PULMONARY ARTERIAL TREE: No pulmonary embolus is seen. LUNGS: There is no tracheal or endobronchial lesion. Right upper lobe pleural calcifications versus postsurgical changes. Redemonstrated diffuse interlobular septal thickening and bilateral groundglass opacities most pronounced in the lung bases. Mild bronchiectasis in the right upper lobe and bilateral lower lobes. PLEURA: Right lower lobe pleural calcifications. No pleural effusion or pneumothorax. HEART/GREAT VESSELS: Unremarkable for patient's age. No thoracic aortic aneurysm. Common origin of the brachiocephalic artery and the left common carotid artery off of the aortic arch, which is a normal anatomic variant. MEDIASTINUM AND GABE: Unremarkable. CHEST WALL AND LOWER NECK: Median  sternotomy wires are intact. VISUALIZED STRUCTURES IN THE UPPER ABDOMEN: Unremarkable. OSSEOUS STRUCTURES: No acute fracture or destructive osseous lesion.     Impression: No pulmonary embolism. Redemonstrated diffuse interlobular septal thickening and bilateral groundglass opacities most pronounced in the lung bases and mild bronchiectasis in the right upper lobe and bilateral lower lobes. Findings may be due to infectious/inflammatory etiology  such as COVID-pneumonia, pulmonary edema, or underlying pulmonary fibrosis. Recommend follow-up high-resolution chest CT in 3 months for reassessment. The study was marked in EPIC for immediate notification. Workstation performed: [id number]     CT chest abdomen pelvis wo contrast    Result Date: 2/4/2024  Narrative: CT CHEST, ABDOMEN AND PELVIS WITHOUT IV CONTRAST INDICATION: fall cough. COMPARISON: CT chest abdomen pelvis dated April 11, 2021. TECHNIQUE: CT examination of the chest, abdomen and pelvis was performed without intravenous contrast. Multiplanar 2D reformatted images were created from the source data. This examination, like all CT scans performed in the [facility], was performed utilizing techniques to minimize radiation dose exposure, including the use of iterative reconstruction and automated exposure control. Radiation dose length product (DLP) for this visit: 874 mGy-cm Enteric Contrast: Not administered. FINDINGS: CHEST LUNGS: There is diffuse nonspecific interlobular septal thickening with areas of groundglass haziness suspicious for an infectious or inflammatory process. No tracheal or endobronchial lesion. Calcified parenchymal scarring is noted at the right lung apex. Mild parenchymal scarring is also noted at the left lung apex. No pneumothorax or pleural effusion. PLEURA: Unremarkable. HEART/GREAT VESSELS: Heart is unremarkable for patient's age. No thoracic aortic aneurysm. MEDIASTINUM AND GABE: Unremarkable. CHEST WALL AND LOWER  NECK: Postoperative changes of prior median sternotomy are present. ABDOMEN LIVER/BILIARY TREE: Unremarkable. GALLBLADDER: Post cholecystectomy. SPLEEN: Unremarkable. PANCREAS: Unremarkable. ADRENAL GLANDS: Unremarkable. KIDNEYS/URETERS: Unremarkable. No hydronephrosis. STOMACH AND BOWEL: There is chronic fatty infiltration of the colonic submucosa. There is no evidence of large or small bowel obstruction. APPENDIX: No findings to suggest appendicitis. ABDOMINOPELVIC CAVITY: No ascites. No pneumoperitoneum. No lymphadenopathy. VESSELS: Unremarkable for patient's age. PELVIS REPRODUCTIVE ORGANS: Unremarkable for patient's age. URINARY BLADDER: Unremarkable. ABDOMINAL WALL/INGUINAL REGIONS: Unremarkable. BONES: No acute fracture or suspicious osseous lesion.     Impression: Although the study was limited by the lack of intravenous contrast, there is no gross evidence of solid organ injury. No acute intra-abdominal abnormality. No free air or free fluid. Diffuse nonspecific interlobular septal thickening with areas of groundglass haziness with differential considerations including an infectious or inflammatory process, pulmonary edema, among other etiologies. Correlation with the patient's symptoms and laboratory studies is recommended. A repeat high-resolution CT chest in 6 to 8 weeks is recommended to assess for improvement/resolution and exclude an underlying lesion. Workstation performed: [id number]     CT cervical spine without contrast    Result Date: 2/4/2024  Narrative: CT CERVICAL SPINE - WITHOUT CONTRAST INDICATION:   fall. COMPARISON:  None. TECHNIQUE:  CT examination of the cervical spine was performed without intravenous contrast.  Contiguous axial images were obtained. Multiplanar 2D reformatted images were created from the source data. Radiation dose length product (DLP) for this visit:  323 mGy-cm .  This examination, like all CT scans performed in the [facility], was performed  utilizing techniques to minimize radiation dose exposure, including the use of iterative reconstruction and automated exposure control. IMAGE QUALITY:  Diagnostic. FINDINGS: ALIGNMENT:  There is straightening of normal cervical lordosis.  No subluxation or compression deformity. VERTEBRAE:  No fracture. DEGENERATIVE CHANGES:  Mild multilevel cervical degenerative changes are noted without critical central canal stenosis. PREVERTEBRAL AND PARASPINAL SOFT TISSUES: Unremarkable THORACIC INLET:  Biapical pleural-parenchymal scarring is seen and apical emphysematous changes are noted.     Impression: No cervical spine fracture or traumatic malalignment. Workstation performed: [id number]     CT head without contrast    Result Date: 2/4/2024  Narrative: CT BRAIN - WITHOUT CONTRAST INDICATION:   fall. COMPARISON:  None. TECHNIQUE:  CT examination of the brain was performed.  Multiplanar 2D reformatted images were created from the source data. Radiation dose length product (DLP) for this visit:  885 mGy-cm .  This examination, like all CT scans performed in the [facility], was performed utilizing techniques to minimize radiation dose exposure, including the use of iterative reconstruction and automated exposure control. IMAGE QUALITY:  Diagnostic. FINDINGS: PARENCHYMA:  No intracranial mass, mass effect or midline shift. No CT signs of acute infarction.  No acute parenchymal hemorrhage. There is mild periventricular white matter low attenuation which is nonspecific and most likely related to chronic small vessel ischemic changes. VENTRICLES AND EXTRA-AXIAL SPACES:  Normal for the patient's age. VISUALIZED ORBITS: Normal visualized orbits. PARANASAL SINUSES: Advanced mucosal thickening of the visualized paranasal sinuses. CALVARIUM AND EXTRACRANIAL SOFT TISSUES:  Normal.     Impression: No acute intracranial abnormality. Workstation performed: [id number]      EKG, Pathology, and Other Studies: I have  "personally reviewed pertinent reports.        Counseling / Coordination of Care  Total floor / unit time spent today 60 minutes. Greater than 50% of total time was spent with the patient and / or family counseling and / or coordination of care. A description of the counseling / coordination of care: provided medical updates, determined goals of care, discussed palliative care and symptom management, discussed comfort care and hospice care, discussed code status, provided anticipatory guidance, determined competency and POA/HCA, determined social/family support, provided psychosocial support. Reviewed with Dr. Archer ([facility]).    Portions of this document may have been created using dictation software and as such some \"sound alike\" terms may have been generated by the system. Do not hesitate to contact me with any questions or clarifications.   "

## 2024-03-01 NOTE — OCCUPATIONAL THERAPY NOTE
Occupational Therapy Progress Note     Patient Name: Nina Tran  Today's Date: 3/1/2024  Problem List  Principal Problem:    Acute on chronic respiratory failure with hypoxia (HCC)  Active Problems:    COVID-19    Generalized weakness    History of thymus cancer    Chest pain    Pneumonia            03/01/24 0905   OT Last Visit   OT Visit Date 03/01/24   Note Type   Note Type Treatment   Pain Assessment   Pain Assessment Tool 0-10   Pain Score No Pain   Restrictions/Precautions   Weight Bearing Precautions Per Order No   Other Precautions Chair Alarm;Bed Alarm;Multiple lines;O2;Fall Risk   ADL   Where Assessed Edge of bed   Grooming Assistance 5  Supervision/Setup   Grooming Deficit Wash/dry face;Wash/dry hands;Setup   UB Bathing Assistance 4  Minimal Assistance   UB Bathing Deficit Setup;Right arm;Left arm;Abdomen  (decreased thoroughness due to fatigue)   LB Bathing Assistance 3  Moderate Assistance   LB Bathing Deficit Perineal area;Buttocks   UB Dressing Assistance 4  Minimal Assistance   UB Dressing Deficit Setup  (hospital gown management)   LB Dressing Assistance 1  Total Assistance   LB Dressing Deficit Don/doff R sock;Don/doff L sock;Thread RLE into underwear;Thread LLE into underwear   LB Dressing Comments Incontinence briefs donned while seated with Total A to manage over hips while standing; pt reporting urinary incontinence; educated to request transfer to BSC routinely to minimize episodes on incontinence.   Toileting Comments Declined need   Functional Standing Tolerance   Time ~30 seconds   Activity standing from EOB with RW in preparation for transfer   Bed Mobility   Supine to Sit 3  Moderate assistance   Additional items Assist x 1;Increased time required;Verbal cues;HOB elevated  (trunk management)   Transfers   Sit to Stand 3  Moderate assistance   Additional items Assist x 1;Increased time required;Verbal cues   Stand to Sit 3  Moderate assistance   Additional items Assist x 1;Increased  "time required;Verbal cues   Stand pivot 4  Minimal assistance   Additional items Assist x 1;Increased time required;Verbal cues   Additional Comments RW for improved balance   Subjective   Subjective \"Can I have the purewick?\"   Cognition   Overall Cognitive Status WFL   Arousal/Participation Alert;Cooperative   Orientation Level Oriented X4   Following Commands Follows one step commands without difficulty   Comments repeated cues throughout, lethargic   Activity Tolerance   Activity Tolerance Patient limited by fatigue   Medical Staff Made Aware PTAdriana   Assessment   Assessment Nina was seen for a follow up treatment focused on improving safety/independence with ADLs and functional transfers. Pt demo's decline in performance due to fatigue, decreased strength, impaired balance and decreased motivation/depressed affect. Pt received on 4L O2 with sats in high 80's. Desatted to 78% on 4L during transfer and required 6L O2 to rebound to mid-high 80's with extended seated rest break and max cues for pursed lip breathing. Pt observed shallow breathing with mouth open. HR: 130-140's with exertion. RN made aware. Continue OT POC.   Plan   Treatment Interventions ADL retraining;Functional transfer training;UE strengthening/ROM;Endurance training;Patient/family training;Equipment evaluation/education;Compensatory technique education;Continued evaluation;Cardiac education;Energy conservation;Activityengagement   Goal Expiration Date 03/03/24   OT Treatment Day 4   OT Frequency 2-3x/wk   Discharge Recommendation   Rehab Resource Intensity Level, OT III (Minimum Resource Intensity)   AM-PAC Daily Activity Inpatient   Lower Body Dressing 2   Bathing 2   Toileting 2   Upper Body Dressing 3   Grooming 3   Eating 4   Daily Activity Raw Score 16   Daily Activity Standardized Score (Calc for Raw Score >=11) 35.96   AM-PAC Applied Cognition Inpatient   Following a Speech/Presentation 3   Understanding Ordinary Conversation 4 "   Taking Medications 3   Remembering Where Things Are Placed or Put Away 2   Remembering List of 4-5 Errands 2   Taking Care of Complicated Tasks 2   Applied Cognition Raw Score 16   Applied Cognition Standardized Score 35.03     Assessment:  The patient's raw score on the -PAC Daily Activity Inpatient Short Form is 16. A raw score of less than 19 suggests the patient may benefit from discharge to post-acute rehabilitation services. Please refer to the recommendation of the Occupational Therapist for safe discharge planning. Pt seen for co-treatment with skilled Physical Therapy due to clinically unstable presentation, medical complexity, fall risk, functional balance, limited activity tolerance which is a decline from PLOF and may impact overall safety.        Nhung Cummins, OTR/L

## 2024-03-01 NOTE — PLAN OF CARE
Problem: PHYSICAL THERAPY ADULT  Goal: Performs mobility at highest level of function for planned discharge setting.  See evaluation for individualized goals.  Description: Treatment/Interventions: ADL retraining, Functional transfer training, LE strengthening/ROM, Elevations, Therapeutic exercise, Endurance training, Patient/family training, Equipment eval/education, Bed mobility, Gait training, Compensatory technique education, Spoke to nursing, Spoke to case management, OT  Equipment Recommended:  (walker-pt has)       See flowsheet documentation for full assessment, interventions and recommendations.  Outcome: Not Progressing  Note: Prognosis: Good  Problem List: Decreased strength, Decreased endurance, Impaired balance, Decreased mobility, Impaired judgement, Decreased safety awareness  Assessment: Pt seen for PT treatment session this date with interventions consisting of gait training w/ emphasis on improving pt's ability to ambulate level surfaces x 3' with min A provided by therapist with RW, Therapeutic exercise consisting of: AROM 15 reps B LE in sitting position, and therapeutic activity consisting of training: supine<>sit transfers and static sitting tolerance at EOB for 15 minutes w/ B/L UE support. Pt agreeable to PT treatment session upon arrival, pt found supine in bed w/ HOB elevated, in no apparent distress. In comparison to previous session, pt with no improvements as evidenced by Pt ambulating decreased distance, requires increased assistance for bed mobility and static sitting balance . Post session: pt returned back to recliner, chair alarm engaged, all needs in reach, and RN notified of session findings/recommendations Continue to recommend  Level II Resource Intensity  at time of d/c in order to maximize pt's functional independence and safety w/ mobility. Pt continues to be functioning below baseline level, and remains limited 2* factors listed above and including decreased strength,  balance, mobility, and activity tolerance. PT will continue to see pt while here in order to address the deficits listed above and provide interventions consistent w/ POC in effort to achieve STGs.  Barriers to Discharge: Inaccessible home environment  Barriers to Discharge Comments: pt requires increased assistance for mobility, limited activity tolerance  Rehab Resource Intensity Level, PT: II (Moderate Resource Intensity)    See flowsheet documentation for full assessment.

## 2024-03-01 NOTE — ASSESSMENT & PLAN NOTE
Recently admitted with COVID-19 infection tested positive on 2/4, still testing positive from previous infection. Completed 10 days of isolation per COVID isolation precautions  Was initially on solumedrol, now on Prednisone 60 mg daily

## 2024-03-01 NOTE — CASE MANAGEMENT
Case Management Progress Note    Patient name Nina Tran  Location /-01 MRN [id number]  : 1945 Date 3/1/2024       LOS (days): 13  Geometric Mean LOS (GMLOS) (days): 5.2  Days to GMLOS:-7.8        OBJECTIVE:        Current admission status: Inpatient  Preferred Pharmacy:   [facility]/pharmacy #[id number] [place] [address]  Phone: [phone] Fax: [phone]    Primary Care Provider: Emely Will DO    Primary Insurance: MEDICARE  Secondary Insurance: [employer]    PROGRESS NOTE:    CM met with patient in room.  Reviewed STR options.  Patient prefers CM call spouse to discuss.  Abelardo, phone [phone].  Abelardo has spoken with dtrs and all in agreement with [facility] at time of dc.  Reserved in [place].      DCP: [facility] accepting, COVID test prior to dc, transportation will be arranged by CM - will need BLS due to oxygen requirements.      UPDATE 1405: No bed available today.  Call directly to [facility] admissions, [facility].  Case being reviewed for a possible Saturday admission.  Vickey to notify this writer by end of day with determination.     UPDATE [facility] able to take Saturday.  COVID test done today.  CM will arrange BLS transport for 1400.  Appreciate IMM given by co-worker.

## 2024-03-02 ENCOUNTER — APPOINTMENT (INPATIENT)
Dept: CT IMAGING | Facility: HOSPITAL | Age: 79
DRG: 196 | End: 2024-03-02
Payer: MEDICARE

## 2024-03-02 PROBLEM — R07.89 OTHER CHEST PAIN: Status: ACTIVE | Noted: 2024-02-17

## 2024-03-02 LAB
ALBUMIN SERPL BCP-MCNC: 2.9 G/DL (ref 3.5–5)
ALP SERPL-CCNC: 381 U/L (ref 34–104)
ALT SERPL W P-5'-P-CCNC: 86 U/L (ref 7–52)
ANION GAP SERPL CALCULATED.3IONS-SCNC: 6 MMOL/L
ANION GAP SERPL CALCULATED.3IONS-SCNC: 8 MMOL/L
ANISOCYTOSIS BLD QL SMEAR: PRESENT
AST SERPL W P-5'-P-CCNC: 84 U/L (ref 13–39)
BACTERIA UR QL AUTO: ABNORMAL /HPF
BASOPHILS # BLD MANUAL: 0 THOUSAND/UL (ref 0–0.1)
BASOPHILS NFR MAR MANUAL: 0 % (ref 0–1)
BILIRUB DIRECT SERPL-MCNC: 0.21 MG/DL (ref 0–0.2)
BILIRUB SERPL-MCNC: 0.84 MG/DL (ref 0.2–1)
BILIRUB UR QL STRIP: NEGATIVE
BNP SERPL-MCNC: 80 PG/ML (ref 0–100)
BUN SERPL-MCNC: 20 MG/DL (ref 5–25)
BUN SERPL-MCNC: 21 MG/DL (ref 5–25)
CALCIUM SERPL-MCNC: 7.2 MG/DL (ref 8.4–10.2)
CALCIUM SERPL-MCNC: 7.9 MG/DL (ref 8.4–10.2)
CHLORIDE SERPL-SCNC: 92 MMOL/L (ref 96–108)
CHLORIDE SERPL-SCNC: 95 MMOL/L (ref 96–108)
CLARITY UR: CLEAR
CO2 SERPL-SCNC: 25 MMOL/L (ref 21–32)
CO2 SERPL-SCNC: 25 MMOL/L (ref 21–32)
COLOR UR: YELLOW
CREAT SERPL-MCNC: 0.63 MG/DL (ref 0.6–1.3)
CREAT SERPL-MCNC: 0.65 MG/DL (ref 0.6–1.3)
EOSINOPHIL # BLD MANUAL: 0.1 THOUSAND/UL (ref 0–0.4)
EOSINOPHIL NFR BLD MANUAL: 1 % (ref 0–6)
ERYTHROCYTE [DISTWIDTH] IN BLOOD BY AUTOMATED COUNT: 15.7 % (ref 11.6–15.1)
GFR SERPL CREATININE-BSD FRML MDRD: 84 ML/MIN/1.73SQ M
GFR SERPL CREATININE-BSD FRML MDRD: 84 ML/MIN/1.73SQ M
GLUCOSE SERPL-MCNC: 101 MG/DL (ref 65–140)
GLUCOSE SERPL-MCNC: 113 MG/DL (ref 65–140)
GLUCOSE SERPL-MCNC: 143 MG/DL (ref 65–140)
GLUCOSE SERPL-MCNC: 197 MG/DL (ref 65–140)
GLUCOSE SERPL-MCNC: 214 MG/DL (ref 65–140)
GLUCOSE SERPL-MCNC: 87 MG/DL (ref 65–140)
GLUCOSE UR STRIP-MCNC: NEGATIVE MG/DL
HCT VFR BLD AUTO: 31.1 % (ref 34.8–46.1)
HGB BLD-MCNC: 10.1 G/DL (ref 11.5–15.4)
HGB UR QL STRIP.AUTO: NEGATIVE
KETONES UR STRIP-MCNC: NEGATIVE MG/DL
LACTATE SERPL-SCNC: 1.5 MMOL/L (ref 0.5–2)
LEUKOCYTE ESTERASE UR QL STRIP: NEGATIVE
LYMPHOCYTES # BLD AUTO: 0.21 THOUSAND/UL (ref 0.6–4.47)
LYMPHOCYTES # BLD AUTO: 2 % (ref 14–44)
MACROCYTES BLD QL AUTO: PRESENT
MCH RBC QN AUTO: 26.8 PG (ref 26.8–34.3)
MCHC RBC AUTO-ENTMCNC: 32.5 G/DL (ref 31.4–37.4)
MCV RBC AUTO: 83 FL (ref 82–98)
METAMYELOCYTES NFR BLD MANUAL: 1 % (ref 0–1)
MONOCYTES # BLD AUTO: 0.21 THOUSAND/UL (ref 0–1.22)
MONOCYTES NFR BLD: 2 % (ref 4–12)
MYELOCYTES NFR BLD MANUAL: 2 % (ref 0–1)
NEUTROPHILS # BLD MANUAL: 9.47 THOUSAND/UL (ref 1.85–7.62)
NEUTS BAND NFR BLD MANUAL: 3 % (ref 0–8)
NEUTS SEG NFR BLD AUTO: 89 % (ref 43–75)
NITRITE UR QL STRIP: NEGATIVE
NON-SQ EPI CELLS URNS QL MICRO: ABNORMAL /HPF
NRBC BLD AUTO-RTO: 1 /100 WBC (ref 0–2)
OVALOCYTES BLD QL SMEAR: PRESENT
PH UR STRIP.AUTO: 6 [PH]
PLATELET # BLD AUTO: 136 THOUSANDS/UL (ref 149–390)
PLATELET BLD QL SMEAR: ADEQUATE
PMV BLD AUTO: 9.9 FL (ref 8.9–12.7)
POIKILOCYTOSIS BLD QL SMEAR: PRESENT
POTASSIUM SERPL-SCNC: 3.9 MMOL/L (ref 3.5–5.3)
POTASSIUM SERPL-SCNC: 4 MMOL/L (ref 3.5–5.3)
PROCALCITONIN SERPL-MCNC: 0.3 NG/ML
PROT SERPL-MCNC: 5 G/DL (ref 6.4–8.4)
PROT UR STRIP-MCNC: ABNORMAL MG/DL
RBC # BLD AUTO: 3.77 MILLION/UL (ref 3.81–5.12)
RBC #/AREA URNS AUTO: ABNORMAL /HPF
RBC MORPH BLD: PRESENT
SODIUM SERPL-SCNC: 125 MMOL/L (ref 135–147)
SODIUM SERPL-SCNC: 126 MMOL/L (ref 135–147)
SP GR UR STRIP.AUTO: 1.01 (ref 1–1.03)
UROBILINOGEN UR QL STRIP.AUTO: 0.2 E.U./DL
WBC # BLD AUTO: 10.29 THOUSAND/UL (ref 4.31–10.16)
WBC #/AREA URNS AUTO: ABNORMAL /HPF
WBC TOXIC VACUOLES BLD QL SMEAR: PRESENT

## 2024-03-02 PROCEDURE — 80048 BASIC METABOLIC PNL TOTAL CA: CPT | Performed by: INTERNAL MEDICINE

## 2024-03-02 PROCEDURE — 85007 BL SMEAR W/DIFF WBC COUNT: CPT | Performed by: INTERNAL MEDICINE

## 2024-03-02 PROCEDURE — 83880 ASSAY OF NATRIURETIC PEPTIDE: CPT | Performed by: INTERNAL MEDICINE

## 2024-03-02 PROCEDURE — 84145 PROCALCITONIN (PCT): CPT | Performed by: INTERNAL MEDICINE

## 2024-03-02 PROCEDURE — 71250 CT THORAX DX C-: CPT

## 2024-03-02 PROCEDURE — 80076 HEPATIC FUNCTION PANEL: CPT | Performed by: INTERNAL MEDICINE

## 2024-03-02 PROCEDURE — 99232 SBSQ HOSP IP/OBS MODERATE 35: CPT | Performed by: INTERNAL MEDICINE

## 2024-03-02 PROCEDURE — 85027 COMPLETE CBC AUTOMATED: CPT | Performed by: INTERNAL MEDICINE

## 2024-03-02 PROCEDURE — 83605 ASSAY OF LACTIC ACID: CPT | Performed by: INTERNAL MEDICINE

## 2024-03-02 PROCEDURE — 81001 URINALYSIS AUTO W/SCOPE: CPT | Performed by: INTERNAL MEDICINE

## 2024-03-02 PROCEDURE — 82948 REAGENT STRIP/BLOOD GLUCOSE: CPT

## 2024-03-02 RX ORDER — FUROSEMIDE 10 MG/ML
40 INJECTION INTRAMUSCULAR; INTRAVENOUS ONCE
Status: DISCONTINUED | OUTPATIENT
Start: 2024-03-02 | End: 2024-03-02

## 2024-03-02 RX ORDER — SODIUM CHLORIDE, SODIUM GLUCONATE, SODIUM ACETATE, POTASSIUM CHLORIDE, MAGNESIUM CHLORIDE, SODIUM PHOSPHATE, DIBASIC, AND POTASSIUM PHOSPHATE .53; .5; .37; .037; .03; .012; .00082 G/100ML; G/100ML; G/100ML; G/100ML; G/100ML; G/100ML; G/100ML
75 INJECTION, SOLUTION INTRAVENOUS CONTINUOUS
Status: DISCONTINUED | OUTPATIENT
Start: 2024-03-02 | End: 2024-03-03

## 2024-03-02 RX ORDER — SODIUM CHLORIDE, SODIUM GLUCONATE, SODIUM ACETATE, POTASSIUM CHLORIDE, MAGNESIUM CHLORIDE, SODIUM PHOSPHATE, DIBASIC, AND POTASSIUM PHOSPHATE .53; .5; .37; .037; .03; .012; .00082 G/100ML; G/100ML; G/100ML; G/100ML; G/100ML; G/100ML; G/100ML
500 INJECTION, SOLUTION INTRAVENOUS ONCE
Status: COMPLETED | OUTPATIENT
Start: 2024-03-02 | End: 2024-03-02

## 2024-03-02 RX ADMIN — PREDNISONE 60 MG: 20 TABLET ORAL at 08:44

## 2024-03-02 RX ADMIN — GUAIFENESIN 1200 MG: 600 TABLET ORAL at 08:43

## 2024-03-02 RX ADMIN — SODIUM CHLORIDE, SODIUM GLUCONATE, SODIUM ACETATE, POTASSIUM CHLORIDE, MAGNESIUM CHLORIDE, SODIUM PHOSPHATE, DIBASIC, AND POTASSIUM PHOSPHATE 75 ML/HR: .53; .5; .37; .037; .03; .012; .00082 INJECTION, SOLUTION INTRAVENOUS at 20:26

## 2024-03-02 RX ADMIN — ACETAMINOPHEN 325MG 650 MG: 325 TABLET ORAL at 21:51

## 2024-03-02 RX ADMIN — SUCRALFATE 1 G: 1 TABLET ORAL at 21:49

## 2024-03-02 RX ADMIN — METHENAMINE HIPPURATE 1 G: 1 TABLET ORAL at 16:43

## 2024-03-02 RX ADMIN — GUAIFENESIN 1200 MG: 600 TABLET ORAL at 19:54

## 2024-03-02 RX ADMIN — MYCOPHENOLATE MOFETIL 1000 MG: 250 CAPSULE ORAL at 19:54

## 2024-03-02 RX ADMIN — SODIUM CHLORIDE, SODIUM GLUCONATE, SODIUM ACETATE, POTASSIUM CHLORIDE, MAGNESIUM CHLORIDE, SODIUM PHOSPHATE, DIBASIC, AND POTASSIUM PHOSPHATE 500 ML: .53; .5; .37; .037; .03; .012; .00082 INJECTION, SOLUTION INTRAVENOUS at 14:34

## 2024-03-02 RX ADMIN — METHENAMINE HIPPURATE 1 G: 1 TABLET ORAL at 08:44

## 2024-03-02 RX ADMIN — OXYCODONE HYDROCHLORIDE 5 MG: 5 TABLET ORAL at 16:43

## 2024-03-02 RX ADMIN — CEFEPIME HYDROCHLORIDE 2000 MG: 2 INJECTION, SOLUTION INTRAVENOUS at 08:44

## 2024-03-02 RX ADMIN — MIDODRINE HYDROCHLORIDE 5 MG: 5 TABLET ORAL at 16:43

## 2024-03-02 RX ADMIN — ATORVASTATIN CALCIUM 20 MG: 20 TABLET, FILM COATED ORAL at 16:43

## 2024-03-02 RX ADMIN — SUCRALFATE 1 G: 1 TABLET ORAL at 16:43

## 2024-03-02 RX ADMIN — ACETAMINOPHEN 325MG 650 MG: 325 TABLET ORAL at 12:36

## 2024-03-02 RX ADMIN — MYCOPHENOLATE MOFETIL 1000 MG: 250 CAPSULE ORAL at 08:43

## 2024-03-02 RX ADMIN — ATOVAQUONE 1500 MG: 750 SUSPENSION ORAL at 08:45

## 2024-03-02 RX ADMIN — OXYCODONE HYDROCHLORIDE AND ACETAMINOPHEN 500 MG: 500 TABLET ORAL at 08:44

## 2024-03-02 RX ADMIN — SUCRALFATE 1 G: 1 TABLET ORAL at 12:18

## 2024-03-02 RX ADMIN — CEFEPIME HYDROCHLORIDE 2000 MG: 2 INJECTION, SOLUTION INTRAVENOUS at 19:54

## 2024-03-02 RX ADMIN — SODIUM CHLORIDE, SODIUM GLUCONATE, SODIUM ACETATE, POTASSIUM CHLORIDE, MAGNESIUM CHLORIDE, SODIUM PHOSPHATE, DIBASIC, AND POTASSIUM PHOSPHATE 75 ML/HR: .53; .5; .37; .037; .03; .012; .00082 INJECTION, SOLUTION INTRAVENOUS at 14:34

## 2024-03-02 RX ADMIN — LIDOCAINE 5% 1 PATCH: 700 PATCH TOPICAL at 12:18

## 2024-03-02 RX ADMIN — ENOXAPARIN SODIUM 40 MG: 40 INJECTION SUBCUTANEOUS at 08:44

## 2024-03-02 RX ADMIN — SUCRALFATE 1 G: 1 TABLET ORAL at 05:54

## 2024-03-02 RX ADMIN — PANTOPRAZOLE SODIUM 40 MG: 40 TABLET, DELAYED RELEASE ORAL at 16:43

## 2024-03-02 RX ADMIN — PANTOPRAZOLE SODIUM 40 MG: 40 TABLET, DELAYED RELEASE ORAL at 05:54

## 2024-03-02 NOTE — PROGRESS NOTES
[facility]  Progress Note  Name: Nina Tran I  MRN: [id number]  Unit/Bed#: -01 I Date of Admission: 2/17/2024   Date of Service: 3/2/2024 I Hospital Day: 14    Assessment/Plan   * Acute on chronic respiratory failure with hypoxia (HCC)  Assessment & Plan  Presented with shortness of breath.  Recently admitted for COVID infection and sepsis.  Requiring 2 L during recent admission but no oxygen requirement prior to that at baseline.  Was managed as stepdown due to worsening oxygen requirement up to 8 L mid flow and mild hypotension which is improved.  Imaging with CTA chest showing no PE but groundglass opacity and interstitial marking present bilaterally and bibasilar region suspicion for fibrosis.  Has been immunosuppressed with CellCept, steroid.  Follows up with ID and pulmonology at [facility].  On PJP prophylaxis with atovaquone.  Initially treated with IV Solu-Medrol and IV Zosyn for suspicion of underlying pneumonia but was noted to have persistent fever switched to Zosyn.  ID recommended to continue cefepime but noted to have fever again today Tmax of 102.  More hypoxic and tachycardic.  Increased oxygen requirement up to 4 L again.  Patient having worsening tiredness today.    Will get basic workup including CBC, CMP, procalcitonin, lactic acid, BNP  Repeat CT chest without contrast  Check urinalysis with microscopic  Will check Doppler lower extremity bilateral  Isolyte 75 cc/h  Continue midodrine 5 mg 3 times daily  Incentive spirometry  Oral prednisone 60 mg taper by 10 mg every 5 days as per pulmonary recommendation  ID and pulmonary following  If continues to be febrile tomorrow then consider reaching out to ID and pulmonary regarding need for bronchoscopy  Continue atovaquone, CellCept, chronic prednisone therapy and follow-up with ID at [facility] on discharge    Pneumonia  Assessment & Plan  Suspected nosocomial: Pneumonia superimposed on ILD exacerbation.  Was on  cefepime given episode of fever on 2/24.  Initial plan was to continue cefepime until 3/4 and then changed to oral however had another episode of fever today.    See plan as above.    Other chest pain  Assessment & Plan  Presented to ED with worsening left-sided chest pain and worsening shortness of breath.  Suspected secondary to ILD exacerbation versus underlying pneumonia.  CTA chest was unremarkable for any PE.  Low suspicion for ACS.  S/p EGD showing large hiatal hernia    Continue PPI twice daily    History of thymus cancer  Assessment & Plan  Patient with history of thymus cancer s/p removal    Continue outpatient follow up     Generalized weakness  Assessment & Plan  POA with complaints of generalized weakness suspect secondary to resp issues  PT/OT consult: II (Moderate Resource Intensity) will need to go to rehab on discharge but overall her prognosis is guarded due to her worsening respiratory status    COVID-19  Assessment & Plan  Recently admitted with COVID-19 infection tested positive on 2/4, still testing positive from previous infection. Completed 10 days of isolation per COVID isolation precautions  Was initially on solumedrol, now on Prednisone 60 mg daily           VTE Pharmacologic Prophylaxis: VTE Score: 6 High Risk (Score >/= 5) - Pharmacological DVT Prophylaxis Ordered: enoxaparin (Lovenox). Sequential Compression Devices Ordered.    Mobility:   Basic Mobility Inpatient Raw Score: 15  JH-HLM Goal: 4: Move to chair/commode  JH-HLM Achieved: 1: Laying in bed  HLM Goal achieved. Continue to encourage appropriate mobility.    Patient Centered Rounds: I performed bedside rounds with nursing staff today.   Discussions with Specialists or Other Care Team Provider: case management    Education and Discussions with Family / Patient: Updated  () at bedside.    Current Length of Stay: 14 day(s)  Current Patient Status: Inpatient   Certification Statement: The patient will continue  to require additional inpatient hospital stay due to ongoing medical management  Discharge Plan: Anticipate discharge in 48-72 hrs to rehab facility.    Code Status: Level 1 - Full Code    Subjective:   Patient seen and examined at bedside.  Was very tired on my examination, indicating a few words only.  States worsening shortness of breath.  Was noted to have hypoxia increasing amount of oxygen required today.    Objective:     Vitals:   Temp (24hrs), Av.9 °F (37.2 °C), Min:97.7 °F (36.5 °C), Max:102.2 °F (39 °C)    Temp:  [97.7 °F (36.5 °C)-102.2 °F (39 °C)] 102.2 °F (39 °C)  HR:  [] 126  Resp:  [18-20] 18  BP: (106-135)/(63-84) 116/74  SpO2:  [83 %-96 %] 92 %  Body mass index is 23.52 kg/m².     Input and Output Summary (last 24 hours):     Intake/Output Summary (Last 24 hours) at 3/2/2024 1419  Last data filed at 3/2/2024 0811  Gross per 24 hour   Intake 120 ml   Output 650 ml   Net -530 ml       Physical Exam:   Physical Exam  Vitals and nursing note reviewed.   Constitutional:       General: She is not in acute distress.     Appearance: Normal appearance. She is well-developed.   HENT:      Head: Normocephalic and atraumatic.   Eyes:      General: No scleral icterus.        Right eye: No discharge.         Left eye: No discharge.      Conjunctiva/sclera: Conjunctivae normal.   Cardiovascular:      Rate and Rhythm: Normal rate and regular rhythm.      Pulses: Normal pulses.      Heart sounds: Normal heart sounds. No murmur heard.  Pulmonary:      Effort: Pulmonary effort is normal. No respiratory distress.      Breath sounds: Normal breath sounds. No wheezing or rales.      Comments: Coarse crackles over bilateral lung fields  Chest:      Chest wall: No tenderness.   Abdominal:      General: Abdomen is flat. Bowel sounds are normal. There is no distension.      Palpations: Abdomen is soft.      Tenderness: There is no abdominal tenderness.   Musculoskeletal:         General: No swelling or deformity.  Normal range of motion.      Cervical back: Normal range of motion and neck supple.      Right lower leg: No edema.      Left lower leg: No edema.   Skin:     General: Skin is warm and dry.      Capillary Refill: Capillary refill takes less than 2 seconds.      Coloration: Skin is not jaundiced or pale.      Findings: No rash.   Neurological:      General: No focal deficit present.      Mental Status: She is alert and oriented to person, place, and time. Mental status is at baseline.      Cranial Nerves: No cranial nerve deficit.      Motor: No weakness.   Psychiatric:         Mood and Affect: Mood normal.         Behavior: Behavior normal.            Additional Data:     Labs:  Results from last 7 days   Lab Units 03/02/24  1130   WBC Thousand/uL 10.29*   HEMOGLOBIN g/dL 10.1*   HEMATOCRIT % 31.1*   PLATELETS Thousands/uL 136*   BANDS PCT % 3   LYMPHO PCT % 2*   MONO PCT % 2*   EOS PCT % 1     Results from last 7 days   Lab Units 03/02/24  1130 02/28/24  1009 02/26/24  0614   SODIUM mmol/L 125*   < > 130*   POTASSIUM mmol/L 3.9   < > 5.0   CHLORIDE mmol/L 92*   < > 96   CO2 mmol/L 25   < > 28   BUN mg/dL 21   < > 19   CREATININE mg/dL 0.65   < > 0.67   ANION GAP mmol/L 8   < > 6   CALCIUM mg/dL 7.9*   < > 7.8*   ALBUMIN g/dL  --   --  2.9*   TOTAL BILIRUBIN mg/dL  --   --  0.76   ALK PHOS U/L  --   --  130*   ALT U/L  --   --  63*   AST U/L  --   --  37   GLUCOSE RANDOM mg/dL 113   < > 170*    < > = values in this interval not displayed.         Results from last 7 days   Lab Units 03/02/24  1120 03/02/24  0811 03/01/24  2100 03/01/24  1604 03/01/24  1115 03/01/24  0803 02/29/24  1617 02/29/24  1123 02/29/24  0738 02/28/24  2139 02/28/24  1628 02/28/24  1210   POC GLUCOSE mg/dl 101 87 141* 178* 158* 97 158* 149* 97 149* 141* 91         Results from last 7 days   Lab Units 03/02/24  1130 02/29/24  0542 02/28/24  1009 02/25/24  0618   LACTIC ACID mmol/L 1.5  --  1.4 1.9   PROCALCITONIN ng/ml  --  0.33*  --  0.23        Lines/Drains:  Invasive Devices       Peripheral Intravenous Line  Duration             Long-Dwell Peripheral IV (Midline) 02/24/24 Right Brachial 7 days              Drain  Duration             External Urinary Catheter 13 days                          Imaging: Reviewed radiology reports from this admission including: chest xray and chest CT scan    Recent Cultures (last 7 days):           Last 24 Hours Medication List:   Current Facility-Administered Medications   Medication Dose Route Frequency Provider Last Rate    acetaminophen  650 mg Oral Q6H PRN Charlene Archer MD      aluminum-magnesium hydroxide-simethicone  30 mL Oral Q4H PRN Charlene Archer MD      ascorbic acid  500 mg Oral Daily Charlene Archer MD      atorvastatin  20 mg Oral Daily With Dinner Charlene Archer MD      atovaquone  1,500 mg Oral Daily Charlene Archer MD      bismuth subsalicylate  525 mg Oral Q6H PRN Charlene Archer MD      cefepime  2,000 mg Intravenous Q12H Charlene Archer MD 2,000 mg (03/02/24 0844)    enoxaparin  40 mg Subcutaneous Daily Charlene Archer MD      guaiFENesin  1,200 mg Oral Q12H [place] Charlene Archer MD      insulin lispro  1-6 Units Subcutaneous TID AC Charlene Archer MD      ipratropium-albuterol  3 mL Nebulization Q6H PRN Charlene Archer MD      lidocaine  1 patch Topical Q24H Charlene Archer MD      methenamine hippurate  1 g Oral BID With Meals Charlene Archer MD      midodrine  5 mg Oral TID AC Charlene Archer MD      multi-electrolyte  75 mL/hr Intravenous Continuous Marly Darden MD      multi-electrolyte  500 mL Intravenous Once Marly Darden MD      mycophenolate  1,000 mg Oral Q12H [place] Charlene Archer MD      ondansetron  4 mg Intravenous Q6H PRN Charlene Archer MD      oxyCODONE  2.5 mg Oral Q4H PRN Charlene Archer MD      Or    oxyCODONE  5 mg Oral Q4H PRN Charlene Archer MD      pantoprazole  40 mg Oral BID AC Charlene Archer MD      predniSONE  60 mg Oral Daily Charlene Archer MD      sucralfate  1 g Oral 4x Daily (AC & HS) Charlene Archer MD           Today, Patient Was Seen By: Marly Darden MD    **Please Note: This note may have been constructed using a voice recognition system.**

## 2024-03-02 NOTE — PLAN OF CARE
Problem: PAIN - ADULT  Goal: Verbalizes/displays adequate comfort level or baseline comfort level  Description: Interventions:  - Encourage patient to monitor pain and request assistance  - Assess pain using appropriate pain scale0-10  - Administer analgesics based on type and severity of pain and evaluate response  - Implement non-pharmacological measures as appropriate and evaluate response  - Consider cultural and social influences on pain and pain management  - Notify physician/advanced practitioner if interventions unsuccessful or patient reports new pain  Outcome: Progressing     Problem: INFECTION - ADULT  Goal: Absence or prevention of progression during hospitalization  Description: INTERVENTIONS:  - Assess and monitor for signs and symptoms of infection  - Monitor lab/diagnostic results  - Monitor all insertion sites, i.e. indwelling lines, tubes, and drains  - Monitor endotracheal if appropriate and nasal secretions for changes in amount and color  - [place] appropriate cooling/warming therapies per order  - Administer medications as ordered  - Instruct and encourage patient and family to use good hand hygiene technique  - Identify and instruct in appropriate isolation precautions for identified infection/condition  Outcome: Progressing     Problem: SAFETY ADULT  Goal: Patient will remain free of falls  Description: INTERVENTIONS:  - Educate patient/family on patient safety including physical limitations  - Instruct patient to call for assistance with activity   - Consult OT/PT to assist with strengthening/mobility   - Keep Call bell within reach  - Keep bed low and locked with side rails adjusted as appropriate  - Keep care items and personal belongings within reach  - Initiate and maintain comfort rounds  - Make Fall Risk Sign visible to staff  - Offer Toileting every 2 Hours, in advance of need  - Initiate/Maintain bed/chair alarm  - Apply yellow socks and bracelet for high fall risk patients  -  Consider moving patient to room near nurses station  Outcome: Progressing  Goal: Maintain or return to baseline ADL function  Description: INTERVENTIONS:  -  Assess patient's ability to carry out ADLs; assess patient's baseline for ADL function and identify physical deficits which impact ability to perform ADLs (bathing, care of mouth/teeth, toileting, grooming, dressing, etc.)  - Assess/evaluate cause of self-care deficits   - Assess range of motion  - Assess patient's mobility; develop plan if impaired  - Assess patient's need for assistive devices and provide as appropriate  - Encourage maximum independence but intervene and supervise when necessary  - Involve family in performance of ADLs  - Assess for home care needs following discharge   - Consider OT consult to assist with ADL evaluation and planning for discharge  - Provide patient education as appropriate  Outcome: Progressing  Goal: Maintains/Returns to pre admission functional level  Description: INTERVENTIONS:  - Perform AM-PAC 6 Click Basic Mobility/ Daily Activity assessment daily.  - Set and communicate daily mobility goal to care team and patient/family/caregiver.   - Collaborate with rehabilitation services on mobility goals if consulted  - Perform Range of Motion 4 times a day.  - Reposition patient every 2 hours.  - Ambulate patient 4 times a day  - Out of bed to chair 3 times a day   - Out of bed for meals 3 times a day  - Out of bed for toileting  - Record patient progress and toleration of activity level   Outcome: Progressing     Problem: DISCHARGE PLANNING  Goal: Discharge to home or other facility with appropriate resources  Description: INTERVENTIONS:  - Identify barriers to discharge w/patient and caregiver  - Arrange for needed discharge resources and transportation as appropriate  - Identify discharge learning needs (meds, wound care, etc.)  - Arrange for interpretive services to assist at discharge as needed  - Refer to Case Management  Department for coordinating discharge planning if the patient needs post-hospital services based on physician/advanced practitioner order or complex needs related to functional status, cognitive ability, or social support system  Outcome: Progressing     Problem: Knowledge Deficit  Goal: Patient/family/caregiver demonstrates understanding of disease process, treatment plan, medications, and discharge instructions  Description: Complete learning assessment and assess knowledge base.  Interventions:  - Provide teaching at level of understanding  - Provide teaching via preferred learning methods  Outcome: Progressing     Problem: RESPIRATORY - ADULT  Goal: Achieves optimal ventilation and oxygenation  Description: INTERVENTIONS:  - Assess for changes in respiratory status  - Assess for changes in mentation and behavior  - Position to facilitate oxygenation and minimize respiratory effort  - Oxygen administered by appropriate delivery if ordered  - Initiate smoking cessation education as indicated  - Encourage broncho-pulmonary hygiene including cough, deep breathe, Incentive Spirometry  - Assess the need for suctioning and aspirate as needed  - Assess and instruct to report SOB or any respiratory difficulty  - Respiratory Therapy support as indicated  Outcome: Progressing     Problem: Prexisting or High Potential for Compromised Skin Integrity  Goal: Skin integrity is maintained or improved  Description: INTERVENTIONS:  - Identify patients at risk for skin breakdown  - Assess and monitor skin integrity  - Assess and monitor nutrition and hydration status  - Monitor labs   - Assess for incontinence   - Turn and reposition patient  - Assist with mobility/ambulation  - Relieve pressure over bony prominences  - Avoid friction and shearing  - Provide appropriate hygiene as needed including keeping skin clean and dry  - Evaluate need for skin moisturizer/barrier cream  - Collaborate with interdisciplinary team   -  Patient/family teaching  - Consider wound care consult   Outcome: Progressing     Problem: Potential for Falls  Goal: Patient will remain free of falls  Description: INTERVENTIONS:  - Educate patient/family on patient safety including physical limitations  - Instruct patient to call for assistance with activity   - Consult OT/PT to assist with strengthening/mobility   - Keep Call bell within reach  - Keep bed low and locked with side rails adjusted as appropriate  - Keep care items and personal belongings within reach  - Initiate and maintain comfort rounds  - Make Fall Risk Sign visible to staff  - Offer Toileting every 2 Hours, in advance of need  - Initiate/Maintain bed/chair alarm  - Apply yellow socks and bracelet for high fall risk patients  - Consider moving patient to room near nurses station  Outcome: Progressing

## 2024-03-02 NOTE — PLAN OF CARE
Problem: INFECTION - ADULT  Goal: Absence or prevention of progression during hospitalization  Description: INTERVENTIONS:  - Assess and monitor for signs and symptoms of infection  - Monitor lab/diagnostic results  - Monitor all insertion sites, i.e. indwelling lines, tubes, and drains  - Monitor endotracheal if appropriate and nasal secretions for changes in amount and color  - [place] appropriate cooling/warming therapies per order  - Administer medications as ordered  - Instruct and encourage patient and family to use good hand hygiene technique  - Identify and instruct in appropriate isolation precautions for identified infection/condition  Outcome: Progressing     Problem: RESPIRATORY - ADULT  Goal: Achieves optimal ventilation and oxygenation  Description: INTERVENTIONS:  - Assess for changes in respiratory status  - Assess for changes in mentation and behavior  - Position to facilitate oxygenation and minimize respiratory effort  - Oxygen administered by appropriate delivery if ordered  - Initiate smoking cessation education as indicated  - Encourage broncho-pulmonary hygiene including cough, deep breathe, Incentive Spirometry  - Assess the need for suctioning and aspirate as needed  - Assess and instruct to report SOB or any respiratory difficulty  - Respiratory Therapy support as indicated  Outcome: Progressing

## 2024-03-02 NOTE — ASSESSMENT & PLAN NOTE
Presented with shortness of breath.  Recently admitted for COVID infection and sepsis.  Requiring 2 L during recent admission but no oxygen requirement prior to that at baseline.  Was managed as stepdown due to worsening oxygen requirement up to 8 L mid flow and mild hypotension which is improved.  Imaging with CTA chest showing no PE but groundglass opacity and interstitial marking present bilaterally and bibasilar region suspicion for fibrosis.  Has been immunosuppressed with CellCept, steroid.  Follows up with ID and pulmonology at [facility].  On PJP prophylaxis with atovaquone.  Initially treated with IV Solu-Medrol and IV Zosyn for suspicion of underlying pneumonia but was noted to have persistent fever switched to Zosyn.  ID recommended to continue cefepime but noted to have fever again today Tmax of 102.  More hypoxic and tachycardic.  Increased oxygen requirement up to 4 L again.  Patient having worsening tiredness today.    Will get basic workup including CBC, CMP, procalcitonin, lactic acid, BNP  Repeat CT chest without contrast  Check urinalysis with microscopic  Will check Doppler lower extremity bilateral  Isolyte 75 cc/h  Continue midodrine 5 mg 3 times daily  Incentive spirometry  Oral prednisone 60 mg taper by 10 mg every 5 days as per pulmonary recommendation  ID and pulmonary following  If continues to be febrile tomorrow then consider reaching out to ID and pulmonary regarding need for bronchoscopy  Continue atovaquone, CellCept, chronic prednisone therapy and follow-up with ID at [facility] on discharge

## 2024-03-02 NOTE — ASSESSMENT & PLAN NOTE
Presented to ED with worsening left-sided chest pain and worsening shortness of breath.  Suspected secondary to ILD exacerbation versus underlying pneumonia.  CTA chest was unremarkable for any PE.  Low suspicion for ACS.  S/p EGD showing large hiatal hernia    Continue PPI twice daily

## 2024-03-02 NOTE — CASE MANAGEMENT
Case Management Progress Note    Patient name Nina Tran  Location /-01 MRN [id number]  : 1945 Date 3/2/2024       LOS (days): 14  Geometric Mean LOS (GMLOS) (days): 5.2  Days to GMLOS:-8.9        OBJECTIVE:        Current admission status: Inpatient  Preferred Pharmacy:   [facility]/pharmacy #[id number] [place], [place] - [address] [place] [id number]  Phone: [phone] Fax: [phone]    Primary Care Provider: Emely Will DO    Primary Insurance: MEDICARE  Secondary Insurance: A[employer]    PROGRESS NOTE:    Patient not stable for SNF dc today.   [facility] notifed via call and message in [facility].   Ride cancelled via [employer].     Possible Monday dc.  CM following

## 2024-03-02 NOTE — ASSESSMENT & PLAN NOTE
Suspected nosocomial: Pneumonia superimposed on ILD exacerbation.  Was on cefepime given episode of fever on 2/24.  Initial plan was to continue cefepime until 3/4 and then changed to oral however had another episode of fever today.    See plan as above.

## 2024-03-03 PROBLEM — Z86.018: Status: ACTIVE | Noted: 2024-02-04

## 2024-03-03 PROBLEM — Z71.89 GOALS OF CARE, COUNSELING/DISCUSSION: Status: ACTIVE | Noted: 2024-03-03

## 2024-03-03 PROBLEM — D49.89 THYMOMA: Status: ACTIVE | Noted: 2024-02-04

## 2024-03-03 LAB
ALBUMIN SERPL BCP-MCNC: 2.5 G/DL (ref 3.5–5)
ALP SERPL-CCNC: 284 U/L (ref 34–104)
ALT SERPL W P-5'-P-CCNC: 73 U/L (ref 7–52)
ANION GAP SERPL CALCULATED.3IONS-SCNC: 8 MMOL/L
AST SERPL W P-5'-P-CCNC: 68 U/L (ref 13–39)
BILIRUB SERPL-MCNC: 0.68 MG/DL (ref 0.2–1)
BUN SERPL-MCNC: 19 MG/DL (ref 5–25)
CALCIUM ALBUM COR SERPL-MCNC: 8.5 MG/DL (ref 8.3–10.1)
CALCIUM SERPL-MCNC: 7.3 MG/DL (ref 8.4–10.2)
CHLORIDE SERPL-SCNC: 94 MMOL/L (ref 96–108)
CO2 SERPL-SCNC: 29 MMOL/L (ref 21–32)
CREAT SERPL-MCNC: 0.57 MG/DL (ref 0.6–1.3)
CRP SERPL QL: 110.4 MG/L
ERYTHROCYTE [DISTWIDTH] IN BLOOD BY AUTOMATED COUNT: 15.8 % (ref 11.6–15.1)
GFR SERPL CREATININE-BSD FRML MDRD: 87 ML/MIN/1.73SQ M
GLUCOSE SERPL-MCNC: 105 MG/DL (ref 65–140)
GLUCOSE SERPL-MCNC: 127 MG/DL (ref 65–140)
GLUCOSE SERPL-MCNC: 153 MG/DL (ref 65–140)
GLUCOSE SERPL-MCNC: 176 MG/DL (ref 65–140)
GLUCOSE SERPL-MCNC: 180 MG/DL (ref 65–140)
HCT VFR BLD AUTO: 27.3 % (ref 34.8–46.1)
HGB BLD-MCNC: 8.6 G/DL (ref 11.5–15.4)
IMM GRANULOCYTES # BLD AUTO: 0.2 THOUSAND/UL (ref 0–0.2)
MAGNESIUM SERPL-MCNC: 2 MG/DL (ref 1.9–2.7)
MCH RBC QN AUTO: 26.5 PG (ref 26.8–34.3)
MCHC RBC AUTO-ENTMCNC: 31.5 G/DL (ref 31.4–37.4)
MCV RBC AUTO: 84 FL (ref 82–98)
NRBC BLD AUTO-RTO: 0 /100 WBCS
PLATELET # BLD AUTO: 114 THOUSANDS/UL (ref 149–390)
PMV BLD AUTO: 10.2 FL (ref 8.9–12.7)
POTASSIUM SERPL-SCNC: 3.4 MMOL/L (ref 3.5–5.3)
PROT SERPL-MCNC: 4.3 G/DL (ref 6.4–8.4)
RBC # BLD AUTO: 3.24 MILLION/UL (ref 3.81–5.12)
SODIUM SERPL-SCNC: 131 MMOL/L (ref 135–147)
WBC # BLD AUTO: 10.46 THOUSAND/UL (ref 4.31–10.16)

## 2024-03-03 PROCEDURE — 85027 COMPLETE CBC AUTOMATED: CPT | Performed by: INTERNAL MEDICINE

## 2024-03-03 PROCEDURE — 83735 ASSAY OF MAGNESIUM: CPT | Performed by: INTERNAL MEDICINE

## 2024-03-03 PROCEDURE — 86140 C-REACTIVE PROTEIN: CPT | Performed by: INTERNAL MEDICINE

## 2024-03-03 PROCEDURE — 99233 SBSQ HOSP IP/OBS HIGH 50: CPT | Performed by: STUDENT IN AN ORGANIZED HEALTH CARE EDUCATION/TRAINING PROGRAM

## 2024-03-03 PROCEDURE — 80053 COMPREHEN METABOLIC PANEL: CPT | Performed by: INTERNAL MEDICINE

## 2024-03-03 PROCEDURE — 82948 REAGENT STRIP/BLOOD GLUCOSE: CPT

## 2024-03-03 RX ORDER — LANOLIN ALCOHOL/MO/W.PET/CERES
6 CREAM (GRAM) TOPICAL ONCE
Status: COMPLETED | OUTPATIENT
Start: 2024-03-03 | End: 2024-03-03

## 2024-03-03 RX ORDER — POTASSIUM CHLORIDE 20 MEQ/1
20 TABLET, EXTENDED RELEASE ORAL ONCE
Status: COMPLETED | OUTPATIENT
Start: 2024-03-03 | End: 2024-03-03

## 2024-03-03 RX ADMIN — POTASSIUM CHLORIDE 20 MEQ: 1500 TABLET, EXTENDED RELEASE ORAL at 16:09

## 2024-03-03 RX ADMIN — SODIUM CHLORIDE, SODIUM GLUCONATE, SODIUM ACETATE, POTASSIUM CHLORIDE, MAGNESIUM CHLORIDE, SODIUM PHOSPHATE, DIBASIC, AND POTASSIUM PHOSPHATE 75 ML/HR: .53; .5; .37; .037; .03; .012; .00082 INJECTION, SOLUTION INTRAVENOUS at 10:17

## 2024-03-03 RX ADMIN — CEFEPIME HYDROCHLORIDE 2000 MG: 2 INJECTION, SOLUTION INTRAVENOUS at 20:05

## 2024-03-03 RX ADMIN — ATOVAQUONE 1500 MG: 750 SUSPENSION ORAL at 08:58

## 2024-03-03 RX ADMIN — MYCOPHENOLATE MOFETIL 1000 MG: 250 CAPSULE ORAL at 10:24

## 2024-03-03 RX ADMIN — PANTOPRAZOLE SODIUM 40 MG: 40 TABLET, DELAYED RELEASE ORAL at 16:09

## 2024-03-03 RX ADMIN — GUAIFENESIN 1200 MG: 600 TABLET ORAL at 20:06

## 2024-03-03 RX ADMIN — SUCRALFATE 1 G: 1 TABLET ORAL at 20:07

## 2024-03-03 RX ADMIN — PREDNISONE 60 MG: 20 TABLET ORAL at 08:57

## 2024-03-03 RX ADMIN — CEFEPIME HYDROCHLORIDE 2000 MG: 2 INJECTION, SOLUTION INTRAVENOUS at 08:58

## 2024-03-03 RX ADMIN — Medication 6 MG: at 00:50

## 2024-03-03 RX ADMIN — SUCRALFATE 1 G: 1 TABLET ORAL at 16:09

## 2024-03-03 RX ADMIN — MYCOPHENOLATE MOFETIL 1000 MG: 250 CAPSULE ORAL at 20:06

## 2024-03-03 RX ADMIN — INSULIN LISPRO 1 UNITS: 100 INJECTION, SOLUTION INTRAVENOUS; SUBCUTANEOUS at 16:11

## 2024-03-03 RX ADMIN — SUCRALFATE 1 G: 1 TABLET ORAL at 11:58

## 2024-03-03 RX ADMIN — PANTOPRAZOLE SODIUM 40 MG: 40 TABLET, DELAYED RELEASE ORAL at 05:32

## 2024-03-03 RX ADMIN — MIDODRINE HYDROCHLORIDE 5 MG: 5 TABLET ORAL at 05:31

## 2024-03-03 RX ADMIN — LIDOCAINE 5% 1 PATCH: 700 PATCH TOPICAL at 12:00

## 2024-03-03 RX ADMIN — METHENAMINE HIPPURATE 1 G: 1 TABLET ORAL at 16:10

## 2024-03-03 RX ADMIN — GUAIFENESIN 1200 MG: 600 TABLET ORAL at 08:57

## 2024-03-03 RX ADMIN — OXYCODONE HYDROCHLORIDE AND ACETAMINOPHEN 500 MG: 500 TABLET ORAL at 08:57

## 2024-03-03 RX ADMIN — ATORVASTATIN CALCIUM 20 MG: 20 TABLET, FILM COATED ORAL at 16:09

## 2024-03-03 RX ADMIN — SUCRALFATE 1 G: 1 TABLET ORAL at 05:31

## 2024-03-03 RX ADMIN — METHENAMINE HIPPURATE 1 G: 1 TABLET ORAL at 08:00

## 2024-03-03 RX ADMIN — ENOXAPARIN SODIUM 40 MG: 40 INJECTION SUBCUTANEOUS at 08:57

## 2024-03-03 RX ADMIN — INSULIN LISPRO 1 UNITS: 100 INJECTION, SOLUTION INTRAVENOUS; SUBCUTANEOUS at 11:58

## 2024-03-03 NOTE — NURSING NOTE
"During pt care pt stating that she \"wants to die\".  stated that pt called him last evening and told him the same.  noted that pt has not expressed this previously. Pt stated she is tired and just wants to die. Doesn't want anymore testing. Pt is able to tell me her name, , season, month, year, location. Will discuss with provider.   "

## 2024-03-03 NOTE — PLAN OF CARE
Problem: INFECTION - ADULT  Goal: Absence or prevention of progression during hospitalization  Description: INTERVENTIONS:  - Assess and monitor for signs and symptoms of infection  - Monitor lab/diagnostic results  - Monitor all insertion sites, i.e. indwelling lines, tubes, and drains  - Monitor endotracheal if appropriate and nasal secretions for changes in amount and color  - [place] appropriate cooling/warming therapies per order  - Administer medications as ordered  - Instruct and encourage patient and family to use good hand hygiene technique  - Identify and instruct in appropriate isolation precautions for identified infection/condition  Outcome: Progressing     Problem: RESPIRATORY - ADULT  Goal: Achieves optimal ventilation and oxygenation  Description: INTERVENTIONS:  - Assess for changes in respiratory status  - Assess for changes in mentation and behavior  - Position to facilitate oxygenation and minimize respiratory effort  - Oxygen administered by appropriate delivery if ordered  - Initiate smoking cessation education as indicated  - Encourage broncho-pulmonary hygiene including cough, deep breathe, Incentive Spirometry  - Assess the need for suctioning and aspirate as needed  - Assess and instruct to report SOB or any respiratory difficulty  - Respiratory Therapy support as indicated  Outcome: Progressing

## 2024-03-03 NOTE — PLAN OF CARE
Problem: PAIN - ADULT  Goal: Verbalizes/displays adequate comfort level or baseline comfort level  Description: Interventions:  - Encourage patient to monitor pain and request assistance  - Assess pain using appropriate pain scale0-10  - Administer analgesics based on type and severity of pain and evaluate response  - Implement non-pharmacological measures as appropriate and evaluate response  - Consider cultural and social influences on pain and pain management  - Notify physician/advanced practitioner if interventions unsuccessful or patient reports new pain  Outcome: Progressing     Problem: INFECTION - ADULT  Goal: Absence or prevention of progression during hospitalization  Description: INTERVENTIONS:  - Assess and monitor for signs and symptoms of infection  - Monitor lab/diagnostic results  - Monitor all insertion sites, i.e. indwelling lines, tubes, and drains  - Monitor endotracheal if appropriate and nasal secretions for changes in amount and color  - [place] appropriate cooling/warming therapies per order  - Administer medications as ordered  - Instruct and encourage patient and family to use good hand hygiene technique  - Identify and instruct in appropriate isolation precautions for identified infection/condition  Outcome: Progressing     Problem: SAFETY ADULT  Goal: Patient will remain free of falls  Description: INTERVENTIONS:  - Educate patient/family on patient safety including physical limitations  - Instruct patient to call for assistance with activity   - Consult OT/PT to assist with strengthening/mobility   - Keep Call bell within reach  - Keep bed low and locked with side rails adjusted as appropriate  - Keep care items and personal belongings within reach  - Initiate and maintain comfort rounds  - Make Fall Risk Sign visible to staff  - Offer Toileting every 2 Hours, in advance of need  - Initiate/Maintain bed/chair alarm  - Apply yellow socks and bracelet for high fall risk patients  -  Consider moving patient to room near nurses station  Outcome: Progressing  Goal: Maintain or return to baseline ADL function  Description: INTERVENTIONS:  -  Assess patient's ability to carry out ADLs; assess patient's baseline for ADL function and identify physical deficits which impact ability to perform ADLs (bathing, care of mouth/teeth, toileting, grooming, dressing, etc.)  - Assess/evaluate cause of self-care deficits   - Assess range of motion  - Assess patient's mobility; develop plan if impaired  - Assess patient's need for assistive devices and provide as appropriate  - Encourage maximum independence but intervene and supervise when necessary  - Involve family in performance of ADLs  - Assess for home care needs following discharge   - Consider OT consult to assist with ADL evaluation and planning for discharge  - Provide patient education as appropriate  Outcome: Progressing  Goal: Maintains/Returns to pre admission functional level  Description: INTERVENTIONS:  - Perform AM-PAC 6 Click Basic Mobility/ Daily Activity assessment daily.  - Set and communicate daily mobility goal to care team and patient/family/caregiver.   - Collaborate with rehabilitation services on mobility goals if consulted  - Perform Range of Motion 4 times a day.  - Reposition patient every 2 hours.  - Ambulate patient 4 times a day  - Out of bed to chair 3 times a day   - Out of bed for meals 3 times a day  - Out of bed for toileting  - Record patient progress and toleration of activity level   Outcome: Progressing     Problem: DISCHARGE PLANNING  Goal: Discharge to home or other facility with appropriate resources  Description: INTERVENTIONS:  - Identify barriers to discharge w/patient and caregiver  - Arrange for needed discharge resources and transportation as appropriate  - Identify discharge learning needs (meds, wound care, etc.)  - Arrange for interpretive services to assist at discharge as needed  - Refer to Case Management  Department for coordinating discharge planning if the patient needs post-hospital services based on physician/advanced practitioner order or complex needs related to functional status, cognitive ability, or social support system  Outcome: Progressing     Problem: Knowledge Deficit  Goal: Patient/family/caregiver demonstrates understanding of disease process, treatment plan, medications, and discharge instructions  Description: Complete learning assessment and assess knowledge base.  Interventions:  - Provide teaching at level of understanding  - Provide teaching via preferred learning methods  Outcome: Progressing     Problem: RESPIRATORY - ADULT  Goal: Achieves optimal ventilation and oxygenation  Description: INTERVENTIONS:  - Assess for changes in respiratory status  - Assess for changes in mentation and behavior  - Position to facilitate oxygenation and minimize respiratory effort  - Oxygen administered by appropriate delivery if ordered  - Initiate smoking cessation education as indicated  - Encourage broncho-pulmonary hygiene including cough, deep breathe, Incentive Spirometry  - Assess the need for suctioning and aspirate as needed  - Assess and instruct to report SOB or any respiratory difficulty  - Respiratory Therapy support as indicated  Outcome: Progressing     Problem: Prexisting or High Potential for Compromised Skin Integrity  Goal: Skin integrity is maintained or improved  Description: INTERVENTIONS:  - Identify patients at risk for skin breakdown  - Assess and monitor skin integrity  - Assess and monitor nutrition and hydration status  - Monitor labs   - Assess for incontinence   - Turn and reposition patient  - Assist with mobility/ambulation  - Relieve pressure over bony prominences  - Avoid friction and shearing  - Provide appropriate hygiene as needed including keeping skin clean and dry  - Evaluate need for skin moisturizer/barrier cream  - Collaborate with interdisciplinary team   -  Patient/family teaching  - Consider wound care consult   Outcome: Progressing     Problem: Potential for Falls  Goal: Patient will remain free of falls  Description: INTERVENTIONS:  - Educate patient/family on patient safety including physical limitations  - Instruct patient to call for assistance with activity   - Consult OT/PT to assist with strengthening/mobility   - Keep Call bell within reach  - Keep bed low and locked with side rails adjusted as appropriate  - Keep care items and personal belongings within reach  - Initiate and maintain comfort rounds  - Make Fall Risk Sign visible to staff  - Offer Toileting every 2 Hours, in advance of need  - Initiate/Maintain bed/chair alarm  - Apply yellow socks and bracelet for high fall risk patients  - Consider moving patient to room near nurses station  Outcome: Progressing

## 2024-03-03 NOTE — PROGRESS NOTES
"[facility]  Progress Note  Name: Nina Tran I  MRN: [id number]  Unit/Bed#: -01 I Date of Admission: 2/17/2024   Date of Service: 3/3/2024 I Hospital Day: 15    Assessment/Plan   Goals of care, counseling/discussion  Assessment & Plan  3/3/24 per discussion with patient , with her  and her RN at the bedside:     Patient confirmed repeatedly that she does not want continue with full treatment and she \"wants to die\"!! Explained to the patient the options of management, provided info on what is comfort care measure vs full treatment. Also asked her to repeate to us and confirm her understanding of current health condition, the consequences of stopping ongoing antibiotics and steroids and she confirmed understanding, noting she is tired of all theses ongoing treatments and she understand that stopping them would result in her dying sooner, asking for comfort measures only.     Patient at bedside noted that the patient expressed and confirmed the above to the daughter lately on a phone call, and that he agree with respecting her wishes and he is confident that she is in her sound mind and making her own decision as above.    CONSULT TO HOSPICE VIA  IS PLACED ; CM was made aware and following   Per patient's and her 's permission, continue treatment for now till hospice team comes on board, and process the transition to hospice ; patient still to decide whether that will be inpatient vs home.     * Acute on chronic respiratory failure with hypoxia (HCC)  Assessment & Plan  Presented with shortness of breath.  Recently admitted for COVID infection and sepsis.  Requiring 2 L during recent admission but no oxygen requirement prior to that at baseline.  Was managed as stepdown due to worsening oxygen requirement up to 8 L mid flow and mild hypotension which is improved.  Imaging with CTA chest showing no PE but groundglass opacity and interstitial marking present " bilaterally and bibasilar region suspicion for fibrosis.  Has been immunosuppressed with CellCept, steroid.  Follows up with ID and pulmonology at [place].  On PJP prophylaxis with atovaquone.  Initially treated with IV Solu-Medrol and IV Zosyn for suspicion of underlying pneumonia but was noted to have persistent fever switched to Zosyn.  ID recommended to continue cefepime but noted to have fever again today Tmax of 102.  More hypoxic and tachycardic.  Increased oxygen requirement up to 4 L again.  Patient having worsening tiredness today.    Will get basic workup including CBC, CMP, procalcitonin, lactic acid, BNP  Repeat CT chest without contrast  Check urinalysis with microscopic  Will check Doppler lower extremity bilateral  Isolyte 75 cc/h  Continue midodrine 5 mg 3 times daily  Incentive spirometry  Oral prednisone 60 mg taper by 10 mg every 5 days as per pulmonary recommendation  ID and pulmonary following  If continues to be febrile tomorrow then consider reaching out to ID and pulmonary regarding need for bronchoscopy  Continue atovaquone, CellCept, chronic prednisone therapy and follow-up with ID at [facility] on discharge    Pneumonia  Assessment & Plan  Suspected nosocomial: Pneumonia superimposed on ILD exacerbation.  Was on cefepime given episode of fever on 2/24.  Initial plan was to continue cefepime until 3/4 and then changed to oral however had another episode of fever today.    See plan as above.    Other chest pain  Assessment & Plan  Presented to ED with worsening left-sided chest pain and worsening shortness of breath.  Suspected secondary to ILD exacerbation versus underlying pneumonia.  CTA chest was unremarkable for any PE.  Low suspicion for ACS.  S/p EGD showing large hiatal hernia    Continue PPI twice daily    Thymoma  Assessment & Plan  History of thymoma s/p removal in 2013   F/u outpatient     Generalized weakness  Assessment & Plan  POA with complaints of generalized weakness suspect  secondary to resp issues  PT/OT consult: II (Moderate Resource Intensity) will need to go to rehab on discharge but overall her prognosis is guarded due to her worsening respiratory status    COVID-19  Assessment & Plan  Recently admitted with COVID-19 infection tested positive on 2/4, still testing positive from previous infection. Completed 10 days of isolation per COVID isolation precautions  Was initially on solumedrol, now on Prednisone 60 mg daily               VTE Pharmacologic Prophylaxis: VTE Score: 6 Moderate Risk (Score 3-4) - Pharmacological DVT Prophylaxis Ordered: enoxaparin (Lovenox).    Mobility:   Basic Mobility Inpatient Raw Score: 15  -HLM Goal: 4: Move to chair/commode  JH-HLM Achieved: 1: Laying in bed  HLM Goal achieved. Continue to encourage appropriate mobility.    Patient Centered Rounds: I performed bedside rounds with nursing staff today.   Discussions with Specialists or Other Care Team Provider: with      Education and Discussions with Family / Patient: Updated  () at bedside.    Total Time Spent on Date of Encounter in care of patient: 55 mins. This time was spent on one or more of the following: performing physical exam; counseling and coordination of care; obtaining or reviewing history; documenting in the medical record; reviewing/ordering tests, medications or procedures; communicating with other healthcare professionals and discussing with patient's family/caregivers.    Current Length of Stay: 15 day(s)  Current Patient Status: Inpatient   Certification Statement: The patient will continue to require additional inpatient hospital stay due to respiratory failure   Discharge Plan: Anticipate discharge in 24-48 hrs to TBD determined ; hospice consult in process     Code Status: Level 3 - DNAR and DNI    Subjective:   See above GOC discussion  ROS + for [place] , Cough , and fatigue   Patient agree to DNR/DNI and she is requesting comfort care measure  only, consultation to hospice in process for the transition   and RN at bedside ,  confirms agreement and support to patient's wishes.     Objective:     Vitals:   Temp (24hrs), Av.8 °F (36.6 °C), Min:97.3 °F (36.3 °C), Max:98.1 °F (36.7 °C)    Temp:  [97.3 °F (36.3 °C)-98.1 °F (36.7 °C)] 98.1 °F (36.7 °C)  HR:  [86-96] 95  Resp:  [14-24] 24  BP: ()/(53-70) 114/70  SpO2:  [95 %-98 %] 98 %  Body mass index is 23.52 kg/m².     Input and Output Summary (last 24 hours):     Intake/Output Summary (Last 24 hours) at 3/3/2024 1924  Last data filed at 3/3/2024 1359  Gross per 24 hour   Intake --   Output 1450 ml   Net -1450 ml       Physical Exam:   Physical Exam   - GEN: Appears ill, pale , alert and oriented x 3, pleasant and cooperative, in no acute distress  - HEENT: Anicteric, mucous membranes Dry , PERRL and EOMI   - NECK: No lymphadenopathy, JVD or carotid bruits   - HEART: RRR, normal S1 and S2, no murmurs, clicks, gallops or rubs   - LUNGS: bilateral rhonchi & decreased breathing sounds + bibasilar rales ; but no wheezing ; on 4-5 L NC ; cough noted   - ABDOMEN: Normal bowel sounds, soft, no tenderness, no distention, no organomegaly or masses felt on exam.   - EXTREMITIES: Peripheral pulses normal; no clubbing, cyanosis, or edema  - NEURO: No focal findings, CN II-XII are grossly intact.   - Musculoskeletal: 5/5 strength, normal ROM, no swollen or erythematous joints.   - SKIN: Normal without suspicious lesions on exposed skin      Additional Data:     Labs:  Results from last 7 days   Lab Units 24  0527 24  1130   WBC Thousand/uL 10.46* 10.29*   HEMOGLOBIN g/dL 8.6* 10.1*   HEMATOCRIT % 27.3* 31.1*   PLATELETS Thousands/uL 114* 136*   BANDS PCT %  --  3   LYMPHO PCT %  --  2*   MONO PCT %  --  2*   EOS PCT %  --  1     Results from last 7 days   Lab Units 24  0527   SODIUM mmol/L 131*   POTASSIUM mmol/L 3.4*   CHLORIDE mmol/L 94*   CO2 mmol/L 29   BUN mg/dL 19    CREATININE mg/dL 0.57*   ANION GAP mmol/L 8   CALCIUM mg/dL 7.3*   ALBUMIN g/dL 2.5*   TOTAL BILIRUBIN mg/dL 0.68   ALK PHOS U/L 284*   ALT U/L 73*   AST U/L 68*   GLUCOSE RANDOM mg/dL 127         Results from last 7 days   Lab Units 03/03/24  1552 03/03/24  1112 03/03/24  0822 03/02/24  2054 03/02/24  1633 03/02/24  1120 03/02/24  0811 03/01/24  2100 03/01/24  1604 03/01/24  1115 03/01/24  0803 02/29/24  1617   POC GLUCOSE mg/dl 176* 153* 105 214* 143* 101 87 141* 178* 158* 97 158*         Results from last 7 days   Lab Units 03/02/24  1130 02/29/24  0542 02/28/24  1009   LACTIC ACID mmol/L 1.5  --  1.4   PROCALCITONIN ng/ml 0.30* 0.33*  --        Lines/Drains:  Invasive Devices       Peripheral Intravenous Line  Duration             Long-Dwell Peripheral IV (Midline) 02/24/24 Right Brachial 8 days              Drain  Duration             External Urinary Catheter 15 days                          Imaging: Reviewed radiology reports from this admission including: chest CT scan    Recent Cultures (last 7 days):         Last 24 Hours Medication List:   Current Facility-Administered Medications   Medication Dose Route Frequency Provider Last Rate    acetaminophen  650 mg Oral Q6H PRN Charlene Archer MD      aluminum-magnesium hydroxide-simethicone  30 mL Oral Q4H PRN Charlene Archer MD      ascorbic acid  500 mg Oral Daily Charlene Archer MD      atorvastatin  20 mg Oral Daily With Dinner Charlene Archer MD      atovaquone  1,500 mg Oral Daily Charlene Archer MD      bismuth subsalicylate  525 mg Oral Q6H PRN Charlene Archer MD      cefepime  2,000 mg Intravenous Q12H Charlene Archer MD 2,000 mg (03/03/24 0858)    enoxaparin  40 mg Subcutaneous Daily Charlene Archer MD      guaiFENesin  1,200 mg Oral Q12H F[place]ON Charlene Archer MD      insulin lispro  1-6 Units Subcutaneous TID AC Charlene Archer MD      ipratropium-albuterol  3 mL Nebulization Q6H PRN Charlene Archer MD      lidocaine  1 patch Topical Q24H Charlene Archer MD      methenamine hippurate  1  g Oral BID With Meals Charlene Archer MD      midodrine  5 mg Oral TID AC Charlene Archer MD      mycophenolate  1,000 mg Oral Q12H [place] Charlene Archer MD      ondansetron  4 mg Intravenous Q6H PRN Charlene Archer MD      oxyCODONE  2.5 mg Oral Q4H PRN Charlene Archer MD      Or    oxyCODONE  5 mg Oral Q4H PRN Charlene Archer MD      pantoprazole  40 mg Oral BID AC Charlene Archer MD      predniSONE  60 mg Oral Daily Charlene Archer MD      sucralfate  1 g Oral 4x Daily (AC & HS) Charlene Archer MD          Today, Patient Was Seen By: Tai Stevens DO    **Please Note: This note may have been constructed using a voice recognition system.**

## 2024-03-03 NOTE — CASE MANAGEMENT
"   Case Management Discharge Planning Note    Patient name Nina Tran  Location /-01 MRN [id number]  : 1945 Date 3/3/2024       Current Admission Date: 2024  Current Admission Diagnosis:Acute on chronic respiratory failure with hypoxia (HCC)   Patient Active Problem List    Diagnosis Date Noted    Epigastric pain 2024    GERD (gastroesophageal reflux disease) 2024    Other chest pain 2024    Pneumonia 2024    Bacteremia 2024    Acute on chronic respiratory failure with hypoxia (HCC) 2024    Sepsis (HCC) 2024    COVID-19 2024    Generalized weakness 2024    Ambulatory dysfunction 2024    History of thymus cancer 2024      LOS (days): 15  Geometric Mean LOS (GMLOS) (days): 5.2  Days to GMLOS:-10.1     OBJECTIVE:  Risk of Unplanned Readmission Score: 18.89         Current admission status: Inpatient   Preferred Pharmacy:   [facility]/pharmacy #[id number] [place] [address]  Phone: [phone] Fax: [phone]    Primary Care Provider: Emely Will DO    Primary Insurance: MEDICARE  Secondary Insurance: [facility]    DISCHARGE DETAILS:     Notified MD placed a Hospice referral as patient verbalized to team \" she is done, wants to be kept comfortable.\"  Called and spoke to Mr Tran on his cell phone, he shared there will be a team coming in to speak to his wife about hospice tomorrow at the hospital.  Patient is  61 years and has 2 adult daughters.  Hospice care was explained:  We discussed Hospice at Home, however he does not think he can manage by himself and that his daughters both work. We discussed H[facility] Care at [facility], informed on room and board is not covered.    He is in agreement for a referral to speak to a Hospice Liaison with his wife.  We discussed preference of Hospice Agency, he has no preference, he is aware [facility] team may " be available to assist him and his wife. He is in agreement of a referral to [facility].      Hospice referral to [facility] was made.  CM will follow up tomorrow on the referral.

## 2024-03-04 ENCOUNTER — HOME CARE VISIT (OUTPATIENT)
Dept: HOME HEALTH SERVICES | Facility: HOME HEALTHCARE | Age: 79
End: 2024-03-04

## 2024-03-04 LAB
ALBUMIN SERPL BCP-MCNC: 2.8 G/DL (ref 3.5–5)
ALP SERPL-CCNC: 320 U/L (ref 34–104)
ALT SERPL W P-5'-P-CCNC: 82 U/L (ref 7–52)
ANION GAP SERPL CALCULATED.3IONS-SCNC: 5 MMOL/L
AST SERPL W P-5'-P-CCNC: 85 U/L (ref 13–39)
BILIRUB SERPL-MCNC: 0.74 MG/DL (ref 0.2–1)
BUN SERPL-MCNC: 17 MG/DL (ref 5–25)
CALCIUM ALBUM COR SERPL-MCNC: 8.4 MG/DL (ref 8.3–10.1)
CALCIUM SERPL-MCNC: 7.4 MG/DL (ref 8.4–10.2)
CHLORIDE SERPL-SCNC: 98 MMOL/L (ref 96–108)
CO2 SERPL-SCNC: 28 MMOL/L (ref 21–32)
CREAT SERPL-MCNC: 0.56 MG/DL (ref 0.6–1.3)
ERYTHROCYTE [DISTWIDTH] IN BLOOD BY AUTOMATED COUNT: 15.9 % (ref 11.6–15.1)
GFR SERPL CREATININE-BSD FRML MDRD: 88 ML/MIN/1.73SQ M
GLUCOSE SERPL-MCNC: 126 MG/DL (ref 65–140)
GLUCOSE SERPL-MCNC: 157 MG/DL (ref 65–140)
GLUCOSE SERPL-MCNC: 167 MG/DL (ref 65–140)
GLUCOSE SERPL-MCNC: 86 MG/DL (ref 65–140)
GLUCOSE SERPL-MCNC: 95 MG/DL (ref 65–140)
HCT VFR BLD AUTO: 28.8 % (ref 34.8–46.1)
HGB BLD-MCNC: 9.3 G/DL (ref 11.5–15.4)
MCH RBC QN AUTO: 26.9 PG (ref 26.8–34.3)
MCHC RBC AUTO-ENTMCNC: 32.3 G/DL (ref 31.4–37.4)
MCV RBC AUTO: 83 FL (ref 82–98)
PLATELET # BLD AUTO: 112 THOUSANDS/UL (ref 149–390)
PMV BLD AUTO: 10.3 FL (ref 8.9–12.7)
POTASSIUM SERPL-SCNC: 3.5 MMOL/L (ref 3.5–5.3)
PROT SERPL-MCNC: 4.8 G/DL (ref 6.4–8.4)
RBC # BLD AUTO: 3.46 MILLION/UL (ref 3.81–5.12)
SODIUM SERPL-SCNC: 131 MMOL/L (ref 135–147)
WBC # BLD AUTO: 9.86 THOUSAND/UL (ref 4.31–10.16)

## 2024-03-04 PROCEDURE — 99232 SBSQ HOSP IP/OBS MODERATE 35: CPT

## 2024-03-04 PROCEDURE — 80053 COMPREHEN METABOLIC PANEL: CPT

## 2024-03-04 PROCEDURE — 82948 REAGENT STRIP/BLOOD GLUCOSE: CPT

## 2024-03-04 PROCEDURE — 85027 COMPLETE CBC AUTOMATED: CPT

## 2024-03-04 RX ORDER — LORAZEPAM 0.5 MG/1
0.5 TABLET ORAL ONCE
Status: COMPLETED | OUTPATIENT
Start: 2024-03-04 | End: 2024-03-04

## 2024-03-04 RX ADMIN — LORAZEPAM 0.5 MG: 0.5 TABLET ORAL at 01:55

## 2024-03-04 RX ADMIN — MYCOPHENOLATE MOFETIL 1000 MG: 250 CAPSULE ORAL at 20:32

## 2024-03-04 RX ADMIN — GUAIFENESIN 1200 MG: 600 TABLET ORAL at 20:32

## 2024-03-04 RX ADMIN — ATOVAQUONE 1500 MG: 750 SUSPENSION ORAL at 09:10

## 2024-03-04 RX ADMIN — OXYCODONE HYDROCHLORIDE AND ACETAMINOPHEN 500 MG: 500 TABLET ORAL at 09:11

## 2024-03-04 RX ADMIN — PANTOPRAZOLE SODIUM 40 MG: 40 TABLET, DELAYED RELEASE ORAL at 16:23

## 2024-03-04 RX ADMIN — SUCRALFATE 1 G: 1 TABLET ORAL at 16:23

## 2024-03-04 RX ADMIN — SUCRALFATE 1 G: 1 TABLET ORAL at 13:42

## 2024-03-04 RX ADMIN — PANTOPRAZOLE SODIUM 40 MG: 40 TABLET, DELAYED RELEASE ORAL at 05:54

## 2024-03-04 RX ADMIN — CEFEPIME HYDROCHLORIDE 2000 MG: 2 INJECTION, SOLUTION INTRAVENOUS at 09:11

## 2024-03-04 RX ADMIN — LIDOCAINE 5% 1 PATCH: 700 PATCH TOPICAL at 13:42

## 2024-03-04 RX ADMIN — ENOXAPARIN SODIUM 40 MG: 40 INJECTION SUBCUTANEOUS at 09:11

## 2024-03-04 RX ADMIN — SUCRALFATE 1 G: 1 TABLET ORAL at 21:11

## 2024-03-04 RX ADMIN — METHENAMINE HIPPURATE 1 G: 1 TABLET ORAL at 09:11

## 2024-03-04 RX ADMIN — METHENAMINE HIPPURATE 1 G: 1 TABLET ORAL at 16:22

## 2024-03-04 RX ADMIN — GUAIFENESIN 1200 MG: 600 TABLET ORAL at 09:11

## 2024-03-04 RX ADMIN — PREDNISONE 60 MG: 20 TABLET ORAL at 09:11

## 2024-03-04 RX ADMIN — CEFEPIME HYDROCHLORIDE 2000 MG: 2 INJECTION, SOLUTION INTRAVENOUS at 20:29

## 2024-03-04 RX ADMIN — MYCOPHENOLATE MOFETIL 1000 MG: 250 CAPSULE ORAL at 09:11

## 2024-03-04 RX ADMIN — SUCRALFATE 1 G: 1 TABLET ORAL at 05:54

## 2024-03-04 RX ADMIN — INSULIN LISPRO 1 UNITS: 100 INJECTION, SOLUTION INTRAVENOUS; SUBCUTANEOUS at 17:24

## 2024-03-04 RX ADMIN — ATORVASTATIN CALCIUM 20 MG: 20 TABLET, FILM COATED ORAL at 16:22

## 2024-03-04 NOTE — ASSESSMENT & PLAN NOTE
Presented with shortness of breath  She was recently admitted for COVID infection and sepsis requiring 2 L during recent admission  Was initially managed to stepdown due to worsening oxygen requirement up to 8 L mid flow  CTA chest showing no PE-does show groundglass opacity and interstitial marking bilaterally with suspicion for fibrosis  Repeat CT chest without contrast showing developing pneumonia on 3/2  She is immune compromised with CellCept, steroid and follows with ID and pulmonology at [place], also on PJP prophylaxis with atovaquone     Has had intermittent fevers -afebrile since 3/2  ID following for pneumonia in setting immunocompromise status -continue cefepime  See under pneumonia    Patient planning to transition to hospice on discharge

## 2024-03-04 NOTE — ASSESSMENT & PLAN NOTE
Suspected nosocomial/gram-negative bacteria: Pneumonia superimposed on ILD exacerbation.    Was on cefepime given episode of fever on 2/24.  Initial plan was to continue cefepime until 3/4 and then changed to oral however had another episode of fever today 3/2  Has been afebrile since -can continue oral antibiotics for a few days after discharge and then stop as patient wants comfort measures however wants some treatment for infection until transition to home    See plan as above.

## 2024-03-04 NOTE — ASSESSMENT & PLAN NOTE
"3/3/24 per discussion with patient , with her  and her RN at the bedside:     Patient confirmed repeatedly that she does not want continue with full treatment and she \"wants to die\"!! Explained to the patient the options of management, provided info on what is comfort care measure vs full treatment. Also asked her to repeate to us and confirm her understanding of current health condition, the consequences of stopping ongoing antibiotics and steroids and she confirmed understanding, noting she is tired of all theses ongoing treatments and she understand that stopping them would result in her dying sooner, asking for comfort measures only.     Patient at bedside noted that the patient expressed and confirmed the above to the daughter lately on a phone call, and that he agree with respecting her wishes and he is confident that she is in her sound mind and making her own decision as above.    CONSULT TO HOSPICE VIA  IS PLACED ; CM was made aware and following   Per patient's and her 's permission, continue treatment for now till hospice team comes on board, and process the transition to hospice ; patient still to decide whether that will be inpatient vs home.   "

## 2024-03-04 NOTE — Clinical Note
Nina Tran 79 yo female  9. currently at [facility] [place] [place]. Hx Thymus cancer, acute on chronic respiratory failure and restrictive lung disease s/p wedge resection on continuous oxygen 2L. now requiring 4L. She was admitted for SOB and found to have COVID(2024). AST 68 ALT 73 Alk Phos 284 Albumin 2.5. CT chest showed New patchy nodular opacities in the anterior inferior right upper lobe, along with a tiny locule of gas in one with an air bronchogram, infectious or inflammatory. PPS 30-40. Approve, RLOC?

## 2024-03-04 NOTE — PHYSICAL THERAPY NOTE
PHYSICAL THERAPY NOTE          Patient Name: Nina Tran  Today's Date: 3/4/2024       03/04/24 0835   Note Type   Note Type Cancelled Session       Chart reviewed. Patient is considering transitioning to hospice care at this time. Will cancel PT and see patient at a later time should she defer hospice.    Bonny Perez, PT,DPT

## 2024-03-04 NOTE — ASSESSMENT & PLAN NOTE
"3/3/24 per discussion with patient , with her  and her RN at the bedside by previous provider:   Patient confirmed repeatedly that she does not want continue with full treatment and she \"wants to die\"!! Explained to the patient the options of management, provided info on what is comfort care measure vs full treatment. Also asked her to repeate to us and confirm her understanding of current health condition, the consequences of stopping ongoing antibiotics and steroids and she confirmed understanding, noting she is tired of all theses ongoing treatments and she understand that stopping them would result in her dying sooner, asking for comfort measures only.     Discussed with patient again today and patient's  via phone after they had spoken with hospice liaison -confirm wanting comfort measures only, want home care  Case management involved to set up home care referrals for hospice    "

## 2024-03-04 NOTE — PLAN OF CARE
Problem: PAIN - ADULT  Goal: Verbalizes/displays adequate comfort level or baseline comfort level  Description: Interventions:  - Encourage patient to monitor pain and request assistance  - Assess pain using appropriate pain scale0-10  - Administer analgesics based on type and severity of pain and evaluate response  - Implement non-pharmacological measures as appropriate and evaluate response  - Consider cultural and social influences on pain and pain management  - Notify physician/advanced practitioner if interventions unsuccessful or patient reports new pain  Outcome: Progressing     Problem: INFECTION - ADULT  Goal: Absence or prevention of progression during hospitalization  Description: INTERVENTIONS:  - Assess and monitor for signs and symptoms of infection  - Monitor lab/diagnostic results  - Monitor all insertion sites, i.e. indwelling lines, tubes, and drains  - Monitor endotracheal if appropriate and nasal secretions for changes in amount and color  - [place] appropriate cooling/warming therapies per order  - Administer medications as ordered  - Instruct and encourage patient and family to use good hand hygiene technique  - Identify and instruct in appropriate isolation precautions for identified infection/condition  Outcome: Progressing     Problem: SAFETY ADULT  Goal: Patient will remain free of falls  Description: INTERVENTIONS:  - Educate patient/family on patient safety including physical limitations  - Instruct patient to call for assistance with activity   - Consult OT/PT to assist with strengthening/mobility   - Keep Call bell within reach  - Keep bed low and locked with side rails adjusted as appropriate  - Keep care items and personal belongings within reach  - Initiate and maintain comfort rounds  - Make Fall Risk Sign visible to staff  - Offer Toileting every 2 Hours, in advance of need  - Initiate/Maintain bed/chair alarm  - Apply yellow socks and bracelet for high fall risk patients  -  Consider moving patient to room near nurses station  Outcome: Progressing  Goal: Maintain or return to baseline ADL function  Description: INTERVENTIONS:  -  Assess patient's ability to carry out ADLs; assess patient's baseline for ADL function and identify physical deficits which impact ability to perform ADLs (bathing, care of mouth/teeth, toileting, grooming, dressing, etc.)  - Assess/evaluate cause of self-care deficits   - Assess range of motion  - Assess patient's mobility; develop plan if impaired  - Assess patient's need for assistive devices and provide as appropriate  - Encourage maximum independence but intervene and supervise when necessary  - Involve family in performance of ADLs  - Assess for home care needs following discharge   - Consider OT consult to assist with ADL evaluation and planning for discharge  - Provide patient education as appropriate  Outcome: Progressing  Goal: Maintains/Returns to pre admission functional level  Description: INTERVENTIONS:  - Perform AM-PAC 6 Click Basic Mobility/ Daily Activity assessment daily.  - Set and communicate daily mobility goal to care team and patient/family/caregiver.   - Collaborate with rehabilitation services on mobility goals if consulted  - Perform Range of Motion 4 times a day.  - Reposition patient every 2 hours.  - Ambulate patient 4 times a day  - Out of bed to chair 3 times a day   - Out of bed for meals 3 times a day  - Out of bed for toileting  - Record patient progress and toleration of activity level   Outcome: Progressing     Problem: DISCHARGE PLANNING  Goal: Discharge to home or other facility with appropriate resources  Description: INTERVENTIONS:  - Identify barriers to discharge w/patient and caregiver  - Arrange for needed discharge resources and transportation as appropriate  - Identify discharge learning needs (meds, wound care, etc.)  - Arrange for interpretive services to assist at discharge as needed  - Refer to Case Management  Department for coordinating discharge planning if the patient needs post-hospital services based on physician/advanced practitioner order or complex needs related to functional status, cognitive ability, or social support system  Outcome: Progressing     Problem: Knowledge Deficit  Goal: Patient/family/caregiver demonstrates understanding of disease process, treatment plan, medications, and discharge instructions  Description: Complete learning assessment and assess knowledge base.  Interventions:  - Provide teaching at level of understanding  - Provide teaching via preferred learning methods  Outcome: Progressing     Problem: RESPIRATORY - ADULT  Goal: Achieves optimal ventilation and oxygenation  Description: INTERVENTIONS:  - Assess for changes in respiratory status  - Assess for changes in mentation and behavior  - Position to facilitate oxygenation and minimize respiratory effort  - Oxygen administered by appropriate delivery if ordered  - Initiate smoking cessation education as indicated  - Encourage broncho-pulmonary hygiene including cough, deep breathe, Incentive Spirometry  - Assess the need for suctioning and aspirate as needed  - Assess and instruct to report SOB or any respiratory difficulty  - Respiratory Therapy support as indicated  Outcome: Progressing     Problem: Prexisting or High Potential for Compromised Skin Integrity  Goal: Skin integrity is maintained or improved  Description: INTERVENTIONS:  - Identify patients at risk for skin breakdown  - Assess and monitor skin integrity  - Assess and monitor nutrition and hydration status  - Monitor labs   - Assess for incontinence   - Turn and reposition patient  - Assist with mobility/ambulation  - Relieve pressure over bony prominences  - Avoid friction and shearing  - Provide appropriate hygiene as needed including keeping skin clean and dry  - Evaluate need for skin moisturizer/barrier cream  - Collaborate with interdisciplinary team   -  Patient/family teaching  - Consider wound care consult   Outcome: Progressing     Problem: Potential for Falls  Goal: Patient will remain free of falls  Description: INTERVENTIONS:  - Educate patient/family on patient safety including physical limitations  - Instruct patient to call for assistance with activity   - Consult OT/PT to assist with strengthening/mobility   - Keep Call bell within reach  - Keep bed low and locked with side rails adjusted as appropriate  - Keep care items and personal belongings within reach  - Initiate and maintain comfort rounds  - Make Fall Risk Sign visible to staff  - Offer Toileting every 2 Hours, in advance of need  - Initiate/Maintain bed/chair alarm  - Apply yellow socks and bracelet for high fall risk patients  - Consider moving patient to room near nurses station  Outcome: Progressing

## 2024-03-04 NOTE — ASSESSMENT & PLAN NOTE
POA with complaints of generalized weakness suspect secondary to resp issues  PT/OT consult: II (Moderate Resource Intensity) will need to go to rehab on discharge but overall her prognosis is guarded due to her worsening respiratory status  After goals of care discussion patient will be going home with home hospice care

## 2024-03-04 NOTE — PROGRESS NOTES
"[facility]  Progress Note  Name: Nina Tran I  MRN: [id number]  Unit/Bed#: -01 I Date of Admission: 2/17/2024   Date of Service: 3/4/2024 I Hospital Day: 16    Assessment/Plan   Goals of care, counseling/discussion  Assessment & Plan  3/3/24 per discussion with patient , with her  and her RN at the bedside by previous provider:   Patient confirmed repeatedly that she does not want continue with full treatment and she \"wants to die\"!! Explained to the patient the options of management, provided info on what is comfort care measure vs full treatment. Also asked her to repeate to us and confirm her understanding of current health condition, the consequences of stopping ongoing antibiotics and steroids and she confirmed understanding, noting she is tired of all theses ongoing treatments and she understand that stopping them would result in her dying sooner, asking for comfort measures only.     Discussed with patient again today and patient's  via phone after they had spoken with hospice liaison -confirm wanting comfort measures only, want home care  Case management involved to set up home care referrals for hospice      Pneumonia  Assessment & Plan  Suspected nosocomial/gram-negative bacteria: Pneumonia superimposed on ILD exacerbation.    Was on cefepime given episode of fever on 2/24.  Initial plan was to continue cefepime until 3/4 and then changed to oral however had another episode of fever today 3/2  Has been afebrile since -can continue oral antibiotics for a few days after discharge and then stop as patient wants comfort measures however wants some treatment for infection until transition to home    See plan as above.    Other chest pain  Assessment & Plan  Presented to ED with worsening left-sided chest pain and worsening shortness of breath.  Suspected secondary to ILD exacerbation versus underlying pneumonia.  CTA chest was unremarkable for any PE.  Low " suspicion for ACS.  S/p EGD showing large hiatal hernia    Continue PPI twice daily    Thymoma  Assessment & Plan  History of thymoma s/p removal in 2013   F/u outpatient     Generalized weakness  Assessment & Plan  POA with complaints of generalized weakness suspect secondary to resp issues  PT/OT consult: II (Moderate Resource Intensity) will need to go to rehab on discharge but overall her prognosis is guarded due to her worsening respiratory status  After goals of care discussion patient will be going home with home hospice care    COVID-19  Assessment & Plan  Recently admitted with COVID-19 infection tested positive on 2/4, still testing positive from previous infection. Completed 10 days of isolation per COVID isolation precautions  Was initially on solumedrol, now on Prednisone 60 mg daily    * Acute respiratory failure with hypoxia (HCC)  Assessment & Plan  Presented with shortness of breath  She was recently admitted for COVID infection and sepsis requiring 2 L during recent admission  Was initially managed to stepdown due to worsening oxygen requirement up to 8 L mid flow  CTA chest showing no PE-does show groundglass opacity and interstitial marking bilaterally with suspicion for fibrosis  Repeat CT chest without contrast showing developing pneumonia on 3/2  She is immune compromised with CellCept, steroid and follows with ID and pulmonology at [place], also on PJP prophylaxis with atovaquone     Has had intermittent fevers -afebrile since 3/2  ID following for pneumonia in setting immunocompromise status -continue cefepime  See under pneumonia    Patient planning to transition to hospice on discharge                   VTE Pharmacologic Prophylaxis: VTE Score: 6 High Risk (Score >/= 5) - Pharmacological DVT Prophylaxis Ordered: enoxaparin (Lovenox). Sequential Compression Devices Ordered.    Mobility:   Basic Mobility Inpatient Raw Score: 12  -[facility] Goal: 4: Move to chair/commode  -[facility] Achieved: 4: Move  to chair/commode  HLM Goal achieved. Continue to encourage appropriate mobility.    Patient Centered Rounds: I performed bedside rounds with nursing staff today.   Discussions with Specialists or Other Care Team Provider: BRITNI     Education and Discussions with Family / Patient: Updated  () via phone.    Total Time Spent on Date of Encounter in care of patient:  mins. This time was spent on one or more of the following: performing physical exam; counseling and coordination of care; obtaining or reviewing history; documenting in the medical record; reviewing/ordering tests, medications or procedures; communicating with other healthcare professionals and discussing with patient's family/caregivers.    Current Length of Stay: 16 day(s)  Current Patient Status: Inpatient   Certification Statement: The patient will continue to require additional inpatient hospital stay due to pneumonia, respiratory failure   Discharge Plan: Anticipate discharge tomorrow to home with hospice    Code Status: Level 3 - DNAR and DNI    Subjective:   Seen and examined. Patient feels very week and states she does feel short of breath. Endorses wanting comfort measures only     Objective:     Vitals:   Temp (24hrs), Av.7 °F (36.5 °C), Min:97.2 °F (36.2 °C), Max:98.1 °F (36.7 °C)    Temp:  [97.2 °F (36.2 °C)-98.1 °F (36.7 °C)] 97.7 °F (36.5 °C)  HR:  [93-95] 93  Resp:  [18-24] 18  BP: (114-137)/(70-78) 137/78  SpO2:  [94 %-98 %] 94 %  Body mass index is 23.52 kg/m².     Input and Output Summary (last 24 hours):   No intake or output data in the 24 hours ending 24 0375    Physical Exam:   Physical Exam  Vitals and nursing note reviewed.   Constitutional:       General: She is not in acute distress.     Appearance: Normal appearance. She is ill-appearing.   HENT:      Head: Normocephalic and atraumatic.      Nose: No congestion.      Mouth/Throat:      Mouth: Mucous membranes are moist.   Eyes:      Conjunctiva/sclera:  Conjunctivae normal.   Cardiovascular:      Rate and Rhythm: Normal rate and regular rhythm.      Pulses: Normal pulses.      Heart sounds: Normal heart sounds. No murmur heard.  Pulmonary:      Effort: Respiratory distress present.      Comments: Diminished breath sounds   Abdominal:      General: Bowel sounds are normal.      Palpations: Abdomen is soft.      Tenderness: There is no abdominal tenderness.   Musculoskeletal:         General: Normal range of motion.      Right lower leg: No edema.      Left lower leg: No edema.   Skin:     General: Skin is warm and dry.   Neurological:      Mental Status: She is alert. Mental status is at baseline.          Additional Data:     Labs:  Results from last 7 days   Lab Units 03/04/24  0923 03/03/24  0527 03/02/24  1130   WBC Thousand/uL 9.86   < > 10.29*   HEMOGLOBIN g/dL 9.3*   < > 10.1*   HEMATOCRIT % 28.8*   < > 31.1*   PLATELETS Thousands/uL 112*   < > 136*   BANDS PCT %  --   --  3   LYMPHO PCT %  --   --  2*   MONO PCT %  --   --  2*   EOS PCT %  --   --  1    < > = values in this interval not displayed.     Results from last 7 days   Lab Units 03/04/24  0923   SODIUM mmol/L 131*   POTASSIUM mmol/L 3.5   CHLORIDE mmol/L 98   CO2 mmol/L 28   BUN mg/dL 17   CREATININE mg/dL 0.56*   ANION GAP mmol/L 5   CALCIUM mg/dL 7.4*   ALBUMIN g/dL 2.8*   TOTAL BILIRUBIN mg/dL 0.74   ALK PHOS U/L 320*   ALT U/L 82*   AST U/L 85*   GLUCOSE RANDOM mg/dL 95         Results from last 7 days   Lab Units 03/04/24  1132 03/04/24  0747 03/03/24  2051 03/03/24  1552 03/03/24  1112 03/03/24  0822 03/02/24  2054 03/02/24  1633 03/02/24  1120 03/02/24  0811 03/01/24  2100 03/01/24  1604   POC GLUCOSE mg/dl 126 86 180* 176* 153* 105 214* 143* 101 87 141* 178*         Results from last 7 days   Lab Units 03/02/24  1130 02/29/24  0542 02/28/24  1009   LACTIC ACID mmol/L 1.5  --  1.4   PROCALCITONIN ng/ml 0.30* 0.33*  --        Lines/Drains:  Invasive Devices       Peripheral Intravenous Line   Duration             Long-Dwell Peripheral IV (Midline) 02/24/24 Right Brachial 9 days              Drain  Duration             External Urinary Catheter 15 days                          Imaging: Reviewed radiology reports from this admission including: chest CT scan    Recent Cultures (last 7 days):         Last 24 Hours Medication List:   Current Facility-Administered Medications   Medication Dose Route Frequency Provider Last Rate    acetaminophen  650 mg Oral Q6H PRN Charlene Archer MD      aluminum-magnesium hydroxide-simethicone  30 mL Oral Q4H PRN Charlene Archer MD      ascorbic acid  500 mg Oral Daily Charlene Archer MD      atorvastatin  20 mg Oral Daily With Dinner Charlene Archer MD      atovaquone  1,500 mg Oral Daily Charlene Archer MD      bismuth subsalicylate  525 mg Oral Q6H PRN Charlene Archer MD      cefepime  2,000 mg Intravenous Q12H Charlene Archer MD 2,000 mg (03/04/24 0911)    enoxaparin  40 mg Subcutaneous Daily Charlene Archer MD      guaiFENesin  1,200 mg Oral Q12H FALLON Charlene Archer MD      insulin lispro  1-6 Units Subcutaneous TID AC Charlene Archer MD      ipratropium-albuterol  3 mL Nebulization Q6H PRN Charlene Archer MD      lidocaine  1 patch Topical Q24H Charlene Archer MD      methenamine hippurate  1 g Oral BID With Meals Charlene Archer MD      midodrine  5 mg Oral TID AC Charlene Archer MD      mycophenolate  1,000 mg Oral Q12H F[place]ON Charlene Archer MD      ondansetron  4 mg Intravenous Q6H PRN Charlene Archer MD      oxyCODONE  2.5 mg Oral Q4H PRN Charlene Archer MD      Or    oxyCODONE  5 mg Oral Q4H PRN Charlene Archer MD      pantoprazole  40 mg Oral BID AC Charlene Archer MD      predniSONE  60 mg Oral Daily Charlene Archer MD      sucralfate  1 g Oral 4x Daily (AC & HS) Charlene Archer MD          Today, Patient Was Seen By: Kathy Seo PA-C    **Please Note: This note may have been constructed using a voice recognition system.**

## 2024-03-04 NOTE — HOSPICE NOTE
Received referral, patient was approved for [facility] hospice (at home or SNF). I met with patient and  Abelardo at bedside, services were reviewed and they were agreeable. Abelardo needs to talk with his daughters in regards to bringing patient home with hospice vs. SNF placement. I updated BRITNI Bailon will continue to follow patient.

## 2024-03-04 NOTE — CASE MANAGEMENT
Case Management Progress Note    Patient name Nina Tran  Location /-01 MRN [id number]  : 1945 Date 3/4/2024       LOS (days): 16  Geometric Mean LOS (GMLOS) (days): 5.2  Days to GMLOS:-11        OBJECTIVE:        Current admission status: Inpatient  Preferred Pharmacy:   [facility]/pharmacy #[id number] - [place] [address]  Phone: [phone] Fax: [phone]    Primary Care Provider: Emely Will DO    Primary Insurance: MEDICARE  Secondary Insurance: [employer]    PROGRESS NOTE:    Appreciate weekend CM sending hospice referral.      Patient was to dc to [facility].  New hospice referral with plan being made by hospice liaison, family, and patient.  May dc home with hospice services vs [facility] with hospice services.  [facility] is contracted with [facility].    UDPATE 1419  Call to spouse Abelardo at [phone].  Abelardo would like to use [facility], liaison Angélica Floyd, phone [phone].  Call to Angélica who is reaching out to family now to coordinate dc needs.  She is asking CM to arrange transport to home address tomorrow noon or after in anticipation of dc home with hospice care.  Referral in [facility] to [facility] done.      DCP [facility]    BLS with 4L oxygen to home address, Tuesday 3/5 1200 requested.   PCS and face sheet to paper chart

## 2024-03-04 NOTE — PLAN OF CARE
Problem: PAIN - ADULT  Goal: Verbalizes/displays adequate comfort level or baseline comfort level  Description: Interventions:  - Encourage patient to monitor pain and request assistance  - Assess pain using appropriate pain scale0-10  - Administer analgesics based on type and severity of pain and evaluate response  - Implement non-pharmacological measures as appropriate and evaluate response  - Consider cultural and social influences on pain and pain management  - Notify physician/advanced practitioner if interventions unsuccessful or patient reports new pain  Outcome: Progressing     Problem: SAFETY ADULT  Goal: Patient will remain free of falls  Description: INTERVENTIONS:  - Educate patient/family on patient safety including physical limitations  - Instruct patient to call for assistance with activity   - Consult OT/PT to assist with strengthening/mobility   - Keep Call bell within reach  - Keep bed low and locked with side rails adjusted as appropriate  - Keep care items and personal belongings within reach  - Initiate and maintain comfort rounds  - Make Fall Risk Sign visible to staff  - Offer Toileting every 2 Hours, in advance of need  - Initiate/Maintain bed/chair alarm  - Apply yellow socks and bracelet for high fall risk patients  - Consider moving patient to room near nurses station  Outcome: Progressing

## 2024-03-05 VITALS
HEART RATE: 94 BPM | DIASTOLIC BLOOD PRESSURE: 65 MMHG | RESPIRATION RATE: 18 BRPM | HEIGHT: 65 IN | WEIGHT: 141.31 LBS | OXYGEN SATURATION: 91 % | SYSTOLIC BLOOD PRESSURE: 113 MMHG | TEMPERATURE: 97.7 F | BODY MASS INDEX: 23.54 KG/M2

## 2024-03-05 LAB
GLUCOSE SERPL-MCNC: 116 MG/DL (ref 65–140)
GLUCOSE SERPL-MCNC: 95 MG/DL (ref 65–140)

## 2024-03-05 PROCEDURE — 99239 HOSP IP/OBS DSCHRG MGMT >30: CPT

## 2024-03-05 PROCEDURE — 82948 REAGENT STRIP/BLOOD GLUCOSE: CPT

## 2024-03-05 RX ORDER — ACETAMINOPHEN 325 MG/1
650 TABLET ORAL EVERY 6 HOURS PRN
Start: 2024-03-05

## 2024-03-05 RX ORDER — PREDNISONE 20 MG/1
TABLET ORAL DAILY
Qty: 38 TABLET | Refills: 0 | Status: SHIPPED | OUTPATIENT
Start: 2024-03-05 | End: 2024-03-30

## 2024-03-05 RX ADMIN — GUAIFENESIN 1200 MG: 600 TABLET ORAL at 09:04

## 2024-03-05 RX ADMIN — PANTOPRAZOLE SODIUM 40 MG: 40 TABLET, DELAYED RELEASE ORAL at 06:02

## 2024-03-05 RX ADMIN — PREDNISONE 60 MG: 20 TABLET ORAL at 09:04

## 2024-03-05 RX ADMIN — MYCOPHENOLATE MOFETIL 1000 MG: 250 CAPSULE ORAL at 09:02

## 2024-03-05 RX ADMIN — METHENAMINE HIPPURATE 1 G: 1 TABLET ORAL at 09:04

## 2024-03-05 RX ADMIN — SUCRALFATE 1 G: 1 TABLET ORAL at 11:27

## 2024-03-05 RX ADMIN — OXYCODONE HYDROCHLORIDE AND ACETAMINOPHEN 500 MG: 500 TABLET ORAL at 09:04

## 2024-03-05 RX ADMIN — CEFEPIME HYDROCHLORIDE 2000 MG: 2 INJECTION, SOLUTION INTRAVENOUS at 09:06

## 2024-03-05 RX ADMIN — ENOXAPARIN SODIUM 40 MG: 40 INJECTION SUBCUTANEOUS at 09:04

## 2024-03-05 RX ADMIN — SUCRALFATE 1 G: 1 TABLET ORAL at 06:02

## 2024-03-05 NOTE — QUICK NOTE
Reviewed chart and discussed case with SLIM A but patient was not examined.  Plan is to transition to hospice care and discharge today.  Recommend discharge on prednisone 50 mg daily tapering by 10 mg every 5 days until completion.  Agree with discontinuation of CellCept and atovaquone.  Pulmonary will sign off.  Call with questions.

## 2024-03-05 NOTE — ASSESSMENT & PLAN NOTE
Recently admitted with COVID-19 infection tested positive on 2/4, still testing positive from previous infection. Completed 10 days of isolation per COVID isolation precautions  Was initially on solumedrol, now on Prednisone 60 mg daily with taper to aid in comfort

## 2024-03-05 NOTE — CASE MANAGEMENT
Case Management Discharge Planning Note    Patient name Nina Tran  Location /-01 MRN [id number]  : 1945 Date 3/5/2024       Current Admission Date: 2024  Current Admission Diagnosis:Acute respiratory failure with hypoxia (HCC)   Patient Active Problem List    Diagnosis Date Noted    Goals of care, counseling/discussion 2024    Epigastric pain 2024    GERD (gastroesophageal reflux disease) 2024    Other chest pain 2024    Pneumonia 2024    Bacteremia 2024    Acute respiratory failure with hypoxia (HCC) 2024    Sepsis (HCC) 2024    COVID-19 2024    Generalized weakness 2024    Ambulatory dysfunction 2024    Thymoma 2024      LOS (days): 17  Geometric Mean LOS (GMLOS) (days): 5.2  Days to GMLOS:-11.8     OBJECTIVE:  Risk of Unplanned Readmission Score: 18.95         Current admission status: Inpatient   Preferred Pharmacy:   [facility]/pharmacy #[id number] - [address] [address]  Phone: [phone] Fax: [phone]    Primary Care Provider: Emely Will DO    Primary Insurance: MEDICARE  Secondary Insurance: [facility]    DISCHARGE DETAILS:    Discharge planning discussed with:: Spouse  Freedom of Choice: Yes  Comments - Freedom of Choice: [facility]  CM contacted family/caregiver?: Yes  Were Treatment Team discharge recommendations reviewed with patient/caregiver?: Yes  Did patient/caregiver verbalize understanding of patient care needs?: Yes  Were patient/caregiver advised of the risks associated with not following Treatment Team discharge recommendations?: Yes    Contacts  Patient Contacts: Abelardo Tran, spouse  Contact Method: Phone  Phone Number: [phone] (L)  Reason/Outcome: Continuity of Care, Discharge Planning    Requested Home Health Care         Is the patient interested in HHC at discharge?: No         Other Referral/Resources/Interventions  Provided:  Interventions: Hospice  Referral Comments: [facility], will admit at home 3/5 after 1200         Treatment Team Recommendation: Hospice  Discharge Destination Plan:: Hospice  Transport at Discharge : [facility]  Dispatcher Contacted: Yes        ETA of Transport (Date): 03/05/24  ETA of Transport (Time): 1200     Transfer Mode: [facility] claimed the ride for Nina Marc in unit/room  bed -01, and will arrive on 03/05/2024 at 12:00pm EST. Contact them at [phone].

## 2024-03-05 NOTE — PLAN OF CARE
Problem: PAIN - ADULT  Goal: Verbalizes/displays adequate comfort level or baseline comfort level  Description: Interventions:  - Encourage patient to monitor pain and request assistance  - Assess pain using appropriate pain scale0-10  - Administer analgesics based on type and severity of pain and evaluate response  - Implement non-pharmacological measures as appropriate and evaluate response  - Consider cultural and social influences on pain and pain management  - Notify physician/advanced practitioner if interventions unsuccessful or patient reports new pain  Outcome: Progressing     Problem: INFECTION - ADULT  Goal: Absence or prevention of progression during hospitalization  Description: INTERVENTIONS:  - Assess and monitor for signs and symptoms of infection  - Monitor lab/diagnostic results  - Monitor all insertion sites, i.e. indwelling lines, tubes, and drains  - Monitor endotracheal if appropriate and nasal secretions for changes in amount and color  - [place] appropriate cooling/warming therapies per order  - Administer medications as ordered  - Instruct and encourage patient and family to use good hand hygiene technique  - Identify and instruct in appropriate isolation precautions for identified infection/condition  Outcome: Progressing     Problem: SAFETY ADULT  Goal: Patient will remain free of falls  Description: INTERVENTIONS:  - Educate patient/family on patient safety including physical limitations  - Instruct patient to call for assistance with activity   - Consult OT/PT to assist with strengthening/mobility   - Keep Call bell within reach  - Keep bed low and locked with side rails adjusted as appropriate  - Keep care items and personal belongings within reach  - Initiate and maintain comfort rounds  - Make Fall Risk Sign visible to staff  - Offer Toileting every 2 Hours, in advance of need  - Initiate/Maintain bed/chair alarm  - Apply yellow socks and bracelet for high fall risk patients  -  Consider moving patient to room near nurses station  Outcome: Progressing  Goal: Maintain or return to baseline ADL function  Description: INTERVENTIONS:  -  Assess patient's ability to carry out ADLs; assess patient's baseline for ADL function and identify physical deficits which impact ability to perform ADLs (bathing, care of mouth/teeth, toileting, grooming, dressing, etc.)  - Assess/evaluate cause of self-care deficits   - Assess range of motion  - Assess patient's mobility; develop plan if impaired  - Assess patient's need for assistive devices and provide as appropriate  - Encourage maximum independence but intervene and supervise when necessary  - Involve family in performance of ADLs  - Assess for home care needs following discharge   - Consider OT consult to assist with ADL evaluation and planning for discharge  - Provide patient education as appropriate  Outcome: Progressing  Goal: Maintains/Returns to pre admission functional level  Description: INTERVENTIONS:  - Perform AM-PAC 6 Click Basic Mobility/ Daily Activity assessment daily.  - Set and communicate daily mobility goal to care team and patient/family/caregiver.   - Collaborate with rehabilitation services on mobility goals if consulted  - Perform Range of Motion 4 times a day.  - Reposition patient every 2 hours.  - Ambulate patient 4 times a day  - Out of bed to chair 3 times a day   - Out of bed for meals 3 times a day  - Out of bed for toileting  - Record patient progress and toleration of activity level   Outcome: Progressing     Problem: DISCHARGE PLANNING  Goal: Discharge to home or other facility with appropriate resources  Description: INTERVENTIONS:  - Identify barriers to discharge w/patient and caregiver  - Arrange for needed discharge resources and transportation as appropriate  - Identify discharge learning needs (meds, wound care, etc.)  - Arrange for interpretive services to assist at discharge as needed  - Refer to Case Management  Department for coordinating discharge planning if the patient needs post-hospital services based on physician/advanced practitioner order or complex needs related to functional status, cognitive ability, or social support system  Outcome: Progressing     Problem: Knowledge Deficit  Goal: Patient/family/caregiver demonstrates understanding of disease process, treatment plan, medications, and discharge instructions  Description: Complete learning assessment and assess knowledge base.  Interventions:  - Provide teaching at level of understanding  - Provide teaching via preferred learning methods  Outcome: Progressing     Problem: RESPIRATORY - ADULT  Goal: Achieves optimal ventilation and oxygenation  Description: INTERVENTIONS:  - Assess for changes in respiratory status  - Assess for changes in mentation and behavior  - Position to facilitate oxygenation and minimize respiratory effort  - Oxygen administered by appropriate delivery if ordered  - Initiate smoking cessation education as indicated  - Encourage broncho-pulmonary hygiene including cough, deep breathe, Incentive Spirometry  - Assess the need for suctioning and aspirate as needed  - Assess and instruct to report SOB or any respiratory difficulty  - Respiratory Therapy support as indicated  Outcome: Progressing     Problem: Prexisting or High Potential for Compromised Skin Integrity  Goal: Skin integrity is maintained or improved  Description: INTERVENTIONS:  - Identify patients at risk for skin breakdown  - Assess and monitor skin integrity  - Assess and monitor nutrition and hydration status  - Monitor labs   - Assess for incontinence   - Turn and reposition patient  - Assist with mobility/ambulation  - Relieve pressure over bony prominences  - Avoid friction and shearing  - Provide appropriate hygiene as needed including keeping skin clean and dry  - Evaluate need for skin moisturizer/barrier cream  - Collaborate with interdisciplinary team   -  Patient/family teaching  - Consider wound care consult   Outcome: Progressing     Problem: Potential for Falls  Goal: Patient will remain free of falls  Description: INTERVENTIONS:  - Educate patient/family on patient safety including physical limitations  - Instruct patient to call for assistance with activity   - Consult OT/PT to assist with strengthening/mobility   - Keep Call bell within reach  - Keep bed low and locked with side rails adjusted as appropriate  - Keep care items and personal belongings within reach  - Initiate and maintain comfort rounds  - Make Fall Risk Sign visible to staff  - Offer Toileting every 2 Hours, in advance of need  - Initiate/Maintain bed/chair alarm  - Apply yellow socks and bracelet for high fall risk patients  - Consider moving patient to room near nurses station  Outcome: Progressing

## 2024-03-05 NOTE — ASSESSMENT & PLAN NOTE
Suspected nosocomial/gram-negative bacteria: Pneumonia superimposed on ILD exacerbation.    Was on cefepime given episode of fever on 2/24.  Initial plan was to continue cefepime until 3/4 and then changed to oral however had another episode of fever today 3/2  Has been afebrile since -  Has finished course recommended by ID - transition to hospice at [place]     See plan as above.

## 2024-03-05 NOTE — PROGRESS NOTES
Patient:    MRN:  [id number]    Aidin Request ID:  [id number]    Level of care reserved:  Hospice    Partner Reserved:  [facility], [place] [id number] [phone]    Clinical needs requested:    Geography searched:  [phone]    Start of Service:    Request sent:  4:26pm EST on 3/3/2024 by Desirae Abraham    Partner reserved:  4:05pm EST on 3/4/2024 by Adamaris Eden    Choice list shared:  4:04pm EST on 3/4/2024 by Adamaris Eden

## 2024-03-05 NOTE — DISCHARGE SUMMARY
"[facility]  Discharge- Nina Tran 1945, 78 y.o. female MRN: [id number]  Unit/Bed#: -[facility] Encounter: [id number]  Primary Care Provider: Emely Will,    Date and time admitted to hospital: 2/17/2024  7:39 AM    Goals of care, counseling/discussion  Assessment & Plan  3/3/24 per discussion with patient , with her  and her RN at the bedside by previous provider:   Patient confirmed repeatedly that she does not want continue with full treatment and she \"wants to die\"!! Explained to the patient the options of management, provided info on what is comfort care measure vs full treatment. Also asked her to repeate to us and confirm her understanding of current health condition, the consequences of stopping ongoing antibiotics and steroids and she confirmed understanding, noting she is tired of all theses ongoing treatments and she understand that stopping them would result in her dying sooner, asking for comfort measures only.     Discussed with patient again today and patient's  via phone after they had spoken with hospice liaison -confirm wanting comfort measures only, want home care  Case management involved to set up home care referrals for hospice      Pneumonia  Assessment & Plan  Suspected nosocomial/gram-negative bacteria: Pneumonia superimposed on ILD exacerbation.    Was on cefepime given episode of fever on 2/24.  Initial plan was to continue cefepime until 3/4 and then changed to oral however had another episode of fever today 3/2  Has been afebrile since -  Has finished course recommended by ID - transition to hospice at [place]     See plan as above.    Other chest pain  Assessment & Plan  Presented to ED with worsening left-sided chest pain and worsening shortness of breath.  Suspected secondary to ILD exacerbation versus underlying pneumonia.  CTA chest was unremarkable for any PE.  Low suspicion for ACS.  S/p EGD showing large hiatal " hernia    Finished carafate treatment inpatient     Thymoma  Assessment & Plan  History of thymoma s/p removal in 2013   F/u outpatient     Generalized weakness  Assessment & Plan  POA with complaints of generalized weakness suspect secondary to resp issues  PT/OT consult: II (Moderate Resource Intensity) will need to go to rehab on discharge but overall her prognosis is guarded due to her worsening respiratory status  After goals of care discussion patient will be going home with home hospice care    COVID-19  Assessment & Plan  Recently admitted with COVID-19 infection tested positive on 2/4, still testing positive from previous infection. Completed 10 days of isolation per COVID isolation precautions  Was initially on solumedrol, now on Prednisone 60 mg daily with taper to aid in comfort    * Acute respiratory failure with hypoxia (HCC)  Assessment & Plan  Presented with shortness of breath  She was recently admitted for COVID infection and sepsis requiring 2 L during recent admission  Was initially managed to stepdown due to worsening oxygen requirement up to 8 L mid flow  CTA chest showing no PE-does show groundglass opacity and interstitial marking bilaterally with suspicion for fibrosis  Repeat CT chest without contrast showing developing pneumonia on 3/2  She is immune compromised with CellCept, steroid and follows with ID and pulmonology at [place], also on PJP prophylaxis with atovaquone     Has had intermittent fevers -afebrile since 3/2  ID following for pneumonia in setting immunocompromise status - finished recommended course of abx  See under pneumonia    Patient planning to transition to hospice on discharge            Medical Problems       Resolved Problems  Date Reviewed: 2/29/2024   None       Discharging Physician / Practitioner: Kathy Seo PA-C  PCP: Emely Will, DO  Admission Date:   Admission Orders (From admission, onward)       Ordered        02/17/24 0913  INPATIENT ADMISSION   Once                          Discharge Date: 03/05/24    Consultations During Hospital Stay:  GI  Pulmonology   Critical care  ID  Palliative care    Procedures Performed:   EGD    Significant Findings / Test Results:   Procedure: XR chest portable - Result Date: 2/25/2024  Impression: No significant interval change in bibasilar groundglass opacities and interstitial thickening.     Procedure: XR chest portable - Result Date: 2/23/2024  Impression: Persistent increased bilateral groundglass airspace infiltrates which may reflect worsening COVID-19 pneumonia, especially left base     Procedure: XR chest portable - Result Date: 2/21/2024  Impression: Mild cardiomegaly. Slight vascular congestion. Peripheral bibasilar groundglass airspace opacities which may reflect COVID-19 pneumonia.     Procedure: PE Study with CT Abdomen and Pelvis with contrast - Result Date: 2/17/2024  Impression: No pulmonary embolism identified. Unchanged groundglass opacities and interstitial marking thickening with bibasal predominance. Appearance favors fibrosis but acute process cannot be completely excluded. Featureless appearance, wall thickening and under distention of the transverse and left colon including rectum. Correlate to exclude history or symptoms of colitis. Ulcerative colitis is considered but usually presents at earlier age, correlate with history. Chronic findings, as per the body of the report.     Procedure: Echo complete w/ contrast if indicated - Result Date: 2/5/2024  Narrative:   Left Ventricle: Left ventricular cavity size is normal. Wall thickness is moderately increased. The left ventricular ejection fraction is 65-69 %. Systolic function is normal. Wall motion is normal. Diastolic function is mildly abnormal, consistent with grade I (abnormal) relaxation.   Right Ventricle: Right ventricular cavity size is normal. Systolic function is normal.   Mitral Valve: There is mild regurgitation.   Tricuspid Valve: There  is mild regurgitation.   IVC/SVC: The inferior vena cava size is 13.0 mm. The right atrial pressure is estimated at 3.0 mmHg. The inferior vena cava is normal in size. Respirophasic changes were normal.     Procedure: XR humerus LEFT - Result Date: 2/5/2024  Impression: No acute osseous abnormality. Opacities in the left lung could represent pneumonia or sequelae of interstitial lung disease. Correlate for acute respiratory symptoms.     Procedure: XR humerus RIGHT - Result Date: 2/5/2024  Impression: No acute osseous abnormality.    Procedure: CTA chest pe study - Result Date: 2/4/2024  Impression: No pulmonary embolism. Redemonstrated diffuse interlobular septal thickening and bilateral groundglass opacities most pronounced in the lung bases and mild bronchiectasis in the right upper lobe and bilateral lower lobes. Findings may be due to infectious/inflammatory etiology  such as COVID-pneumonia, pulmonary edema, or underlying pulmonary fibrosis. Recommend follow-up high-resolution chest CT in 3 months for reassessment.     Procedure: CT chest abdomen pelvis wo contrast - Result Date: 2/4/2024  Impression: Although the study was limited by the lack of intravenous contrast, there is no gross evidence of solid organ injury. No acute intra-abdominal abnormality. No free air or free fluid. Diffuse nonspecific interlobular septal thickening with areas of groundglass haziness with differential considerations including an infectious or inflammatory process, pulmonary edema, among other etiologies. Correlation with the patient's symptoms and laboratory studies is recommended. A repeat high-resolution CT chest in 6 to 8 weeks is recommended to assess for improvement/resolution and exclude an underlying lesion.     Procedure: CT cervical spine without contrast - Result Date: 2/4/2024  Impression: No cervical spine fracture or traumatic malalignment.     Procedure: CT head without contrast - Result Date: 2/4/2024  Impression:  No acute intracranial abnormality.      Incidental Findings:   See above     Test Results Pending at Discharge (will require follow up):   None     Outpatient Tests Requested:  None    Complications:  none    Reason for Admission: Acute respiratory failure     Hospital Course:   Nina Tran is a 78 y.o. female patient with a PMH of  thymus cancer, anemia, recurrent UTI's, GERD, hyperlipidemia, recent admission with COVID infection who originally presented to the hospital on 2/17/2024 due to shortness of breath and left sided chest pain x 3 days.  She was found to require 2 L and was found to have pneumonia at time of admission was placed on IV antibiotics.  PE was ruled out.  O2 requirements continue to increase.  Her cardiac workup for her chest pain was negative.  She had some epigastric pain that is more consistent with GERD and GI was consulted, EGD performed with hiatal hernia and she was given a course of Carafate which she finished as well as maintained on Protonix.    Pulmonology was consulted given patient's interstitial lung disease and increasing oxygen requirements.  She was started on IV steroids for suspected ILD flare.  She was trialed on oral steroids however next day placed back on IV due to decompensation and increased oxygen requirements.  She spiked was intermittently while being on IV antibiotics, infectious disease was consulted and recommended continuing course of antibiotics with broader spectrum, changed to Zosyn then Zosyn was stopped and she was observed off antibiotics with still fever was placed on cefepime.  Her oxygen requirements increased after EGD and she was on 6 to 8 L mid flow.  She was transitioned and lower down on her oxygen, put on oral prednisone and was to continue antibiotics through 3/4.  Per IDs recommendations.  With her poor functional status patient expressed wishes to talk with palliative care.  Initially she wanted full medical treatment.  Over last weekend she was  "reported wishes that she would like to be discharged on hospice.  Goals of care discussion was had with her and her  and hospice was consulted.  They are both agreeable and she will be discharged to home with home hospice today.    Please see above list of diagnoses and related plan for additional information.     Condition at Discharge:  Guarded    Discharge Day Visit / Exam:   Subjective: Seen and examined.  Patient expresses desire to go home today.  She feels short of breath however denies pain.     Vitals: Blood Pressure: 113/65 (03/05/24 0745)  Pulse: 94 (03/05/24 0745)  Temperature: 97.7 °F (36.5 °C) (03/04/24 1551)  Temp Source: Temporal (03/03/24 2223)  Respirations: 18 (03/05/24 0745)  Height: 5' 5\" (165.1 cm) (02/24/24 1024)  Weight - Scale: 64.1 kg (141 lb 5 oz) (02/24/24 1003)  SpO2: 91 % (03/05/24 0745)  Exam:   Physical Exam  Vitals and nursing note reviewed.   Constitutional:       General: She is not in acute distress.     Appearance: Normal appearance. She is ill-appearing.   HENT:      Head: Normocephalic and atraumatic.      Nose: No congestion.      Mouth/Throat:      Mouth: Mucous membranes are moist.   Eyes:      Conjunctiva/sclera: Conjunctivae normal.   Cardiovascular:      Rate and Rhythm: Normal rate and regular rhythm.      Pulses: Normal pulses.      Heart sounds: Normal heart sounds. No murmur heard.  Pulmonary:      Effort: Pulmonary effort is normal. No respiratory distress.      Breath sounds: Rhonchi and rales present.      Comments: diminished  Abdominal:      General: Bowel sounds are normal.      Palpations: Abdomen is soft.      Tenderness: There is no abdominal tenderness.   Musculoskeletal:         General: Normal range of motion.      Right lower leg: No edema.      Left lower leg: No edema.   Skin:     General: Skin is warm and dry.   Neurological:      Mental Status: She is alert and oriented to person, place, and time.          Discussion with Family: Updated "  () via phone.    Discharge instructions/Information to patient and family:   See after visit summary for information provided to patient and family.      Provisions for Follow-Up Care:  See after visit summary for information related to follow-up care and any pertinent home health orders.      Mobility at time of Discharge:   Basic Mobility Inpatient Raw Score: 12  JH-HLM Goal: 4: Move to chair/commode  JH-HLM Achieved: 1: Laying in bed  HLM Goal NOT achieved. Continue to encourage mobility in post discharge setting.     Disposition:   Home with VNA Services (Reminder: Complete face to face encounter)    Planned Readmission: None     Discharge Statement:  I spent  minutes discharging the patient. This time was spent on the day of discharge. I had direct contact with the patient on the day of discharge. Greater than 50% of the total time was spent examining patient, answering all patient questions, arranging and discussing plan of care with patient as well as directly providing post-discharge instructions.  Additional time then spent on discharge activities.    Discharge Medications:  See after visit summary for reconciled discharge medications provided to patient and/or family.      **Please Note: This note may have been constructed using a voice recognition system**

## 2024-03-05 NOTE — ASSESSMENT & PLAN NOTE
Presented to ED with worsening left-sided chest pain and worsening shortness of breath.  Suspected secondary to ILD exacerbation versus underlying pneumonia.  CTA chest was unremarkable for any PE.  Low suspicion for ACS.  S/p EGD showing large hiatal hernia    Finished carafate treatment inpatient

## 2024-03-05 NOTE — ASSESSMENT & PLAN NOTE
Presented with shortness of breath  She was recently admitted for COVID infection and sepsis requiring 2 L during recent admission  Was initially managed to stepdown due to worsening oxygen requirement up to 8 L mid flow  CTA chest showing no PE-does show groundglass opacity and interstitial marking bilaterally with suspicion for fibrosis  Repeat CT chest without contrast showing developing pneumonia on 3/2  She is immune compromised with CellCept, steroid and follows with ID and pulmonology at [place], also on PJP prophylaxis with atovaquone     Has had intermittent fevers -afebrile since 3/2  ID following for pneumonia in setting immunocompromise status - finished recommended course of abx  See under pneumonia    Patient planning to transition to hospice on discharge

## 2024-03-05 NOTE — PLAN OF CARE
Problem: PAIN - ADULT  Goal: Verbalizes/displays adequate comfort level or baseline comfort level  Description: Interventions:  - Encourage patient to monitor pain and request assistance  - Assess pain using appropriate pain scale0-10  - Administer analgesics based on type and severity of pain and evaluate response  - Implement non-pharmacological measures as appropriate and evaluate response  - Consider cultural and social influences on pain and pain management  - Notify physician/advanced practitioner if interventions unsuccessful or patient reports new pain  Outcome: Adequate for Discharge     Problem: INFECTION - ADULT  Goal: Absence or prevention of progression during hospitalization  Description: INTERVENTIONS:  - Assess and monitor for signs and symptoms of infection  - Monitor lab/diagnostic results  - Monitor all insertion sites, i.e. indwelling lines, tubes, and drains  - Monitor endotracheal if appropriate and nasal secretions for changes in amount and color  - [place] appropriate cooling/warming therapies per order  - Administer medications as ordered  - Instruct and encourage patient and family to use good hand hygiene technique  - Identify and instruct in appropriate isolation precautions for identified infection/condition  Outcome: Adequate for Discharge     Problem: SAFETY ADULT  Goal: Patient will remain free of falls  Description: INTERVENTIONS:  - Educate patient/family on patient safety including physical limitations  - Instruct patient to call for assistance with activity   - Consult OT/PT to assist with strengthening/mobility   - Keep Call bell within reach  - Keep bed low and locked with side rails adjusted as appropriate  - Keep care items and personal belongings within reach  - Initiate and maintain comfort rounds  - Make Fall Risk Sign visible to staff  - Offer Toileting every 2 Hours, in advance of need  - Initiate/Maintain bed/chair alarm  - Apply yellow socks and bracelet for high  fall risk patients  - Consider moving patient to room near nurses station  Outcome: Adequate for Discharge  Goal: Maintain or return to baseline ADL function  Description: INTERVENTIONS:  -  Assess patient's ability to carry out ADLs; assess patient's baseline for ADL function and identify physical deficits which impact ability to perform ADLs (bathing, care of mouth/teeth, toileting, grooming, dressing, etc.)  - Assess/evaluate cause of self-care deficits   - Assess range of motion  - Assess patient's mobility; develop plan if impaired  - Assess patient's need for assistive devices and provide as appropriate  - Encourage maximum independence but intervene and supervise when necessary  - Involve family in performance of ADLs  - Assess for home care needs following discharge   - Consider OT consult to assist with ADL evaluation and planning for discharge  - Provide patient education as appropriate  Outcome: Adequate for Discharge  Goal: Maintains/Returns to pre admission functional level  Description: INTERVENTIONS:  - Perform AM-PAC 6 Click Basic Mobility/ Daily Activity assessment daily.  - Set and communicate daily mobility goal to care team and patient/family/caregiver.   - Collaborate with rehabilitation services on mobility goals if consulted  - Perform Range of Motion 4 times a day.  - Reposition patient every 2 hours.  - Ambulate patient 4 times a day  - Out of bed to chair 3 times a day   - Out of bed for meals 3 times a day  - Out of bed for toileting  - Record patient progress and toleration of activity level   Outcome: Adequate for Discharge     Problem: DISCHARGE PLANNING  Goal: Discharge to home or other facility with appropriate resources  Description: INTERVENTIONS:  - Identify barriers to discharge w/patient and caregiver  - Arrange for needed discharge resources and transportation as appropriate  - Identify discharge learning needs (meds, wound care, etc.)  - Arrange for interpretive services to  assist at discharge as needed  - Refer to Case Management Department for coordinating discharge planning if the patient needs post-hospital services based on physician/advanced practitioner order or complex needs related to functional status, cognitive ability, or social support system  Outcome: Adequate for Discharge     Problem: Knowledge Deficit  Goal: Patient/family/caregiver demonstrates understanding of disease process, treatment plan, medications, and discharge instructions  Description: Complete learning assessment and assess knowledge base.  Interventions:  - Provide teaching at level of understanding  - Provide teaching via preferred learning methods  Outcome: Adequate for Discharge     Problem: RESPIRATORY - ADULT  Goal: Achieves optimal ventilation and oxygenation  Description: INTERVENTIONS:  - Assess for changes in respiratory status  - Assess for changes in mentation and behavior  - Position to facilitate oxygenation and minimize respiratory effort  - Oxygen administered by appropriate delivery if ordered  - Initiate smoking cessation education as indicated  - Encourage broncho-pulmonary hygiene including cough, deep breathe, Incentive Spirometry  - Assess the need for suctioning and aspirate as needed  - Assess and instruct to report SOB or any respiratory difficulty  - Respiratory Therapy support as indicated  Outcome: Adequate for Discharge     Problem: Prexisting or High Potential for Compromised Skin Integrity  Goal: Skin integrity is maintained or improved  Description: INTERVENTIONS:  - Identify patients at risk for skin breakdown  - Assess and monitor skin integrity  - Assess and monitor nutrition and hydration status  - Monitor labs   - Assess for incontinence   - Turn and reposition patient  - Assist with mobility/ambulation  - Relieve pressure over bony prominences  - Avoid friction and shearing  - Provide appropriate hygiene as needed including keeping skin clean and dry  - Evaluate need  for skin moisturizer/barrier cream  - Collaborate with interdisciplinary team   - Patient/family teaching  - Consider wound care consult   Outcome: Adequate for Discharge     Problem: Potential for Falls  Goal: Patient will remain free of falls  Description: INTERVENTIONS:  - Educate patient/family on patient safety including physical limitations  - Instruct patient to call for assistance with activity   - Consult OT/PT to assist with strengthening/mobility   - Keep Call bell within reach  - Keep bed low and locked with side rails adjusted as appropriate  - Keep care items and personal belongings within reach  - Initiate and maintain comfort rounds  - Make Fall Risk Sign visible to staff  - Offer Toileting every 2 Hours, in advance of need  - Initiate/Maintain bed/chair alarm  - Apply yellow socks and bracelet for high fall risk patients  - Consider moving patient to room near nurses station  Outcome: Adequate for Discharge